# Patient Record
Sex: FEMALE | Race: WHITE | Employment: OTHER | URBAN - METROPOLITAN AREA
[De-identification: names, ages, dates, MRNs, and addresses within clinical notes are randomized per-mention and may not be internally consistent; named-entity substitution may affect disease eponyms.]

---

## 2017-01-31 PROBLEM — M43.10 ANTEROLISTHESIS: Status: ACTIVE | Noted: 2017-01-31

## 2017-01-31 PROBLEM — M54.50 CHRONIC BILATERAL LOW BACK PAIN WITHOUT SCIATICA: Status: ACTIVE | Noted: 2017-01-31

## 2017-01-31 PROBLEM — M54.16 LUMBAR RADICULOPATHY: Status: ACTIVE | Noted: 2017-01-31

## 2017-01-31 PROBLEM — M48.062 LUMBAR STENOSIS WITH NEUROGENIC CLAUDICATION: Status: ACTIVE | Noted: 2017-01-31

## 2017-01-31 PROBLEM — G89.29 CHRONIC BILATERAL LOW BACK PAIN WITHOUT SCIATICA: Status: ACTIVE | Noted: 2017-01-31

## 2017-11-26 PROBLEM — N81.4 UTEROVAGINAL PROLAPSE: Status: ACTIVE | Noted: 2017-11-26

## 2017-11-27 PROBLEM — Z90.710 STATUS POST HYSTERECTOMY: Status: ACTIVE | Noted: 2017-11-27

## 2017-11-29 PROBLEM — Z90.710 STATUS POST HYSTERECTOMY: Status: RESOLVED | Noted: 2017-11-27 | Resolved: 2017-11-29

## 2017-11-29 PROBLEM — N81.4 UTEROVAGINAL PROLAPSE: Status: RESOLVED | Noted: 2017-11-26 | Resolved: 2017-11-29

## 2018-03-26 ENCOUNTER — OFFICE VISIT (OUTPATIENT)
Dept: PHYSICAL MEDICINE AND REHAB | Age: 68
End: 2018-03-26
Payer: MEDICARE

## 2018-03-26 VITALS
WEIGHT: 252 LBS | DIASTOLIC BLOOD PRESSURE: 88 MMHG | SYSTOLIC BLOOD PRESSURE: 134 MMHG | BODY MASS INDEX: 36.08 KG/M2 | HEIGHT: 70 IN | OXYGEN SATURATION: 99 % | HEART RATE: 54 BPM

## 2018-03-26 DIAGNOSIS — M54.16 LUMBAR RADICULOPATHY: Primary | ICD-10-CM

## 2018-03-26 DIAGNOSIS — G89.29 CHRONIC BILATERAL LOW BACK PAIN WITHOUT SCIATICA: ICD-10-CM

## 2018-03-26 DIAGNOSIS — M70.61 TROCHANTERIC BURSITIS OF RIGHT HIP: ICD-10-CM

## 2018-03-26 DIAGNOSIS — M48.062 LUMBAR STENOSIS WITH NEUROGENIC CLAUDICATION: ICD-10-CM

## 2018-03-26 DIAGNOSIS — M47.896 OTHER SPONDYLOSIS, LUMBAR REGION: ICD-10-CM

## 2018-03-26 DIAGNOSIS — M54.50 CHRONIC BILATERAL LOW BACK PAIN WITHOUT SCIATICA: ICD-10-CM

## 2018-03-26 PROCEDURE — 99214 OFFICE O/P EST MOD 30 MIN: CPT | Performed by: PHYSICAL MEDICINE & REHABILITATION

## 2018-03-26 NOTE — PROGRESS NOTES
Silke Garcia D.O. [facility]  [address] [facility], [address] [place] [address], Jeronimo Maria  Phone: [phone]  Fax: [phone]      3/27/18    Chief Complaint   Patient presents with    Lower Back Pain     6 Week Follow Up        HPI:  Rosalind Stevens is a 69y.o. year old woman seen today in follow up regarding low back pain. Interval history: Since the last visit the patient states her right lateral hip pain has resolved with the exercises. She completed PT and is doing the home exercise daily. She has continued with Celebrex and Tylenol arthritis. Today, the pain is rated Pain Score:   5 where 0 is no pain and 10 is pain as bad as it can be. The pain is located in the axial lumbar spine, right lateral hip and  does not radiate, and is described as aching, tight. This pain occurs in the morning. The symptoms have been unchanged since onset. Symptoms are exacerbated by walking. Factors which relieve the pain include hot shower, rest and sitting. Other associated symptoms include weakness in the right leg, weakness in the left leg, tingling in the right leg and tingling in the left leg. Otherwise, the pain assessment has not changed since the last visit.      Past Medical History:   Diagnosis Date    Arthritis     Dry eyes, bilateral     Female bladder prolapse 05/2017    Hypertension     OAB (overactive bladder)      Past Surgical History:   Procedure Laterality Date    ABDOMEN SURGERY      BUNIONECTOMY      bilteral twice    CHOLECYSTECTOMY      COLONOSCOPY      x 2    HYSTERECTOMY  11/27/2017    robotic assisted bilateral alpingo-oophorectomy lap robotic assisted abdominal sacral colopexy with y mesh    JOINT REPLACEMENT      bilateral knees, right shoulder replaced    KNEE ARTHROPLASTY Bilateral     SHOULDER SURGERY  09/20/2016    right total shoulder reverse arthroplasty     Social History     Social History    Marital status:  and pin prick in all upper and lower extremity dermatomes. Vibration and proprioception are intact at the bilateral first MTP. Strength: 4/5 bilateral hip flexion and abduction, otherwise is 5/5 in all myotomes in the upper and lower extremities. Muscle Tendon Reflexes: 2+ symmetric in the bilateral upper and lower extremities. Babinski is downgoing bilaterally. Beau Jun is negative bilaterally. Gait is Tredellenberg, antalgic with cane. Romberg is negative. Heel and toe walk are normal.  Tandem walk is normal.  No clonus or spasticity. The patient was able to rise from a chair and squat without difficulty. There is no tremor. Impression:   1. Lumbar radiculopathy    2. Chronic bilateral low back pain without sciatica    3. Lumbar stenosis with neurogenic claudication    4. Trochanteric bursitis of right hip    5. Other spondylosis, lumbar region         Plan:  Orders Placed This Encounter   Procedures    AK INJ DX/THER AGNT PARAVERT FACET JOINT, LUMBAR/SAC, 1ST LEVEL     Bilateral intra-articular facet joint injection with fluoroscopic guidance at L3-4 and L4-5  by Dr. Sarah Muir     Weight loss is recommended. Patient was educated regarding reasons to seek urgent medical care including new and/ or progressive weakness in her legs, saddle anesthesia, loss of control of bowel and bladder, falls and difficulty ambulating. The patient was educated about the diagnosis, prognosis, indications, risks and benefits of treatment. An opportunity to ask questions was given to the patient and questions were answered. The patient agreed to proceed with the recommended treatment as described above. Follow up after Facet injection  Thank you for allowing me to participate in the care of your patient. Ricky Flowers D.O., P.T.   Board Certified Physical Medicine and Rehabilitation  Board Certified Electrodiagnostic Medicine

## 2018-04-24 ENCOUNTER — TELEPHONE (OUTPATIENT)
Dept: PHYSICAL MEDICINE AND REHAB | Age: 68
End: 2018-04-24

## 2018-06-28 ENCOUNTER — HOSPITAL ENCOUNTER (OUTPATIENT)
Dept: OPERATING ROOM | Age: 68
Setting detail: OUTPATIENT SURGERY
Discharge: HOME | End: 2018-06-28
Attending: PHYSICAL MEDICINE & REHABILITATION
Payer: MEDICARE

## 2018-06-28 ENCOUNTER — HOSPITAL ENCOUNTER (OUTPATIENT)
Age: 68
Setting detail: OUTPATIENT SURGERY
Discharge: HOME | End: 2018-06-28
Attending: PHYSICAL MEDICINE & REHABILITATION | Admitting: PHYSICAL MEDICINE & REHABILITATION
Payer: MEDICARE

## 2018-06-28 VITALS
HEART RATE: 54 BPM | RESPIRATION RATE: 16 BRPM | OXYGEN SATURATION: 99 % | SYSTOLIC BLOOD PRESSURE: 171 MMHG | DIASTOLIC BLOOD PRESSURE: 86 MMHG

## 2018-06-28 DIAGNOSIS — M54.16 LUMBAR RADICULOPATHY: ICD-10-CM

## 2018-06-28 PROBLEM — M47.819 FACET ARTHROPATHY: Status: ACTIVE | Noted: 2018-06-28

## 2018-06-28 PROCEDURE — 3209999900 FLUORO FOR SURGICAL PROCEDURES

## 2018-06-28 PROCEDURE — 64494 INJ PARAVERT F JNT L/S 2 LEV: CPT | Performed by: PHYSICAL MEDICINE & REHABILITATION

## 2018-06-28 PROCEDURE — 6360000002 HC RX W HCPCS: Performed by: PHYSICAL MEDICINE & REHABILITATION

## 2018-06-28 PROCEDURE — 3600000005 HC SURGERY LEVEL 5 BASE: Performed by: PHYSICAL MEDICINE & REHABILITATION

## 2018-06-28 PROCEDURE — 7100000010 HC PHASE II RECOVERY - FIRST 15 MIN: Performed by: PHYSICAL MEDICINE & REHABILITATION

## 2018-06-28 PROCEDURE — 64493 INJ PARAVERT F JNT L/S 1 LEV: CPT | Performed by: PHYSICAL MEDICINE & REHABILITATION

## 2018-06-28 PROCEDURE — 2500000003 HC RX 250 WO HCPCS: Performed by: PHYSICAL MEDICINE & REHABILITATION

## 2018-06-28 RX ORDER — LIDOCAINE HYDROCHLORIDE 10 MG/ML
INJECTION, SOLUTION EPIDURAL; INFILTRATION; INTRACAUDAL; PERINEURAL PRN
Status: DISCONTINUED | OUTPATIENT
Start: 2018-06-28 | End: 2018-06-28 | Stop reason: HOSPADM

## 2018-06-28 ASSESSMENT — PAIN SCALES - GENERAL
PAINLEVEL_OUTOF10: 0
PAINLEVEL_OUTOF10: 0

## 2018-06-28 ASSESSMENT — PAIN - FUNCTIONAL ASSESSMENT: PAIN_FUNCTIONAL_ASSESSMENT: 0-10

## 2018-06-28 ASSESSMENT — PAIN DESCRIPTION - DESCRIPTORS: DESCRIPTORS: ACHING

## 2018-07-27 ENCOUNTER — OFFICE VISIT (OUTPATIENT)
Dept: PHYSICAL MEDICINE AND REHAB | Age: 68
End: 2018-07-27
Payer: MEDICARE

## 2018-07-27 VITALS
HEIGHT: 70 IN | OXYGEN SATURATION: 97 % | WEIGHT: 253 LBS | HEART RATE: 48 BPM | BODY MASS INDEX: 36.22 KG/M2 | DIASTOLIC BLOOD PRESSURE: 80 MMHG | SYSTOLIC BLOOD PRESSURE: 130 MMHG

## 2018-07-27 DIAGNOSIS — M48.062 LUMBAR STENOSIS WITH NEUROGENIC CLAUDICATION: ICD-10-CM

## 2018-07-27 DIAGNOSIS — M47.896 OTHER SPONDYLOSIS, LUMBAR REGION: ICD-10-CM

## 2018-07-27 DIAGNOSIS — G89.29 CHRONIC BILATERAL LOW BACK PAIN WITHOUT SCIATICA: Primary | ICD-10-CM

## 2018-07-27 DIAGNOSIS — E66.01 MORBID OBESITY DUE TO EXCESS CALORIES (HCC): ICD-10-CM

## 2018-07-27 DIAGNOSIS — M54.50 CHRONIC BILATERAL LOW BACK PAIN WITHOUT SCIATICA: Primary | ICD-10-CM

## 2018-07-27 PROCEDURE — 99213 OFFICE O/P EST LOW 20 MIN: CPT | Performed by: PHYSICAL MEDICINE & REHABILITATION

## 2020-03-06 ENCOUNTER — HOSPITAL ENCOUNTER (OUTPATIENT)
Dept: GENERAL RADIOLOGY | Age: 70
Discharge: HOME | End: 2020-03-08
Payer: MEDICARE

## 2020-03-06 PROCEDURE — 77063 BREAST TOMOSYNTHESIS BI: CPT

## 2020-12-05 ENCOUNTER — HOSPITAL ENCOUNTER (EMERGENCY)
Age: 70
Discharge: HOME | End: 2020-12-05
Attending: EMERGENCY MEDICINE
Payer: MEDICARE

## 2020-12-05 ENCOUNTER — APPOINTMENT (OUTPATIENT)
Dept: CT IMAGING | Age: 70
End: 2020-12-05
Payer: MEDICARE

## 2020-12-05 VITALS
HEART RATE: 61 BPM | BODY MASS INDEX: 35.79 KG/M2 | TEMPERATURE: 97.7 F | OXYGEN SATURATION: 98 % | DIASTOLIC BLOOD PRESSURE: 123 MMHG | WEIGHT: 250 LBS | RESPIRATION RATE: 18 BRPM | HEIGHT: 70 IN | SYSTOLIC BLOOD PRESSURE: 231 MMHG

## 2020-12-05 PROCEDURE — 72125 CT NECK SPINE W/O DYE: CPT

## 2020-12-05 PROCEDURE — 99283 EMERGENCY DEPT VISIT LOW MDM: CPT

## 2020-12-05 PROCEDURE — 70450 CT HEAD/BRAIN W/O DYE: CPT

## 2020-12-05 ASSESSMENT — ENCOUNTER SYMPTOMS
NAUSEA: 0
ABDOMINAL PAIN: 0
VISUAL CHANGE: 0
COUGH: 0
EYE DISCHARGE: 0
SHORTNESS OF BREATH: 0
SORE THROAT: 0
WHEEZING: 0
BACK PAIN: 0
BOWEL INCONTINENCE: 0
EYE REDNESS: 0
DIARRHEA: 0
VOMITING: 0
ABDOMINAL DISTENTION: 0
EYE PAIN: 0
SINUS PRESSURE: 0

## 2020-12-05 NOTE — ED PROVIDER NOTES
Pupils: Pupils are equal, round, and reactive to light. Neck:      Musculoskeletal: Normal range of motion and neck supple. Cardiovascular:      Rate and Rhythm: Normal rate and regular rhythm. Heart sounds: Normal heart sounds. No murmur. Pulmonary:      Effort: Pulmonary effort is normal. No respiratory distress. Breath sounds: Normal breath sounds. No wheezing or rales. Abdominal:      General: Bowel sounds are normal.      Palpations: Abdomen is soft. Tenderness: There is no abdominal tenderness. There is no guarding or rebound. Musculoskeletal:         General: Tenderness present. Cervical back: She exhibits tenderness and bony tenderness. Comments: Midline tenderness around C4-C5. Skin:     General: Skin is warm and dry. Neurological:      Mental Status: She is alert and oriented to person, place, and time. Cranial Nerves: No cranial nerve deficit. Coordination: Coordination normal.          Procedures     MDM            --------------------------------------------- PAST HISTORY ---------------------------------------------  Past Medical History:  has a past medical history of Arthritis, Dry eyes, bilateral, Female bladder prolapse, Hypertension, and OAB (overactive bladder). Past Surgical History:  has a past surgical history that includes Knee Arthroplasty (Bilateral); Bunionectomy; Cholecystectomy; shoulder surgery (09/20/2016); Colonoscopy; joint replacement; Hysterectomy (11/27/2017); Abdomen surgery; Nerve Block (Bilateral, 06/28/2018); and pr kiarra nose/cleft lip/tip (Bilateral, 6/28/2018). Social History:  reports that she has quit smoking. She has never used smokeless tobacco. She reports current alcohol use. She reports that she does not use drugs. Family History: family history is not on file. The patients home medications have been reviewed.     Allergies: Morphine and Pcn [penicillins]    -------------------------------------------------- RESULTS -------------------------------------------------  Labs:  No results found for this visit on 12/05/20. Radiology:  CT HEAD WO CONTRAST   Final Result   1. There is no acute intracranial abnormality. Specifically, there is no   intracranial hemorrhage. 2. Atrophy and periventricular leukomalacia,   3. Right maxillary and right ethmoid sinusitis. There is complete   opacification of the right maxillary sinus. CT CERVICAL SPINE WO CONTRAST   Final Result   1. There is no acute compression fracture or subluxation of the cervical   spine. 2. Multilevel degenerative disc and degenerative joint disease.          ------------------------- NURSING NOTES AND VITALS REVIEWED ---------------------------  Date / Time Roomed:  12/5/2020  1:43 PM  ED Bed Assignment:  16/16    The nursing notes within the ED encounter and vital signs as below have been reviewed. BP (!) 231/123   Pulse 61   Temp 97.7 °F (36.5 °C)   Resp 18   Ht 5' 10\" (1.778 m)   Wt 250 lb (113.4 kg)   SpO2 98%   BMI 35.87 kg/m²   Oxygen Saturation Interpretation: Normal      ------------------------------------------ PROGRESS NOTES ------------------------------------------  2:46 PM EST  I have spoken with the patient and discussed todays results, in addition to providing specific details for the plan of care and counseling regarding the diagnosis and prognosis. Their questions are answered at this time and they are agreeable with the plan. I discussed at length with them reasons for immediate return here for re evaluation. They will followup with their primary care physician by calling their office on Monday.      --------------------------------- ADDITIONAL PROVIDER NOTES ---------------------------------  At this time the patient is without objective evidence of an acute process requiring hospitalization or inpatient management.   They have remained hemodynamically stable throughout their entire ED visit and are stable for discharge with outpatient follow-up. The plan has been discussed in detail and they are aware of the specific conditions for emergent return, as well as the importance of follow-up. New Prescriptions    No medications on file       Diagnosis:  1. Fall, initial encounter        Disposition:  Patient's disposition: Discharge to home  Patient's condition is stable.            Belén Bernal DO  12/05/20 1446

## 2021-10-08 ENCOUNTER — HOSPITAL ENCOUNTER (OUTPATIENT)
Dept: GENERAL RADIOLOGY | Age: 71
Discharge: HOME | End: 2021-10-10
Payer: MEDICARE

## 2021-10-08 DIAGNOSIS — Z12.31 VISIT FOR SCREENING MAMMOGRAM: ICD-10-CM

## 2021-10-08 PROCEDURE — 77063 BREAST TOMOSYNTHESIS BI: CPT

## 2021-12-06 NOTE — PROGRESS NOTES
Patient states she is fully vaccinated against Covid-19. Patient to bring vaccine card on day of procedure. No pre-op testing needed.

## 2021-12-08 ENCOUNTER — PREP FOR PROCEDURE (OUTPATIENT)
Dept: UROLOGY | Age: 71
End: 2021-12-08

## 2021-12-08 RX ORDER — CIPROFLOXACIN 2 MG/ML
400 INJECTION, SOLUTION INTRAVENOUS
Status: CANCELLED | OUTPATIENT
Start: 2021-12-08 | End: 2021-12-08

## 2021-12-08 RX ORDER — SODIUM CHLORIDE 0.9 % (FLUSH) 0.9 %
5-40 SYRINGE (ML) INJECTION PRN
Status: CANCELLED | OUTPATIENT
Start: 2021-12-08

## 2021-12-08 RX ORDER — SODIUM CHLORIDE 9 MG/ML
25 INJECTION, SOLUTION INTRAVENOUS PRN
Status: CANCELLED | OUTPATIENT
Start: 2021-12-08

## 2021-12-08 RX ORDER — SODIUM CHLORIDE 0.9 % (FLUSH) 0.9 %
5-40 SYRINGE (ML) INJECTION EVERY 12 HOURS SCHEDULED
Status: CANCELLED | OUTPATIENT
Start: 2021-12-08

## 2021-12-16 RX ORDER — METOPROLOL SUCCINATE 100 MG/1
100 TABLET, EXTENDED RELEASE ORAL DAILY
COMMUNITY
End: 2022-10-19 | Stop reason: SDUPTHER

## 2021-12-16 NOTE — PROGRESS NOTES
Have you been tested for COVID  Yes           Have you been told you were positive for COVID No  Have you had any known exposure to someone that is positive for COVID No  Do you have a cough                   No              Do you have shortness of breath No                 Do you have a sore throat            No                Are you having chills                    No                Are you having muscle aches. No                    Please come to the hospital wearing a mask and have your significant other wear a mask as well. Both of you should check your temperature before leaving to come here,  if it is 100 or higher please call [phone] for instruction.

## 2021-12-16 NOTE — PROGRESS NOTES
[facility] PRE-ADMISSION TESTING INSTRUCTIONS    The Preadmission Testing patient is instructed accordingly using the following criteria (check applicable):    ARRIVAL INSTRUCTIONS:  [x] Parking the day of Surgery is located in the Main Entrance lot. Upon entering the door, make an immediate right to the surgery reception desk    [x] Bring photo ID and insurance card    [x] Bring in a copy of Living will or Durable Power of  papers. [x] Please be sure to arrange for responsible adult to provide transportation to and from the hospital    [x] Please arrange for responsible adult to be with you for the 24 hour period post procedure due to having anesthesia      GENERAL INSTRUCTIONS:    [x] Nothing by mouth after midnight, including gum, candy, mints or water    [x] You may brush your teeth, but do not swallow any water    [x] Take medications as instructed with 1-2 oz of water    [x] Stop herbal supplements and vitamins 5 days prior to procedure    [x] Follow preop dosing of blood thinners per physician instructions    [] Take 1/2 dose of evening insulin, but no insulin after midnight    [] No oral diabetic medications after midnight    [] If diabetic and have low blood sugar or feel symptomatic, take 1-2oz apple juice only    [] Bring inhalers day of surgery    [] Bring C-PAP/ Bi-Pap day of surgery    [] Bring urine specimen day of surgery    [x] Shower or bath with soap, lather and rinse well, AM of Surgery, no lotion, powders or creams to surgical site    [] Follow bowel prep as instructed per surgeon    [x] No tobacco products within 24 hours of surgery     [x] No alcohol or illegal drug use within 24 hours of surgery.     [x] Jewelry, body piercing's, eyeglasses, contact lenses and dentures are not permitted into surgery (bring cases)      [x] Please do not wear any nail polish, make up or hair products on the day of surgery    [x] You can expect a call the business day prior to procedure to notify you if your arrival time changes    [x] If you receive a survey after surgery we would greatly appreciate your comments    [] Parent/guardian of a minor must accompany their child and remain on the premises  the entire time they are under our care     [] Pediatric patients may bring favorite toy, blanket or comfort item with them    [] A caregiver or family member must remain with the patient during their stay if they are mentally handicapped, have dementia, disoriented or unable to use a call light or would be a safety concern if left unattended    [x] Please notify surgeon if you develop any illness between now and time of surgery (cold, cough, sore throat, fever, nausea, vomiting) or any signs of infections  including skin, wounds, and dental.    [x]  The Outpatient Pharmacy is available to fill your prescription here on your day of surgery, ask your preop nurse for details    [] Other instructions    EDUCATIONAL MATERIALS PROVIDED:    [] PAT Preoperative Education Packet/Booklet     [] Medication List    [] Transfusion bracelet applied with instructions    [] Shower with soap, lather and rinse well, and use CHG wipes provided the evening before surgery as instructed    [] Incentive spirometer with instructions

## 2021-12-17 ENCOUNTER — HOSPITAL ENCOUNTER (OUTPATIENT)
Age: 71
Setting detail: OUTPATIENT SURGERY
Discharge: HOME | End: 2021-12-17
Attending: UROLOGY | Admitting: UROLOGY
Payer: MEDICARE

## 2021-12-17 ENCOUNTER — ANESTHESIA (OUTPATIENT)
Dept: OPERATING ROOM | Age: 71
End: 2021-12-17
Payer: MEDICARE

## 2021-12-17 ENCOUNTER — HOSPITAL ENCOUNTER (OUTPATIENT)
Dept: GENERAL RADIOLOGY | Age: 71
Discharge: HOME | End: 2021-12-19
Payer: MEDICARE

## 2021-12-17 ENCOUNTER — ANESTHESIA EVENT (OUTPATIENT)
Dept: OPERATING ROOM | Age: 71
End: 2021-12-17
Payer: MEDICARE

## 2021-12-17 VITALS
BODY MASS INDEX: 33.6 KG/M2 | TEMPERATURE: 97.3 F | DIASTOLIC BLOOD PRESSURE: 88 MMHG | SYSTOLIC BLOOD PRESSURE: 158 MMHG | HEART RATE: 64 BPM | RESPIRATION RATE: 18 BRPM | HEIGHT: 71 IN | OXYGEN SATURATION: 97 % | WEIGHT: 240 LBS

## 2021-12-17 VITALS
OXYGEN SATURATION: 98 % | RESPIRATION RATE: 13 BRPM | SYSTOLIC BLOOD PRESSURE: 112 MMHG | DIASTOLIC BLOOD PRESSURE: 71 MMHG

## 2021-12-17 DIAGNOSIS — R52 PAIN: ICD-10-CM

## 2021-12-17 PROCEDURE — 3209999900 FLUORO FOR SURGICAL PROCEDURES

## 2021-12-17 PROCEDURE — 6360000002 HC RX W HCPCS: Performed by: NURSE ANESTHETIST, CERTIFIED REGISTERED

## 2021-12-17 PROCEDURE — 2500000003 HC RX 250 WO HCPCS: Performed by: NURSE ANESTHETIST, CERTIFIED REGISTERED

## 2021-12-17 PROCEDURE — C1897 LEAD, NEUROSTIM TEST KIT: HCPCS | Performed by: UROLOGY

## 2021-12-17 PROCEDURE — 2500000003 HC RX 250 WO HCPCS: Performed by: UROLOGY

## 2021-12-17 PROCEDURE — 3600000002 HC SURGERY LEVEL 2 BASE: Performed by: UROLOGY

## 2021-12-17 PROCEDURE — 6360000002 HC RX W HCPCS: Performed by: ANESTHESIOLOGY

## 2021-12-17 PROCEDURE — 3700000001 HC ADD 15 MINUTES (ANESTHESIA): Performed by: UROLOGY

## 2021-12-17 PROCEDURE — 3600000012 HC SURGERY LEVEL 2 ADDTL 15MIN: Performed by: UROLOGY

## 2021-12-17 PROCEDURE — 6360000002 HC RX W HCPCS: Performed by: NURSE PRACTITIONER

## 2021-12-17 PROCEDURE — 2580000003 HC RX 258: Performed by: NURSE ANESTHETIST, CERTIFIED REGISTERED

## 2021-12-17 PROCEDURE — 2709999900 HC NON-CHARGEABLE SUPPLY: Performed by: UROLOGY

## 2021-12-17 PROCEDURE — 7100000011 HC PHASE II RECOVERY - ADDTL 15 MIN: Performed by: UROLOGY

## 2021-12-17 PROCEDURE — 3700000000 HC ANESTHESIA ATTENDED CARE: Performed by: UROLOGY

## 2021-12-17 PROCEDURE — 7100000010 HC PHASE II RECOVERY - FIRST 15 MIN: Performed by: UROLOGY

## 2021-12-17 DEVICE — KIT NEUROSTIMULATOR TST LD FOR SACR NEUROMODULATION BWL: Type: IMPLANTABLE DEVICE | Site: BACK | Status: FUNCTIONAL

## 2021-12-17 DEVICE — LEAD STIM TST INTERSTIM: Type: IMPLANTABLE DEVICE | Site: BACK | Status: FUNCTIONAL

## 2021-12-17 RX ORDER — LIDOCAINE HYDROCHLORIDE 10 MG/ML
INJECTION, SOLUTION EPIDURAL; INFILTRATION; INTRACAUDAL; PERINEURAL PRN
Status: DISCONTINUED | OUTPATIENT
Start: 2021-12-17 | End: 2021-12-17 | Stop reason: ALTCHOICE

## 2021-12-17 RX ORDER — SODIUM CHLORIDE 9 MG/ML
25 INJECTION, SOLUTION INTRAVENOUS PRN
Status: DISCONTINUED | OUTPATIENT
Start: 2021-12-17 | End: 2021-12-17 | Stop reason: HOSPADM

## 2021-12-17 RX ORDER — MIDAZOLAM HYDROCHLORIDE 1 MG/ML
INJECTION INTRAMUSCULAR; INTRAVENOUS PRN
Status: DISCONTINUED | OUTPATIENT
Start: 2021-12-17 | End: 2021-12-17 | Stop reason: SDUPTHER

## 2021-12-17 RX ORDER — FENTANYL CITRATE 50 UG/ML
INJECTION, SOLUTION INTRAMUSCULAR; INTRAVENOUS PRN
Status: DISCONTINUED | OUTPATIENT
Start: 2021-12-17 | End: 2021-12-17 | Stop reason: SDUPTHER

## 2021-12-17 RX ORDER — CIPROFLOXACIN 2 MG/ML
400 INJECTION, SOLUTION INTRAVENOUS
Status: COMPLETED | OUTPATIENT
Start: 2021-12-17 | End: 2021-12-17

## 2021-12-17 RX ORDER — SODIUM CHLORIDE 9 MG/ML
INJECTION, SOLUTION INTRAVENOUS CONTINUOUS PRN
Status: DISCONTINUED | OUTPATIENT
Start: 2021-12-17 | End: 2021-12-17 | Stop reason: SDUPTHER

## 2021-12-17 RX ORDER — HYDRALAZINE HYDROCHLORIDE 20 MG/ML
10 INJECTION INTRAMUSCULAR; INTRAVENOUS ONCE
Status: COMPLETED | OUTPATIENT
Start: 2021-12-17 | End: 2021-12-17

## 2021-12-17 RX ORDER — SODIUM CHLORIDE 0.9 % (FLUSH) 0.9 %
5-40 SYRINGE (ML) INJECTION EVERY 12 HOURS SCHEDULED
Status: DISCONTINUED | OUTPATIENT
Start: 2021-12-17 | End: 2021-12-17 | Stop reason: HOSPADM

## 2021-12-17 RX ORDER — SODIUM CHLORIDE 0.9 % (FLUSH) 0.9 %
5-40 SYRINGE (ML) INJECTION PRN
Status: DISCONTINUED | OUTPATIENT
Start: 2021-12-17 | End: 2021-12-17 | Stop reason: HOSPADM

## 2021-12-17 RX ORDER — KETAMINE HYDROCHLORIDE 10 MG/ML
INJECTION, SOLUTION INTRAMUSCULAR; INTRAVENOUS PRN
Status: DISCONTINUED | OUTPATIENT
Start: 2021-12-17 | End: 2021-12-17 | Stop reason: SDUPTHER

## 2021-12-17 RX ORDER — PROPOFOL 10 MG/ML
INJECTION, EMULSION INTRAVENOUS PRN
Status: DISCONTINUED | OUTPATIENT
Start: 2021-12-17 | End: 2021-12-17 | Stop reason: SDUPTHER

## 2021-12-17 RX ORDER — HYDRALAZINE HYDROCHLORIDE 20 MG/ML
5 INJECTION INTRAMUSCULAR; INTRAVENOUS ONCE
Status: COMPLETED | OUTPATIENT
Start: 2021-12-17 | End: 2021-12-17

## 2021-12-17 RX ORDER — PROPOFOL 10 MG/ML
INJECTION, EMULSION INTRAVENOUS CONTINUOUS PRN
Status: DISCONTINUED | OUTPATIENT
Start: 2021-12-17 | End: 2021-12-17 | Stop reason: SDUPTHER

## 2021-12-17 RX ADMIN — PROPOFOL 100 MCG/KG/MIN: 10 INJECTION, EMULSION INTRAVENOUS at 09:19

## 2021-12-17 RX ADMIN — HYDRALAZINE HYDROCHLORIDE 5 MG: 20 INJECTION INTRAMUSCULAR; INTRAVENOUS at 10:47

## 2021-12-17 RX ADMIN — CIPROFLOXACIN 600 MG: 2 INJECTION, SOLUTION INTRAVENOUS at 09:46

## 2021-12-17 RX ADMIN — CIPROFLOXACIN 400 MG: 2 INJECTION, SOLUTION INTRAVENOUS at 08:47

## 2021-12-17 RX ADMIN — MIDAZOLAM 1 MG: 1 INJECTION INTRAMUSCULAR; INTRAVENOUS at 09:19

## 2021-12-17 RX ADMIN — HYDRALAZINE HYDROCHLORIDE 10 MG: 20 INJECTION INTRAMUSCULAR; INTRAVENOUS at 11:38

## 2021-12-17 RX ADMIN — FENTANYL CITRATE 50 MCG: 50 INJECTION, SOLUTION INTRAMUSCULAR; INTRAVENOUS at 09:08

## 2021-12-17 RX ADMIN — SODIUM CHLORIDE: 9 INJECTION, SOLUTION INTRAVENOUS at 09:07

## 2021-12-17 RX ADMIN — KETAMINE HYDROCHLORIDE 30 MG: 10 INJECTION INTRAMUSCULAR; INTRAVENOUS at 09:19

## 2021-12-17 RX ADMIN — PROPOFOL 40 MG: 10 INJECTION, EMULSION INTRAVENOUS at 09:19

## 2021-12-17 ASSESSMENT — PAIN SCALES - GENERAL
PAINLEVEL_OUTOF10: 0

## 2021-12-17 ASSESSMENT — PULMONARY FUNCTION TESTS
PIF_VALUE: 1
PIF_VALUE: 0
PIF_VALUE: 1
PIF_VALUE: 0

## 2021-12-17 ASSESSMENT — ENCOUNTER SYMPTOMS: SHORTNESS OF BREATH: 0

## 2021-12-17 ASSESSMENT — PAIN DESCRIPTION - PAIN TYPE: TYPE: SURGICAL PAIN

## 2021-12-17 NOTE — H&P
12/17/2021 8:22 AM  Service: Urology  Group: [facility] (Josh/Yuliana/Mary)    Santino Lindsey  [id number]     Chief Complaint: NGB, cystocele     History of Present Illness:   The patient is a 65 y.o. female patient who presents with the above    Past Medical History:   Diagnosis Date    Arthritis     Dry eyes, bilateral     Hypertension     OAB (overactive bladder)        Past Surgical History:   Procedure Laterality Date    ABDOMEN SURGERY      BUNIONECTOMY      bilteral twice    CHOLECYSTECTOMY      COLONOSCOPY      x 2    HYSTERECTOMY  11/27/2017    robotic assisted bilateral alpingo-oophorectomy lap robotic assisted abdominal sacral colopexy with y mesh    JOINT REPLACEMENT      bilateral knees, right shoulder replaced    KNEE ARTHROPLASTY Bilateral     NERVE BLOCK Bilateral 06/28/2018    facet L3-5 #1    IA ENOC NOSE/CLEFT LIP/TIP Bilateral 6/28/2018    BILATERAL INTRA-ARTICULAR FACET JOINT INJECTION WITH FLUOROSCOPIC GUIDANCE AT L3-4 AND L4-5 #1 performed by Eduard Maciel DO at [address] [facility]  09/20/2016    right total shoulder reverse arthroplasty       Medications Prior to Admission:    Medications Prior to Admission: metoprolol succinate (TOPROL XL) 100 MG extended release tablet, Take 100 mg by mouth daily  acetaminophen (TYLENOL ARTHRITIS PAIN) 650 MG extended release tablet, Take 650 mg by mouth every 8 hours as needed for Pain  celecoxib (CELEBREX) 200 MG capsule, Take 200 mg by mouth daily  docusate sodium (COLACE) 100 MG capsule, Take 100 mg by mouth daily  aspirin 81 MG tablet, Take 81 mg by mouth daily  Cyanocobalamin (VITAMIN B 12 PO), Take 250 mg by mouth daily  Multiple Vitamins-Minerals (MULTIVITAL PO), Take by mouth daily  irbesartan-hydrochlorothiazide (AVALIDE) 150-12.5 MG per tablet, Take 2 tablets by mouth daily   erythromycin (ROMYCIN) 5 MG/GM ophthalmic ointment, APPLY TO BOTH EYES AT BEDTIME AS DIRECTED  polyvinyl alcohol (LIQUIFILM ASCP    Plan:    See the outpatient H&P  All options were discussed  The patient family is present  Progress to the OR for PNE  The risks, benefits, and alternatives were discussed  NPO  DVT prophylaxis  Pre-op antibiotics      Ginger Carvajal Josh, DO   [facility]

## 2021-12-17 NOTE — BRIEF OP NOTE
Brief Postoperative Note      Patient: Ilya Camp  YOB: 1950  MRN: [id number]    Date of Procedure: 12/17/2021    Pre-Op Diagnosis: FEELING OF INCOMPLETE BLADDER EMPTYING    Post-Op Diagnosis: Same       Procedure(s):  PERIPHERAL NERVE EVALUATION    Surgeon(s):  Bienvenido Moreno DO    Assistant:  * No surgical staff found *    Anesthesia: Monitor Anesthesia Care    Estimated Blood Loss (mL): Minimal    Complications: None    Specimens:   * No specimens in log *    Implants:  * No implants in log *      Drains: * No LDAs found *    Findings: see operative report     Electronically signed by Cody Moreno DO on 12/17/2021 at 8:24 AM

## 2021-12-17 NOTE — ANESTHESIA PRE PROCEDURE
Department of Anesthesiology  Preprocedure Note       Name:  Yenni Montague   Age:  65 y.o.  :  1950                                          MRN:  [id number]         Date:  2021      Surgeon: Ede Medina):  Acacia Wells A Josh, DO    Procedure: Procedure(s):  PERIPHERAL NERVE EVALUATION    Medications prior to admission:   Prior to Admission medications    Medication Sig Start Date End Date Taking?  Authorizing Provider   metoprolol succinate (TOPROL XL) 100 MG extended release tablet Take 100 mg by mouth daily   Yes Historical Provider, MD   acetaminophen (TYLENOL ARTHRITIS PAIN) 650 MG extended release tablet Take 650 mg by mouth every 8 hours as needed for Pain   Yes Historical Provider, MD   celecoxib (CELEBREX) 200 MG capsule Take 200 mg by mouth daily   Yes Historical Provider, MD   docusate sodium (COLACE) 100 MG capsule Take 100 mg by mouth daily   Yes Historical Provider, MD   aspirin 81 MG tablet Take 81 mg by mouth daily   Yes Historical Provider, MD   Cyanocobalamin (VITAMIN B 12 PO) Take 250 mg by mouth daily   Yes Historical Provider, MD   Multiple Vitamins-Minerals (MULTIVITAL PO) Take by mouth daily   Yes Historical Provider, MD   irbesartan-hydrochlorothiazide (AVALIDE) 150-12.5 MG per tablet Take 2 tablets by mouth daily    Yes Historical Provider, MD   erythromycin (ROMYCIN) 5 MG/GM ophthalmic ointment APPLY TO BOTH EYES AT BEDTIME AS DIRECTED 8/10/17   Historical Provider, MD   polyvinyl alcohol (LIQUIFILM TEARS) 1.4 % ophthalmic solution Place 1 drop into both eyes as needed    Historical Provider, MD   Omega-3 Fatty Acids (OMEGA 3 PO) Take 2,000 mg by mouth daily     Historical Provider, MD       Current medications:    Current Facility-Administered Medications   Medication Dose Route Frequency Provider Last Rate Last Admin    0.9 % sodium chloride infusion  25 mL IntraVENous PRN Lilian Gu, APRN - CNP        ciprofloxacin (CIPRO) IVPB 400 mg  400 mg IntraVENous On Call to OR CHANDRA Rice CNP        sodium chloride flush 0.9 % injection 5-40 mL  5-40 mL IntraVENous 2 times per day CHANDRA Rice CNP        sodium chloride flush 0.9 % injection 5-40 mL  5-40 mL IntraVENous PRN CHANDRA Rice CNP         Facility-Administered Medications Ordered in Other Encounters   Medication Dose Route Frequency Provider Last Rate Last Admin    sodium chloride flush 0.9 % injection 10 mL  10 mL IntraVENous 2 times per day ADALI Madrigal        sodium chloride flush 0.9 % injection 10 mL  10 mL IntraVENous PRN ADALI Greenfield        0.9 % sodium chloride infusion   IntraVENous Continuous ADALI Madrigal        midazolam (VERSED) injection 1 mg  1 mg IntraVENous Q5 Min PRN Azalea Lopez DO   1 mg at 09/20/16 4539       Allergies:     Allergies   Allergen Reactions    Pcn [Penicillins] Hives, Itching and Swelling    Morphine Nausea Only       Problem List:    Patient Active Problem List   Diagnosis Code    Morbid obesity ([facility] [address] E66.01    Overactive bladder N32.81    Essential hypertension I10    Lumbar stenosis with neurogenic claudication M48.062    Lumbar radiculopathy M54.16    Anterolisthesis M43.10    Chronic bilateral low back pain without sciatica M54.50, G89.29    Facet arthropathy M47.819       Past Medical History:        Diagnosis Date    Arthritis     Dry eyes, bilateral     Hypertension     OAB (overactive bladder)        Past Surgical History:        Procedure Laterality Date    ABDOMEN SURGERY      BUNIONECTOMY      bilteral twice    CHOLECYSTECTOMY      COLONOSCOPY      x 2    HYSTERECTOMY  11/27/2017    robotic assisted bilateral alpingo-oophorectomy lap robotic assisted abdominal sacral colopexy with y mesh    JOINT REPLACEMENT      bilateral knees, right shoulder replaced    KNEE ARTHROPLASTY Bilateral     NERVE BLOCK Bilateral 06/28/2018    facet L3-5 #1    IL ENOC NOSE/CLEFT LIP/TIP Bilateral 6/28/2018 BILATERAL INTRA-ARTICULAR FACET JOINT INJECTION WITH FLUOROSCOPIC GUIDANCE AT L3-4 AND L4-5 #1 performed by Delia Alamo DO at [address]  09/20/2016    right total shoulder reverse arthroplasty       Social History:    Social History     Tobacco Use    Smoking status: Former Smoker    Smokeless tobacco: Never Used    Tobacco comment: quit smoking 40 yrs ago   Substance Use Topics    Alcohol use: Yes     Comment: social                                Counseling given: Not Answered  Comment: quit smoking 40 yrs ago      Vital Signs (Current):   Vitals:    12/16/21 0932 12/17/21 0816   Weight: 240 lb (108.9 kg) 240 lb (108.9 kg)   Height: 5' 11\" (1.803 m) 5' 11\" (1.803 m)                                              BP Readings from Last 3 Encounters:   12/05/20 (!) 231/123   07/27/18 130/80   06/28/18 (!) 171/86       NPO Status:                                                                                 BMI:   Wt Readings from Last 3 Encounters:   12/17/21 240 lb (108.9 kg)   12/05/20 250 lb (113.4 kg)   07/27/18 253 lb (114.8 kg)     Body mass index is 33.47 kg/m². CBC:   Lab Results   Component Value Date    WBC 13.1 11/28/2017    RBC 3.09 11/28/2017    HGB 9.7 11/28/2017    HCT 29.8 11/28/2017    MCV 96.4 11/28/2017    RDW 13.4 11/28/2017     11/28/2017       CMP:   Lab Results   Component Value Date     11/28/2017    K 4.3 11/28/2017     11/28/2017    CO2 25 11/28/2017    BUN 23 11/28/2017    CREATININE 0.6 11/28/2017    GFRAA >60 11/28/2017    LABGLOM >60 11/28/2017    GLUCOSE 154 11/28/2017    PROT 5.4 11/28/2017    CALCIUM 8.4 11/28/2017    BILITOT 0.7 11/28/2017    ALKPHOS 48 11/28/2017    AST 19 11/28/2017    ALT 19 11/28/2017       POC Tests: No results for input(s): POCGLU, POCNA, POCK, POCCL, POCBUN, POCHEMO, POCHCT in the last 72 hours.     Coags: No results found for: PROTIME, INR, APTT    HCG (If Applicable): No results found for: PREGTESTUR, PREGSERUM, HCG, HCGQUANT     ABGs: No results found for: PHART, PO2ART, SUG5NGO, YXM5FAP, BEART, Q5BKORZT     Type & Screen (If Applicable):  No results found for: LABABO, LABRH    Drug/Infectious Status (If Applicable):  No results found for: HIV, HEPCAB    COVID-19 Screening (If Applicable): No results found for: COVID19        Anesthesia Evaluation  Patient summary reviewed no history of anesthetic complications:   Airway: Mallampati: II  TM distance: >3 FB   Neck ROM: full  Mouth opening: > = 3 FB Dental: normal exam         Pulmonary: breath sounds clear to auscultation      (-) asthma and shortness of breath                          ROS comment: Former smoker   Cardiovascular:    (+) hypertension: moderate,     (-) past MI and CAD    ECG reviewed  Rhythm: regular  Rate: normal                    Neuro/Psych:   (+) neuromuscular disease (LUMBAR STENOSIS):,             GI/Hepatic/Renal:   (+) morbid obesity     (-) liver disease and no renal disease       Endo/Other:        (-) diabetes mellitus, hypothyroidism               Abdominal:   (+) obese,           Vascular: negative vascular ROS. Other Findings:             Anesthesia Plan      MAC     ASA 3       Induction: intravenous. MIPS: Postoperative opioids intended and Prophylactic antiemetics administered. Anesthetic plan and risks discussed with patient and spouse. Plan discussed with CRNA. Peyman Beck MD   12/17/2021    Chart reviewed, patient seen and interviewed. Agree with note above.   CHANDRA Steele, CRNA

## 2021-12-17 NOTE — ANESTHESIA POSTPROCEDURE EVALUATION
Department of Anesthesiology  Postprocedure Note    Patient: Sabino Douglas  MRN: [id number]  YOB: 1950  Date of evaluation: 12/17/2021  Time:  9:48 AM     Procedure Summary     Date: 12/17/21 Room / Location: [facility] 01 / [facility]    Anesthesia Start: 2585 Anesthesia Stop:     Procedure: PERIPHERAL NERVE EVALUATION (N/A Back) Diagnosis: (FEELING OF INCOMPLETE BLADDER EMPTYING)    Surgeons: Remy Moreno DO Responsible Provider: Autumn Drake MD    Anesthesia Type: MAC ASA Status: 3          Anesthesia Type: No value filed. Temo Phase I: Temo Score: 10    Temo Phase II:      Last vitals: Reviewed and per EMR flowsheets.        Anesthesia Post Evaluation    Patient location during evaluation: PACU  Patient participation: complete - patient participated  Level of consciousness: awake and alert  Pain score: 0  Airway patency: patent  Nausea & Vomiting: no nausea and no vomiting  Complications: no  Cardiovascular status: hemodynamically stable  Respiratory status: acceptable  Hydration status: euvolemic  Multimodal analgesia pain management approach    Eveleen Goodpasture, APRN, CRNA

## 2021-12-18 NOTE — OP NOTE
[address] [address]                                OPERATIVE REPORT    PATIENT NAME: Miguel Ramirez                   :        1950  MED REC NO:   [id number]                            ROOM:  ACCOUNT NO:   [de-identified]                           ADMIT DATE: 2021  PROVIDER:     Nnamdi Moreno DO    DATE OF PROCEDURE:  2021    PREOPERATIVE DIAGNOSES:  1. History of cystocele, status post robotic-assisted abdominal  sacrocolpopexy. 2.  She has a neurogenic bladder. 3.  Urinary retention. POSTOPERATIVE DIAGNOSES:  1. History of cystocele, status post robotic-assisted abdominal  sacrocolpopexy. 2.  She has a neurogenic bladder. 3.  Urinary retention. PROCEDURE PERFORMED:  The patient had peripheral nerve evaluation and  lead placement. ANESTHESIA:  Monitored anesthesia as well as local infiltration. ESTIMATED BLOOD LOSS:  None. CONDITION:  To PACU, stable. Preoperative antibiotics were administered. PATHOLOGIC SPECIMEN:  None. STORY:  A 68-year-old  female, known to me, presents for  potential neuromodulation implant in the future. We are going to do a  PNE testing today. The risks, the benefits, and the alternatives of the  proposed surgical procedure were extensively explained to the patient as  well as her family. She understood the risks, the benefits, and the  alternatives and elected to proceed. DESCRIPTION OF PROCEDURE:  This pleasant 68-year-old  female  was brought to the operating room #1 at [address], placed in the prone position, and induced with  monitored anesthesia. Anesthesia monitored the head and neck area, IV  access, and vital signs throughout the course of the case.   Status post  induction, triangulation of the S3 nerve foramen occurred bilaterally  with the C-arm and fluoroscopy initially from the anterior and posterior  view and then from the lateral view. I was able to take the finder  needles and placed them into the left and right S3 nerve foramen without  complications. I did testing and got appropriate nael and toe  flexion bilaterally. The leads were then implanted to the appropriate  level. It was at this point _____ was connected, dressed, and awoke  from anesthesia without complications, brought to PACU in stable  condition. PLAN:  1. She will be discharged home today. 2.  No pain medications and antibiotics were required. 3.  She will have these leads removed on Tuesday of next week. 4.  I will see her in the office in one to two weeks. 5.  She will need potential neuromodulation implant if she has  improvement objectively.         [address], DO    D: 12/17/2021 9:38:46       T: 12/17/2021 11:44:46     [id number]  Job#: [id number]     Doc#: [id number]    CC:

## 2022-04-14 ENCOUNTER — OFFICE VISIT (OUTPATIENT)
Dept: BARIATRICS/WEIGHT MGMT | Age: 72
End: 2022-04-14
Payer: MEDICARE

## 2022-04-14 VITALS
WEIGHT: 243.2 LBS | TEMPERATURE: 97.3 F | HEIGHT: 67 IN | HEART RATE: 65 BPM | BODY MASS INDEX: 38.17 KG/M2 | SYSTOLIC BLOOD PRESSURE: 161 MMHG | DIASTOLIC BLOOD PRESSURE: 86 MMHG

## 2022-04-14 DIAGNOSIS — E66.01 CLASS 2 SEVERE OBESITY DUE TO EXCESS CALORIES WITH SERIOUS COMORBIDITY AND BODY MASS INDEX (BMI) OF 37.0 TO 37.9 IN ADULT (HCC): ICD-10-CM

## 2022-04-14 DIAGNOSIS — M54.50 CHRONIC BILATERAL LOW BACK PAIN WITHOUT SCIATICA: ICD-10-CM

## 2022-04-14 DIAGNOSIS — I10 ESSENTIAL HYPERTENSION: Primary | ICD-10-CM

## 2022-04-14 DIAGNOSIS — G89.29 CHRONIC BILATERAL LOW BACK PAIN WITHOUT SCIATICA: ICD-10-CM

## 2022-04-14 PROCEDURE — 99202 OFFICE O/P NEW SF 15 MIN: CPT

## 2022-04-14 PROCEDURE — 99205 OFFICE O/P NEW HI 60 MIN: CPT | Performed by: INTERNAL MEDICINE

## 2022-04-14 NOTE — PROGRESS NOTES
CC -   HTN, Weight gain    Would like to be <200 lbs.     BACKGROUND -     First visit: 4/14/22     Obesity (all weight in lbs)  Began in childhood  Initial BMI 37.81, Wt 243.2, Ht 67.25\"  HS Grad wt ~200 lbs (down from 300)   Lowest   wt 175 lbs (on weight watchers)  Highest  wt 300 (at 10years of age)  Pattern of wt gain: gradual  Wt change past yr: similar (235-245)  Most wt lost: 100 lbs (in high school)  Other diets attempted: [employer], diet pills (multiple)     Desire to lose weight: 10/10 (does not want surgery)    Initial Diet:    Number of meals per day - 3    Number of snacks per day - 1    Meal volume - 12\" plate,  sometimes seconds    Fast food/convenience store - 3-4x/week (eg a breakfast sandwich)    Restaurants (not fast food) - 1-2x/week   Sweets - 0d/week   Chips - 1d/week   Crackers/pretzels - 1-2d/week   Nuts - 1d/week   Peanut Butter - 0d/week   Popcorn - 1d/week   Dried fruit - 0d/week   Whole fruit - 7d/week (berries, oranges, grapes, apples in season)   Breakfast cereal - 2d/week   Granola/Protein/Energy bar - 0d/week   Sugar sweetened beverages - none   Protein - No supplements   Fiber - No supplements     Initial Exercise:    Gym membership - silver sneakers    Walking - marching around the house with leg lifts etc    Running - no    Resistance - no    Aerobic class - no        Initial Sleep: Bedtime: 11pm-midnight, wake up time: 6:30-7:00 - usu rested, daytime naps: no    Weight scale at home: yes, takes weight: 1x/wk  Food scale: yes  ______________________    [facility] -  Past Medical History:   Diagnosis Date    Arthritis     Dry eyes, bilateral     Hypertension     OAB (overactive bladder)      Past Surgical History:   Procedure Laterality Date    ABDOMEN SURGERY      BUNIONECTOMY      bilteral twice    CHOLECYSTECTOMY      COLONOSCOPY      x 2    HYSTERECTOMY  11/27/2017    robotic assisted bilateral alpingo-oophorectomy lap robotic assisted abdominal sacral colopexy with y mesh    JOINT REPLACEMENT      bilateral knees, right shoulder replaced    KNEE ARTHROPLASTY Bilateral     NERVE BLOCK Bilateral 06/28/2018    facet L3-5 #1    PAIN MANAGEMENT PROCEDURE N/A 12/17/2021    PERIPHERAL NERVE EVALUATION performed by Js Moreno DO at [address] NOSE/CLEFT LIP/TIP Bilateral 6/28/2018    BILATERAL INTRA-ARTICULAR FACET JOINT INJECTION WITH FLUOROSCOPIC GUIDANCE AT L3-4 AND L4-5 #1 performed by Stephen Middleton DO at [address] [facility]  09/20/2016    right total shoulder reverse arthroplasty     Prior to Admission medications    Medication Sig Start Date End Date Taking? Authorizing Provider   metoprolol succinate (TOPROL XL) 100 MG extended release tablet Take 100 mg by mouth daily    Historical Provider, MD   acetaminophen (TYLENOL ARTHRITIS PAIN) 650 MG extended release tablet Take 650 mg by mouth every 8 hours as needed for Pain    Historical Provider, MD   erythromycin (ROMYCIN) 5 MG/GM ophthalmic ointment APPLY TO BOTH EYES AT BEDTIME AS DIRECTED 8/10/17   Historical Provider, MD   celecoxib (CELEBREX) 200 MG capsule Take 200 mg by mouth daily    Historical Provider, MD   docusate sodium (COLACE) 100 MG capsule Take 100 mg by mouth daily    Historical Provider, MD   aspirin 81 MG tablet Take 81 mg by mouth daily    Historical Provider, MD   polyvinyl alcohol (LIQUIFILM TEARS) 1.4 % ophthalmic solution Place 1 drop into both eyes as needed    Historical Provider, MD   Cyanocobalamin (VITAMIN B 12 PO) Take 250 mg by mouth daily    Historical Provider, MD   Omega-3 Fatty Acids (OMEGA 3 PO) Take 2,000 mg by mouth daily     Historical Provider, MD   Multiple Vitamins-Minerals (MULTIVITAL PO) Take by mouth daily    Historical Provider, MD   irbesartan-hydrochlorothiazide (AVALIDE) 150-12.5 MG per tablet Take 2 tablets by mouth daily     Historical Provider, MD   xiidra eye drops bid.     Family history: DM: brother, Heart disease: half brothers (3) all had heart ds. Allergies: Allergies   Allergen Reactions    Pcn [Penicillins] Hives, Itching and Swelling    Morphine Nausea Only       Social history: smoking: quit 44 years ago ; Alcohol: couple of times week (a glass of wine or other drink). ROS -  Card - no CP, but difficulty with mobility which she feels is due to weight. GI - no N/V, has Constipation colace helps. PE -  Gen : BP (!) 161/86 (Site: Left Upper Arm, Position: Sitting, Cuff Size: Medium Adult)   Pulse 65   Temp 97.3 °F (36.3 °C) (Temporal)   Ht 5' 7.25\" (1.708 m)   Wt 243 lb 3.2 oz (110.3 kg)   BMI 37.81 kg/m²    WN, WD, NAD  Lung: Nml resp effort, CTA B/L  Heart:  RRR w/o MGR, + LE pitting edema b/l  Psych: Normal mood   Full affect  Neuro: Moves all ext well  ______________________    HISTORY & ASSESSMENT/PLAN -     Problem 1  - Hypertension   HPI   - Ongoing, compliant with medications, and BP is usually controlled at home per patient, but has h/o white coat HTN. BP here was high, repeat BP still high but better, pt asymptomatic  Assessment  - Uncontrolled   Plan   - Continue medications, need to watch. Weight reduction can help. Weight reduction per plan below    Problem 2  - Obesity   HPI   - See above Background for description    Weight  Date    243.2  4/14/22  DEN (est.)= 1926 Dioni/d = 70956 Dioni/wk  Wt effect of HR foods = 1050 Dioni/wk = 150 Dioni/d= 8% DEN = 16 lb/year. Assessment  - Uncontrolled  Plan   - Talked about various options. Does not want surgery at this time. Does not want VLCD. Would like calorie counting with low calorie diet as detailed below. Would like an appetite suppressant, and after talking about options and side effects, would like to try Ozempic first (Has samples), talked about side effects including n/v, diarrhea and need to watch BGL, symptoms discussed.  Planned as follows:  Patient Instructions   Rules:  · Count every calorie every day  · Limit sweets to one day per month  · Limit chips/crackers/pretzels/nuts/popcorn to 100 dioni/day  · Eliminate all sugar sweetened beverages (including fruit juice)  · Limit restaurants (including fast food and food from a convenience store) to one time every two weeks while in town    Requirements:  · Make sure protein intake is at least 75 grams per day (do not count protein every day; instead spot check your intake every 2-3 weeks and make sure what you think you are getting is close to accurate; consider using a protein shake if needed; these are in the pharmacy section of the stores, not the grocery section; Premier, Pure Protein and Fairlife are relatively inexpensive and taste good to most patients; other options are Nectar, Boost Max, Ensure Max, BeneProtein and [employer]NC lean (which is lactose-free); Nectar fruit, Premier Protein Clear, IsoPure Protein Drink, and Protein 2 O are water-based options; Quest (or Cosco, which is cheaper and is ordered on [employer]) and the [employer] 1 protein bars can also be used, but have less protein in them )  (Disclaimer: Dietary supplements rarely have their listed ingredients and the amount of each verified by a third party other. Sometimes they give verification for their claims to be GMO and gluten free and to be organic. However, even such verifications as these may still be untrustworthy.) (<200 Dioni, >25 g protein)    · Make sure that fiber intake is at least 22 grams per day. Do this by either eating 12 tablespoons of the original, plain Fiber One cereal every day or 4 tablespoons of wheat dextrin powder (Benefiber or a generic brand) every day. Work up to this amount slowly by starting with only one-eighth to one-fourth of the target amount and then adding another one-eighth to one-fourth every one or two weeks until reaching the target.     · Take one multivitamin every day    Targets:  · Limit calorie intake to 1400 calories/day  · Walk 30 minutes daily or equivalent (210 min week)  · Avoid eating 2 hours within bedtime. Tips:  · Do not eat outside of the dining room or the kitchen  · Do not eat while watching TV, videos, working on the computer or using a smart phone  · Do not eat food out of a multi-serving bag or container. · Establish 6 hours of food-free \"time-out\" periods (times you don't eat) each day. No period can be less than 1 hour long. The periods need to be the same every day for days that are the same (for example, workdays would have one set of food free periods and weekends would have another set of days). These six hours are in addition to the two hours before bedtime and the time spent sleeping. Protein: >=75 gm/day. Fiber: >=25 gm/day. Water: ~64-96 oz/day. You can try Ozempic, beware of side effects as discussed. Follow up in about 2 months. Problem 3  - Back, hip and knee pain   HPI   - ongoing, feels worse with weight gain limiting ambulation/exercise  Assessment  - uncontrolled. Plan   - Continue tylenol prn. Weight reduction can help with joint pain as well. Planned as above. Total time spent on encounter: 68 min. Luzmaria Martin MD  Internal Medicine/Obesity Medicine  4/14/2022.

## 2022-06-17 ENCOUNTER — OFFICE VISIT (OUTPATIENT)
Dept: BARIATRICS/WEIGHT MGMT | Age: 72
End: 2022-06-17
Payer: MEDICARE

## 2022-06-17 VITALS
BODY MASS INDEX: 35.75 KG/M2 | TEMPERATURE: 97.6 F | SYSTOLIC BLOOD PRESSURE: 165 MMHG | WEIGHT: 227.8 LBS | DIASTOLIC BLOOD PRESSURE: 105 MMHG | HEART RATE: 65 BPM | HEIGHT: 67 IN

## 2022-06-17 DIAGNOSIS — G89.29 CHRONIC BILATERAL LOW BACK PAIN WITHOUT SCIATICA: ICD-10-CM

## 2022-06-17 DIAGNOSIS — I10 ESSENTIAL HYPERTENSION: Primary | ICD-10-CM

## 2022-06-17 DIAGNOSIS — E66.01 CLASS 2 SEVERE OBESITY DUE TO EXCESS CALORIES WITH SERIOUS COMORBIDITY AND BODY MASS INDEX (BMI) OF 35.0 TO 35.9 IN ADULT (HCC): ICD-10-CM

## 2022-06-17 DIAGNOSIS — M54.50 CHRONIC BILATERAL LOW BACK PAIN WITHOUT SCIATICA: ICD-10-CM

## 2022-06-17 DIAGNOSIS — R73.9 HYPERGLYCEMIA: ICD-10-CM

## 2022-06-17 PROCEDURE — 99211 OFF/OP EST MAY X REQ PHY/QHP: CPT

## 2022-06-17 PROCEDURE — 1123F ACP DISCUSS/DSCN MKR DOCD: CPT | Performed by: INTERNAL MEDICINE

## 2022-06-17 PROCEDURE — 99214 OFFICE O/P EST MOD 30 MIN: CPT | Performed by: INTERNAL MEDICINE

## 2022-06-17 RX ORDER — SEMAGLUTIDE 1.34 MG/ML
INJECTION, SOLUTION SUBCUTANEOUS
Qty: 3 ML | Refills: 2 | Status: SHIPPED
Start: 2022-06-17 | End: 2022-06-17 | Stop reason: ALTCHOICE

## 2022-06-17 RX ORDER — APIXABAN 5 MG/1
5 TABLET, FILM COATED ORAL 2 TIMES DAILY
COMMUNITY
Start: 2022-06-15 | End: 2022-08-23 | Stop reason: SDUPTHER

## 2022-06-17 RX ORDER — SEMAGLUTIDE 1.34 MG/ML
0.5 INJECTION, SOLUTION SUBCUTANEOUS WEEKLY
Qty: 1 PEN | Refills: 2 | Status: SHIPPED
Start: 2022-06-17 | End: 2022-07-20 | Stop reason: ALTCHOICE

## 2022-06-17 RX ORDER — LIFITEGRAST 50 MG/ML
SOLUTION/ DROPS OPHTHALMIC 2 TIMES DAILY
COMMUNITY
Start: 2022-05-16

## 2022-06-17 NOTE — PATIENT INSTRUCTIONS
Low calorie non-starchy vegetables:  Food (per 100 g) Calories Fibers T. Carbohydrates Protein Fat Sodium (mg) Potassium (mg)   Green Bell Peppers 10 1.7 4.6 0.9 0.2 3 175   Cucumbers 15 0.5 3.6 0.7 0.1 2 147   Celery 16 1.6 3 0.7 0.2 80 260   Tomatoes 18 1.2 3.9 0.9 0.2 5 237   Carrots 41 2.8 10 0.9 0.2 69 320   Cabbage 25 2.5 6 1.3 0.1 18 170   Broccoli 35 3.3 7.2 2.4 0.4 41 293   Cauliflower 23 2.3 4.1 1.8 0.5 15 142   Iceberg Lettuce 14 1.2 3 0.9 0.1 10 141   Kale 28 2 5.6 1.9 0.4 23 228   Brussel Sprouts 43 3.8 9 3.4 0.3 25 389   Spinach 23 2.4 3.8 3 0.3 70 466   Zucchini 15 1 2.7 1.1 0.4 3 264   Mushroom 28 2.2 5.3 2.2 0.5 2 356   Asparagus 22 2 4.1 2.4 0.2 14 224   Green Beans 35 3.2 7.9 1.9 0.3 1 146   Eggplant 35 2.5 8.7 0.8 0.2 1 123     Continue Ozempic 0.5 mg once a week. Follow up in 2 months.

## 2022-06-17 NOTE — PROGRESS NOTES
CC -   Follow up of: HTN, Weight gain    Would like to be <200 lbs. BACKGROUND -   Last visit: 4/14/22  First visit: 4/14/22     Obesity (all weight in lbs)  Began in childhood  Initial BMI 37.81, Wt 243.2, Ht 67.25\"  HS Grad wt ~200 lbs (down from 300)   Lowest   wt 175 lbs (on weight watchers)  Highest  wt 300 (at 10years of age)  Pattern of wt gain: gradual  Wt change past yr: similar (235-245)  Most wt lost: 100 lbs (in high school)  Other diets attempted: [employer], diet pills (multiple)     Desire to lose weight: 10/10 (does not want surgery)    Initial Diet:    Number of meals per day - 3    Number of snacks per day - 1    Meal volume - 12\" plate,  sometimes seconds    Fast food/convenience store - 3-4x/week (eg a breakfast sandwich)    Restaurants (not fast food) - 1-2x/week   Sweets - 0d/week   Chips - 1d/week   Crackers/pretzels - 1-2d/week   Nuts - 1d/week   Peanut Butter - 0d/week   Popcorn - 1d/week   Dried fruit - 0d/week   Whole fruit - 7d/week (berries, oranges, grapes, apples in season)   Breakfast cereal - 2d/week   Granola/Protein/Energy bar - 0d/week   Sugar sweetened beverages - none   Protein - No supplements   Fiber - No supplements     Initial Exercise:    Gym membership - silver sneakers    Walking - marching around the house with leg lifts etc    Running - no    Resistance - no    Aerobic class - no        Initial Sleep: Bedtime: 11pm-midnight, wake up time: 6:30-7:00 - usu rested, daytime naps: no    Weight scale at home: yes, takes weight: 1x/wk  Food scale: yes    Follow up 6/17/22:  1. Fatigue: Denies. 2. Diet: Fiber: Benefiber - 1/2 of recommended currently. Eats a lot of protein - eggs, chicken breast, burgers on the grill. Follows [employer]. Water at least 6-8 glasses/day and coffee on top. 3. Exercise: walking and balance as shown by prior PT. 4. Sleep: like prior, wakes up 2-3x/night, goes back to sleep without a problem.     5. Medications: Ozempic helping with craving and appetite suppression 8/10. Does have constipation. Was started on Eliquis and Metoprolol, and was referred to cardiologist.    ______________________    [facility] -  Past Medical History:   Diagnosis Date    Arthritis     Class 2 severe obesity due to excess calories with serious comorbidity and body mass index (BMI) of 37.0 to 37.9 in adult ([address]     Dry eyes, bilateral     Hypertension     OAB (overactive bladder)    Atrial fibrillation - recently diagnosed, on Eliquis and Metoprolol now. Past Surgical History:   Procedure Laterality Date    ABDOMEN SURGERY      BUNIONECTOMY      bilteral twice    CHOLECYSTECTOMY      COLONOSCOPY      x 2    HYSTERECTOMY (CERVIX STATUS UNKNOWN)  11/27/2017    robotic assisted bilateral alpingo-oophorectomy lap robotic assisted abdominal sacral colopexy with y mesh    JOINT REPLACEMENT      bilateral knees, right shoulder replaced    KNEE ARTHROPLASTY Bilateral     NERVE BLOCK Bilateral 06/28/2018    facet L3-5 #1    PAIN MANAGEMENT PROCEDURE N/A 12/17/2021    PERIPHERAL NERVE EVALUATION performed by Ruba Morneo DO at [address] NOSE/CLEFT LIP/TIP Bilateral 6/28/2018    BILATERAL INTRA-ARTICULAR FACET JOINT INJECTION WITH FLUOROSCOPIC GUIDANCE AT L3-4 AND L4-5 #1 performed by Sita Mccullough DO at [address] [facility]  09/20/2016    right total shoulder reverse arthroplasty     Prior to Admission medications    Medication Sig Start Date End Date Taking?  Authorizing Provider   XIIDRA 5 % SOLN  5/16/22   Historical Provider, MD   ELIQUIS 5 MG TABS tablet Take 5 mg by mouth in the morning and at bedtime 6/15/22   Historical Provider, MD   metoprolol succinate (TOPROL XL) 100 MG extended release tablet Take 100 mg by mouth daily    Historical Provider, MD   acetaminophen (TYLENOL ARTHRITIS PAIN) 650 MG extended release tablet Take 650 mg by mouth every 8 hours as needed for Pain    Historical Provider, MD   erythromycin [facility]) 5 MG/GM ophthalmic ointment APPLY TO BOTH EYES AT BEDTIME AS DIRECTED 8/10/17   Historical Provider, MD   celecoxib (CELEBREX) 200 MG capsule Take 200 mg by mouth daily    Historical Provider, MD   docusate sodium (COLACE) 100 MG capsule Take 100 mg by mouth daily    Historical Provider, MD   aspirin 81 MG tablet Take 81 mg by mouth daily    Historical Provider, MD   polyvinyl alcohol (LIQUIFILM TEARS) 1.4 % ophthalmic solution Place 1 drop into both eyes as needed    Historical Provider, MD   Cyanocobalamin (VITAMIN B 12 PO) Take 250 mg by mouth daily    Historical Provider, MD   Omega-3 Fatty Acids (OMEGA 3 PO) Take 2,000 mg by mouth daily     Historical Provider, MD   Multiple Vitamins-Minerals (MULTIVITAL PO) Take by mouth daily    Historical Provider, MD   irbesartan-hydrochlorothiazide (AVALIDE) 150-12.5 MG per tablet Take 2 tablets by mouth daily     Historical Provider, MD   xiidra eye drops bid. Family history: DM: brother, Heart disease: half brothers (3) all had heart ds. Allergies: Allergies   Allergen Reactions    Pcn [Penicillins] Hives, Itching and Swelling    Morphine Nausea Only       Social history: smoking: quit 44 years ago ; Alcohol: couple of times week (a glass of wine or other drink). ROS -  Card - no CP, but difficulty with mobility which she feels is due to weight. GI - no N/V, has Constipation colace helps. PE -  Gen : BP (!) 165/105 (Site: Left Upper Arm, Position: Sitting, Cuff Size: Large Adult)   Pulse 65   Temp 97.6 °F (36.4 °C) (Temporal)   Ht 5' 7.25\" (1.708 m)   Wt 227 lb 12.8 oz (103.3 kg)   BMI 35.41 kg/m²    WN, WD, NAD  Lung: Nml resp effort, CTA B/L  Heart:  RRR w/o MGR, + LE pitting edema ramona on left  Psych: Normal mood   Full affect  Neuro:  Moves all ext well  ______________________    HISTORY & ASSESSMENT/PLAN -     Problem 1  - Hypertension   HPI   - Ongoing, compliant with medications, and BP is usually controlled at home per patient, but has h/o white coat HTN. BP here was high, repeat BP still high, pt asymptomatic  Assessment  - Uncontrolled   Plan   - Continue medications, need to watch, will need to see cardiologist for afib as well. Weight reduction can help with BP. Weight reduction per plan below    Problem 2  - Obesity   HPI   - See above Background for description    Weight  Date    243.2  4/14/22    227.8  6/17/22    DEN (est.)= 1926 Dioni/d = 48982 Dioni/wk    Assessment  - Uncontrolled  Plan   - He is doing very well with current plan. Would like to continue. Talked about protein, fiber and water intake. List of low calorie non-starchy vegetables provided. Planned to continue Ozempic script written. Planned for follow up in 2 months. Problem 3  - Back, hip and knee pain   HPI   - ongoing, feels worse with weight gain limiting ambulation/exercise  Assessment  - uncontrolled. Plan   - Continue tylenol prn. Weight reduction can help with joint pain as well. Planned as above. Orders Placed This Encounter   Medications    Semaglutide, 1 MG/DOSE, (OZEMPIC, 1 MG/DOSE,) 4 MG/3ML SOPN     Sig: Continue Ozempic 0.5 mg once a week until next follow up. Dispense:  3 mL     Refill:  2       Erinn Kiser MD  Internal Medicine/Obesity Medicine  6/17/2022.

## 2022-06-20 ENCOUNTER — TELEPHONE (OUTPATIENT)
Dept: BARIATRICS/WEIGHT MGMT | Age: 72
End: 2022-06-20

## 2022-06-20 NOTE — TELEPHONE ENCOUNTER
Received denial on PA for Ozempic spoke with patient filed and appeal per patient request explained to patient does not have DM2 and plan will not pay for medication patient stated her PCP wants her to see cardiology and she was reading [address] has cardiac benefits to it explained to patient insurance will not take that into consideration when assessing for the med however included the info in her appeal. Patient is requesting provider to call her with other options . provider aware and will contact patient.

## 2022-06-23 PROBLEM — R00.2 PALPITATIONS: Status: ACTIVE | Noted: 2022-06-23

## 2022-06-29 ENCOUNTER — TELEPHONE (OUTPATIENT)
Dept: BARIATRICS/WEIGHT MGMT | Age: 72
End: 2022-06-29

## 2022-06-29 DIAGNOSIS — E66.01 CLASS 2 SEVERE OBESITY DUE TO EXCESS CALORIES WITH SERIOUS COMORBIDITY AND BODY MASS INDEX (BMI) OF 35.0 TO 35.9 IN ADULT (HCC): Primary | ICD-10-CM

## 2022-06-29 RX ORDER — TOPIRAMATE 25 MG/1
TABLET ORAL
Qty: 60 TABLET | Refills: 1 | Status: SHIPPED
Start: 2022-06-29 | End: 2022-08-17 | Stop reason: SDUPTHER

## 2022-06-29 NOTE — TELEPHONE ENCOUNTER
Prior auth denied for Ozempic, pt would like to know if there is any other medications she can try or she may be willing to buy  otc at [facility] without ins. Please advise. Pt is asking if she needs an appt to discuss or not or over the phone. I talked to Ms. Alexus Valle over the phone, talked about Topiramate, side effects. Would like to start it after she is done with her current Ozempic which will last till 7/14/2022.  Dung Romero MD.

## 2022-07-11 ENCOUNTER — TELEPHONE (OUTPATIENT)
Dept: BARIATRICS/WEIGHT MGMT | Age: 72
End: 2022-07-11

## 2022-07-11 NOTE — TELEPHONE ENCOUNTER
Patient called left facility voicemail is wondering if the Topamax can be taken with her other meds of it it needs to be taken alone please advise patient on any drug interactions .

## 2022-07-20 ENCOUNTER — OFFICE VISIT (OUTPATIENT)
Dept: CARDIOLOGY CLINIC | Age: 72
End: 2022-07-20
Payer: MEDICARE

## 2022-07-20 ENCOUNTER — TELEPHONE (OUTPATIENT)
Dept: CARDIOLOGY CLINIC | Age: 72
End: 2022-07-20

## 2022-07-20 VITALS
BODY MASS INDEX: 33.34 KG/M2 | WEIGHT: 220 LBS | RESPIRATION RATE: 16 BRPM | HEART RATE: 99 BPM | SYSTOLIC BLOOD PRESSURE: 122 MMHG | DIASTOLIC BLOOD PRESSURE: 66 MMHG | HEIGHT: 68 IN

## 2022-07-20 DIAGNOSIS — I10 ESSENTIAL HYPERTENSION: ICD-10-CM

## 2022-07-20 DIAGNOSIS — I48.19 PERSISTENT ATRIAL FIBRILLATION (HCC): Primary | ICD-10-CM

## 2022-07-20 DIAGNOSIS — E66.8 MODERATE OBESITY: ICD-10-CM

## 2022-07-20 DIAGNOSIS — R00.2 PALPITATIONS: ICD-10-CM

## 2022-07-20 PROBLEM — I48.0 PAROXYSMAL ATRIAL FIBRILLATION (HCC): Status: ACTIVE | Noted: 2022-07-20

## 2022-07-20 PROCEDURE — 93000 ELECTROCARDIOGRAM COMPLETE: CPT | Performed by: INTERNAL MEDICINE

## 2022-07-20 PROCEDURE — 99204 OFFICE O/P NEW MOD 45 MIN: CPT | Performed by: INTERNAL MEDICINE

## 2022-07-20 PROCEDURE — 1123F ACP DISCUSS/DSCN MKR DOCD: CPT | Performed by: INTERNAL MEDICINE

## 2022-07-20 RX ORDER — SODIUM CHLORIDE 0.9 % (FLUSH) 0.9 %
5-40 SYRINGE (ML) INJECTION EVERY 12 HOURS SCHEDULED
Status: CANCELLED | OUTPATIENT
Start: 2022-07-20

## 2022-07-20 RX ORDER — SODIUM CHLORIDE 0.9 % (FLUSH) 0.9 %
5-40 SYRINGE (ML) INJECTION PRN
Status: CANCELLED | OUTPATIENT
Start: 2022-07-20

## 2022-07-20 RX ORDER — SODIUM CHLORIDE 9 MG/ML
INJECTION, SOLUTION INTRAVENOUS PRN
Status: CANCELLED | OUTPATIENT
Start: 2022-07-20

## 2022-07-20 NOTE — PROGRESS NOTES
multisystem review including consitutional, central nervous, respiratory, circulatory, gastrointestinal, genitourinary, endocrinologic, hematologic, musculoskeletal and psychiatric are negative. Past Medical History:  Past Medical History:   Diagnosis Date    Arthritis     Class 2 severe obesity due to excess calories with serious comorbidity and body mass index (BMI) of 37.0 to 37.9 in adult ([facility] [address]     Dry eyes, bilateral     Hypertension     OAB (overactive bladder)        Past Surgical History:  Past Surgical History:   Procedure Laterality Date    ABDOMEN SURGERY      BLADDER SURGERY  01/01/2022    BUNIONECTOMY      bilteral twice    CHOLECYSTECTOMY      COLONOSCOPY      x 2    HYSTERECTOMY (CERVIX STATUS UNKNOWN)  11/27/2017    robotic assisted bilateral alpingo-oophorectomy lap robotic assisted abdominal sacral colopexy with y mesh    JOINT REPLACEMENT      bilateral knees, right shoulder replaced    KNEE ARTHROPLASTY Bilateral     NERVE BLOCK Bilateral 06/28/2018    facet L3-5 #1    PAIN MANAGEMENT PROCEDURE N/A 12/17/2021    PERIPHERAL NERVE EVALUATION performed by Tia Moreno DO at [address] [place] [address] NOSE/CLEFT LIP/TIP Bilateral 06/28/2018    BILATERAL INTRA-ARTICULAR FACET JOINT INJECTION WITH FLUOROSCOPIC GUIDANCE AT L3-4 AND L4-5 #1 performed by Morena Monae DO at [place] [address]  09/20/2016    right total shoulder reverse arthroplasty       Family History:  History reviewed. No pertinent family history. Social History:  Social History     Socioeconomic History    Marital status:      Spouse name: Not on file    Number of children: Not on file    Years of education: Not on file    Highest education level: Not on file   Occupational History    Not on file   Tobacco Use    Smoking status: Former    Smokeless tobacco: Never    Tobacco comments:     quit smoking 40 yrs ago   Vaping Use    Vaping Use: Never used   Substance and Sexual Activity    Alcohol use:  Yes week (Patient not taking: Reported on 7/20/2022) 1 pen 2    aspirin 81 MG tablet Take 81 mg by mouth daily (Patient not taking: Reported on 7/20/2022)       No current facility-administered medications for this visit. Facility-Administered Medications Ordered in Other Visits   Medication Dose Route Frequency Provider Last Rate Last Admin    sodium chloride flush 0.9 % injection 10 mL  10 mL IntraVENous 2 times per day ADALI Greenfield        sodium chloride flush 0.9 % injection 10 mL  10 mL IntraVENous PRN ADALI Greenfield        0.9 % sodium chloride infusion   IntraVENous Continuous ADALI Greenfield        midazolam (VERSED) injection 1 mg  1 mg IntraVENous Q5 Min PRN Bibiana Pipestem, DO   1 mg at 09/20/16 1233         Physical Exam:  /66   Pulse 99   Resp 16   Ht 5' 7.5\" (1.715 m)   Wt 220 lb (99.8 kg)   BMI 33.95 kg/m²   Wt Readings from Last 3 Encounters:   07/20/22 220 lb (99.8 kg)   06/17/22 227 lb 12.8 oz (103.3 kg)   04/14/22 243 lb 3.2 oz (110.3 kg)     The patient is awake, alert and in no discomfort or distress. No gross musculoskeletal deformity or lymphadenopathy are present. No significant skin or nail changes are present. Gross examination of head, eyes, nose and throat are negative. Jugular venous pressure is normal and no carotid bruits are present. No thyromegaly is noted. Normal respiratory effort is noted with no accessory muscle usage present. Lung fields are clear to ascultation. Cardiac examination is notable for an irregular rhythm with no palpable thrill. No gallop rhythm or cardiac murmur are identified. A benign abdominal examination is present with the exception of obesity and no masses or organomegaly. A normal abdominal aortic pulsation is present. Intact pulses are present throughout all extremities and no peripheral edema is present. No focal neurologic deficits are present.     Laboratory Tests:  Lab Results   Component Value Date    CREATININE 0.6 11/28/2017 BUN 23 11/28/2017     11/28/2017    K 4.3 11/28/2017     11/28/2017    CO2 25 11/28/2017     No results found for: BNP  Lab Results   Component Value Date/Time    WBC 13.1 11/28/2017 04:44 AM    RBC 3.09 11/28/2017 04:44 AM    HGB 9.7 11/28/2017 04:44 AM    HCT 29.8 11/28/2017 04:44 AM    MCV 96.4 11/28/2017 04:44 AM    MCH 31.4 11/28/2017 04:44 AM    MCHC 32.6 11/28/2017 04:44 AM    RDW 13.4 11/28/2017 04:44 AM     11/28/2017 04:44 AM    MPV 12.1 11/28/2017 04:44 AM     No results for input(s): ALKPHOS, ALT, AST, PROT, BILITOT, BILIDIR, LABALBU in the last 72 hours. No results found for: MG  No results found for: PROTIME, INR  No results found for: TSH  No components found for: CHLPL  No results found for: TRIG  No results found for: HDL  No results found for: [address]    Cardiac Tests:  ECG: A resting electrocardiogram demonstrates evidence of atrial fibrillation with a mean ventricular response of approximately 100 bpm with nonspecific ST changes      ASSESSMENT / PLAN: On a clinical basis, the patient presents with persistent atrial fibrillation of several months duration in the absence of additional significant symptomatology beyond that of her palpitations. She has presently been maintained on a rate control and anticoagulation strategy with marginal rate control on present therapy. On this basis, and in view of the continuous administration of anticoagulation over the past month, she would be a candidate for electrical cardioversion in attempt to restore sinus rhythm potentially minimizing some of her symptoms including that of her palpitations as well as reducing her risk of embolic events. At the time of evaluation this is been extensively discussed including the proposed benefits, risks and alternatives to cardioversion and following consideration she is agreeable in proceeding.   I additionally recommended a repeat echocardiogram for further assessment of the presence or absence of additional structural heart disease we will provide additional recommendations as appropriate following completion. She will benefit from appropriate lifestyle modification to achieve weight reduction to benefit diastolic cardiac performance as well as reducing her risk of the development of obstructive sleep apnea with associated adverse cardiovascular effects including an increased risk of recurrent atrial arrhythmias. Ongoing appropriate risk factor modification of blood pressure and serum lipids will remain essential to reducing risk of future atherosclerotic development. Based on her existing embolic risk, long-term anticoagulation will be advisable and if possible the discontinuation of her Pinto 2 inhibitor would be advisable both to reduce risk of adverse cardiovascular effects as well as that of adverse effects on blood pressure regulation and fluid retention in addition to that of an increased risk of cardiovascular events. I presently plan her clinical reassessment approximately 1 month and would happily reevaluate her in the interim should additional cardiovascular difficulties or concerns arise. Follow-up office visit in 1 month. Thank you for allowing me to participate in your patient's care. Please feel free to contact me if you have any questions or concerns. Note: This report was completed utilizing a computerized voice recognition software. Every effort has been made to insure accuracy, however; inadvertent computerized transcription errors may be present. Court Nathan.  Dorinda Blackman, [facility] Cardiology    An electronic copy of this consult note was forwarded to Dr. Nessa Blanchard

## 2022-07-24 ENCOUNTER — ANESTHESIA EVENT (OUTPATIENT)
Dept: POSTOP/PACU | Age: 72
End: 2022-07-24
Payer: MEDICARE

## 2022-07-24 ASSESSMENT — ENCOUNTER SYMPTOMS: SHORTNESS OF BREATH: 0

## 2022-07-24 NOTE — ANESTHESIA PRE PROCEDURE
Department of Anesthesiology  Preprocedure Note       Name:  Zachariah Ramirez   Age:  66 y.o.  :  1950                                          MRN:  [id number]         Date:  2022      Surgeon: * Surgery not found *    Procedure:     Medications prior to admission:   Prior to Admission medications    Medication Sig Start Date End Date Taking? Authorizing Provider   topiramate (TOPAMAX) 25 MG tablet Start Topiramate 25 mg. Take one tablet twice each day for one week. If the appetite suppression is insufficient, then increase to two tablets twice daily.  22   Yani Wilson MD   XIIDRA 5 % SOLN  22   Historical Provider, MD   ELIQUIS 5 MG TABS tablet Take 5 mg by mouth in the morning and at bedtime 6/15/22   Historical Provider, MD   metoprolol succinate (TOPROL XL) 100 MG extended release tablet Take 100 mg by mouth daily    Historical Provider, MD   acetaminophen (TYLENOL) 650 MG extended release tablet Take 650 mg by mouth every 8 hours as needed for Pain    Historical Provider, MD   erythromycin (ROMYCIN) 5 MG/GM ophthalmic ointment APPLY TO BOTH EYES AT BEDTIME AS DIRECTED 8/10/17   Historical Provider, MD   celecoxib (CELEBREX) 200 MG capsule Take 200 mg by mouth daily    Historical Provider, MD   docusate sodium (COLACE) 100 MG capsule Take 100 mg by mouth daily    Historical Provider, MD   polyvinyl alcohol (LIQUIFILM TEARS) 1.4 % ophthalmic solution Place 1 drop into both eyes as needed    Historical Provider, MD   Cyanocobalamin (VITAMIN B 12 PO) Take 250 mg by mouth daily    Historical Provider, MD   Omega-3 Fatty Acids (OMEGA 3 PO) Take 2,000 mg by mouth daily     Historical Provider, MD   Multiple Vitamins-Minerals (MULTIVITAL PO) Take by mouth daily    Historical Provider, MD   irbesartan-hydrochlorothiazide (AVALIDE) 150-12.5 MG per tablet Take 2 tablets by mouth daily     Historical Provider, MD       Current medications:    Current Outpatient Medications   Medication Sig Dispense Refill    topiramate (TOPAMAX) 25 MG tablet Start Topiramate 25 mg. Take one tablet twice each day for one week. If the appetite suppression is insufficient, then increase to two tablets twice daily. 60 tablet 1    XIIDRA 5 % SOLN       ELIQUIS 5 MG TABS tablet Take 5 mg by mouth in the morning and at bedtime      metoprolol succinate (TOPROL XL) 100 MG extended release tablet Take 100 mg by mouth daily      acetaminophen (TYLENOL) 650 MG extended release tablet Take 650 mg by mouth every 8 hours as needed for Pain      erythromycin (ROMYCIN) 5 MG/GM ophthalmic ointment APPLY TO BOTH EYES AT BEDTIME AS DIRECTED  6    celecoxib (CELEBREX) 200 MG capsule Take 200 mg by mouth daily      docusate sodium (COLACE) 100 MG capsule Take 100 mg by mouth daily      polyvinyl alcohol (LIQUIFILM TEARS) 1.4 % ophthalmic solution Place 1 drop into both eyes as needed      Cyanocobalamin (VITAMIN B 12 PO) Take 250 mg by mouth daily      Omega-3 Fatty Acids (OMEGA 3 PO) Take 2,000 mg by mouth daily       Multiple Vitamins-Minerals (MULTIVITAL PO) Take by mouth daily      irbesartan-hydrochlorothiazide (AVALIDE) 150-12.5 MG per tablet Take 2 tablets by mouth daily        No current facility-administered medications for this visit. Facility-Administered Medications Ordered in Other Visits   Medication Dose Route Frequency Provider Last Rate Last Admin    sodium chloride flush 0.9 % injection 10 mL  10 mL IntraVENous 2 times per day ADALI Busch        sodium chloride flush 0.9 % injection 10 mL  10 mL IntraVENous PRN ADALI Greenfield        0.9 % sodium chloride infusion   IntraVENous Continuous ADALI Busch        midazolam (VERSED) injection 1 mg  1 mg IntraVENous Q5 Min PRN Adonis Porvirgilio, DO   1 mg at 09/20/16 2385       Allergies:     Allergies   Allergen Reactions    Pcn [Penicillins] Hives, Itching and Swelling    Morphine Nausea Only       Problem List:    Patient Active Problem List Diagnosis Code    Moderate obesity E66.8    Overactive bladder N32.81    Essential hypertension I10    Lumbar stenosis with neurogenic claudication M48.062    Lumbar radiculopathy M54.16    Anterolisthesis M43.10    Chronic bilateral low back pain without sciatica M54.50, G89.29    Facet arthropathy M47.819    Palpitations R00.2    Paroxysmal atrial fibrillation (HCC) I48.0       Past Medical History:        Diagnosis Date    Arthritis     Class 2 severe obesity due to excess calories with serious comorbidity and body mass index (BMI) of 37.0 to 37.9 in adult (HCC)     Dry eyes, bilateral     Hypertension     OAB (overactive bladder)        Past Surgical History:        Procedure Laterality Date    ABDOMEN SURGERY      BLADDER SURGERY  01/01/2022    BUNIONECTOMY      bilteral twice    CHOLECYSTECTOMY      COLONOSCOPY      x 2    HYSTERECTOMY (CERVIX STATUS UNKNOWN)  11/27/2017    robotic assisted bilateral alpingo-oophorectomy lap robotic assisted abdominal sacral colopexy with y mesh    JOINT REPLACEMENT      bilateral knees, right shoulder replaced    KNEE ARTHROPLASTY Bilateral     NERVE BLOCK Bilateral 06/28/2018    facet L3-5 #1    PAIN MANAGEMENT PROCEDURE N/A 12/17/2021    PERIPHERAL NERVE EVALUATION performed by Dawn Moreno DO at [facility] NOSE/CLEFT LIP/TIP Bilateral 06/28/2018    BILATERAL INTRA-ARTICULAR FACET JOINT INJECTION WITH FLUOROSCOPIC GUIDANCE AT L3-4 AND L4-5 #1 performed by Ken Che DO at [address]  09/20/2016    right total shoulder reverse arthroplasty       Social History:    Social History     Tobacco Use    Smoking status: Former    Smokeless tobacco: Never    Tobacco comments:     quit smoking 40 yrs ago   Substance Use Topics    Alcohol use: Yes     Comment: social                                Counseling given: Not Answered  Tobacco comments: quit smoking 40 yrs ago      Vital Signs (Current):    There were no vitals filed for this visit. BP Readings from Last 3 Encounters:   07/20/22 122/66   06/17/22 (!) 165/105   04/14/22 (!) 161/86       NPO Status:                                                                                 BMI:   Wt Readings from Last 3 Encounters:   07/20/22 220 lb (99.8 kg)   06/17/22 227 lb 12.8 oz (103.3 kg)   04/14/22 243 lb 3.2 oz (110.3 kg)     There is no height or weight on file to calculate BMI.    CBC:   Lab Results   Component Value Date/Time    WBC 13.1 11/28/2017 04:44 AM    RBC 3.09 11/28/2017 04:44 AM    HGB 9.7 11/28/2017 04:44 AM    HCT 29.8 11/28/2017 04:44 AM    MCV 96.4 11/28/2017 04:44 AM    RDW 13.4 11/28/2017 04:44 AM     11/28/2017 04:44 AM       CMP:   Lab Results   Component Value Date/Time     11/28/2017 04:44 AM    K 4.3 11/28/2017 04:44 AM     11/28/2017 04:44 AM    CO2 25 11/28/2017 04:44 AM    BUN 23 11/28/2017 04:44 AM    CREATININE 0.6 11/28/2017 04:44 AM    GFRAA >60 11/28/2017 04:44 AM    LABGLOM >60 11/28/2017 04:44 AM    GLUCOSE 154 11/28/2017 04:44 AM    PROT 5.4 11/28/2017 01:07 AM    CALCIUM 8.4 11/28/2017 04:44 AM    BILITOT 0.7 11/28/2017 01:07 AM    ALKPHOS 48 11/28/2017 01:07 AM    AST 19 11/28/2017 01:07 AM    ALT 19 11/28/2017 01:07 AM       POC Tests: No results for input(s): POCGLU, POCNA, POCK, POCCL, POCBUN, POCHEMO, POCHCT in the last 72 hours.     Coags: No results found for: PROTIME, INR, APTT    HCG (If Applicable): No results found for: PREGTESTUR, PREGSERUM, HCG, HCGQUANT     ABGs: No results found for: PHART, PO2ART, OML9GAW, GRO2XKO, BEART, L0ZHPDWR     Type & Screen (If Applicable):  No results found for: LABABO, LABRH    Drug/Infectious Status (If Applicable):  No results found for: HIV, HEPCAB    COVID-19 Screening (If Applicable): No results found for: COVID19        Anesthesia Evaluation  Patient summary reviewed no history of anesthetic complications:   Airway: Mallampati: III  TM distance: >3 FB   Neck ROM: full  Mouth opening: > = 3 FB   Dental: normal exam     Comment: Dentition intact, 2 permanent upper partials, denies any loose teeth. Pulmonary: breath sounds clear to auscultation  (+) sleep apnea ( Probable GRETTA as patient snores at night and reports daytime sleepiness. Advised to obtain a sleep study.):      (-) asthma and shortness of breath                          ROS comment: Former smoker: 0.5 - 1.0 PPD x 40 years   Cardiovascular:    (+) hypertension: moderate, dysrhythmias ( Paroxysmal atrial fibrillation ): atrial fibrillation, murmur: Grade 1, hyperlipidemia    (-) past MI and CAD    ECG reviewed  Rhythm: irregular  Rate: normal                    Neuro/Psych:   (+) neuromuscular disease (Chronic bilateral low back pain without sciatica, Lumbar spinal stenosis):,             GI/Hepatic/Renal:   (+) renal disease ( Overactive bladder):,      (-) liver disease       Endo/Other:    (+) blood dyscrasia ( ELIQUIS 5 MG TABS ): anticoagulation therapy, arthritis: OA., .    (-) diabetes mellitus, hypothyroidism               Abdominal:   (+) obese,           Vascular: negative vascular ROS. Other Findings:             Anesthesia Plan      MAC     ASA 3       Induction: intravenous. Anesthetic plan and risks discussed with patient. Plan discussed with CRNA. Cookie Rodriguez MD   7-26-22      Chart reviewed, patient seen and interviewed. Agree with note above.   Eveleen Goodpasture, APRN, CRNA

## 2022-07-25 NOTE — PROGRESS NOTES
[address]        Date: 7/25/2022    Date of surgery: 7/26/22   Arrival Time: Hospital will call you between 5pm and 7pm with your final arrival time for surgery    Do not eat or drink anything after midnight prior to surgery. This includes no water, chewing gum, mints or ice chips. Take the following medications with a small sip of water on the morning of Surgery: Eliquis and Metoprolol     Diabetics may take evening dose of insulin but none after midnight. If you feel symptomatic or low blood sugar morning of surgery drink 1-2 ounces of apple juice only. Aspirin, Ibuprofen, Advil, Naproxen, Vitamin E and other Anti-inflammatory products should be stopped  before surgery  as directed by your physician. Take Tylenol only unless instructed otherwise by your surgeon. Check with your Doctor regarding stopping Plavix, Coumadin, Lovenox, Eliquis, Effient, or other blood thinners. Do not smoke,use illicit drugs and do not drink any alcoholic beverages 24 hours prior to surgery. You may brush your teeth the morning of surgery. DO NOT SWALLOW WATER    You MUST make arrangements for a responsible adult to take you home after your surgery. You will not be allowed to leave alone or drive yourself home. It is strongly suggested someone stay with you the first 24 hrs. Your surgery will be cancelled if you do not have a ride home. PEDIATRIC PATIENTS ONLY:  A parent/legal guardian must accompany a child scheduled for surgery and plan to stay at the hospital until the child is discharged. Please do not bring other children with you.     Please wear simple, loose fitting clothing to the hospital.  Luz Dozier not bring valuables (money, credit cards, checkbooks, etc.) Do not wear any makeup (including no eye makeup) or nail polish on your fingers or toes. DO NOT wear any jewelry or piercings on day of surgery. All body piercing jewelry must be removed. Shower the night before surgery with __x_Antibacterial soap /RYAN WIPES________    TOTAL JOINT REPLACEMENT/HYSTERECTOMY PATIENTS ONLY---Remember to bring Blood Bank bracelet to the hospital on the day of surgery. If you have a Living Will and Durable Power of  for Healthcare, please bring in a copy. If appropriate bring crutches, inspirex, WALKER, CANE etc... Notify your Surgeon if you develop any illness between now and surgery time, cough, cold, fever, sore throat, nausea, vomiting, etc.  Please notify your surgeon if you experience dizziness, shortness of breath or blurred vision between now & the time of your surgery. If you have ___dentures, they will be removed before going to the OR; we will provide you a container. If you wear ___contact lenses or _x__glasses, they will be removed; please bring a case for them. To provide excellent care visitors will be limited to 2 in the room at any given time. Please bring picture ID and insurance card. Sleep apnea patients need to bring CPAP AND SETTINGS to hospital on day of surgery. During flu season no children under the age of 15 are permitted in the hospital for the safety of all patients. Other                  Please call AMBULATORY CARE if you have any further questions.    [address]     [address]

## 2022-07-26 ENCOUNTER — HOSPITAL ENCOUNTER (OUTPATIENT)
Dept: POSTOP/PACU | Age: 72
Setting detail: OUTPATIENT SURGERY
Discharge: HOME | End: 2022-07-26
Attending: INTERNAL MEDICINE | Admitting: INTERNAL MEDICINE
Payer: MEDICARE

## 2022-07-26 ENCOUNTER — ANESTHESIA (OUTPATIENT)
Dept: POSTOP/PACU | Age: 72
End: 2022-07-26
Payer: MEDICARE

## 2022-07-26 VITALS
DIASTOLIC BLOOD PRESSURE: 88 MMHG | HEIGHT: 68 IN | SYSTOLIC BLOOD PRESSURE: 151 MMHG | RESPIRATION RATE: 16 BRPM | HEART RATE: 73 BPM | BODY MASS INDEX: 32.89 KG/M2 | TEMPERATURE: 97.1 F | WEIGHT: 217 LBS | OXYGEN SATURATION: 96 %

## 2022-07-26 LAB
EKG ATRIAL RATE: 113 BPM
EKG ATRIAL RATE: 60 BPM
EKG P AXIS: 84 DEGREES
EKG P-R INTERVAL: 242 MS
EKG Q-T INTERVAL: 344 MS
EKG Q-T INTERVAL: 418 MS
EKG QRS DURATION: 76 MS
EKG QRS DURATION: 86 MS
EKG QTC CALCULATION (BAZETT): 418 MS
EKG QTC CALCULATION (BAZETT): 456 MS
EKG R AXIS: 10 DEGREES
EKG R AXIS: 22 DEGREES
EKG T AXIS: -9 DEGREES
EKG T AXIS: 21 DEGREES
EKG VENTRICULAR RATE: 106 BPM
EKG VENTRICULAR RATE: 60 BPM

## 2022-07-26 PROCEDURE — 96374 THER/PROPH/DIAG INJ IV PUSH: CPT | Performed by: ANESTHESIOLOGY

## 2022-07-26 PROCEDURE — 2500000003 HC RX 250 WO HCPCS: Performed by: ANESTHESIOLOGY

## 2022-07-26 PROCEDURE — 2580000003 HC RX 258: Performed by: NURSE ANESTHETIST, CERTIFIED REGISTERED

## 2022-07-26 PROCEDURE — 36415 COLL VENOUS BLD VENIPUNCTURE: CPT

## 2022-07-26 PROCEDURE — 93005 ELECTROCARDIOGRAM TRACING: CPT | Performed by: INTERNAL MEDICINE

## 2022-07-26 PROCEDURE — 6360000002 HC RX W HCPCS: Performed by: NURSE ANESTHETIST, CERTIFIED REGISTERED

## 2022-07-26 PROCEDURE — 92960 CARDIOVERSION ELECTRIC EXT: CPT | Performed by: INTERNAL MEDICINE

## 2022-07-26 PROCEDURE — 92960 CARDIOVERSION ELECTRIC EXT: CPT

## 2022-07-26 RX ORDER — SODIUM CHLORIDE, SODIUM LACTATE, POTASSIUM CHLORIDE, CALCIUM CHLORIDE 600; 310; 30; 20 MG/100ML; MG/100ML; MG/100ML; MG/100ML
INJECTION, SOLUTION INTRAVENOUS CONTINUOUS
Status: DISCONTINUED | OUTPATIENT
Start: 2022-07-26 | End: 2022-07-26 | Stop reason: HOSPADM

## 2022-07-26 RX ORDER — SODIUM CHLORIDE 0.9 % (FLUSH) 0.9 %
5-40 SYRINGE (ML) INJECTION PRN
Status: DISCONTINUED | OUTPATIENT
Start: 2022-07-26 | End: 2022-07-26 | Stop reason: HOSPADM

## 2022-07-26 RX ORDER — SODIUM CHLORIDE 0.9 % (FLUSH) 0.9 %
5-40 SYRINGE (ML) INJECTION EVERY 12 HOURS SCHEDULED
Status: DISCONTINUED | OUTPATIENT
Start: 2022-07-26 | End: 2022-07-26 | Stop reason: HOSPADM

## 2022-07-26 RX ORDER — SODIUM CHLORIDE 9 MG/ML
INJECTION, SOLUTION INTRAVENOUS CONTINUOUS PRN
Status: DISCONTINUED | OUTPATIENT
Start: 2022-07-26 | End: 2022-07-26 | Stop reason: SDUPTHER

## 2022-07-26 RX ORDER — LIDOCAINE HYDROCHLORIDE 20 MG/ML
INJECTION, SOLUTION INTRAVENOUS PRN
Status: DISCONTINUED | OUTPATIENT
Start: 2022-07-26 | End: 2022-07-26 | Stop reason: SDUPTHER

## 2022-07-26 RX ORDER — SODIUM CHLORIDE 9 MG/ML
INJECTION, SOLUTION INTRAVENOUS PRN
Status: DISCONTINUED | OUTPATIENT
Start: 2022-07-26 | End: 2022-07-26 | Stop reason: HOSPADM

## 2022-07-26 RX ORDER — PROPOFOL 10 MG/ML
INJECTION, EMULSION INTRAVENOUS PRN
Status: DISCONTINUED | OUTPATIENT
Start: 2022-07-26 | End: 2022-07-26 | Stop reason: SDUPTHER

## 2022-07-26 RX ADMIN — LIDOCAINE HYDROCHLORIDE 100 MG: 20 INJECTION, SOLUTION INTRAVENOUS at 13:38

## 2022-07-26 RX ADMIN — PROPOFOL 80 MG: 10 INJECTION, EMULSION INTRAVENOUS at 13:38

## 2022-07-26 RX ADMIN — SILVER SULFADIAZINE: 10 CREAM TOPICAL at 14:17

## 2022-07-26 RX ADMIN — SODIUM CHLORIDE: 9 INJECTION, SOLUTION INTRAVENOUS at 13:30

## 2022-07-26 ASSESSMENT — PAIN - FUNCTIONAL ASSESSMENT: PAIN_FUNCTIONAL_ASSESSMENT: 0-10

## 2022-07-26 ASSESSMENT — PAIN DESCRIPTION - DESCRIPTORS: DESCRIPTORS: ACHING;TENDER;DISCOMFORT

## 2022-07-26 NOTE — PROGRESS NOTES
Patient brought to PACU for scheduled Cardioversion. Patient placed on monitors and consents verified. Time out performed at 1340. Patient shocked at 200Js at 1341 and converted to normal sinus rhythm with 1st degree AV block. EKG performed. Family updated.

## 2022-07-26 NOTE — ANESTHESIA POSTPROCEDURE EVALUATION
Department of Anesthesiology  Postprocedure Note    Patient: Jenna Mueller  MRN: [id number]  YOB: 1950  Date of evaluation: 7/26/2022      Procedure Summary     Date: 07/26/22 Room / Location: [facility] PACU    Anesthesia Start: 1330 Anesthesia Stop: 1347    Procedure: CARDIOVERSION Diagnosis: Encounter for cardioversion procedure    Scheduled Providers:  Responsible Provider: González Gaspar MD    Anesthesia Type: MAC ASA Status: 3          Anesthesia Type: No value filed.     Temo Phase I: Temo Score: 10    Temo Phase II: Temo Score: 10      Anesthesia Post Evaluation    Patient location during evaluation: PACU  Patient participation: complete - patient participated  Level of consciousness: awake  Pain score: 0  Airway patency: patent  Nausea & Vomiting: no nausea and no vomiting  Complications: no  Cardiovascular status: hemodynamically stable  Respiratory status: acceptable  Hydration status: euvolemic

## 2022-07-26 NOTE — PROCEDURES
PROCEDURE NOTE    Attending Cardiologist:  Cassi Mcguire MD  Primary Cardiologist: Wu Suggs MD    Date of Service: 7/26/2022    Procedure: Direct Current Cardioversion    Indication: Persistent atrial fibrillation    Anticoagulant: Apixaban    Antiarrhythmic drug(s): Metoprolol    Description of procedure:  Patient presented in a fasting and well hydrated state. Informed consent obtained from patient. Risks, benefits and alternatives to DC cardioversion were explained to the patient in detail, and the patient acknowledged understanding. Cardiac rhythm, arterial oxygen saturation, and blood pressure were continuously monitored. Deep sedation with propofol administered by the Department of Anesthesiology. Patch placement: Anterior/posterior  Energy: 200 Joules, synchronized  Number of shocks: 1  Outcome: Sinus rhythm  Complications: None    Impression:  Successful direct current cardioversion of atrial fibrillation to normal sinus rhythm.     Cassi Mcguire MD, [facility] Cardiology

## 2022-07-26 NOTE — H&P
H&P  Octavio Gómez patient. Office note from 7/20/22 reviewed, no changes. Denies missed doses of apixaban. Took dose the AM.    Impression: Persistent atrial fibrillation  Plan: DC cardioversion    Risks and benefits of cardioversion explained, including risk of CVA, failure to restore sinus rhythm, recurrence of atrial arrhythmias, skin burns. She understands and agrees to proceed.     Jose Roberto Hilliard MD

## 2022-07-28 ENCOUNTER — HOSPITAL ENCOUNTER (OUTPATIENT)
Dept: CARDIOLOGY | Age: 72
Discharge: HOME | End: 2022-07-28
Payer: MEDICARE

## 2022-07-28 ENCOUNTER — TELEPHONE (OUTPATIENT)
Dept: CARDIOLOGY CLINIC | Age: 72
End: 2022-07-28

## 2022-07-28 DIAGNOSIS — I48.19 PERSISTENT ATRIAL FIBRILLATION (HCC): ICD-10-CM

## 2022-07-28 LAB
LV EF: 65 %
LVEF MODALITY: NORMAL

## 2022-07-28 PROCEDURE — 93306 TTE W/DOPPLER COMPLETE: CPT

## 2022-07-28 NOTE — TELEPHONE ENCOUNTER
----- Message from Jorgito Aviles MD sent at 7/28/2022  9:50 AM EDT -----  Please notify patient that echocardiogram continues to demonstrate normal heart muscle function. From monitoring at the time of her echocardiogram she remains in a normal rhythm.   Continue present regimen until schedule follow-up appointment

## 2022-08-10 ENCOUNTER — EVALUATION (OUTPATIENT)
Dept: PHYSICAL THERAPY | Age: 72
End: 2022-08-10
Payer: MEDICARE

## 2022-08-10 DIAGNOSIS — M19.90 OSTEOARTHRITIS, UNSPECIFIED OSTEOARTHRITIS TYPE, UNSPECIFIED SITE: Primary | ICD-10-CM

## 2022-08-10 PROCEDURE — 97163 PT EVAL HIGH COMPLEX 45 MIN: CPT | Performed by: PHYSICAL THERAPIST

## 2022-08-10 NOTE — PROGRESS NOTES
[facility] OUTPATIENT REHABILITATION  PHYSICAL THERAPY INITIAL EVALUATION         Date:  8/10/2022   Patient: Rea Montenegro  : 1950  MRN: [id number]  Referring Provider: Annette Nath MD  [address] [facility],  [address] [place] [address]     Medical Diagnosis:     M19.90 (ICD-10-CM) - Unspecified osteoarthritis, unspecified site    Physician Order: Eval and Treat      SUBJECTIVE:     History: Patient reports decreased functional mobility over the past 8  year(s) due to spinal stenosis w/ neurogenic claudication    Chief complaint:  feels she needs a walker but is very nervous about using, wants someone to help her make decision    Behavior: condition is getting worse    Pain: intermittent, worse in morning  Current: 6/10         Symptom Type / Quality: aching  Location[de-identified] all over    Imaging results: ECHO COMPLETE    Result Date: 2022  Transthoracic Echocardiography Report (TTE)  Demographics   Patient Name       Lou Martin  Gender               Female                     K   Medical Record     [id number]        Room Number  Number   Account #          [de-identified]       Procedure Date       2022   Corporate ID                       Ordering Physician   Clarita Mann MD   Accession Number   [id number]      Referring Physician  Clarita Mann MD   Date of Birth      1950      Sonographer          Vladimir Hernandez [facility]   Age                66 year(s)      Interpreting         Clarita Mann MD                                     Physician                                      Any Other  Procedure Type of Study   TTE procedure:Echo Complete W/Doppler & Color Flow. Procedure Date Date: 2022 Start: 08:25 AM Study Location: Echo Lab Technical Quality: Good visualization Indications:Atrial fibrillation.  Patient Status: Routine Height: 67 inches Weight: 220 pounds BSA: 2.11 m^2 BMI: 34.46 kg/m^2  Findings   Left Ventricle  Left ventricular internal dimensions were normal in diastole and systole. No regional wall motion abnormalities seen. Normal left ventricular ejection fraction. Ejection fraction is visually estimated at 65%. Indeterminate diastolic function. Right Ventricle  Normal right ventricular size and function. Left Atrium  The left atrium is mildly dilated. Interatrial septum appears intact. Right Atrium  Normal right atrium size. Mitral Valve  Structurally normal mitral valve. Mild mitral annular calcification. Moderate centrally directed mitral regurgitation. ERO 0.2cm2   Tricuspid Valve  The tricuspid valve appears structurally normal.  Mild tricuspid regurgitation. Aortic Valve  The aortic valve appears mildly sclerotic. Pulmonic Valve  The pulmonic valve was not well visualized. Physiologic and/or trace pulmonic regurgitation present. Pericardial Effusion  No evidence of pericardial effusion. Aorta  Aortic root dimension within normal limits. Conclusions   Summary  Left ventricular internal dimensions were normal in diastole and systole. No regional wall motion abnormalities seen. Normal left ventricular ejection fraction. The left atrium is mildly dilated. Mild mitral annular calcification. Moderate centrally directed mitral regurgitation. The aortic valve appears mildly sclerotic. Mild tricuspid regurgitation.    Signature   ----------------------------------------------------------------  Electronically signed by Kyrie Villalobos MD(Interpreting  physician) on 07/28/2022 09:21 AM  ----------------------------------------------------------------  M-Mode/2D Measurements & Calculations   LV Diastolic    LV Systolic Dimension: 3.2   AV Cusp Separation: 1.8 cmLA  Dimension: 4.9  cm                           Dimension: 4.9 cmAO Root  cm              LV Volume Diastolic: 898.3   Dimension: 2.8 cm  LV FS:34.7 %    ml  LV PW           LV Volume Systolic: 63.0 ml  Diastolic: 1 cm LV EDV/LV EDV Index: 111.6  Septum          ml/53 ml/m^2LV ESV/LV ESV    RV Diastolic Dimension: 2.8  Diastolic: 1 cm Index: 00.0 ml/20ml/ m^2     cm                  EF Calculated: 63 %  LV Mass: 176.04 LV Mass Index: 83 l/min*m^2  g                                            LA volume/Index: 83.5 ml                                               RA Area: 16.7 cm^2  Doppler Measurements & Calculations   MV Peak E-Wave: 1 m/s               AV Peak Velocity:  MV Peak A-Wave: 0.34 m/s            1.56 m/s          Estimated RVSP: 30.3  MV E/A Ratio: 3                     AV Peak Gradient: mmHg  MV Peak Gradient: 5.4 mmHg          9.67 mmHg         Estimated RAP:3 mmHg  MV Mean Gradient: 1.4 mmHg          AV Mean Velocity:  MV Mean Velocity: 0.51 m/s          1.06 m/s  MV Deceleration Time: 157 msec      AV Mean Gradient: TR Velocity:2.61 m/s                                      4.7 mmHg          TR Gradient:27.29                                      AV VTI: 33.6 cm   mmHg   MR Velocity: 5.06 m/s  MV RUFINA PISA: 0.2 cm^2               Estimated PASP:  MR VTI: 192 cm                      30.29 mmHg  Alias Velocity: 0.25 m/sPISA  Radius: 0.8 cm   PISA area: 4.02 cm^2MR flow rate:  102.11 ml/sMR volume:38.4 ml  [email or web address] [id number]%3d      Past Medical History:  Past Medical History:   Diagnosis Date    [facility]     Arthritis     Class 2 severe obesity due to excess calories with serious comorbidity and body mass index (BMI) of 37.0 to 37.9 in adult ([facility]     Dry eyes, bilateral     Hypertension     OAB (overactive bladder)     PONV (postoperative nausea and vomiting)      Past Surgical History:   Procedure Laterality Date    ABDOMEN SURGERY      BLADDER SURGERY  01/01/2022    bladder stimulator    BUNIONECTOMY      bilteral twice    CARDIAC SURGERY  2006    heart cath in [facility]      x 2    HYSTERECTOMY (CERVIX STATUS UNKNOWN)  11/27/2017 robotic assisted bilateral alpingo-oophorectomy lap robotic assisted abdominal sacral colopexy with y mesh    JOINT REPLACEMENT      bilateral knees, right shoulder replaced    KNEE ARTHROPLASTY Bilateral     NERVE BLOCK Bilateral 06/28/2018    facet L3-5 #1    PAIN MANAGEMENT PROCEDURE N/A 12/17/2021    PERIPHERAL NERVE EVALUATION performed by Mely Moreno DO at [address] NOSE/CLEFT LIP/TIP Bilateral 06/28/2018    BILATERAL INTRA-ARTICULAR FACET JOINT INJECTION WITH FLUOROSCOPIC GUIDANCE AT L3-4 AND L4-5 #1 performed by Delia Alamo DO at [place] [address]  09/20/2016    right total shoulder reverse arthroplasty       Medications:   Current Outpatient Medications   Medication Sig Dispense Refill    topiramate (TOPAMAX) 25 MG tablet Start Topiramate 25 mg. Take one tablet twice each day for one week. If the appetite suppression is insufficient, then increase to two tablets twice daily. (Patient taking differently: Take 25 mg by mouth daily as needed Start Topiramate 25 mg. Take one tablet twice each day for one week.  If the appetite suppression is insufficient, then increase to two tablets twice daily.) 60 tablet 1    XIIDRA 5 % SOLN 2 times daily      ELIQUIS 5 MG TABS tablet Take 5 mg by mouth in the morning and at bedtime      metoprolol succinate (TOPROL XL) 100 MG extended release tablet Take 100 mg by mouth daily      acetaminophen (TYLENOL) 650 MG extended release tablet Take 650 mg by mouth every 8 hours as needed for Pain      erythromycin (ROMYCIN) 5 MG/GM ophthalmic ointment APPLY TO BOTH EYES AT BEDTIME AS DIRECTED  6    celecoxib (CELEBREX) 200 MG capsule Take 200 mg by mouth daily      docusate sodium (COLACE) 100 MG capsule Take 100 mg by mouth daily      polyvinyl alcohol (LIQUIFILM TEARS) 1.4 % ophthalmic solution Place 1 drop into both eyes as needed      Cyanocobalamin (VITAMIN B 12 PO) Take 250 mg by mouth daily      Omega-3 Fatty Acids (OMEGA 3 PO) Take 2,000 mg by mouth daily       Multiple Vitamins-Minerals (MULTIVITAL PO) Take by mouth daily      irbesartan-hydrochlorothiazide (AVALIDE) 150-12.5 MG per tablet Take 2 tablets by mouth daily        No current facility-administered medications for this visit. Facility-Administered Medications Ordered in Other Visits   Medication Dose Route Frequency Provider Last Rate Last Admin    sodium chloride flush 0.9 % injection 10 mL  10 mL IntraVENous 2 times per day ADALI Greenfield        sodium chloride flush 0.9 % injection 10 mL  10 mL IntraVENous PRN ADALI Greenfield        0.9 % sodium chloride infusion   IntraVENous Continuous ADALI Greenfield        midazolam (VERSED) injection 1 mg  1 mg IntraVENous Q5 Min PRN Misha Forth, DO   1 mg at 09/20/16 7963       Social history: Patient lives with spouse in a 1 story house with 3 steps to enter with railing. Equipment owned: cane, wheeled walker    Occupation: retired. Teacher special ed supervisor    Exercise regimen: none    Hobbies: quilting active in J[employer]ism    Patient Goals:  able to walk with walker    Contraindications/Precautions: falls risk    OBJECTIVE:     Estimated body mass index is 33.49 kg/m² as calculated from the following:    Height as of 7/26/22: 5' 7.5\" (1.715 m). Weight as of 7/26/22: 217 lb (98.4 kg). Observations: well nourished female, anxious affect because she is always afraid of falling, this is very different from the last time I saw her    Inspection: normal orthopedic exam       Gait: wide-based, Trendelenburg right, Trendelenburg left    Functional Strength:   unable to toe walk, heel walk, and squat.     Range of Motion:      Upper Extremity   Right:   [x] Normal   [] Limited    Left:   [x] Normal   [] Limited     Trunk:   Flexion:  [x] Normal   [] Limited    Extension:  [] Normal   [x] Limited     Right Side Bending: [] Normal   [x] Limited    Left Side Bending: [] Normal   [x] Limited     Lower Extremity:   Right:   [x] Normal   [] Limited   Left:   [x] Normal   [] Limited w/ exception of ankle limited      Strength:     Trunk: 4/5   R UE: 5/5   L UE: 5/5   R LE: 5/5, hip flex 5-/5   L LE: 5/5, hip flex 5-/5    Functional Mobility/Tests:  Test    Basic Transfer Independent   Sit/Stand Independent   Squat unable   Double stance, feet together, eyes open Poor+   Double stance, feet together, eyes closed poor   Step stool touches    360 degree turns    Dysdiadochokinesia Not present    Dysmetria  Not present      30-Sec. Chair Stand Test:  Number:  _7_  Test date: 8/10/2022    Notes:      Scoring criteria. Chair Stand--Below Average Scores Age  Men  Women    60-64  < 14  < 12    65-69  < 12  < 11    70-74  < 12  < 10    75-79  < 11  < 10    80-84  < 10  < 9    85-89  < 8  < 8    90-94  < 7  < 4         ASSESSMENT     Hilaria's gait is significantly worse than when I saw her last 4 years ago. She has difficulty starting which she says is due to fear of falling though it does appear somewhat like \"glue foot\" in Parkinson's. Her gait is waddling. We will work on gait training with walker for safety.     Outcome Measure:   LEFS 71%    Problems:   Strength decreased   Balance decreased in both standing and walking   Limitations with walking      [x] There are no barriers affecting plan of care or recovery    [] Barriers to this patient's plan of care or recovery include:      Domestic Concerns:  [x] No  [] Yes:    Long Term goals (2 weeks)  Demonstrate improved balance in standing and walking  Able to perform / complete the following functions / tasks:  ambulate with walker safely, build confidence  Independent with Home Exercise Programs for balance and strength     Rehab Potential: [x] Good  [] Fair  [] Poor    PLAN     Time: 3883-7824   40 minutes  Treatment Plan:  instruction in home exercise program   therapeutic exercise   therapeutic activity   neuromuscular re-education   gait training     The following CPT codes are likely to be used in the care of this patient:   04056 PT Evaluation: High Complexity   54062 Therapeutic Exercise   14209 Neuromuscular Re-Education   47437 Therapeutic Activities   20214 Gait Training     Suggested Professional Referral: [x] No  [] Yes:     Patient Education:  [x] Plans / Goals, Risks / Benefits discussed  [x] Home exercise program  Method of Education: [x] Verbal  [x] Demo  [x] Written  Comprehension of Education:  [x] Verbalizes understanding. [x] Demonstrates understanding. [] Needs Review. [] Demonstrates / verbalizes understanding of HEP / Jonatan Ok previously given. Frequency:  1-2 days per week for 2 weeks     Patient understands diagnosis/prognosis and consents to treatment, plan and goals: [x] Yes    [] No     Thank you for the opportunity to work with your patient. If you have questions or comments, please contact me at [phone]; fax: [phone]. Electronically signed by: Nita Gonsales PT         Please sign Physician's Certification and return to: [address] [place] [address] PHYSICAL THERAPY  [address] [address]  Dept: [phone]  Dept Fax: [phone]: [phone] Certification / Comments     Frequency/Duration 1-2 days per week for 2 weeks . Certification period from 8/10/2022  to 11/5/2022. I have reviewed the Plan of Care established for skilled therapy services and certify that the services are required and that they will be provided while the patient is under my care.     Physician's Comments/Revisions:               Physician's Printed Name:                                           [de-identified] Signature:                                                               Date:

## 2022-08-12 ENCOUNTER — TREATMENT (OUTPATIENT)
Dept: PHYSICAL THERAPY | Age: 72
End: 2022-08-12
Payer: MEDICARE

## 2022-08-12 DIAGNOSIS — M19.90 OSTEOARTHRITIS, UNSPECIFIED OSTEOARTHRITIS TYPE, UNSPECIFIED SITE: Primary | ICD-10-CM

## 2022-08-12 PROCEDURE — 97116 GAIT TRAINING THERAPY: CPT | Performed by: PHYSICAL THERAPIST

## 2022-08-12 NOTE — PROGRESS NOTES
Physical Therapy Daily Treatment Note    Date: 8/15/2022  Patient Name: Paige Wilkerson  : 1950   MRN: [id number]  DOInjury: -   DOSx: -  Referring Provider: Ro Rowan MD  [address] [facility],  [address] [place] [address]     Medical Diagnosis:      Diagnosis Orders   1. Osteoarthritis, unspecified osteoarthritis type, unspecified site            Outcome Measure:  LEFS 71% disability        X = TO BE PERFORMED NEXT VISIT  > = PROGRESS TO THIS    S: very emotional about having to use a walker, she does realize that it's necessary at this point for safety and improved ability to ambulate  O: gait training with wheeled walker 25 minutes 3 x 145, emphasized sequencing, upright posture, reviewed walker that would be most appropriate for her at home  Time 5892-7966     Visit 2 Repeat outcome measure at mid point and end. Pain Pain at rest 0/10     ROM      Modalities         MO   Manual            Stretch      Towel / strap DF, INV, EV   TE      TE   Exercise      Ball / can rolling   TE   Toe curls      Heel slides   TE   QS   TE   SLR   TE   SAQ   TE   [] TG  [] Leg Press 2-leg   TE   [] TG  [] Leg Press 1-leg   TE   Hamstring Curl Machine   TE   Knee Extension Machine   TE   Step-ups - FWD   TA   Step-ups - LAT   TA   Step-ups - BWD   TA   Calf Raises   TA      TA      TA               A:  Tolerated well.     P: Continue with rehab plan  Victorino Prasad PT    Treatment Charges: Mins Units   Initial Evaluation     Re-Evaluation     Ther Exercise         TE     Manual Therapy     MT     Ther Activities        TA     Gait Training          GT 25 2   Neuro Re-education NR     Modalities     Non-Billable Service Time     Other     Total Time/Units 25 2

## 2022-08-15 PROBLEM — M19.90 OSTEOARTHRITIS: Status: ACTIVE | Noted: 2022-08-15

## 2022-08-16 ENCOUNTER — TREATMENT (OUTPATIENT)
Dept: PHYSICAL THERAPY | Age: 72
End: 2022-08-16
Payer: MEDICARE

## 2022-08-16 DIAGNOSIS — M19.90 OSTEOARTHRITIS, UNSPECIFIED OSTEOARTHRITIS TYPE, UNSPECIFIED SITE: Primary | ICD-10-CM

## 2022-08-16 PROCEDURE — 97116 GAIT TRAINING THERAPY: CPT | Performed by: PHYSICAL THERAPIST

## 2022-08-16 NOTE — PROGRESS NOTES
Physical Therapy Daily Treatment Note    Date: 2022  Patient Name: Colton Purcell  : 1950   MRN: [id number]  DOInjury: -   DOSx: -  Referring Provider: Chantal Bull MD  [address] [employer]  [place],  [address] [place] [address] Road     Medical Diagnosis:      Diagnosis Orders   1. Osteoarthritis, unspecified osteoarthritis type, unspecified site              Outcome Measure:  LEFS 71% disability        X = TO BE PERFORMED NEXT VISIT  > = PROGRESS TO THIS    S: very emotional about having to use a walker, she does realize that it's necessary at this point for safety and improved ability to ambulate  O: gait training with wheeled walker 25 minutes 3 x 145, emphasized sequencing, upright posture, reviewed walker that would be most appropriate for her at home  Time 5713-5807     Visit 2 Repeat outcome measure at mid point and end. Pain Pain at rest 0/10     ROM      Modalities         MO   Manual            Stretch      Towel / strap DF, INV, EV   TE      TE   Exercise      Ball / can rolling   TE   Toe curls      Heel slides   TE   QS   TE   SLR   TE   SAQ   TE   [] TG  [] Leg Press 2-leg   TE   [] TG  [] Leg Press 1-leg   TE   Hamstring Curl Machine   TE   Knee Extension Machine   TE   Step-ups - FWD   TA   Step-ups - LAT   TA   Step-ups - BWD   TA   Calf Raises   TA   Gait training 25 min  GT      TA               A:  Tolerated well.     P: Continue with rehab plan  Liam Merino PT    Treatment Charges: Mins Units   Initial Evaluation     Re-Evaluation     Ther Exercise         TE     Manual Therapy     MT     Ther Activities        TA     Gait Training          GT 25 2   Neuro Re-education NR     Modalities     Non-Billable Service Time     Other     Total Time/Units 25 2

## 2022-08-17 ENCOUNTER — OFFICE VISIT (OUTPATIENT)
Dept: BARIATRICS/WEIGHT MGMT | Age: 72
End: 2022-08-17
Payer: MEDICARE

## 2022-08-17 VITALS
HEART RATE: 54 BPM | WEIGHT: 215.8 LBS | TEMPERATURE: 97.1 F | DIASTOLIC BLOOD PRESSURE: 85 MMHG | HEIGHT: 67 IN | SYSTOLIC BLOOD PRESSURE: 142 MMHG | BODY MASS INDEX: 33.87 KG/M2

## 2022-08-17 DIAGNOSIS — I10 ESSENTIAL HYPERTENSION: Primary | ICD-10-CM

## 2022-08-17 DIAGNOSIS — E66.09 CLASS 1 OBESITY DUE TO EXCESS CALORIES WITH SERIOUS COMORBIDITY AND BODY MASS INDEX (BMI) OF 33.0 TO 33.9 IN ADULT: ICD-10-CM

## 2022-08-17 PROCEDURE — 99211 OFF/OP EST MAY X REQ PHY/QHP: CPT

## 2022-08-17 PROCEDURE — 99214 OFFICE O/P EST MOD 30 MIN: CPT | Performed by: INTERNAL MEDICINE

## 2022-08-17 PROCEDURE — 1123F ACP DISCUSS/DSCN MKR DOCD: CPT | Performed by: INTERNAL MEDICINE

## 2022-08-17 RX ORDER — TOPIRAMATE 25 MG/1
TABLET ORAL
Qty: 120 TABLET | Refills: 3 | Status: SHIPPED | OUTPATIENT
Start: 2022-08-17

## 2022-08-17 NOTE — PATIENT INSTRUCTIONS
Continue current plan. Topiramate:  Increase Topiramate to 50 mg twice a day. Follow up in 2 months.

## 2022-08-17 NOTE — PROGRESS NOTES
CC -   Follow up of: HTN, Weight gain    Would like to be <200 lbs. BACKGROUND -   Last visit: 6/17/22  First visit: 4/14/22    Obesity (all weight in lbs)  Began in childhood  Initial BMI 37.81, Wt 243.2, Ht 67.25\"  HS Grad wt ~200 lbs (down from 300)   Lowest   wt 175 lbs (on weight watchers)  Highest  wt 300 (at 10years of age)  Pattern of wt gain: gradual  Wt change past yr: similar (235-245)  Most wt lost: 100 lbs (in high school)  Other diets attempted: [employer], diet pills (multiple)     Desire to lose weight: 10/10 (does not want surgery)    Initial Diet:    Number of meals per day - 3    Number of snacks per day - 1    Meal volume - 12\" plate,  sometimes seconds    Fast food/convenience store - 3-4x/week (eg a breakfast sandwich)    Restaurants (not fast food) - 1-2x/week   Sweets - 0d/week   Chips - 1d/week   Crackers/pretzels - 1-2d/week   Nuts - 1d/week   Peanut Butter - 0d/week   Popcorn - 1d/week   Dried fruit - 0d/week   Whole fruit - 7d/week (berries, oranges, grapes, apples in season)   Breakfast cereal - 2d/week   Granola/Protein/Energy bar - 0d/week   Sugar sweetened beverages - none   Protein - No supplements   Fiber - No supplements     Initial Exercise:    Gym membership - silver sneakers    Walking - marching around the house with leg lifts etc    Running - no    Resistance - no    Aerobic class - no        Initial Sleep: Bedtime: 11pm-midnight, wake up time: 6:30-7:00 - usu rested, daytime naps: no    Weight scale at home: yes, takes weight: 1x/wk  Food scale: yes    Follow up 6/17/22:  1. Fatigue: Denies. 2. Diet: Fiber: Benefiber - 1/2 of recommended currently. Eats a lot of protein - eggs, chicken breast, burgers on the grill. Follows [employer]. Water at least 6-8 glasses/day and coffee on top. 3. Exercise: walking and balance as shown by prior PT. 4. Sleep: like prior, wakes up 2-3x/night, goes back to sleep without a problem.     5. Medications: Ozempic helping with craving and appetite suppression 8/10. Does have constipation. Was started on Eliquis and Metoprolol, and was referred to cardiologist.    ______________________    [address] [place] [address] -  Past Medical History:   Diagnosis Date    A-fib ([facility]     Arthritis     Class 2 severe obesity due to excess calories with serious comorbidity and body mass index (BMI) of 37.0 to 37.9 in adult ([facility]     Dry eyes, bilateral     Hypertension     OAB (overactive bladder)     PONV (postoperative nausea and vomiting)    Atrial fibrillation - recently diagnosed, on Eliquis and Metoprolol now. Past Surgical History:   Procedure Laterality Date    ABDOMEN SURGERY      BLADDER SURGERY  01/01/2022    bladder stimulator    BUNIONECTOMY      bilteral twice    CARDIAC SURGERY  2006    heart cath in [facility]      x 2    HYSTERECTOMY (CERVIX STATUS UNKNOWN)  11/27/2017    robotic assisted bilateral alpingo-oophorectomy lap robotic assisted abdominal sacral colopexy with y mesh    JOINT REPLACEMENT      bilateral knees, right shoulder replaced    KNEE ARTHROPLASTY Bilateral     NERVE BLOCK Bilateral 06/28/2018    facet L3-5 #1    PAIN MANAGEMENT PROCEDURE N/A 12/17/2021    PERIPHERAL NERVE EVALUATION performed by Socorro Moreno DO at [address] NOSE/CLEFT LIP/TIP Bilateral 06/28/2018    BILATERAL INTRA-ARTICULAR FACET JOINT INJECTION WITH FLUOROSCOPIC GUIDANCE AT L3-4 AND L4-5 #1 performed by Maxwell Mccrary DO at [place] [address]  09/20/2016    right total shoulder reverse arthroplasty     Prior to Admission medications    Medication Sig Start Date End Date Taking? Authorizing Provider   topiramate (TOPAMAX) 25 MG tablet Start Topiramate 25 mg. Take one tablet twice each day for one week. If the appetite suppression is insufficient, then increase to two tablets twice daily. Patient taking differently: Take 25 mg by mouth daily as needed Start Topiramate 25 mg.  Take one tablet twice each day for one week. If the appetite suppression is insufficient, then increase to two tablets twice daily. 6/29/22   MD VAMSHI Feliz 5 % SOLN 2 times daily 5/16/22   Historical Provider, MD   ELIQUIS 5 MG TABS tablet Take 5 mg by mouth in the morning and at bedtime 6/15/22   Historical Provider, MD   metoprolol succinate (TOPROL XL) 100 MG extended release tablet Take 100 mg by mouth daily    Historical Provider, MD   acetaminophen (TYLENOL) 650 MG extended release tablet Take 650 mg by mouth every 8 hours as needed for Pain    Historical Provider, MD   erythromycin (ROMYCIN) 5 MG/GM ophthalmic ointment APPLY TO BOTH EYES AT BEDTIME AS DIRECTED 8/10/17   Historical Provider, MD   celecoxib (CELEBREX) 200 MG capsule Take 200 mg by mouth daily    Historical Provider, MD   docusate sodium (COLACE) 100 MG capsule Take 100 mg by mouth daily    Historical Provider, MD   polyvinyl alcohol (LIQUIFILM TEARS) 1.4 % ophthalmic solution Place 1 drop into both eyes as needed    Historical Provider, MD   Cyanocobalamin (VITAMIN B 12 PO) Take 250 mg by mouth daily    Historical Provider, MD   Omega-3 Fatty Acids (OMEGA 3 PO) Take 2,000 mg by mouth daily     Historical Provider, MD   Multiple Vitamins-Minerals (MULTIVITAL PO) Take by mouth daily    Historical Provider, MD   irbesartan-hydrochlorothiazide (AVALIDE) 150-12.5 MG per tablet Take 2 tablets by mouth daily     Historical Provider, MD   xiidra eye drops bid. Family history: DM: brother, Heart disease: half brothers (3) all had heart ds. Allergies: Allergies   Allergen Reactions    Pcn [Penicillins] Hives, Itching and Swelling    Morphine Nausea Only       Social history: smoking: quit 44 years ago ; Alcohol: couple of times week (a glass of wine or other drink). ROS -  Card - no CP, but difficulty with mobility which she feels is due to weight. GI - no N/V, has Constipation colace helps.     PE -  Gen : BP (!) 142/85 (Site: Left Upper Arm, Position: Sitting, Cuff Size: Large Adult)   Pulse 54   Temp 97.1 °F (36.2 °C) (Temporal)   Ht 5' 7.25\" (1.708 m)   Wt 215 lb 12.8 oz (97.9 kg)   BMI 33.55 kg/m²    WN, WD, NAD  Lung: Nml resp effort, CTA B/L  Heart:  RRR w/o MGR, trace LE pitting edema ramona on left  Psych: Normal mood   Full affect  Neuro: Moves all ext well  ______________________    HISTORY & ASSESSMENT/PLAN -     Problem 1  - Hypertension   HPI   - Ongoing, compliant with medications, patient was recently started on Eliquis for afib, had cardioversion, also on Metoprolol and Avalide 150-12.5, tolerating, pt asymptomatic. Assessment  - fairly controlled   Plan   - Has follow up with cardiologist, current medication seems to be working well. Weight reduction can help with BP - may help decrease dose. Weight reduction per plan below    Problem 2  - Obesity   HPI   - See above Background for description    Weight  Date    243.2  4/14/22    227.8  6/17/22    215.8  8/17/22    Total weight change to date: -27.4 lbs. Average daily energy variance:  4/14/2022 - 6/17/2022: -13.8 lbs (89756 Dioni)/63 days = -767 Dioni/day deficit (-1.6 lbs subtracted for waterweight correction). 6/17/2022 - 8/17/2022: -12 lbs (59480 Dioni)/61 days = -689 Dioni/day deficit. DEN (est.)= 1926 Dioni/d = 68958 Dioni/wk    Update:  Calorie monitoring: calorie conscious, watching portions. Close to recommended but may be little low on fibers. Sweets: 0 d/month  SSB: none  Restaurant food: 1x/wk  Appetite suppressant: Topiramate 25 mg bid, 5/10 with 25 bid, no s/e. Home BP monitoring: NA    Assessment  - improving    Plan   - He is doing very well with current plan. Would like to continue. Talked about protein, fiber and water intake. Ozempic was not approved, patient would like Topiramate increased to 50 bid and see if it helps more - [facility] and [facility]. Will review bloodworks or request in follow up. Planned for follow up in 2 months.     Medication management: Topiramate. Cristine Dos Santos MD  Internal Medicine/Obesity Medicine  8/17/2022.

## 2022-08-18 ENCOUNTER — TELEPHONE (OUTPATIENT)
Dept: BARIATRICS/WEIGHT MGMT | Age: 72
End: 2022-08-18

## 2022-08-19 ENCOUNTER — TREATMENT (OUTPATIENT)
Dept: PHYSICAL THERAPY | Age: 72
End: 2022-08-19
Payer: MEDICARE

## 2022-08-19 DIAGNOSIS — M19.90 OSTEOARTHRITIS, UNSPECIFIED OSTEOARTHRITIS TYPE, UNSPECIFIED SITE: Primary | ICD-10-CM

## 2022-08-19 PROCEDURE — 97116 GAIT TRAINING THERAPY: CPT | Performed by: PHYSICAL THERAPIST

## 2022-08-19 NOTE — PROGRESS NOTES
Physical Therapy Daily Treatment Note    Date: 2022  Patient Name: Rea Montenegro  : 1950   MRN: [id number]  DOInjury: -   DOSx: -  Referring Provider: Annette Nath MD  [address] [facility],  [address] [place] [address]     Medical Diagnosis:      Diagnosis Orders   1. Osteoarthritis, unspecified osteoarthritis type, unspecified site              Outcome Measure:  LEFS 71% disability        X = TO BE PERFORMED NEXT VISIT  > = PROGRESS TO THIS    S: very emotional about having to use a walker, she does realize that it's necessary at this point for safety and improved ability to ambulate  O: gait training with wheeled walker 25 minutes 3 x 145, emphasized sequencing, upright posture, reviewed walker that would be most appropriate for her at home  Time 3110-9625     Visit 3 Repeat outcome measure at mid point and end. Pain Pain at rest 0/10     ROM      Modalities         MO   Manual            Stretch      Towel / strap DF, INV, EV   TE      TE   Exercise      Ball / can rolling   TE   Toe curls      Heel slides   TE   QS   TE   SLR   TE   SAQ   TE   [] TG  [] Leg Press 2-leg   TE   [] TG  [] Leg Press 1-leg   TE   Hamstring Curl Machine   TE   Knee Extension Machine   TE   Step-ups - FWD   TA   Step-ups - LAT   TA   Step-ups - BWD   TA   Calf Raises   TA      TA      TA               A:  Tolerated well.     P: Continue with rehab plan  Paulo Bosworth, PT    Treatment Charges: Mins Units   Initial Evaluation     Re-Evaluation     Ther Exercise         TE     Manual Therapy     MT     Ther Activities        TA     Gait Training          GT 25 2   Neuro Re-education NR     Modalities     Non-Billable Service Time     Other     Total Time/Units 25 2

## 2022-08-23 ENCOUNTER — OFFICE VISIT (OUTPATIENT)
Dept: CARDIOLOGY CLINIC | Age: 72
End: 2022-08-23
Payer: MEDICARE

## 2022-08-23 VITALS
BODY MASS INDEX: 33.9 KG/M2 | RESPIRATION RATE: 16 BRPM | SYSTOLIC BLOOD PRESSURE: 112 MMHG | DIASTOLIC BLOOD PRESSURE: 72 MMHG | HEART RATE: 89 BPM | WEIGHT: 216 LBS | HEIGHT: 67 IN

## 2022-08-23 DIAGNOSIS — R06.09 EXERTIONAL DYSPNEA: ICD-10-CM

## 2022-08-23 DIAGNOSIS — I10 ESSENTIAL HYPERTENSION: ICD-10-CM

## 2022-08-23 DIAGNOSIS — E66.8 MODERATE OBESITY: ICD-10-CM

## 2022-08-23 DIAGNOSIS — I48.0 PAROXYSMAL ATRIAL FIBRILLATION (HCC): Primary | ICD-10-CM

## 2022-08-23 PROCEDURE — 99215 OFFICE O/P EST HI 40 MIN: CPT | Performed by: INTERNAL MEDICINE

## 2022-08-23 PROCEDURE — 93000 ELECTROCARDIOGRAM COMPLETE: CPT | Performed by: INTERNAL MEDICINE

## 2022-08-23 PROCEDURE — 1123F ACP DISCUSS/DSCN MKR DOCD: CPT | Performed by: INTERNAL MEDICINE

## 2022-08-23 RX ORDER — APIXABAN 5 MG/1
5 TABLET, FILM COATED ORAL 2 TIMES DAILY
Qty: 180 TABLET | Refills: 3 | Status: SHIPPED | OUTPATIENT
Start: 2022-08-23

## 2022-08-23 NOTE — PROGRESS NOTES
Patient was in today and had 7 day ZIO XT placed per Dr. Raphael Ordonez. Patient given instructions, questions answered. Patient verbalized understanding. Monitor [id number].      Gala Pickens CMA

## 2022-08-23 NOTE — PROGRESS NOTES
Physical Therapy Daily Treatment Note     Date: 2022  Patient Name: Giovanna Barton  : 1950          MRN: [id number]  DOInjury: -   DOSx: -  Referring Provider: Rosaura Lucas MD  [address] [facility],  [address] [place] [address]       Medical Diagnosis:        Diagnosis Orders   1. Osteoarthritis, unspecified osteoarthritis type, unspecified site            Dear Dr Gale Serrano was seen for 3 visits by me for gait training and transitioning to a walker from a single point cane. She did well and now ambulates faster, with better balance, in a much safer manner. She does have complaints of pain in the left wrist from years of using her cane on the left. On exam of the left she presents with:  Pain on weightbearing  Mild/moderate edema in the wrist  Tenderness with palpation  Limited active extension, L 30deg, R 60deg. Weakness of     I feel she would benefit from a consultation with a hand specialist as she has had this issue for some years. Thank you for the opportunity to work with your patient.     Sincerely,    Victoria Mosley PT, OCS  Board Certified in [address] [place] [address]

## 2022-08-23 NOTE — PROGRESS NOTES
OUTPATIENT CARDIOLOGY FOLLOW-UP    Name: Koby Morocho    Age: 66 y.o. Primary Care Physician: Wayne Ramirez MD    Date of Service: 8/23/2022    Chief Complaint: Paroxysmal atrial fibrillation, exertional dyspnea, hypertension, moderate obesity    Interim History: Since her initial assessment, the patient underwent successful cardioversion restoring sinus rhythm but in the interim has developed recurrence of palpitations as well as that of minimal symptoms of exertional dyspnea in the absence of anginal-like chest discomfort or other ischemic equivalents. She presents with acceptable rate control of her recurrent atrial arrhythmias and is noted no additional symptoms of an arrhythmia related nature with no symptoms of a focal neurologic origin or bleeding in the face of ongoing anticoagulation. She is normotensive at the time of evaluation. Review of Systems: The remainder of a complete multisystem review including consitutional, central nervous, respiratory, circulatory, gastrointestinal, genitourinary, endocrinologic, hematologic, musculoskeletal and psychiatric are negative.     Past Medical History:  Past Medical History:   Diagnosis Date    A-fib ([facility]     Arthritis     Class 2 severe obesity due to excess calories with serious comorbidity and body mass index (BMI) of 37.0 to 37.9 in adult ([facility]     Dry eyes, bilateral     Hypertension     OAB (overactive bladder)     PONV (postoperative nausea and vomiting)        Past Surgical History:  Past Surgical History:   Procedure Laterality Date    ABDOMEN SURGERY      BLADDER SURGERY  01/01/2022    bladder stimulator    BUNIONECTOMY      bilteral twice    CARDIAC SURGERY  2006    heart cath in [address] [place] [address]      x 2    HYSTERECTOMY (CERVIX STATUS UNKNOWN)  11/27/2017    robotic assisted bilateral alpingo-oophorectomy lap robotic assisted abdominal sacral colopexy with y mesh    JOINT REPLACEMENT      bilateral knees, right shoulder replaced    KNEE ARTHROPLASTY Bilateral     NERVE BLOCK Bilateral 06/28/2018    facet L3-5 #1    PAIN MANAGEMENT PROCEDURE N/A 12/17/2021    PERIPHERAL NERVE EVALUATION performed by Lee Moreno DO at [address] NOSE/CLEFT LIP/TIP Bilateral 06/28/2018    BILATERAL INTRA-ARTICULAR FACET JOINT INJECTION WITH FLUOROSCOPIC GUIDANCE AT L3-4 AND L4-5 #1 performed by Devin Linton DO at [place] [address]  09/20/2016    right total shoulder reverse arthroplasty       Family History:  History reviewed. No pertinent family history. Social History:  Social History     Socioeconomic History    Marital status:      Spouse name: Not on file    Number of children: Not on file    Years of education: Not on file    Highest education level: Not on file   Occupational History    Not on file   Tobacco Use    Smoking status: Former    Smokeless tobacco: Never    Tobacco comments:     quit smoking 40 yrs ago   Vaping Use    Vaping Use: Never used   Substance and Sexual Activity    Alcohol use: Yes     Comment: social    Drug use: No    Sexual activity: Not on file   Other Topics Concern    Not on file   Social History Narrative    Not on file     Social Determinants of Health     Financial Resource Strain: Not on file   Food Insecurity: Not on file   Transportation Needs: Not on file   Physical Activity: Not on file   Stress: Not on file   Social Connections: Not on file   Intimate Partner Violence: Not on file   Housing Stability: Not on file       Allergies: Allergies   Allergen Reactions    Pcn [Penicillins] Hives, Itching and Swelling    Morphine Nausea Only       Current Medications:  Current Outpatient Medications   Medication Sig Dispense Refill    topiramate (TOPAMAX) 25 MG tablet Take Topiramate two tablets twice daily.  120 tablet 3    XIIDRA 5 % SOLN 2 times daily      ELIQUIS 5 MG TABS tablet Take 5 mg by mouth in the morning and at bedtime      metoprolol succinate (TOPROL XL) 100 MG extended release tablet Take 100 mg by mouth daily      acetaminophen (TYLENOL) 650 MG extended release tablet Take 650 mg by mouth every 8 hours as needed for Pain      erythromycin (ROMYCIN) 5 MG/GM ophthalmic ointment APPLY TO BOTH EYES AT BEDTIME AS DIRECTED  6    celecoxib (CELEBREX) 200 MG capsule Take 200 mg by mouth daily      docusate sodium (COLACE) 100 MG capsule Take 100 mg by mouth daily      polyvinyl alcohol (LIQUIFILM TEARS) 1.4 % ophthalmic solution Place 1 drop into both eyes as needed      Cyanocobalamin (VITAMIN B 12 PO) Take 250 mg by mouth daily      Omega-3 Fatty Acids (OMEGA 3 PO) Take 2,000 mg by mouth daily       Multiple Vitamins-Minerals (MULTIVITAL PO) Take by mouth daily      irbesartan-hydrochlorothiazide (AVALIDE) 150-12.5 MG per tablet Take 2 tablets by mouth daily        No current facility-administered medications for this visit. Facility-Administered Medications Ordered in Other Visits   Medication Dose Route Frequency Provider Last Rate Last Admin    sodium chloride flush 0.9 % injection 10 mL  10 mL IntraVENous 2 times per day ADALI Greenfield        sodium chloride flush 0.9 % injection 10 mL  10 mL IntraVENous PRN ADALI Greenfield        0.9 % sodium chloride infusion   IntraVENous Continuous ADALI Greenfield        midazolam (VERSED) injection 1 mg  1 mg IntraVENous Q5 Min PRN Manju Coffey DO   1 mg at 09/20/16 5912         Physical Exam:  /72   Pulse 89   Resp 16   Ht 5' 7\" (1.702 m)   Wt 216 lb (98 kg)   BMI 33.83 kg/m²   Wt Readings from Last 3 Encounters:   08/23/22 216 lb (98 kg)   08/17/22 215 lb 12.8 oz (97.9 kg)   07/26/22 217 lb (98.4 kg)     The patient is awake, alert and in no discomfort or distress. No gross musculoskeletal deformity is present. No significant skin or nail changes are present. Gross examination of head, eyes, nose and throat are negative.  Jugular venous pressure is normal and no carotid bruits are present. Normal respiratory effort is noted with no accessory muscle usage present. Lung fields are clear to ascultation. Cardiac examination is notable for an irregular rhythm with no palpable thrill. No gallop rhythm or cardiac murmur are identified. A benign abdominal examination is present with no masses or organomegaly. Intact pulses are present throughout all extremities and no peripheral edema is present. No focal neurologic deficits are present. Laboratory Tests:  Lab Results   Component Value Date    CREATININE 0.6 11/28/2017    BUN 23 11/28/2017     11/28/2017    K 4.3 11/28/2017     11/28/2017    CO2 25 11/28/2017     No results found for: BNP  Lab Results   Component Value Date/Time    WBC 13.1 11/28/2017 04:44 AM    RBC 3.09 11/28/2017 04:44 AM    HGB 9.7 11/28/2017 04:44 AM    HCT 29.8 11/28/2017 04:44 AM    MCV 96.4 11/28/2017 04:44 AM    MCH 31.4 11/28/2017 04:44 AM    MCHC 32.6 11/28/2017 04:44 AM    RDW 13.4 11/28/2017 04:44 AM     11/28/2017 04:44 AM    MPV 12.1 11/28/2017 04:44 AM     No results for input(s): ALKPHOS, ALT, AST, PROT, BILITOT, BILIDIR, LABALBU in the last 72 hours. No results found for: MG  No results found for: PROTIME, INR  No results found for: TSH  No components found for: CHLPL  No results found for: TRIG  No results found for: HDL  No results found for: [address]    Cardiac Tests:  ECG: A resting electrocardiogram demonstrates evidence of atrial fibrillation with a mean ventricular response of approximately 90 bpm with nonspecific ST changes  Last Echocardiogram: An echocardiogram demonstrated evidence of a normal-sized left ventricular chamber with normal left ventricular systolic function and mild left atrial enlargement.   Moderate centrally directed mitral regurgitation was present      ASSESSMENT / PLAN: On a clinical basis, the patient presents with recurrent and minimally symptomatic atrial fibrillation presently with acceptable rate control and with evidence of moderate mitral regurgitation echocardiographically. On this basis, I feel that additional assessment would be advisable both with that of a vasodilator myocardial perfusion imaging study to assess the presence or absence of coronary atherosclerosis in addition to that of arrhythmia monitoring to assess the adequacy of rate control of her atrial arrhythmias in addition to that of additional symptom monitoring prior to a determination of long-term management strategies either with that of rate control and anticoagulation or rhythm maintenance with potential needs of antiarrhythmic therapy and/or consideration of electrophysiology evaluation and potential pulmonary vein isolation. She will continue to benefit from appropriate lifestyle modification to achieve weight reduction to benefit diastolic cardiac performance as well as reducing her risk of the development of obstructive sleep apnea and I would continue to recommend your consideration of the scheduling of a formal sleep assessment and initiation of nocturnal CPAP as appropriate to reduce risk of adverse cardiovascular effects including a continued increased risk of arrhythmia recurrences. In addition, as outlined, in conjunction with needs of chronic anticoagulation the discontinuation of her Pinto 2 inhibitor would be advisable both to reduce risk of adverse cardiovascular effects and bleeding. Ongoing aggressive risk factor modification of blood pressure and serum lipids will remain essential to reducing risk of future atherosclerotic development. I will provide additional recommendations as appropriate following the completion review of her objective assessment with plans of clinical reevaluation in approximately 1 month. The patient's current medication list, allergies, problem list and results of all previously ordered testing were reviewed at today's visit.     Follow-up office visit in 1 month      Note: This report was completed using computerized voice recognition software. Every effort has been made to ensure accuracy, however; inadvertent computerized transcription errors may be present. Elise Busby.  Abraham Rene, [facility]    An electronic copy of this follow-up progress note was forwarded to Dr. Sherrie Brown

## 2022-08-31 ENCOUNTER — TELEPHONE (OUTPATIENT)
Dept: CARDIOLOGY CLINIC | Age: 72
End: 2022-08-31

## 2022-08-31 RX ORDER — TOPIRAMATE 25 MG/1
TABLET ORAL
Qty: 60 TABLET | Refills: 3 | Status: SHIPPED
Start: 2022-08-31 | End: 2022-10-05

## 2022-08-31 NOTE — TELEPHONE ENCOUNTER
Patient called in and left VM on facility VM needs refill on  Topamax. Stated seh orderedit from mail pharmacy and it has not been delivered yet.  Patient states needs about a week supply until mail order pharmacy can deliver medication

## 2022-08-31 NOTE — TELEPHONE ENCOUNTER
Floridalma Tinoco from [facility] team states that a dx of PAF wont cover her upcoming stress test.

## 2022-09-06 ENCOUNTER — TELEPHONE (OUTPATIENT)
Dept: CARDIOLOGY | Age: 72
End: 2022-09-06

## 2022-09-06 NOTE — TELEPHONE ENCOUNTER
Left message on voice mail to remind patient of pharmacological stress test appointment on 9/8/22 at 0930. Instructions for test,Npo after midnight, no caffeine products 12 hours prior to the test , and COVID-19 preprocedure information left on voice mail. Asked patient to call with any questions or if unable to keep appointment.

## 2022-09-08 ENCOUNTER — HOSPITAL ENCOUNTER (OUTPATIENT)
Dept: CARDIOLOGY | Age: 72
Discharge: HOME | End: 2022-09-08
Payer: MEDICARE

## 2022-09-08 VITALS — BODY MASS INDEX: 33.9 KG/M2 | WEIGHT: 216 LBS | HEIGHT: 67 IN

## 2022-09-08 DIAGNOSIS — I48.0 PAROXYSMAL ATRIAL FIBRILLATION (HCC): ICD-10-CM

## 2022-09-08 DIAGNOSIS — R06.09 EXERTIONAL DYSPNEA: ICD-10-CM

## 2022-09-08 PROCEDURE — 6360000002 HC RX W HCPCS: Performed by: INTERNAL MEDICINE

## 2022-09-08 PROCEDURE — 93017 CV STRESS TEST TRACING ONLY: CPT

## 2022-09-08 PROCEDURE — A9502 TC99M TETROFOSMIN: HCPCS | Performed by: INTERNAL MEDICINE

## 2022-09-08 PROCEDURE — 3430000000 HC RX DIAGNOSTIC RADIOPHARMACEUTICAL: Performed by: INTERNAL MEDICINE

## 2022-09-08 PROCEDURE — 2580000003 HC RX 258: Performed by: INTERNAL MEDICINE

## 2022-09-08 PROCEDURE — 78452 HT MUSCLE IMAGE SPECT MULT: CPT

## 2022-09-08 RX ORDER — SODIUM CHLORIDE 0.9 % (FLUSH) 0.9 %
10 SYRINGE (ML) INJECTION PRN
Status: DISCONTINUED | OUTPATIENT
Start: 2022-09-08 | End: 2022-09-09 | Stop reason: HOSPADM

## 2022-09-08 RX ADMIN — Medication 10 ML: at 11:06

## 2022-09-08 RX ADMIN — REGADENOSON 0.4 MG: 0.08 INJECTION, SOLUTION INTRAVENOUS at 11:06

## 2022-09-08 RX ADMIN — Medication 10 ML: at 09:33

## 2022-09-08 RX ADMIN — TETROFOSMIN 11 MILLICURIE: 0.23 INJECTION, POWDER, LYOPHILIZED, FOR SOLUTION INTRAVENOUS at 09:34

## 2022-09-08 RX ADMIN — TETROFOSMIN 35.8 MILLICURIE: 0.23 INJECTION, POWDER, LYOPHILIZED, FOR SOLUTION INTRAVENOUS at 11:06

## 2022-09-08 NOTE — PROCEDURES
[address] [place] [facility]., [facility]. Precious Murphy , [facility]  [phone]                 Pharmacologic Stress Nuclear Gated SPECT Study    Name: Caleb Macias Account Number: [de-identified]    :  1950          Sex: female         Date of Study:  2022    Height: 5' 7\" (170.2 cm)         Weight: 216 lb (98 kg)     Ordering Provider: Sydni Moreno          PCP: Hood Orozco MD      Cardiologist: Sydni Moreno             Interpreting Physician: Jared Alicia  _________________________________________________________________________________    Indication:   Detecting the presence and location of coronary artery disease    Clinical History:   Patient has no known history of coronary artery disease. Resting ECG:    Atrial fibrillation with controlled ventricular response    Procedure:   Pharmacologic stress testing was performed with regadenoson 0.4 mg for 15 seconds. The heart rate was 89 at baseline and alfred to 123 beats during the infusion. The blood pressure at baseline was 134/101 and blood pressure at the end of infusion was 148/106. Blood pressure response was normal during the stress procedure. The patient experienced shortness of breath post lexiscan resolving in recovery during the infusion. ECG during the infusion did not change. IMAGING: Myocardial perfusion imaging was performed at rest 30-35 minutes following the intravenous injection of 11 mCi of (Tc-tetrofosmin) followed by 10 ml of Normal Saline. As per infusion protocol, the patient was injected intravenously with 35.8 mCi of (Tc-tetrofosmin) followed by 10 ml of Normal Saline. Gated post-stress tomographic imaging was performed 45 minutes after stress. FINDINGS: The overall quality of the study was good.      Left ventricular cavity size was noted to be normal.    Rotational analog analysis demonstrated no patient motion or abnormal extracardiac radioactivity and soft tissue breast attenuation. The gated SPECT rest and stress imaging in the short, vertical long, and horizontal long axis demonstrated mild decrease uptake of the radioactive tracer on rest and stress images with normal wall motion, most likely represents breast attenuation artifact. Gated SPECT left ventricular ejection fraction was calculated to be 52%, with normal myocardial thickening and wall motion. Impression:    Electrocardiographically normal regadenoson infusion with a clinically  non-ischemic response  Myocardial perfusion imaging was normal with attenuation artifact. Overall left ventricular systolic function was normal without regional wall motion abnormalities. 4. Low risk general pharmacologic stress test.    Thank you for sending your patient to this [employer].      Electronically signed by Martine Briceno MD on 9/8/22 at 7:01 PM EDT

## 2022-09-08 NOTE — DISCHARGE INSTRUCTIONS
[address] and Vascular Lab      Instructions to Patients    The following are the instructions for patients who have had a procedure in our office today. Patient name: Ilya Camp    Radionuclide Activity: 40mCi of 99mTc-Tetrofosmin    Date Administered: 9/8/2022    Expires: 48 hours after scheduled appointment time      Patient may resume normal activity unless otherwise instructed. Patient may resume medications as normal.  If the need should arise, patient may call [phone] between the hours of 7:00am-3:00pm.  After hours there is at least one physician on-call at all times for those patients needing assistance. Patients may call [phone] and the answering service will direct the patient to one of our physicians for assistance. After the patient's test if they are going to be leaving from an airport in the near future they should take this letter with them to verify the test and radionuclide used for their test.      This letter verifies that the above named bearer received an injection of a radionuclide for medical purpose/usage only.         Electronically signed by Main Carvajal on 9/8/2022 at 10:50 AM

## 2022-09-12 ENCOUNTER — TELEPHONE (OUTPATIENT)
Dept: CARDIOLOGY CLINIC | Age: 72
End: 2022-09-12

## 2022-09-13 DIAGNOSIS — I48.0 PAROXYSMAL ATRIAL FIBRILLATION (HCC): ICD-10-CM

## 2022-09-27 ENCOUNTER — TELEPHONE (OUTPATIENT)
Dept: CARDIOLOGY CLINIC | Age: 72
End: 2022-09-27

## 2022-09-27 ENCOUNTER — OFFICE VISIT (OUTPATIENT)
Dept: CARDIOLOGY CLINIC | Age: 72
End: 2022-09-27
Payer: MEDICARE

## 2022-09-27 VITALS
RESPIRATION RATE: 16 BRPM | HEIGHT: 67 IN | HEART RATE: 94 BPM | WEIGHT: 215 LBS | SYSTOLIC BLOOD PRESSURE: 118 MMHG | BODY MASS INDEX: 33.74 KG/M2 | DIASTOLIC BLOOD PRESSURE: 82 MMHG

## 2022-09-27 DIAGNOSIS — E66.8 MODERATE OBESITY: ICD-10-CM

## 2022-09-27 DIAGNOSIS — I10 ESSENTIAL HYPERTENSION: ICD-10-CM

## 2022-09-27 DIAGNOSIS — I48.0 PAROXYSMAL ATRIAL FIBRILLATION (HCC): Primary | ICD-10-CM

## 2022-09-27 PROCEDURE — 93000 ELECTROCARDIOGRAM COMPLETE: CPT | Performed by: INTERNAL MEDICINE

## 2022-09-27 PROCEDURE — 1123F ACP DISCUSS/DSCN MKR DOCD: CPT | Performed by: INTERNAL MEDICINE

## 2022-09-27 PROCEDURE — 99215 OFFICE O/P EST HI 40 MIN: CPT | Performed by: INTERNAL MEDICINE

## 2022-09-27 RX ORDER — SODIUM CHLORIDE 9 MG/ML
INJECTION, SOLUTION INTRAVENOUS PRN
Status: CANCELLED | OUTPATIENT
Start: 2022-09-27

## 2022-09-27 RX ORDER — SODIUM CHLORIDE 0.9 % (FLUSH) 0.9 %
5-40 SYRINGE (ML) INJECTION EVERY 12 HOURS SCHEDULED
Status: CANCELLED | OUTPATIENT
Start: 2022-09-27

## 2022-09-27 RX ORDER — SODIUM CHLORIDE 0.9 % (FLUSH) 0.9 %
5-40 SYRINGE (ML) INJECTION PRN
Status: CANCELLED | OUTPATIENT
Start: 2022-09-27

## 2022-09-27 NOTE — PROGRESS NOTES
OUTPATIENT CARDIOLOGY FOLLOW-UP    Name: Sherrill Crook    Age: 66 y.o. Primary Care Physician: Nelia Kaplan MD    Date of Service: 9/27/2022    Chief Complaint: Paroxysmal atrial fibrillation, hypertension, moderate obesity    Interim History: Since her most recent evaluation, the patient remains compensated from a cardiovascular standpoint with symptoms limited to that of palpitations in the face of her presently persistent atrial fibrillation with acceptable rate control. She continues to note significant exertional limitations likely predominantly on the basis of her orthopedic status, albeit with the inability to entirely exclude her atrial arrhythmias as a contributing factor. She denies any additional arrhythmia related symptoms and denies symptoms of a focal neurologic origin nor bleeding in the face of her chronic anticoagulation. Addition, in the interim she is undergone myocardial perfusion imaging demonstrating no evidence of significant perfusion abnormalities with normal left ventricular systolic function suggested in the face of her atrial fibrillation. She presently remains normotensive with no change of her weight. She reports no omission of anticoagulation dosing. Review of Systems: The remainder of a complete multisystem review including consitutional, central nervous, respiratory, circulatory, gastrointestinal, genitourinary, endocrinologic, hematologic, musculoskeletal and psychiatric are negative.     Past Medical History:  Past Medical History:   Diagnosis Date    A-fib ([facility] [address]     Arthritis     Class 2 severe obesity due to excess calories with serious comorbidity and body mass index (BMI) of 37.0 to 37.9 in adult ([address]     Dry eyes, bilateral     Hypertension     OAB (overactive bladder)     PONV (postoperative nausea and vomiting)        Past Surgical History:  Past Surgical History:   Procedure Laterality Date    ABDOMEN SURGERY      BLADDER SURGERY  01/01/2022 bladder stimulator    BUNIONECTOMY      bilteral twice    CARDIAC SURGERY  2006    heart cath in [address]      x 2    HYSTERECTOMY (CERVIX STATUS UNKNOWN)  11/27/2017    robotic assisted bilateral alpingo-oophorectomy lap robotic assisted abdominal sacral colopexy with y mesh    JOINT REPLACEMENT      bilateral knees, right shoulder replaced    KNEE ARTHROPLASTY Bilateral     NERVE BLOCK Bilateral 06/28/2018    facet L3-5 #1    PAIN MANAGEMENT PROCEDURE N/A 12/17/2021    PERIPHERAL NERVE EVALUATION performed by Camilo Moreno DO at [address] [place] [address] NOSE/CLEFT LIP/TIP Bilateral 06/28/2018    BILATERAL INTRA-ARTICULAR FACET JOINT INJECTION WITH FLUOROSCOPIC GUIDANCE AT L3-4 AND L4-5 #1 performed by Polina Morel DO at [place] [address]  09/20/2016    right total shoulder reverse arthroplasty       Family History:  Family History   Problem Relation Age of Onset    Stroke Mother     Stroke Father        Social History:  Social History     Socioeconomic History    Marital status:      Spouse name: Not on file    Number of children: Not on file    Years of education: Not on file    Highest education level: Not on file   Occupational History    Not on file   Tobacco Use    Smoking status: Former    Smokeless tobacco: Never    Tobacco comments:     quit smoking 40 yrs ago   Vaping Use    Vaping Use: Never used   Substance and Sexual Activity    Alcohol use: Yes     Comment: social    Drug use: No    Sexual activity: Not on file   Other Topics Concern    Not on file   Social History Narrative    Not on file     Social Determinants of Health     Financial Resource Strain: Not on file   Food Insecurity: Not on file   Transportation Needs: Not on file   Physical Activity: Not on file   Stress: Not on file   Social Connections: Not on file   Intimate Partner Violence: Not on file   Housing Stability: Not on file       Allergies:   Allergies   Allergen Reactions    Pcn [Penicillins] Hives, Itching and Swelling    Morphine Nausea Only       Current Medications:  Current Outpatient Medications   Medication Sig Dispense Refill    topiramate (TOPAMAX) 25 MG tablet Take 2 tabs BID 60 tablet 3    ELIQUIS 5 MG TABS tablet Take 1 tablet by mouth in the morning and at bedtime 180 tablet 3    topiramate (TOPAMAX) 25 MG tablet Take Topiramate two tablets twice daily. 120 tablet 3    XIIDRA 5 % SOLN 2 times daily      metoprolol succinate (TOPROL XL) 100 MG extended release tablet Take 100 mg by mouth daily      acetaminophen (TYLENOL) 650 MG extended release tablet Take 650 mg by mouth every 8 hours as needed for Pain      erythromycin (ROMYCIN) 5 MG/GM ophthalmic ointment APPLY TO BOTH EYES AT BEDTIME AS DIRECTED  6    celecoxib (CELEBREX) 200 MG capsule Take 200 mg by mouth daily      docusate sodium (COLACE) 100 MG capsule Take 100 mg by mouth daily      polyvinyl alcohol (LIQUIFILM TEARS) 1.4 % ophthalmic solution Place 1 drop into both eyes as needed      Cyanocobalamin (VITAMIN B 12 PO) Take 250 mg by mouth daily      Omega-3 Fatty Acids (OMEGA 3 PO) Take 2,000 mg by mouth daily       Multiple Vitamins-Minerals (MULTIVITAL PO) Take by mouth daily      irbesartan-hydrochlorothiazide (AVALIDE) 150-12.5 MG per tablet Take 2 tablets by mouth daily        No current facility-administered medications for this visit.      Facility-Administered Medications Ordered in Other Visits   Medication Dose Route Frequency Provider Last Rate Last Admin    sodium chloride flush 0.9 % injection 10 mL  10 mL IntraVENous 2 times per day ADALI Greenfield        sodium chloride flush 0.9 % injection 10 mL  10 mL IntraVENous PRN ADALI Greenfield        0.9 % sodium chloride infusion   IntraVENous Continuous ADALI Greenfield        midazolam (VERSED) injection 1 mg  1 mg IntraVENous Q5 Min PRN Nakita Peed, DO   1 mg at 09/20/16 7961         Physical Exam:  /82   Pulse 94   Resp 16 Ht 5' 7\" (1.702 m)   Wt 215 lb (97.5 kg)   BMI 33.67 kg/m²   Wt Readings from Last 3 Encounters:   09/27/22 215 lb (97.5 kg)   09/08/22 216 lb (98 kg)   08/23/22 216 lb (98 kg)     The patient is awake, alert and in no discomfort or distress. No gross musculoskeletal deformity is present. No significant skin or nail changes are present. Gross examination of head, eyes, nose and throat are negative. Jugular venous pressure is normal and no carotid bruits are present. Normal respiratory effort is noted with no accessory muscle usage present. Lung fields are clear to ascultation. Cardiac examination is notable for an irregular rhythm with no palpable thrill. No gallop rhythm or cardiac murmur are identified. A benign abdominal examination is present with the exception of obesity and the exception of obesity and no masses or organomegaly. Intact pulses are present throughout all extremities and no peripheral edema is present. No focal neurologic deficits are present. Laboratory Tests:  Lab Results   Component Value Date    CREATININE 0.6 11/28/2017    BUN 23 11/28/2017     11/28/2017    K 4.3 11/28/2017     11/28/2017    CO2 25 11/28/2017     No results found for: BNP  Lab Results   Component Value Date/Time    WBC 13.1 11/28/2017 04:44 AM    RBC 3.09 11/28/2017 04:44 AM    HGB 9.7 11/28/2017 04:44 AM    HCT 29.8 11/28/2017 04:44 AM    MCV 96.4 11/28/2017 04:44 AM    MCH 31.4 11/28/2017 04:44 AM    MCHC 32.6 11/28/2017 04:44 AM    RDW 13.4 11/28/2017 04:44 AM     11/28/2017 04:44 AM    MPV 12.1 11/28/2017 04:44 AM     No results for input(s): ALKPHOS, ALT, AST, PROT, BILITOT, BILIDIR, LABALBU in the last 72 hours.   No results found for: MG  No results found for: PROTIME, INR  No results found for: TSH  No components found for: CHLPL  No results found for: TRIG  No results found for: HDL  No results found for: [facility]    Cardiac Tests:  ECG: A resting electrocardiogram demonstrates evidence of atrial fibrillation with a mean ventricular response of approximately 95 bpm with nonspecific ST changes  Last stress test: A vasodilator myocardial perfusion imaging study of September, 2022 demonstrated no evidence of perfusion abnormalities beyond that of breast attenuation with no regional wall motion abnormalities and grossly normal left ventricular systolic function in the face of her atrial fibrillation      ASSESSMENT / PLAN: On a clinical basis, the patient while relatively compensated from a cardiovascular standpoint in the face of her recurrent and presently persistent atrial fibrillation would likely be improved over the long-term with maintenance of sinus rhythm and have discussed this with her at the time of her evaluation. On this basis, I have recommended one attempt of antiarrhythmic therapy with dronedarone in the absence of structural abnormalities and following a discussion of the proposed benefits, risks and alternatives to this approach she is agreeable with repeat cardioversion scheduled for next week following the initiation of antiarrhythmic therapy. I additionally have reinforced her needs of appropriate lifestyle modification to achieve weight reduction to benefit diastolic cardiac performance as well as reducing her risk of the development of obstructive sleep apnea with associated adverse cardiovascular effects. Ongoing aggressive risk factor modification of blood pressure and serum lipids will remain essential to reducing risk of future atherosclerotic development. I presently plan her clinical reassessment approximately 1 month and would happily reassess her in the interim should additional cardiovascular difficulties or concerns arise. The patient's current medication list, allergies, problem list and results of all previously ordered testing were reviewed at today's visit.     Follow-up office visit in 1 month      Note: This report was completed using computerized voice recognition software. Every effort has been made to ensure accuracy, however; inadvertent computerized transcription errors may be present. Dylan Clark.  Madison Nixon, [facility]    An electronic copy of this follow-up progress note was forwarded to Dr. Nelia Kaplan

## 2022-09-28 RX ORDER — PROPAFENONE HYDROCHLORIDE 225 MG/1
225 CAPSULE, EXTENDED RELEASE ORAL 2 TIMES DAILY
Qty: 60 CAPSULE | Refills: 3 | Status: SHIPPED
Start: 2022-09-28 | End: 2022-10-19 | Stop reason: SDUPTHER

## 2022-10-05 ENCOUNTER — ANESTHESIA EVENT (OUTPATIENT)
Dept: POSTOP/PACU | Age: 72
End: 2022-10-05

## 2022-10-05 NOTE — PROGRESS NOTES
[address]                                                                                                                     PRE OP INSTRUCTIONS FOR  Martínez Knight        Date: 10/5/2022    Date and time of surgery: 10/6/22   Arrival Time: 10:30 am     Do not eat or drink anything after 12 midnight prior to surgery. This includes no water, chewing gum, mints or ice chips. Take the following pills with a small sip of water on the morning of Surgery: Eliquis, Metoprolol, Propafenone and tylenol      Diabetics may take evening dose of insulin but none after midnight. If you feel symptomatic or low blood sugar take 1-2 ounces of apple juice only. Aspirin, Ibuprofen, Advil, Naproxen, Vitamin E and other Anti-inflammatory products should be stopped  before surgery  as directed by your physician. Check with your Doctor regarding stopping Plavix, Coumadin, Lovenox, Fragmin or other blood thinners. Do not smoke,use illicit drugs and do not drink any alcoholic beverages 24 hours prior to surgery. You may brush your teeth and gargle the morning of surgery. DO NOT SWALLOW WATER    You MUST make arrangements for a responsible adult to take you home after your surgery. You will not be allowed to leave alone or drive yourself home. It is strongly suggested someone stay with you the first 24 hrs. Your surgery will be cancelled if you do not have a ride home. A parent/legal guardian must accompany a child scheduled for surgery and plan to stay at the hospital until the child is discharged. Please do not bring other children with you. Please wear simple, loose fitting clothing to the hospital.  Benjamine Si not bring valuables (money, credit cards, checkbooks, etc.) Do not wear any makeup (including no eye makeup) or nail polish on your fingers or toes. DO NOT wear any jewelry or piercings on day of surgery. All body piercing jewelry must be removed.     Shower the night before surgery with _X__Antibacterial soap ___Hibiclens. Remember to bring Blood Bank bracelet to the hospital on the day of surgery. If you have a Living Will and Durable Power of  for Healthcare, please bring in a copy. If appropriate bring crutches, inspirex, etc... Notify your Surgeon if you develop any illness between now and surgery time, cough, cold, fever, sore throat, nausea, vomiting, etc.  Please notify your surgeon if you experience dizziness, shortness of breath or blurred vision between now & the time of your surgery. If you have ___dentures, they will be removed before going to the OR; we will provide you a container. If you wear ___contact lenses or ___glasses, they will be removed; please bring a case for them. To provide excellent care visitors will be limited to one in the room at any given time. Please bring picture ID and insurance card. Sleep apnea patients need to bring CPAP to hospital on day of surgery. Visit our web site for additional information: [email or web address]    During flu season no children under the age of 15 are permitted in the hospital for the safety of all patients. Other Come in through main lobby, go to information desk. Please call pre admission testing if you have any further questions.    [address] [facility]     [address]

## 2022-10-06 ENCOUNTER — HOSPITAL ENCOUNTER (OUTPATIENT)
Dept: POSTOP/PACU | Age: 72
Setting detail: OUTPATIENT SURGERY
Discharge: HOME | End: 2022-10-06
Payer: MEDICARE

## 2022-10-06 ENCOUNTER — ANESTHESIA (OUTPATIENT)
Dept: POSTOP/PACU | Age: 72
End: 2022-10-06

## 2022-10-06 VITALS
OXYGEN SATURATION: 99 % | TEMPERATURE: 97.2 F | DIASTOLIC BLOOD PRESSURE: 61 MMHG | RESPIRATION RATE: 18 BRPM | HEART RATE: 50 BPM | SYSTOLIC BLOOD PRESSURE: 105 MMHG

## 2022-10-06 PROCEDURE — 92960 CARDIOVERSION ELECTRIC EXT: CPT | Performed by: INTERNAL MEDICINE

## 2022-10-06 PROCEDURE — 7100000001 HC PACU RECOVERY - ADDTL 15 MIN

## 2022-10-06 PROCEDURE — 7100000011 HC PHASE II RECOVERY - ADDTL 15 MIN

## 2022-10-06 PROCEDURE — 3700000001 HC ADD 15 MINUTES (ANESTHESIA)

## 2022-10-06 PROCEDURE — 3700000000 HC ANESTHESIA ATTENDED CARE

## 2022-10-06 PROCEDURE — 99024 POSTOP FOLLOW-UP VISIT: CPT | Performed by: INTERNAL MEDICINE

## 2022-10-06 PROCEDURE — 7100000010 HC PHASE II RECOVERY - FIRST 15 MIN

## 2022-10-06 PROCEDURE — 6360000002 HC RX W HCPCS: Performed by: NURSE ANESTHETIST, CERTIFIED REGISTERED

## 2022-10-06 PROCEDURE — 7100000000 HC PACU RECOVERY - FIRST 15 MIN

## 2022-10-06 PROCEDURE — 92960 CARDIOVERSION ELECTRIC EXT: CPT

## 2022-10-06 PROCEDURE — 2580000003 HC RX 258: Performed by: ANESTHESIOLOGY

## 2022-10-06 RX ORDER — SODIUM CHLORIDE, SODIUM LACTATE, POTASSIUM CHLORIDE, CALCIUM CHLORIDE 600; 310; 30; 20 MG/100ML; MG/100ML; MG/100ML; MG/100ML
INJECTION, SOLUTION INTRAVENOUS CONTINUOUS
Status: DISCONTINUED | OUTPATIENT
Start: 2022-10-06 | End: 2022-10-07 | Stop reason: HOSPADM

## 2022-10-06 RX ORDER — SODIUM CHLORIDE 9 MG/ML
INJECTION, SOLUTION INTRAVENOUS PRN
Status: DISCONTINUED | OUTPATIENT
Start: 2022-10-06 | End: 2022-10-07 | Stop reason: HOSPADM

## 2022-10-06 RX ORDER — PROPOFOL 10 MG/ML
INJECTION, EMULSION INTRAVENOUS PRN
Status: DISCONTINUED | OUTPATIENT
Start: 2022-10-06 | End: 2022-10-06 | Stop reason: SDUPTHER

## 2022-10-06 RX ORDER — SODIUM CHLORIDE 0.9 % (FLUSH) 0.9 %
5-40 SYRINGE (ML) INJECTION EVERY 12 HOURS SCHEDULED
Status: DISCONTINUED | OUTPATIENT
Start: 2022-10-06 | End: 2022-10-07 | Stop reason: HOSPADM

## 2022-10-06 RX ORDER — SODIUM CHLORIDE 0.9 % (FLUSH) 0.9 %
5-40 SYRINGE (ML) INJECTION PRN
Status: DISCONTINUED | OUTPATIENT
Start: 2022-10-06 | End: 2022-10-07 | Stop reason: HOSPADM

## 2022-10-06 RX ADMIN — SODIUM CHLORIDE, POTASSIUM CHLORIDE, SODIUM LACTATE AND CALCIUM CHLORIDE: 600; 310; 30; 20 INJECTION, SOLUTION INTRAVENOUS at 11:59

## 2022-10-06 RX ADMIN — PROPOFOL 60 MG: 10 INJECTION, EMULSION INTRAVENOUS at 12:21

## 2022-10-06 ASSESSMENT — LIFESTYLE VARIABLES: SMOKING_STATUS: 0

## 2022-10-06 ASSESSMENT — PAIN - FUNCTIONAL ASSESSMENT: PAIN_FUNCTIONAL_ASSESSMENT: NONE - DENIES PAIN

## 2022-10-06 ASSESSMENT — ENCOUNTER SYMPTOMS: SHORTNESS OF BREATH: 0

## 2022-10-06 NOTE — PROGRESS NOTES
1148- Patient arrived to PACU and placed on monitors. See flowsheet for vital signs  1218- Dr. Norris Stevens arrived  [facility] done  [phone]- Patient medicated by anesthesia  [id number] 31 29 02- Shocked in sync mode at 200J and converted to sinus domingo. Dr. Norris Stevens is ok with HR in the 40s. EKG order received  1231- Patient resting comfortably tolerated procedure well.

## 2022-10-06 NOTE — ANESTHESIA PRE PROCEDURE
Department of Anesthesiology  Preprocedure Note       Name:  Sonu Cid   Age:  66 y.o.  :  1950                                          MRN:  [id number]         Date:  10/6/2022      Surgeon: * No surgeons listed *    Procedure: * No procedures listed *    Medications prior to admission:   Prior to Admission medications    Medication Sig Start Date End Date Taking? Authorizing Provider   propafenone (RYTHMOL SR) 225 MG extended release capsule Take 1 capsule by mouth 2 times daily 22   Marisela Garcia MD   ELIQUIS 5 MG TABS tablet Take 1 tablet by mouth in the morning and at bedtime 22   Marisela Garcia MD   topiramate (TOPAMAX) 25 MG tablet Take Topiramate two tablets twice daily.  22   Yani Wilson MD   XIIDRA 5 % SOLN 2 times daily 22   Historical Provider, MD   metoprolol succinate (TOPROL XL) 100 MG extended release tablet Take 100 mg by mouth daily    Historical Provider, MD   acetaminophen (TYLENOL) 650 MG extended release tablet Take 650 mg by mouth every 8 hours as needed for Pain    Historical Provider, MD   erythromycin (ROMYCIN) 5 MG/GM ophthalmic ointment APPLY TO BOTH EYES AT BEDTIME AS DIRECTED 8/10/17   Historical Provider, MD   celecoxib (CELEBREX) 200 MG capsule Take 200 mg by mouth daily    Historical Provider, MD   docusate sodium (COLACE) 100 MG capsule Take 100 mg by mouth daily    Historical Provider, MD   polyvinyl alcohol (LIQUIFILM TEARS) 1.4 % ophthalmic solution Place 1 drop into both eyes as needed    Historical Provider, MD   Cyanocobalamin (VITAMIN B 12 PO) Take 250 mg by mouth daily    Historical Provider, MD   Omega-3 Fatty Acids (OMEGA 3 PO) Take 2,000 mg by mouth daily     Historical Provider, MD   Multiple Vitamins-Minerals (MULTIVITAL PO) Take by mouth daily    Historical Provider, MD   irbesartan-hydrochlorothiazide (AVALIDE) 150-12.5 MG per tablet Take 2 tablets by mouth daily     Historical Provider, MD       Current medications: Current Outpatient Medications   Medication Sig Dispense Refill    propafenone (RYTHMOL SR) 225 MG extended release capsule Take 1 capsule by mouth 2 times daily 60 capsule 3    ELIQUIS 5 MG TABS tablet Take 1 tablet by mouth in the morning and at bedtime 180 tablet 3    topiramate (TOPAMAX) 25 MG tablet Take Topiramate two tablets twice daily.  120 tablet 3    XIIDRA 5 % SOLN 2 times daily      metoprolol succinate (TOPROL XL) 100 MG extended release tablet Take 100 mg by mouth daily      acetaminophen (TYLENOL) 650 MG extended release tablet Take 650 mg by mouth every 8 hours as needed for Pain      erythromycin (ROMYCIN) 5 MG/GM ophthalmic ointment APPLY TO BOTH EYES AT BEDTIME AS DIRECTED  6    celecoxib (CELEBREX) 200 MG capsule Take 200 mg by mouth daily      docusate sodium (COLACE) 100 MG capsule Take 100 mg by mouth daily      polyvinyl alcohol (LIQUIFILM TEARS) 1.4 % ophthalmic solution Place 1 drop into both eyes as needed      Cyanocobalamin (VITAMIN B 12 PO) Take 250 mg by mouth daily      Omega-3 Fatty Acids (OMEGA 3 PO) Take 2,000 mg by mouth daily       Multiple Vitamins-Minerals (MULTIVITAL PO) Take by mouth daily      irbesartan-hydrochlorothiazide (AVALIDE) 150-12.5 MG per tablet Take 2 tablets by mouth daily        Current Facility-Administered Medications   Medication Dose Route Frequency Provider Last Rate Last Admin    lactated ringers infusion   IntraVENous Continuous Sherry Vernon MD        sodium chloride flush 0.9 % injection 5-40 mL  5-40 mL IntraVENous 2 times per day Ochoa Lew MD        sodium chloride flush 0.9 % injection 5-40 mL  5-40 mL IntraVENous PRN Ochoa Lew MD        0.9 % sodium chloride infusion   IntraVENous PRN Ochoa Lew MD         Facility-Administered Medications Ordered in Other Encounters   Medication Dose Route Frequency Provider Last Rate Last Admin    sodium chloride flush 0.9 % injection 10 mL  10 mL IntraVENous 2 times per day ADALI Otoole        sodium chloride flush 0.9 % injection 10 mL  10 mL IntraVENous PRN ADALI Greenfield        0.9 % sodium chloride infusion   IntraVENous Continuous ADALI Otoole        midazolam (VERSED) injection 1 mg  1 mg IntraVENous Q5 Min PRN Brenda Osullivan DO   1 mg at 09/20/16 8815       Allergies:     Allergies   Allergen Reactions    Pcn [Penicillins] Hives, Itching and Swelling    Morphine Nausea Only       Problem List:    Patient Active Problem List   Diagnosis Code    Moderate obesity E66.8    Overactive bladder N32.81    Essential hypertension I10    Lumbar stenosis with neurogenic claudication M48.062    Lumbar radiculopathy M54.16    Anterolisthesis M43.10    Chronic bilateral low back pain without sciatica M54.50, G89.29    Facet arthropathy M47.819    Palpitations R00.2    Paroxysmal atrial fibrillation (HCC) I48.0    Osteoarthritis M19.90       Past Medical History:        Diagnosis Date    A-fib ([facility] [place] [facility]     Arthritis     Class 2 severe obesity due to excess calories with serious comorbidity and body mass index (BMI) of 37.0 to 37.9 in adult (HCC)     Dry eyes, bilateral     Hypertension     OAB (overactive bladder)     PONV (postoperative nausea and vomiting)        Past Surgical History:        Procedure Laterality Date    ABDOMEN SURGERY      BLADDER SURGERY  01/01/2022    bladder stimulator    BUNIONECTOMY      bilteral twice    CARDIAC SURGERY  2006    heart cath in [place]    CHOLECYSTECTOMY      COLONOSCOPY      x 2    HYSTERECTOMY (CERVIX STATUS UNKNOWN)  11/27/2017    robotic assisted bilateral alpingo-oophorectomy lap robotic assisted abdominal sacral colopexy with y mesh    JOINT REPLACEMENT      bilateral knees, right shoulder replaced    KNEE ARTHROPLASTY Bilateral     NERVE BLOCK Bilateral 06/28/2018    facet L3-5 #1    PAIN MANAGEMENT PROCEDURE N/A 12/17/2021    PERIPHERAL NERVE EVALUATION performed by Kaleen Bence Memo, DO at [facility]    RI ENOC NOSE/CLEFT LIP/TIP Bilateral 06/28/2018    BILATERAL INTRA-ARTICULAR FACET JOINT INJECTION WITH FLUOROSCOPIC GUIDANCE AT L3-4 AND L4-5 #1 performed by Jessica Morgan DO at [place] [id number]  09/20/2016    right total shoulder reverse arthroplasty       Social History:    Social History     Tobacco Use    Smoking status: Former    Smokeless tobacco: Former    Tobacco comments:     quit smoking 40 yrs ago   Substance Use Topics    Alcohol use: Yes     Comment: social                                Counseling given: Not Answered  Tobacco comments: quit smoking 40 yrs ago      Vital Signs (Current):   Vitals:                                              BP Readings from Last 3 Encounters:   09/27/22 118/82   08/23/22 112/72   08/17/22 (!) 142/85       NPO Status: Time of last liquid consumption: 0730 (sip with meds )                        Time of last solid consumption: 1900                        Date of last liquid consumption: 10/06/22                        Date of last solid food consumption: 10/05/22    BMI:   Wt Readings from Last 3 Encounters:   09/27/22 215 lb (97.5 kg)   09/08/22 216 lb (98 kg)   08/23/22 216 lb (98 kg)     There is no height or weight on file to calculate BMI.    CBC:   Lab Results   Component Value Date/Time    WBC 13.1 11/28/2017 04:44 AM    RBC 3.09 11/28/2017 04:44 AM    HGB 9.7 11/28/2017 04:44 AM    HCT 29.8 11/28/2017 04:44 AM    MCV 96.4 11/28/2017 04:44 AM    RDW 13.4 11/28/2017 04:44 AM     11/28/2017 04:44 AM       CMP:   Lab Results   Component Value Date/Time     11/28/2017 04:44 AM    K 4.3 11/28/2017 04:44 AM     11/28/2017 04:44 AM    CO2 25 11/28/2017 04:44 AM    BUN 23 11/28/2017 04:44 AM    CREATININE 0.6 11/28/2017 04:44 AM    GFRAA >60 11/28/2017 04:44 AM    LABGLOM >60 11/28/2017 04:44 AM    GLUCOSE 154 11/28/2017 04:44 AM    PROT 5.4 11/28/2017 01:07 AM    CALCIUM 8.4 11/28/2017 04:44 AM BILITOT 0.7 11/28/2017 01:07 AM    ALKPHOS 48 11/28/2017 01:07 AM    AST 19 11/28/2017 01:07 AM    ALT 19 11/28/2017 01:07 AM       POC Tests: No results for input(s): POCGLU, POCNA, POCK, POCCL, POCBUN, POCHEMO, POCHCT in the last 72 hours. Coags: No results found for: PROTIME, INR, APTT    HCG (If Applicable): No results found for: PREGTESTUR, PREGSERUM, HCG, HCGQUANT     ABGs: No results found for: PHART, PO2ART, PEL3TIK, ORA8IJE, BEART, P3KMEIAG     Type & Screen (If Applicable):  No results found for: LABABO, LABRH    Drug/Infectious Status (If Applicable):  No results found for: HIV, HEPCAB    COVID-19 Screening (If Applicable): No results found for: COVID19     ECHO  TTE procedure:Echo Complete W/Doppler & Color Flow. Procedure Date  Date: 07/28/2022 Start: 08:25 AM     Study Location: Echo Lab  Technical Quality: Good visualization     Indications:Atrial fibrillation.     Patient Status: Routine     Height: 67 inches Weight: 220 pounds BSA: 2.11 m^2 BMI: 34.46 kg/m^2      Findings      Left Ventricle   Left ventricular internal dimensions were normal in diastole and systole. No regional wall motion abnormalities seen. Normal left ventricular ejection fraction. Ejection fraction is visually estimated at 65%. Indeterminate diastolic function. Right Ventricle   Normal right ventricular size and function. Left Atrium   The left atrium is mildly dilated. Interatrial septum appears intact. Right Atrium   Normal right atrium size. Mitral Valve   Structurally normal mitral valve. Mild mitral annular calcification. Moderate centrally directed mitral regurgitation. ERO 0.2cm2      Tricuspid Valve   The tricuspid valve appears structurally normal.   Mild tricuspid regurgitation. Aortic Valve   The aortic valve appears mildly sclerotic. Pulmonic Valve   The pulmonic valve was not well visualized. Physiologic and/or trace pulmonic regurgitation present. Pericardial Effusion   No evidence of pericardial effusion. Aorta   Aortic root dimension within normal limits. Conclusions      Summary   Left ventricular internal dimensions were normal in diastole and systole. No regional wall motion abnormalities seen. Normal left ventricular ejection fraction. The left atrium is mildly dilated. Mild mitral annular calcification. Moderate centrally directed mitral regurgitation. The aortic valve appears mildly sclerotic. Mild tricuspid regurgitation. Signature      ----------------------------------------------------------------   Electronically signed by Miriam Sam MD(Interpreting   physician) on 07/28/2022 09:21 AM   ----------------------------------------------------------------      EKG  Narrative & Impression    Atrial fibrillation  Abnormal ECG  When compared with ECG of 26-JUL-2022 13:53,  Atrial fibrillation has replaced Sinus rhythm  Nonspecific T wave abnormality, worse in Inferior leads      Specimen Collected: 10/06/22 11:15 EDT Last Resulted: 10/06/22 11:29 EDT                Anesthesia Evaluation  Patient summary reviewed and Nursing notes reviewed history of anesthetic complications (patient denies issues with anesthesia):    Airway: Mallampati: II  TM distance: >3 FB   Neck ROM: full  Mouth opening: > = 3 FB   Dental: normal exam   (+) bridge      Pulmonary:Negative Pulmonary ROS and normal exam  breath sounds clear to auscultation      (-) COPD, asthma, shortness of breath and not a current smoker                          ROS comment: Former smoker   Cardiovascular:  Exercise tolerance: good (>4 METS),   (+) hypertension: no interval change, dysrhythmias: atrial fibrillation,     (-) pacemaker, past MI and CAD    ECG reviewed  Rhythm: irregular  Rate: normal  Echocardiogram reviewed  Stress test reviewed  Cleared by cardiology              Neuro/Psych:   Negative Neuro/Psych ROS     (-) seizures, neuromuscular disease, CVA, headaches and depression/anxiety            GI/Hepatic/Renal:        (-) hiatal hernia, GERD, hepatitis and no renal disease      ROS comment: Bladder stimulator. Endo/Other:    (+) hypothyroidism: arthritis: OA., no malignancy/cancer. (-) diabetes mellitus, no electrolyte abnormalities, no malignancy/cancer               Abdominal:       Abdomen: soft. Vascular: negative vascular ROS. - PVD, DVT and PE. Other Findings:           Anesthesia Plan      MAC     ASA 3       Induction: intravenous. Anesthetic plan and risks discussed with patient. Use of blood products discussed with patient whom consented to blood products. Plan discussed with attending. Discussed plan of care with patient, patient verbalizes understanding and gives consent for anesthesia plan and procedure.    Elliott Jimenez, CHANDRA - CRNA   10/6/2022

## 2022-10-06 NOTE — PROCEDURES
: Kervin Malik MD     Procedure Date: 10/6/22      PROCEDURE(S): Synchronized direct-current cardioversion. INDICATION(S): Atrial fibrillation. CLINICAL SUMMARY:  Darshana Johnson is a 66 y.o. female with history of a fib who presents for JYOTI/DCC. The risks and benefits of synchronized direct current cardioversion and moderate sedation were discussed with the patient today to include but not limited skin burn, stroke/CVA, allergic reaction,breathing problems that require a breathing tube, infection, embolus, arrhythmias, MI, external pacing, temporary and or permanent pacemaker use of ACLS, death. It was discussed that there is a <1% risk of significant complication associated with the synchronized direct current cardioversion and moderate sedation. The patient verbalizes the  Understanding of these and other unnamed risks and wishes to proceed. The patient is on anticoagulation with Eliquis and status of anticoagulant agent was confirmed. The patient is on above-mentioned anticoagulation for at least > 6 weeks without missing a dose. DESCRIPTION OF PROCEDURE(S):  After taking written informed consent, the patient was brought to the procedure room and the pads were placed appropriately. NPO status was confirmed with the patient. A proper time-out was performed. The patient was sedated by the anesthesia service. Patient then underwent synchronized biphasic cardioversion with 200 J with successful conversion to sinus rhythm. The patient tolerated the procedure very well without any complication. PLAN:   Successful synchronized biphasic cardioversion with 200 J  from A fib to  sinus rhythm.     The patient tolerated the procedure very well without any complication  Continue anticoagulation and cardiac meds for rate/rhythm control  Follow up with your cardiologist  Patient  was updated with above           Lencho Dunn MD  10/6/22  12:43 PM

## 2022-10-06 NOTE — ANESTHESIA POSTPROCEDURE EVALUATION
Department of Anesthesiology  Postprocedure Note    Patient: Daniele Lenz  MRN: [id number]  YOB: 1950  Date of evaluation: 10/6/2022      Procedure Summary     Date: 10/06/22 Room / Location: [facility] PACU    Anesthesia Start: 1159 Anesthesia Stop: 1230    Procedure: CARDIOVERSION Diagnosis: Encounter for cardioversion procedure    Scheduled Providers:  Responsible Provider: Shanae Laura MD    Anesthesia Type: MAC ASA Status: 3          Anesthesia Type: No value filed.     Temo Phase I: Temo Score: 10    Temo Phase II:        Anesthesia Post Evaluation    Patient location during evaluation: PACU  Patient participation: complete - patient participated  Level of consciousness: awake and alert  Pain score: 3  Airway patency: patent  Nausea & Vomiting: no nausea  Complications: no  Cardiovascular status: blood pressure returned to baseline  Respiratory status: acceptable  Hydration status: euvolemic

## 2022-10-11 ENCOUNTER — HOSPITAL ENCOUNTER (OUTPATIENT)
Dept: GENERAL RADIOLOGY | Age: 72
Discharge: HOME | End: 2022-10-13
Payer: MEDICARE

## 2022-10-11 DIAGNOSIS — Z12.31 VISIT FOR SCREENING MAMMOGRAM: ICD-10-CM

## 2022-10-11 PROCEDURE — 77063 BREAST TOMOSYNTHESIS BI: CPT

## 2022-10-11 NOTE — H&P
H&P    Name: Coretta Dickerson    Age: 66 y.o. Date of Admission: 10/6/2022 10:39 AM    Date of Service: 10/11/2022    Reason for Consultation: No chief complaint on file. Referring Physician: No admitting provider for patient encounter. History of Present Illness: 68-year-old female with history of atrial fibrillation presented for elective cardioversion.              Past Medical History:  Past Medical History:   Diagnosis Date    A-fib ([facility] [address]     Arthritis     Class 2 severe obesity due to excess calories with serious comorbidity and body mass index (BMI) of 37.0 to 37.9 in adult ([address]     Dry eyes, bilateral     Hypertension     OAB (overactive bladder)     PONV (postoperative nausea and vomiting)        Past Surgical History:  Past Surgical History:   Procedure Laterality Date    ABDOMEN SURGERY      BLADDER SURGERY  01/01/2022    bladder stimulator    BREAST BIOPSY      BUNIONECTOMY      bilteral twice    CARDIAC SURGERY  2006    heart cath in [facility]      x 2    HYSTERECTOMY (CERVIX STATUS UNKNOWN)  11/27/2017    robotic assisted bilateral alpingo-oophorectomy lap robotic assisted abdominal sacral colopexy with y mesh    JOINT REPLACEMENT      bilateral knees, right shoulder replaced    KNEE ARTHROPLASTY Bilateral     NERVE BLOCK Bilateral 06/28/2018    facet L3-5 #1    PAIN MANAGEMENT PROCEDURE N/A 12/17/2021    PERIPHERAL NERVE EVALUATION performed by Gene Moreno DO at [address] NOSE/CLEFT LIP/TIP Bilateral 06/28/2018    BILATERAL INTRA-ARTICULAR FACET JOINT INJECTION WITH FLUOROSCOPIC GUIDANCE AT L3-4 AND L4-5 #1 performed by Home Sahni DO at [place] [address]  09/20/2016    right total shoulder reverse arthroplasty       Family History:  Family History   Problem Relation Age of Onset    Stroke Mother     Stroke Father     Uterine Cancer Sister     Cancer Sister     Prostate Cancer Brother     Prostate Cancer Brother     Colon Cancer Niece        Social History:  Social History     Socioeconomic History    Marital status:      Spouse name: Not on file    Number of children: Not on file    Years of education: Not on file    Highest education level: Not on file   Occupational History    Not on file   Tobacco Use    Smoking status: Former    Smokeless tobacco: Former    Tobacco comments:     quit smoking 40 yrs ago   Vaping Use    Vaping Use: Never used   Substance and Sexual Activity    Alcohol use: Yes     Comment: social    Drug use: No    Sexual activity: Not on file   Other Topics Concern    Not on file   Social History Narrative    Not on file     Social Determinants of Health     Financial Resource Strain: Not on file   Food Insecurity: Not on file   Transportation Needs: Not on file   Physical Activity: Not on file   Stress: Not on file   Social Connections: Not on file   Intimate Partner Violence: Not on file   Housing Stability: Not on file       Allergies: Allergies   Allergen Reactions    Pcn [Penicillins] Hives, Itching and Swelling    Morphine Nausea Only       Home Medications:  Prior to Admission medications    Medication Sig Start Date End Date Taking? Authorizing Provider   propafenone (RYTHMOL SR) 225 MG extended release capsule Take 1 capsule by mouth 2 times daily 9/28/22   Lluvia Root MD   ELIQUIS 5 MG TABS tablet Take 1 tablet by mouth in the morning and at bedtime 8/23/22   Lluvia Root MD   topiramate (TOPAMAX) 25 MG tablet Take Topiramate two tablets twice daily.  8/17/22   Yani Wilson MD   XIIDRA 5 % SOLN 2 times daily 5/16/22   Historical Provider, MD   metoprolol succinate (TOPROL XL) 100 MG extended release tablet Take 100 mg by mouth daily    Historical Provider, MD   acetaminophen (TYLENOL) 650 MG extended release tablet Take 650 mg by mouth every 8 hours as needed for Pain    Historical Provider, MD   erythromycin (ROMYCIN) 5 MG/GM ophthalmic ointment APPLY TO BOTH EYES AT BEDTIME AS DIRECTED 8/10/17   Historical Provider, MD   celecoxib (CELEBREX) 200 MG capsule Take 200 mg by mouth daily    Historical Provider, MD   docusate sodium (COLACE) 100 MG capsule Take 100 mg by mouth daily    Historical Provider, MD   polyvinyl alcohol (LIQUIFILM TEARS) 1.4 % ophthalmic solution Place 1 drop into both eyes as needed    Historical Provider, MD   Cyanocobalamin (VITAMIN B 12 PO) Take 250 mg by mouth daily    Historical Provider, MD   Omega-3 Fatty Acids (OMEGA 3 PO) Take 2,000 mg by mouth daily     Historical Provider, MD   Multiple Vitamins-Minerals (MULTIVITAL PO) Take by mouth daily    Historical Provider, MD   irbesartan-hydrochlorothiazide (AVALIDE) 150-12.5 MG per tablet Take 2 tablets by mouth daily     Historical Provider, MD       Current Medications:  Current Outpatient Medications   Medication Sig Dispense Refill    propafenone (RYTHMOL SR) 225 MG extended release capsule Take 1 capsule by mouth 2 times daily 60 capsule 3    ELIQUIS 5 MG TABS tablet Take 1 tablet by mouth in the morning and at bedtime 180 tablet 3    topiramate (TOPAMAX) 25 MG tablet Take Topiramate two tablets twice daily.  120 tablet 3    XIIDRA 5 % SOLN 2 times daily      metoprolol succinate (TOPROL XL) 100 MG extended release tablet Take 100 mg by mouth daily      acetaminophen (TYLENOL) 650 MG extended release tablet Take 650 mg by mouth every 8 hours as needed for Pain      erythromycin (ROMYCIN) 5 MG/GM ophthalmic ointment APPLY TO BOTH EYES AT BEDTIME AS DIRECTED  6    celecoxib (CELEBREX) 200 MG capsule Take 200 mg by mouth daily      docusate sodium (COLACE) 100 MG capsule Take 100 mg by mouth daily      polyvinyl alcohol (LIQUIFILM TEARS) 1.4 % ophthalmic solution Place 1 drop into both eyes as needed      Cyanocobalamin (VITAMIN B 12 PO) Take 250 mg by mouth daily      Omega-3 Fatty Acids (OMEGA 3 PO) Take 2,000 mg by mouth daily       Multiple Vitamins-Minerals (MULTIVITAL PO) Take by mouth daily      irbesartan-hydrochlorothiazide (AVALIDE) 150-12.5 MG per tablet Take 2 tablets by mouth daily        No current facility-administered medications for this encounter. Facility-Administered Medications Ordered in Other Encounters   Medication Dose Route Frequency Provider Last Rate Last Admin    sodium chloride flush 0.9 % injection 10 mL  10 mL IntraVENous 2 times per day ADALI Greenfield        sodium chloride flush 0.9 % injection 10 mL  10 mL IntraVENous PRN ADALI Greenfield        0.9 % sodium chloride infusion   IntraVENous Continuous ADALI Greenfield        midazolam (VERSED) injection 1 mg  1 mg IntraVENous Q5 Min PRN Dellar Raid, DO   1 mg at 09/20/16 5834             Review of Systems:   Cardiac: As per HPI  General: Denies fever or chills  Pulmonary: As per HPI  HEENT: Denies runny nose  GI: No complaints  : No complaints  Endocrine: Denies night sweats  Musculoskeletal: No complaints  Skin: Dry skin  Neuro: No complaints  Psych: Denies depression    Physical Exam:  /61   Pulse 50   Temp 97.2 °F (36.2 °C)   Resp 18   SpO2 99%   Wt Readings from Last 3 Encounters:   09/27/22 215 lb (97.5 kg)   09/08/22 216 lb (98 kg)   08/23/22 216 lb (98 kg)       Appearance: Alert and oriented x3 not in acute distress. Skin: Dry skin  Head: Atraumatic  Eyes: Intact extraocular muscles   ENMT: Mucous membranes are moist  Neck: Supple  Lungs: Clear to auscultation  Cardiac: Normal S1 and S2  Abdomen: Protuberant  Extremities: Intact range of motion  Neurologic: No focal neurological deficits  Peripheral Pulses: 2+ peripheral pulses      Intake/Output:  No intake or output data in the 24 hours ending 10/11/22 1252  No intake/output data recorded. Laboratory Tests:  No results for input(s): NA, K, CL, CO2, BUN, CREATININE, GLUCOSE, CALCIUM in the last 72 hours. No results found for: MG  No results for input(s): ALKPHOS, ALT, AST, PROT, BILITOT, BILIDIR, LABALBU in the last 72 hours.   No results for input(s): WBC, RBC, HGB, HCT, MCV, MCH, MCHC, RDW, PLT, MPV in the last 72 hours. No results found for: CKTOTAL, CKMB, CKMBINDEX, TROPONINI  No results for input(s): CKTOTAL, CKMB, CKMBINDEX, TROPHS in the last 72 hours. No results found for: INR, PROTIME  No results found for: TSHFT4, TSH  No results found for: LABA1C  No results found for: EAG  No results found for: CHOL  No results found for: TRIG  No results found for: HDL  No results found for: LDLCALC, LDLCHOLESTEROL  No results found for: LABVLDL, VLDL  No results found for: CHOLHDLRATIO  No results for input(s): PROBNP in the last 72 hours. CXR reviewed: The ASCVD Risk score (Luzmaria DK, et al., 2019) failed to calculate for the following reasons:    Cannot find a previous HDL lab    Cannot find a previous total cholesterol lab    ASSESSMENT / PLAN:  Atrial fibrillation:  Patient report has been on Eliquis without missing any dose for at least more than 6-week  Proceed with synchronized biphasic cardioversion            Thank you for allowing me to participate in your patient's care. Please feel free to contact me if you have any questions or concerns.     Lizzette Childs MD  [facility]) Cardiology

## 2022-10-12 LAB
EKG ATRIAL RATE: 46 BPM
EKG ATRIAL RATE: 58 BPM
EKG P AXIS: 54 DEGREES
EKG P-R INTERVAL: 222 MS
EKG Q-T INTERVAL: 364 MS
EKG Q-T INTERVAL: 466 MS
EKG QRS DURATION: 74 MS
EKG QRS DURATION: 78 MS
EKG QTC CALCULATION (BAZETT): 407 MS
EKG QTC CALCULATION (BAZETT): 440 MS
EKG R AXIS: 43 DEGREES
EKG R AXIS: 7 DEGREES
EKG T AXIS: 12 DEGREES
EKG T AXIS: 9 DEGREES
EKG VENTRICULAR RATE: 46 BPM
EKG VENTRICULAR RATE: 88 BPM

## 2022-10-17 ENCOUNTER — OFFICE VISIT (OUTPATIENT)
Dept: BARIATRICS/WEIGHT MGMT | Age: 72
End: 2022-10-17
Payer: MEDICARE

## 2022-10-17 VITALS
WEIGHT: 214 LBS | HEIGHT: 67 IN | SYSTOLIC BLOOD PRESSURE: 152 MMHG | TEMPERATURE: 97.7 F | BODY MASS INDEX: 33.59 KG/M2 | DIASTOLIC BLOOD PRESSURE: 79 MMHG | HEART RATE: 50 BPM

## 2022-10-17 DIAGNOSIS — I10 ESSENTIAL HYPERTENSION: Primary | ICD-10-CM

## 2022-10-17 DIAGNOSIS — E88.81 INSULIN RESISTANCE: ICD-10-CM

## 2022-10-17 DIAGNOSIS — E66.09 CLASS 1 OBESITY DUE TO EXCESS CALORIES WITH SERIOUS COMORBIDITY AND BODY MASS INDEX (BMI) OF 33.0 TO 33.9 IN ADULT: ICD-10-CM

## 2022-10-17 PROCEDURE — 1123F ACP DISCUSS/DSCN MKR DOCD: CPT | Performed by: INTERNAL MEDICINE

## 2022-10-17 PROCEDURE — 99211 OFF/OP EST MAY X REQ PHY/QHP: CPT

## 2022-10-17 PROCEDURE — 99214 OFFICE O/P EST MOD 30 MIN: CPT | Performed by: INTERNAL MEDICINE

## 2022-10-17 RX ORDER — SEMAGLUTIDE 1.34 MG/ML
INJECTION, SOLUTION SUBCUTANEOUS
Qty: 3 ML | Refills: 0 | Status: SHIPPED | OUTPATIENT
Start: 2022-10-17

## 2022-10-17 NOTE — PROGRESS NOTES
CC -   Follow up of: HTN, Weight gain    Would like to be <200 lbs. BACKGROUND -   Last visit: 8/17/22  First visit: 4/14/22    Obesity (all weight in lbs)  Began in childhood  Initial BMI 37.81, Wt 243.2, Ht 67.25\"  HS Grad wt ~200 lbs (down from 300)   Lowest   wt 175 lbs (on weight watchers)  Highest  wt 300 (at 10years of age)  Pattern of wt gain: gradual  Wt change past yr: similar (235-245)  Most wt lost: 100 lbs (in high school)  Other diets attempted: [employer], diet pills (multiple)     Desire to lose weight: 10/10 (does not want surgery)    Initial Diet:    Number of meals per day - 3    Number of snacks per day - 1    Meal volume - 12\" plate,  sometimes seconds    Fast food/convenience store - 3-4x/week (eg a breakfast sandwich)    Restaurants (not fast food) - 1-2x/week   Sweets - 0d/week   Chips - 1d/week   Crackers/pretzels - 1-2d/week   Nuts - 1d/week   Peanut Butter - 0d/week   Popcorn - 1d/week   Dried fruit - 0d/week   Whole fruit - 7d/week (berries, oranges, grapes, apples in season)   Breakfast cereal - 2d/week   Granola/Protein/Energy bar - 0d/week   Sugar sweetened beverages - none   Protein - No supplements   Fiber - No supplements     Initial Exercise:    Gym membership - silver sneakers    Walking - marching around the house with leg lifts etc    Running - no    Resistance - no    Aerobic class - no        Initial Sleep: Bedtime: 11pm-midnight, wake up time: 6:30-7:00 - usu rested, daytime naps: no    Weight scale at home: yes, takes weight: 1x/wk  Food scale: yes  ______________________    [facility] [place] -  Past Medical History:   Diagnosis Date    A-fib ([facility] [address]     Arthritis     Class 2 severe obesity due to excess calories with serious comorbidity and body mass index (BMI) of 37.0 to 37.9 in adult ([facility])     Dry eyes, bilateral     Hypertension     OAB (overactive bladder)     PONV (postoperative nausea and vomiting)    Atrial fibrillation - recently diagnosed, on Eliquis and Metoprolol now.     Past Surgical History:   Procedure Laterality Date    ABDOMEN SURGERY      BLADDER SURGERY  01/01/2022    bladder stimulator    BREAST BIOPSY      BUNIONECTOMY      bilteral twice    CARDIAC SURGERY  2006    heart cath in [address]      x 2    HYSTERECTOMY (CERVIX STATUS UNKNOWN)  11/27/2017    robotic assisted bilateral alpingo-oophorectomy lap robotic assisted abdominal sacral colopexy with y mesh    JOINT REPLACEMENT      bilateral knees, right shoulder replaced    KNEE ARTHROPLASTY Bilateral     NERVE BLOCK Bilateral 06/28/2018    facet L3-5 #1    PAIN MANAGEMENT PROCEDURE N/A 12/17/2021    PERIPHERAL NERVE EVALUATION performed by Maria Fernanda Moreno DO at [address] [place] [address] NOSE/CLEFT LIP/TIP Bilateral 06/28/2018    BILATERAL INTRA-ARTICULAR FACET JOINT INJECTION WITH FLUOROSCOPIC GUIDANCE AT L3-4 AND L4-5 #1 performed by Jerson Caballero DO at [place] [address]  09/20/2016    right total shoulder reverse arthroplasty     Prior to Admission medications    Medication Sig Start Date End Date Taking? Authorizing Provider   propafenone (RYTHMOL SR) 225 MG extended release capsule Take 1 capsule by mouth 2 times daily 9/28/22   Rico Greco MD   ELIQUIS 5 MG TABS tablet Take 1 tablet by mouth in the morning and at bedtime 8/23/22   iRco Greco MD   topiramate (TOPAMAX) 25 MG tablet Take Topiramate two tablets twice daily.  8/17/22   Yani Wilson MD   XIIDRA 5 % SOLN 2 times daily 5/16/22   Historical Provider, MD   metoprolol succinate (TOPROL XL) 100 MG extended release tablet Take 100 mg by mouth daily    Historical Provider, MD   acetaminophen (TYLENOL) 650 MG extended release tablet Take 650 mg by mouth every 8 hours as needed for Pain    Historical Provider, MD   erythromycin (ROMYCIN) 5 MG/GM ophthalmic ointment APPLY TO BOTH EYES AT BEDTIME AS DIRECTED 8/10/17   Historical Provider, MD   celecoxib (CELEBREX) 200 MG capsule Take 200 mg by mouth daily    Historical Provider, MD   docusate sodium (COLACE) 100 MG capsule Take 100 mg by mouth daily    Historical Provider, MD   polyvinyl alcohol (LIQUIFILM TEARS) 1.4 % ophthalmic solution Place 1 drop into both eyes as needed    Historical Provider, MD   Cyanocobalamin (VITAMIN B 12 PO) Take 250 mg by mouth daily    Historical Provider, MD   Omega-3 Fatty Acids (OMEGA 3 PO) Take 2,000 mg by mouth daily     Historical Provider, MD   Multiple Vitamins-Minerals (MULTIVITAL PO) Take by mouth daily    Historical Provider, MD   irbesartan-hydrochlorothiazide (AVALIDE) 150-12.5 MG per tablet Take 2 tablets by mouth daily     Historical Provider, MD   xiidra eye drops bid. Family history: DM: brother, Heart disease: half brothers (3) all had heart ds. Allergies: Allergies   Allergen Reactions    Pcn [Penicillins] Hives, Itching and Swelling    Morphine Nausea Only       Social history: smoking: quit 44 years ago ; Alcohol: couple of times week (a glass of wine or other drink). ROS -  Card - no CP, but difficulty with mobility which she feels is due to weight. GI - no N/V, has Constipation colace helps. PE -  Gen : BP (!) 152/79 (Site: Left Upper Arm, Position: Sitting, Cuff Size: Large Adult)   Pulse 50   Temp 97.7 °F (36.5 °C) (Temporal)   Ht 5' 7.25\" (1.708 m)   Wt 214 lb (97.1 kg)   BMI 33.27 kg/m²    WN, WD, NAD  Lung: Nml resp effort, CTA B/L  Heart:  RRR w/o MGR, trace LE pitting edema ramona on left  Psych: Normal mood   Full affect  Neuro: Moves all ext well  ______________________    HISTORY & ASSESSMENT/PLAN -     Problem 1  - Hypertension   HPI   - Ongoing, compliant with medications, patient had cardioversion, on Metoprolol and Avalide 150-12.5, tolerating, pt asymptomatic. Assessment  - was fairly controlled, high BP today   Plan   - Has follow up with cardiologist, current medication seems to be working well. Weight reduction can help with BP - may help de-escalate dose.  Weight reduction per plan below    Problem 2  - Obesity   HPI   - See above Background for description    Weight  Date    243.2  4/14/22   Ozempic - did not get covered    227.8  6/17/22   Topiramate    215.8  8/17/22   Topiramate    214.0  10/17/22  Taper topiramate - retry Ozempic (IR)    Total weight change to date: -29.2 lbs. Average daily energy variance:  4/14/2022 - 6/17/2022: -13.8 lbs (45789 Dioni)/63 d = -767 Dioni/d deficit. 6/17/2022 - 8/17/2022: -12 lbs (39238 Dioni)/61 d = -689 Dioni/d deficit. 8/17/2022 - 10/17/2022: -1.8 lbs (6300 Dioni)/61 d = -103 Dioni/d deficit. DEN (est.)= 1926 Dioni/d = 89083 Dioni/wk    Update:  Calorie monitoring: calorie conscious, watching portions. Close to recommended but may be little low on fibers. Sweets: 0 d/month (has tried to avoid it completely  SSB: none  Restaurant food: 2x/wk (but watches  Appetite suppressant: Topiramate 50 mg bid, not as much appetite suppression with it, no s/e (had gout since last visit). Home BP monitoring: has wrist BP machine    Assessment  - improving    Plan   - We planned on trying VLCD. Topiramate has not been working, and also had gout so we planned to taper it. She would like to see if Ozempic gets covered. Based on prior result that we have ( back in 2017) and obesity she has insulin resistance, so I reordered Ozempic. Planned as follows  Patient Instructions   Topiramate:  Take 1 tablet (25 mg) twice a day for 3 days, and then 1 tablet (25 mg) nightly for 3 nights and then stop. Breakfast -     one high protein shake                            + 20 grams of fiber. Do this by either eating 12 tablespoons of the original, plain Fiber One cereal every day or 4 tablespoons of wheat dextrin powder (Benefiber or a generic brand) every day. Work up to this amount slowly by starting with only one-eighth to one-fourth of the target amount and then adding another one-eighth to one-fourth every one or two weeks until reaching the target.      Lunch - one high protein shake                           + one a fat snack item     Dinner -          one frozen meal                           + one snack item     Shake options (<200 gonzalez, >25 grams/protein) :  Nectar, Pure Protein, Premier, Boost Max, Ensure Max, BeneProtein and [employer] (which is lactose-free) are milk-based options; Nectar, Premier Protein Clear, IsoPure Protein Drink, and Protein 2 O are water-based options; (Premier Protein Clear, the water-based option, comes in a 20 oz bottle with 20 grams of protein and 90 calories. So you have to drink three each day which increases the cost.)  (Disclaimer: Dietary supplements rarely have their listed ingredients and the amount of each verified by a third party other. Sometimes they give verification for their claims to be GMO and gluten free and to be organic. However, even such verifications as these may still be untrustworthy.)     Fat snack options (<150 gonzalez, >11 grams of fat): 22 almonds, 1 1/2 tablespoon of a oil-based dressing or 4 tablespoons of [place] dressing on a bed of salad greens, 1 1/2 tablespoons of peanut butter, 1 Cranberry [employer] options (<100 gonzalez, no sweets): fruit, low fat/high protein [place] yogurt, mozzarella cheese stick, nuts, salad with dressing, peanut butter, chips/crackers/pretzels     Frozen meal options (<350 gonzalez, <800 mg sodium):  Weight Watchers Smart Ones, [employer] Cuisine, Healthy Choice, Nemo's, Kathy's     Food substitutes for the shakes:  4 oz of baked, grilled or broiled chicken, turkey or fish, 8 egg whites     Take a multivitamin daily     Walk 30 min every day    Ozempic:  Start Wegovy by taking 0.25 mg once each week for four weeks, then increase to 0.5 mg once each weeks; at the end of four weeks contact me for the next prescription. Follow up in 1 month. Medication management: Taper Topiramate, Ozempic. German Lemus MD  Internal Medicine/Obesity Medicine  10/17/2022.

## 2022-10-17 NOTE — PATIENT INSTRUCTIONS
Topiramate:  Take 1 tablet (25 mg) twice a day for 3 days, and then 1 tablet (25 mg) nightly for 3 nights and then stop. Breakfast -     one high protein shake                            + 20 grams of fiber. Do this by either eating 12 tablespoons of the original, plain Fiber One cereal every day or 4 tablespoons of wheat dextrin powder (Benefiber or a generic brand) every day. Work up to this amount slowly by starting with only one-eighth to one-fourth of the target amount and then adding another one-eighth to one-fourth every one or two weeks until reaching the target. Lunch -           one high protein shake                           + one a fat snack item     Dinner -          one frozen meal                           + one snack item     Shake options (<200 gonzalez, >25 grams/protein) :  Nectar, Pure Protein, Premier, Boost Max, Ensure Max, BeneProtein and [employer] (which is lactose-free) are milk-based options; Nectar, Premier Protein Clear, IsoPure Protein Drink, and Protein 2 O are water-based options; (Premier Protein Clear, the water-based option, comes in a 20 oz bottle with 20 grams of protein and 90 calories. So you have to drink three each day which increases the cost.)  (Disclaimer: Dietary supplements rarely have their listed ingredients and the amount of each verified by a third party other. Sometimes they give verification for their claims to be GMO and gluten free and to be organic.  However, even such verifications as these may still be untrustworthy.)     Fat snack options (<150 gonzalez, >11 grams of fat): 22 almonds, 1 1/2 tablespoon of a oil-based dressing or 4 tablespoons of [place] dressing on a bed of salad greens, 1 1/2 tablespoons of peanut butter, 1 Cranberry [employer] options (<100 gonzalez, no sweets): fruit, low fat/high protein [place] yogurt, mozzarella cheese stick, nuts, salad with dressing, peanut butter, chips/crackers/pretzels     Frozen meal options (<350 gonzalez, <800 mg sodium):  Weight Watchers Smart Ones, [employer] Cuisine, Healthy Choice, Nemo's, Kathy's     Food substitutes for the shakes:  4 oz of baked, grilled or broiled chicken, turkey or fish, 8 egg whites     Take a multivitamin daily     Walk 30 min every day    Ozempic:  Start Wegovy by taking 0.25 mg once each week for four weeks, then increase to 0.5 mg once each weeks; at the end of four weeks contact me for the next prescription. Follow up in 1 month.

## 2022-10-19 ENCOUNTER — OFFICE VISIT (OUTPATIENT)
Dept: CARDIOLOGY CLINIC | Age: 72
End: 2022-10-19
Payer: MEDICARE

## 2022-10-19 VITALS
WEIGHT: 216 LBS | HEART RATE: 52 BPM | HEIGHT: 67 IN | SYSTOLIC BLOOD PRESSURE: 130 MMHG | OXYGEN SATURATION: 99 % | RESPIRATION RATE: 18 BRPM | DIASTOLIC BLOOD PRESSURE: 86 MMHG | BODY MASS INDEX: 33.9 KG/M2

## 2022-10-19 DIAGNOSIS — E88.81 INSULIN RESISTANCE: ICD-10-CM

## 2022-10-19 DIAGNOSIS — I48.0 PAROXYSMAL ATRIAL FIBRILLATION (HCC): Primary | ICD-10-CM

## 2022-10-19 DIAGNOSIS — E66.8 MODERATE OBESITY: ICD-10-CM

## 2022-10-19 DIAGNOSIS — E66.09 CLASS 1 OBESITY DUE TO EXCESS CALORIES WITH SERIOUS COMORBIDITY AND BODY MASS INDEX (BMI) OF 33.0 TO 33.9 IN ADULT: ICD-10-CM

## 2022-10-19 DIAGNOSIS — I10 ESSENTIAL HYPERTENSION: ICD-10-CM

## 2022-10-19 DIAGNOSIS — I44.0 FIRST DEGREE ATRIOVENTRICULAR BLOCK: ICD-10-CM

## 2022-10-19 LAB
ALBUMIN SERPL-MCNC: 4.3 G/DL (ref 3.5–5.2)
ALP BLD-CCNC: 54 U/L (ref 35–104)
ALT SERPL-CCNC: 15 U/L (ref 0–32)
ANION GAP SERPL CALCULATED.3IONS-SCNC: 11 MMOL/L (ref 7–16)
AST SERPL-CCNC: 20 U/L (ref 0–31)
BILIRUB SERPL-MCNC: 0.8 MG/DL (ref 0–1.2)
BUN BLDV-MCNC: 20 MG/DL (ref 6–23)
CALCIUM SERPL-MCNC: 9.6 MG/DL (ref 8.6–10.2)
CHLORIDE BLD-SCNC: 107 MMOL/L (ref 98–107)
CHOLESTEROL, TOTAL: 169 MG/DL (ref 0–199)
CO2: 24 MMOL/L (ref 22–29)
CREAT SERPL-MCNC: 0.6 MG/DL (ref 0.5–1)
GFR SERPL CREATININE-BSD FRML MDRD: >60 ML/MIN/1.73
GLUCOSE BLD-MCNC: 102 MG/DL (ref 74–99)
HDLC SERPL-MCNC: 49 MG/DL
LDL CHOLESTEROL CALCULATED: 105 MG/DL (ref 0–99)
POTASSIUM SERPL-SCNC: 3.8 MMOL/L (ref 3.5–5)
SODIUM BLD-SCNC: 142 MMOL/L (ref 132–146)
TOTAL PROTEIN: 7.2 G/DL (ref 6.4–8.3)
TRIGL SERPL-MCNC: 76 MG/DL (ref 0–149)
VLDLC SERPL CALC-MCNC: 15 MG/DL

## 2022-10-19 PROCEDURE — 93000 ELECTROCARDIOGRAM COMPLETE: CPT | Performed by: INTERNAL MEDICINE

## 2022-10-19 PROCEDURE — 1123F ACP DISCUSS/DSCN MKR DOCD: CPT | Performed by: INTERNAL MEDICINE

## 2022-10-19 PROCEDURE — 99214 OFFICE O/P EST MOD 30 MIN: CPT | Performed by: INTERNAL MEDICINE

## 2022-10-19 RX ORDER — PROPAFENONE HYDROCHLORIDE 225 MG/1
225 CAPSULE, EXTENDED RELEASE ORAL 2 TIMES DAILY
Qty: 180 CAPSULE | Refills: 3 | Status: SHIPPED | OUTPATIENT
Start: 2022-10-19

## 2022-10-19 RX ORDER — METOPROLOL SUCCINATE 50 MG/1
TABLET, EXTENDED RELEASE ORAL
Qty: 90 TABLET | Refills: 3 | Status: SHIPPED | OUTPATIENT
Start: 2022-10-19

## 2022-10-19 NOTE — PROGRESS NOTES
OUTPATIENT CARDIOLOGY FOLLOW-UP    Name: Ning Vanessa    Age: 66 y.o. Primary Care Physician: Enid Portillo MD    Date of Service: 10/19/2022    Chief Complaint: Paroxysmal atrial fibrillation, first-degree atrioventricular block, hypertension, moderate obesity    Interim History: Since her most recent evaluation and the initiation of antiarrhythmic therapy, the patient underwent successful cardioversion restoring sinus rhythm with present maintenance. She relates intermittent transient episodes of palpitations of a brief duration and without additional symptomatology. In the interim, she is additionally been diagnosed with gouty arthritis with the ongoing continuation of a Pinto II agent and the development of mildly progressive lower extremity and predominantly pedal edema. At the time of present evaluation her weight remains essentially stable and she is normotensive. She is presently undergoing weight reduction assessment to assist in management of her moderate obesity. Review of Systems: The remainder of a complete multisystem review including consitutional, central nervous, respiratory, circulatory, gastrointestinal, genitourinary, endocrinologic, hematologic, musculoskeletal and psychiatric are negative.     Past Medical History:  Past Medical History:   Diagnosis Date    A-fib ([facility] [address]     Arthritis     Class 2 severe obesity due to excess calories with serious comorbidity and body mass index (BMI) of 37.0 to 37.9 in adult ([address]     Dry eyes, bilateral     Hypertension     OAB (overactive bladder)     PONV (postoperative nausea and vomiting)        Past Surgical History:  Past Surgical History:   Procedure Laterality Date    ABDOMEN SURGERY      BLADDER SURGERY  01/01/2022    bladder stimulator    BREAST BIOPSY      BUNIONECTOMY      bilteral twice    CARDIAC SURGERY  2006    heart cath in [facility]      x 2    HYSTERECTOMY (CERVIX STATUS UNKNOWN) 11/27/2017    robotic assisted bilateral alpingo-oophorectomy lap robotic assisted abdominal sacral colopexy with y mesh    JOINT REPLACEMENT      bilateral knees, right shoulder replaced    KNEE ARTHROPLASTY Bilateral     NERVE BLOCK Bilateral 06/28/2018    facet L3-5 #1    PAIN MANAGEMENT PROCEDURE N/A 12/17/2021    PERIPHERAL NERVE EVALUATION performed by Kaleen Bence Memo, DO at [address] NOSE/CLEFT LIP/TIP Bilateral 06/28/2018    BILATERAL INTRA-ARTICULAR FACET JOINT INJECTION WITH FLUOROSCOPIC GUIDANCE AT L3-4 AND L4-5 #1 performed by Nury Avina DO at [place] [address]  09/20/2016    right total shoulder reverse arthroplasty       Family History:  Family History   Problem Relation Age of Onset    Stroke Mother     Stroke Father     Uterine Cancer Sister     Cancer Sister     Prostate Cancer Brother     Prostate Cancer Brother     Colon Cancer Niece        Social History:  Social History     Socioeconomic History    Marital status:      Spouse name: Not on file    Number of children: Not on file    Years of education: Not on file    Highest education level: Not on file   Occupational History    Not on file   Tobacco Use    Smoking status: Former    Smokeless tobacco: Former    Tobacco comments:     quit smoking 40 yrs ago   Vaping Use    Vaping Use: Never used   Substance and Sexual Activity    Alcohol use: Yes     Comment: social    Drug use: No    Sexual activity: Not on file   Other Topics Concern    Not on file   Social History Narrative    1 cup coffee daily      Social Determinants of Health     Financial Resource Strain: Not on file   Food Insecurity: Not on file   Transportation Needs: Not on file   Physical Activity: Not on file   Stress: Not on file   Social Connections: Not on file   Intimate Partner Violence: Not on file   Housing Stability: Not on file       Allergies:   Allergies   Allergen Reactions    Pcn [Penicillins] Hives, Itching and Swelling Morphine Nausea Only       Current Medications:  Current Outpatient Medications   Medication Sig Dispense Refill    propafenone (RYTHMOL SR) 225 MG extended release capsule Take 1 capsule by mouth 2 times daily 60 capsule 3    ELIQUIS 5 MG TABS tablet Take 1 tablet by mouth in the morning and at bedtime 180 tablet 3    topiramate (TOPAMAX) 25 MG tablet Take Topiramate two tablets twice daily. 120 tablet 3    XIIDRA 5 % SOLN 2 times daily      metoprolol succinate (TOPROL XL) 100 MG extended release tablet Take 100 mg by mouth daily      acetaminophen (TYLENOL) 650 MG extended release tablet Take 650 mg by mouth every 8 hours as needed for Pain      erythromycin (ROMYCIN) 5 MG/GM ophthalmic ointment APPLY TO BOTH EYES AT BEDTIME AS DIRECTED  6    celecoxib (CELEBREX) 200 MG capsule Take 200 mg by mouth daily      docusate sodium (COLACE) 100 MG capsule Take 100 mg by mouth daily      polyvinyl alcohol (LIQUIFILM TEARS) 1.4 % ophthalmic solution Place 1 drop into both eyes as needed      Cyanocobalamin (VITAMIN B 12 PO) Take 250 mg by mouth daily      Omega-3 Fatty Acids (OMEGA 3 PO) Take 2,000 mg by mouth daily       Multiple Vitamins-Minerals (MULTIVITAL PO) Take by mouth daily      irbesartan-hydrochlorothiazide (AVALIDE) 150-12.5 MG per tablet Take 2 tablets by mouth daily       Semaglutide,0.25 or 0.5MG/DOS, (OZEMPIC, 0.25 OR 0.5 MG/DOSE,) 2 MG/1.5ML SOPN Start Ozempic by taking 0.25 mg once each week for four weeks, then increase to 0.5 mg once each weeks; at the end of four weeks contact me for the next prescription. (Patient not taking: Reported on 10/19/2022) 3 mL 0     No current facility-administered medications for this visit.      Facility-Administered Medications Ordered in Other Visits   Medication Dose Route Frequency Provider Last Rate Last Admin    sodium chloride flush 0.9 % injection 10 mL  10 mL IntraVENous 2 times per day ADALI Greenfield        sodium chloride flush 0.9 % injection 10 mL  10 mL IntraVENous PRN ADALI Greenfield        0.9 % sodium chloride infusion   IntraVENous Continuous ADALI Greenfield        midazolam (VERSED) injection 1 mg  1 mg IntraVENous Q5 Min PRN Miquel Gaytan DO   1 mg at 09/20/16 0627         Physical Exam:  /86 (Site: Left Upper Arm, Position: Sitting, Cuff Size: Medium Adult)   Pulse 52   Resp 18   Ht 5' 7\" (1.702 m)   Wt 216 lb (98 kg)   SpO2 99%   BMI 33.83 kg/m²   Wt Readings from Last 3 Encounters:   10/19/22 216 lb (98 kg)   10/17/22 214 lb (97.1 kg)   09/27/22 215 lb (97.5 kg)     The patient is awake, alert and in no discomfort or distress. No gross musculoskeletal deformity is present. No significant skin or nail changes are present. Gross examination of head, eyes, nose and throat are negative. Jugular venous pressure is normal and no carotid bruits are present. Normal respiratory effort is noted with no accessory muscle usage present. Lung fields are clear to ascultation. Cardiac examination is notable for a regular rate and rhythm with no palpable thrill. No gallop rhythm or cardiac murmur are identified. A benign abdominal examination is present with no masses or organomegaly. Intact pulses are present throughout all extremities and no peripheral edema is present. No focal neurologic deficits are present.     Laboratory Tests:  Lab Results   Component Value Date    CREATININE 0.6 11/28/2017    BUN 23 11/28/2017     11/28/2017    K 4.3 11/28/2017     11/28/2017    CO2 25 11/28/2017     No results found for: BNP  Lab Results   Component Value Date/Time    WBC 13.1 11/28/2017 04:44 AM    RBC 3.09 11/28/2017 04:44 AM    HGB 9.7 11/28/2017 04:44 AM    HCT 29.8 11/28/2017 04:44 AM    MCV 96.4 11/28/2017 04:44 AM    MCH 31.4 11/28/2017 04:44 AM    MCHC 32.6 11/28/2017 04:44 AM    RDW 13.4 11/28/2017 04:44 AM     11/28/2017 04:44 AM    MPV 12.1 11/28/2017 04:44 AM     No results for input(s): ALKPHOS, ALT, AST, PROT, BILITOT, BILIDIR, LABALBU in the last 72 hours. No results found for: MG  No results found for: PROTIME, INR  No results found for: TSH  No components found for: CHLPL  No results found for: TRIG  No results found for: HDL  No results found for: [address]    Cardiac Tests:  ECG: A resting electrocardiogram demonstrates evidence of sinus bradycardia with a first-degree atrioventricular block and PA interval approximately 235 ms with nonspecific ST changes      ASSESSMENT / PLAN: On a clinical basis, the patient remains compensated from a cardiovascular standpoint at present I have modified her medical regimen on the basis of the development of a first-degree atrioventricular block following alteration of her medical regimen restoration of sinus rhythm with a reduction of her metoprolol dosage from 150 mg daily with plans of the continuation of her propafenone. Careful monitoring of her blood pressure will remain essential to ongoing stabilization of diastolic cardiac function. As previously outlined, based on the adverse effects of her anti-inflammatory therapy, an alternative would be advisable to reduce risk of fluid retention and potential adverse effects on renal function in conjunction with her antihypertensive regimen. In addition, a formal assessment of the presence or absence of obstructive sleep apnea would be advisable to reduce risk of adverse cardiovascular effects both related to diastolic heart failure and recurrence of her atrial arrhythmias. Ongoing appropriate risk factor modification of blood pressure and serum lipids will remain essential to reducing risk of future atherosclerotic development. I presently plan her clinical reevaluation approximately 3 months and would happily reevaluate her in the interim should additional cardiovascular difficulties or concerns arise.     The patient's current medication list, allergies, problem list and results of all previously ordered testing were reviewed at today's visit. Follow-up office visit in 3 months      Note: This report was completed using computerized voice recognition software. Every effort has been made to ensure accuracy, however; inadvertent computerized transcription errors may be present. Tejal Abdi.  [facility], [address] [place] [address]    An electronic copy of this follow-up progress note was forwarded to Dr. Kryie Vazquez

## 2022-10-20 ENCOUNTER — TELEPHONE (OUTPATIENT)
Dept: BARIATRICS/WEIGHT MGMT | Age: 72
End: 2022-10-20

## 2022-10-24 DIAGNOSIS — E66.09 CLASS 1 OBESITY DUE TO EXCESS CALORIES WITH SERIOUS COMORBIDITY AND BODY MASS INDEX (BMI) OF 33.0 TO 33.9 IN ADULT: ICD-10-CM

## 2022-10-25 RX ORDER — TOPIRAMATE 25 MG/1
TABLET ORAL
Qty: 60 TABLET | Refills: 0 | OUTPATIENT
Start: 2022-10-25

## 2022-11-07 SDOH — HEALTH STABILITY: PHYSICAL HEALTH: ON AVERAGE, HOW MANY DAYS PER WEEK DO YOU ENGAGE IN MODERATE TO STRENUOUS EXERCISE (LIKE A BRISK WALK)?: 0 DAYS

## 2022-11-09 ENCOUNTER — TELEPHONE (OUTPATIENT)
Dept: ORTHOPEDIC SURGERY | Age: 72
End: 2022-11-09

## 2022-11-09 DIAGNOSIS — M25.532 LEFT WRIST PAIN: Primary | ICD-10-CM

## 2022-11-10 ENCOUNTER — OFFICE VISIT (OUTPATIENT)
Dept: ORTHOPEDIC SURGERY | Age: 72
End: 2022-11-10
Payer: MEDICARE

## 2022-11-10 VITALS — HEIGHT: 67 IN | WEIGHT: 217 LBS | BODY MASS INDEX: 34.06 KG/M2

## 2022-11-10 DIAGNOSIS — M19.132 SLAC (SCAPHOLUNATE ADVANCED COLLAPSE) OF WRIST, LEFT: Primary | ICD-10-CM

## 2022-11-10 DIAGNOSIS — M19.032 ARTHRITIS OF LEFT WRIST: ICD-10-CM

## 2022-11-10 PROCEDURE — 20605 DRAIN/INJ JOINT/BURSA W/O US: CPT | Performed by: ORTHOPAEDIC SURGERY

## 2022-11-10 PROCEDURE — 99203 OFFICE O/P NEW LOW 30 MIN: CPT | Performed by: ORTHOPAEDIC SURGERY

## 2022-11-10 PROCEDURE — 1123F ACP DISCUSS/DSCN MKR DOCD: CPT | Performed by: ORTHOPAEDIC SURGERY

## 2022-11-10 RX ORDER — BETAMETHASONE SODIUM PHOSPHATE AND BETAMETHASONE ACETATE 3; 3 MG/ML; MG/ML
6 INJECTION, SUSPENSION INTRA-ARTICULAR; INTRALESIONAL; INTRAMUSCULAR; SOFT TISSUE ONCE
Status: COMPLETED | OUTPATIENT
Start: 2022-11-10 | End: 2022-11-10

## 2022-11-10 RX ADMIN — BETAMETHASONE SODIUM PHOSPHATE AND BETAMETHASONE ACETATE 6 MG: 3; 3 INJECTION, SUSPENSION INTRA-ARTICULAR; INTRALESIONAL; INTRAMUSCULAR; SOFT TISSUE at 13:47

## 2022-11-10 NOTE — PROGRESS NOTES
Department of Orthopedic Surgery  History and Physical      CHIEF COMPLAINT:  left wrist pain    HISTORY OF PRESENT ILLNESS:                The patient is a RHD 66 y.o. female who presents with left wrist pain. Patient reports that she has had left wrist pain for the last 6 to 8 months. She states she has been using a cane to her left hand for over the last 10 years. She denies any injury. She reports the pain to her wrist is progressively worsening. She denies any numbness or tingling. She did have bilateral carpal tunnel releases previously. She has not had any treatments for this. She does report decreased  strength. She does have a history of gout in her left great toe. She does not report gout to her hands.     Past Medical History:        Diagnosis Date    A-fib ([address]     Arthritis     Class 2 severe obesity due to excess calories with serious comorbidity and body mass index (BMI) of 37.0 to 37.9 in adult ([address]     Dry eyes, bilateral     Hypertension     OAB (overactive bladder)     PONV (postoperative nausea and vomiting)      Past Surgical History:        Procedure Laterality Date    ABDOMEN SURGERY      BLADDER SURGERY  01/01/2022    bladder stimulator    BREAST BIOPSY      BUNIONECTOMY      bilteral twice    CARDIAC SURGERY  2006    heart cath in [facility]      x 2    HYSTERECTOMY (CERVIX STATUS UNKNOWN)  11/27/2017    robotic assisted bilateral alpingo-oophorectomy lap robotic assisted abdominal sacral colopexy with y mesh    JOINT REPLACEMENT      bilateral knees, right shoulder replaced    KNEE ARTHROPLASTY Bilateral     NERVE BLOCK Bilateral 06/28/2018    facet L3-5 #1    PAIN MANAGEMENT PROCEDURE N/A 12/17/2021    PERIPHERAL NERVE EVALUATION performed by Ame Rivero Memo,  at [address] NOSE/CLEFT LIP/TIP Bilateral 06/28/2018    BILATERAL INTRA-ARTICULAR FACET JOINT INJECTION WITH FLUOROSCOPIC GUIDANCE AT L3-4 AND L4-5 #1 performed by Isma Corona DO Jo at [facility]  09/20/2016    right total shoulder reverse arthroplasty     Current Medications:   No current facility-administered medications for this visit. Facility-Administered Medications Ordered in Other Visits: sodium chloride flush 0.9 % injection 10 mL, 10 mL, IntraVENous, 2 times per day  sodium chloride flush 0.9 % injection 10 mL, 10 mL, IntraVENous, PRN  0.9 % sodium chloride infusion, , IntraVENous, Continuous  midazolam (VERSED) injection 1 mg, 1 mg, IntraVENous, Q5 Min PRN  Allergies:  Pcn [penicillins] and Morphine    Social History:   TOBACCO:   reports that she has quit smoking. She has quit using smokeless tobacco.  ETOH:   reports current alcohol use. DRUGS:   reports no history of drug use.   ACTIVITIES OF DAILY LIVING:    OCCUPATION:    Family History:       Problem Relation Age of Onset    Stroke Mother     Stroke Father     Uterine Cancer Sister     Cancer Sister     Prostate Cancer Brother     Prostate Cancer Brother     Colon Cancer Niece        REVIEW OF SYSTEMS:  CONSTITUTIONAL:  negative  EYES:  negative  HEENT:  negative  RESPIRATORY:  negative  CARDIOVASCULAR:  HTN, a-fib  GASTROINTESTINAL:  negative  GENITOURINARY:  negative  INTEGUMENT/BREAST:  negative  HEMATOLOGIC/LYMPHATIC:  negative  ALLERGIC/IMMUNOLOGIC:  negative  ENDOCRINE:  negative  MUSCULOSKELETAL:  left wrist pain  NEUROLOGICAL:  negative  BEHAVIOR/PSYCH:  negative    PHYSICAL EXAM:    VITALS:  Ht 5' 7\" (1.702 m)   Wt 217 lb (98.4 kg)   BMI 33.99 kg/m²   CONSTITUTIONAL:  awake, alert, cooperative, no apparent distress, and appears stated age  EYES:  Lids and lashes normal, pupils equal, round and reactive to light, extra ocular muscles intact, sclera clear, conjunctiva normal  ENT:  Normocephalic, without obvious abnormality, atraumatic, sinuses nontender on palpation, external ears without lesions, oral pharynx with moist mucus membranes, tonsils without erythema or exudates, gums normal and good dentition. NECK:  Supple, symmetrical, trachea midline, no adenopathy, thyroid symmetric, not enlarged and no tenderness, skin normal  NEUROLOGIC:  Awake, alert, oriented to name, place and time. Cranial nerves II-XII are grossly intact. Motor is 5 out of 5 bilaterally. Sensory is intact.  gait is normal.  MUSCULOSKELETAL:      Left upper extremity: Left wrist and hand with swelling and deformity. Stiffness with flexion and extension of the finger. -Tinel's of the cubital tunnel, negative Tinel's at the carpal tunnel, negative Durkan's. Negative CMC grind. Significant pain and tenderness to the dorsal aspect of the wrist.  Pain with Mackenzie's maneuver. Wrist extension 30 degrees, wrist flexion 35 degrees, radial deviation 0, ulnar deviation 25 degrees. Median, ulnar, radial nerves intact light touch. Brisk capillary refill. Otherwise neurovascular intact. DATA:    CBC:   Lab Results   Component Value Date/Time    WBC 13.1 11/28/2017 04:44 AM    RBC 3.09 11/28/2017 04:44 AM    HGB 9.7 11/28/2017 04:44 AM    HCT 29.8 11/28/2017 04:44 AM    MCV 96.4 11/28/2017 04:44 AM    MCH 31.4 11/28/2017 04:44 AM    MCHC 32.6 11/28/2017 04:44 AM    RDW 13.4 11/28/2017 04:44 AM     11/28/2017 04:44 AM    MPV 12.1 11/28/2017 04:44 AM     PT/INR:  No results found for: PROTIME, INR    Radiology Review:  Xray: x-rays of the left wrist were obtained today in the office and reviewed with the patient. 3 views: AP/lateral/oblique: demonstrate no acute fractures. 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 Discussed conservative and surgical management with the patient. Recommended a cortisone injection to the dorsal aspect of the wrist along with a cock up wrist brace for use as needed.   Did discuss if no relief with the injection she could consider surgical management. Did discuss concern for with a wrist arthroplasty secondary to her use of a cane. Discussed the wrist fusion may be a better option for her if necessary in the future. Patient follow-up in 6 weeks to assess response of injection. If she is doing well she may cancel. All questions answered. Procedure Note Wrist Cortisone Injection    The left wrist was identified as the injection site. The risk and benefits of a cortisone injection were explained and the patient consented to the injection. Under sterile conditions, the wrist was injected with a mixture of 1 mL of 1% Lidocaine and 1 mL of 6 mg/mL Betamethasone without complication. A sterile bandage was applied. I have seen and evaluated the patient and agree with the above assessment and plan on today's visit. I have performed the key components of the history and physical examination with significant findings of end-stage wrist arthritis. I concur with the findings and plan as documented.     Danny Douglas MD  11/10/2022

## 2022-12-13 ENCOUNTER — OFFICE VISIT (OUTPATIENT)
Dept: BARIATRICS/WEIGHT MGMT | Age: 72
End: 2022-12-13
Payer: MEDICARE

## 2022-12-13 VITALS
WEIGHT: 218.4 LBS | HEIGHT: 67 IN | TEMPERATURE: 97.2 F | BODY MASS INDEX: 34.28 KG/M2 | SYSTOLIC BLOOD PRESSURE: 142 MMHG | DIASTOLIC BLOOD PRESSURE: 82 MMHG | HEART RATE: 49 BPM

## 2022-12-13 DIAGNOSIS — E88.81 INSULIN RESISTANCE: ICD-10-CM

## 2022-12-13 DIAGNOSIS — E66.09 CLASS 1 OBESITY DUE TO EXCESS CALORIES WITH SERIOUS COMORBIDITY AND BODY MASS INDEX (BMI) OF 34.0 TO 34.9 IN ADULT: ICD-10-CM

## 2022-12-13 DIAGNOSIS — I10 ESSENTIAL HYPERTENSION: Primary | ICD-10-CM

## 2022-12-13 PROCEDURE — 1123F ACP DISCUSS/DSCN MKR DOCD: CPT | Performed by: INTERNAL MEDICINE

## 2022-12-13 PROCEDURE — 3078F DIAST BP <80 MM HG: CPT | Performed by: INTERNAL MEDICINE

## 2022-12-13 PROCEDURE — 99214 OFFICE O/P EST MOD 30 MIN: CPT | Performed by: INTERNAL MEDICINE

## 2022-12-13 PROCEDURE — 99211 OFF/OP EST MAY X REQ PHY/QHP: CPT | Performed by: INTERNAL MEDICINE

## 2022-12-13 PROCEDURE — 3074F SYST BP LT 130 MM HG: CPT | Performed by: INTERNAL MEDICINE

## 2022-12-13 NOTE — PROGRESS NOTES
CC -   Follow up of: HTN, Weight gain    Would like to be <200 lbs. BACKGROUND -   Last visit: 10/17/22  First visit: 4/14/22    Obesity (all weight in lbs)  Began in childhood  Initial BMI 37.81, Wt 243.2, Ht 67.25\"  HS Grad wt ~200 lbs (down from 300)   Lowest   wt 175 lbs (on weight watchers)  Highest  wt 300 (at 10years of age)  Pattern of wt gain: gradual  Wt change past yr: similar (235-245)  Most wt lost: 100 lbs (in high school)  Other diets attempted: 88 Teresa Simeon, diet pills (multiple)     Desire to lose weight: 10/10 (does not want surgery)    Initial Diet:    Number of meals per day - 3    Number of snacks per day - 1    Meal volume - 12\" plate,  sometimes seconds    Fast food/convenience store - 3-4x/week (eg a breakfast sandwich)    Restaurants (not fast food) - 1-2x/week   Sweets - 0d/week   Chips - 1d/week   Crackers/pretzels - 1-2d/week   Nuts - 1d/week   Peanut Butter - 0d/week   Popcorn - 1d/week   Dried fruit - 0d/week   Whole fruit - 7d/week (berries, oranges, grapes, apples in season)   Breakfast cereal - 2d/week   Granola/Protein/Energy bar - 0d/week   Sugar sweetened beverages - none   Protein - No supplements   Fiber - No supplements     Initial Exercise:    Gym membership - silver sneakers    Walking - marching around the house with leg lifts etc    Running - no    Resistance - no    Aerobic class - no        Initial Sleep: Bedtime: 11pm-midnight, wake up time: 6:30-7:00 - usu rested, daytime naps: no    Weight scale at home: yes, takes weight: 1x/wk  Food scale: yes  ______________________    [facility] -  Past Medical History:   Diagnosis Date    A-fib ([facility] [address]     Arthritis     Class 2 severe obesity due to excess calories with serious comorbidity and body mass index (BMI) of 37.0 to 37.9 in adult (HCC)     Dry eyes, bilateral     Hypertension     OAB (overactive bladder)     PONV (postoperative nausea and vomiting)    Atrial fibrillation - recently diagnosed, on Eliquis and Metoprolol now.     Past Surgical History:   Procedure Laterality Date    ABDOMEN SURGERY      BLADDER SURGERY  01/01/2022    bladder stimulator    BREAST BIOPSY      BUNIONECTOMY      bilteral twice    CARDIAC SURGERY  2006    heart cath in [address]      x 2    HYSTERECTOMY (CERVIX STATUS UNKNOWN)  11/27/2017    robotic assisted bilateral alpingo-oophorectomy lap robotic assisted abdominal sacral colopexy with y mesh    JOINT REPLACEMENT      bilateral knees, right shoulder replaced    KNEE ARTHROPLASTY Bilateral     NERVE BLOCK Bilateral 06/28/2018    facet L3-5 #1    PAIN MANAGEMENT PROCEDURE N/A 12/17/2021    PERIPHERAL NERVE EVALUATION performed by Evita Moreno DO at [address] [place] [address] NOSE/CLEFT LIP/TIP Bilateral 06/28/2018    BILATERAL INTRA-ARTICULAR FACET JOINT INJECTION WITH FLUOROSCOPIC GUIDANCE AT L3-4 AND L4-5 #1 performed by Mimi Peña DO at [place] [address]  09/20/2016    right total shoulder reverse arthroplasty     Prior to Admission medications    Medication Sig Start Date End Date Taking?  Authorizing Provider   propafenone (RYTHMOL SR) 225 MG extended release capsule Take 1 capsule by mouth 2 times daily 10/19/22   Tamara Savage MD   metoprolol succinate (TOPROL XL) 50 MG extended release tablet 1 tablet daily 10/19/22   Tamara Savage MD   ELIQUIS 5 MG TABS tablet Take 1 tablet by mouth in the morning and at bedtime 8/23/22   Tamara Savage MD   Alvira Revel 5 % SOLN 2 times daily 5/16/22   Historical Provider, MD   acetaminophen (TYLENOL) 650 MG extended release tablet Take 650 mg by mouth every 8 hours as needed for Pain    Historical Provider, MD   erythromycin (ROMYCIN) 5 MG/GM ophthalmic ointment APPLY TO BOTH EYES AT BEDTIME AS DIRECTED 8/10/17   Historical Provider, MD   celecoxib (CELEBREX) 200 MG capsule Take 200 mg by mouth daily    Historical Provider, MD   docusate sodium (COLACE) 100 MG capsule Take 100 mg by mouth daily    Historical Provider, MD   polyvinyl alcohol (LIQUIFILM TEARS) 1.4 % ophthalmic solution Place 1 drop into both eyes as needed    Historical Provider, MD   Cyanocobalamin (VITAMIN B 12 PO) Take 250 mg by mouth daily    Historical Provider, MD   Omega-3 Fatty Acids (OMEGA 3 PO) Take 2,000 mg by mouth daily     Historical Provider, MD   Multiple Vitamins-Minerals (MULTIVITAL PO) Take by mouth daily    Historical Provider, MD   irbesartan-hydrochlorothiazide (AVALIDE) 150-12.5 MG per tablet Take 2 tablets by mouth daily     Historical Provider, MD   xiidra eye drops bid.    12/13/22: medications were adjusted (see above list)    Family history: DM: brother, Heart disease: half brothers (3) all had heart ds. Allergies: Allergies   Allergen Reactions    Pcn [Penicillins] Hives, Itching and Swelling    Morphine Nausea Only       Social history: smoking: quit 44 years ago ; Alcohol: couple of times week (a glass of wine or other drink). ROS -  Card - no CP, but difficulty with mobility which she feels is due to weight. GI - no N/V, has Constipation colace helps. PE -  Gen : BP (!) 142/82 (Site: Left Upper Arm, Position: Sitting, Cuff Size: Large Adult)   Pulse (!) 49   Temp 97.2 °F (36.2 °C) (Temporal)   Ht 5' 7\" (1.702 m)   Wt 218 lb 6.4 oz (99.1 kg)   BMI 34.21 kg/m²    WN, WD, NAD  Lung: Nml resp effort, CTA B/L  Heart:  RRR w/o MGR, trace LE pitting edema ramona on left  Psych: Normal mood   Full affect  Neuro: Moves all ext well  ______________________    HISTORY & ASSESSMENT/PLAN -     Problem 1  - Hypertension   HPI   - Ongoing, compliant with medications, patient had cardioversion, on Metoprolol and Avalide 150-12.5, tolerating, pt asymptomatic. Assessment  - was fairly controlled, slightly high BP today   Plan   - Has follow up with cardiologist, current medication seems to be working well. Weight reduction can help with BP - may help de-escalate dose.  Weight reduction per plan below    Problem 2  - Obesity HPI   - See above Background for description    Weight  Date    243.2  4/14/22   Ozempic - did not get covered    227.8  6/17/22   Topiramate    215.8  8/17/22   Topiramate    214.0  10/17/22  Taper topiramate - retry Ozempic (IR)    218.4  12/13/22 Saxenda    Total weight change to date: -24.8 lbs. Average daily energy variance:  4/14/2022 - 6/17/2022: -13.8 lbs (92518 Dioni)/63 d = -767 Dioni/d deficit. 6/17/2022 - 8/17/2022: -12 lbs (62084 Dioni)/61 d = -689 Dioni/d deficit. 8/17/2022 - 10/17/2022: -1.8 lbs (6300 Dioni)/61 d = -103 Dioni/d deficit. 10/17/2022 - 12/13/2022: +4.4 lbs (67532 Dioni)/57 d = +270 Dioni/d excess. DEN (est.)= 1926 Dioni/d = 57245 Dioni/wk    Update:  VLCD: has restarted those VLCD  Fiber: with fiber one cereal, or powder  Sweets: 2-3 d/month (does not have it in house)  SSB: none  Restaurant food: 2x/wk (but watches)  Appetite suppressant: Topiramate was tapered off. Ozempic was not covered. Home BP monitoring: has wrist BP machine    Assessment  - improving    Plan   - She has not been doing well but now started VLCD. She feels she needs an appetite suppressant. We tried [employer] but was not covered by insurance. After going over options (unable to use Phentermine d/t h/o arrhythmia) we planned on trying Saxenda and see if insurance covers Planned as follows  Patient Instructions   Breakfast -     one high protein shake                            + 20 grams of fiber. Do this by either eating 12 tablespoons of the original, plain Fiber One cereal every day or 4 tablespoons of wheat dextrin powder (Benefiber or a generic brand) every day. Work up to this amount slowly by starting with only one-eighth to one-fourth of the target amount and then adding another one-eighth to one-fourth every one or two weeks until reaching the target.      Lunch -           one high protein shake                           + one a fat snack item     Dinner -          one frozen meal                           + one snack item     Shake options (<200 gonzalez, >25 grams/protein) :  Nectar, Pure Protein, Premier, Boost Max, Ensure Max, BeneProtein and [employer] (which is lactose-free) are milk-based options; Nectar, Premier Protein Clear, IsoPure Protein Drink, and Protein 2 O are water-based options; (Premier Protein Clear, the water-based option, comes in a 20 oz bottle with 20 grams of protein and 90 calories. So you have to drink three each day which increases the cost.)  (Disclaimer: Dietary supplements rarely have their listed ingredients and the amount of each verified by a third party other. Sometimes they give verification for their claims to be GMO and gluten free and to be organic. However, even such verifications as these may still be untrustworthy.)     Fat snack options (<150 gonzalez, >11 grams of fat): 22 almonds, 1 1/2 tablespoon of a oil-based dressing or 4 tablespoons of [place] dressing on a bed of salad greens, 1 1/2 tablespoons of peanut butter, 1 Cranberry [employer] options (<100 gonzalez, no sweets): fruit, low fat/high protein [place] yogurt, mozzarella cheese stick, nuts, salad with dressing, peanut butter, chips/crackers/pretzels     Frozen meal options (<350 gonzalez, <800 mg sodium):  Weight Watchers Smart Ones, [employer] Cuisine, Healthy Choice, Nemo's, Kathy's     Food substitutes for the shakes:  4 oz of baked, grilled or broiled chicken, turkey or fish, 8 egg whites     Take a multivitamin daily     Walk 30 min every day, or chair exercises    Saxenda:  Inject 0.6 mg of Saxenda under the skin once each day; every week add 0.6 mg to the daily dose; keep increasing the dose until reaching 3 mg each day    Follow up in 4-6 weeks. Medication management: Ashley Eastman MD  Internal Medicine/Obesity Medicine  12/13/2022.

## 2022-12-13 NOTE — PATIENT INSTRUCTIONS
Breakfast -     one high protein shake                            + 20 grams of fiber. Do this by either eating 12 tablespoons of the original, plain Fiber One cereal every day or 4 tablespoons of wheat dextrin powder (Benefiber or a generic brand) every day. Work up to this amount slowly by starting with only one-eighth to one-fourth of the target amount and then adding another one-eighth to one-fourth every one or two weeks until reaching the target. Lunch -           one high protein shake                           + one a fat snack item     Dinner -          one frozen meal                           + one snack item     Shake options (<200 gonzalez, >25 grams/protein) :  Nectar, Pure Protein, Premier, Boost Max, Ensure Max, BeneProtein and [employer] (which is lactose-free) are milk-based options; Nectar, Premier Protein Clear, IsoPure Protein Drink, and Protein 2 O are water-based options; (Premier Protein Clear, the water-based option, comes in a 20 oz bottle with 20 grams of protein and 90 calories. So you have to drink three each day which increases the cost.)  (Disclaimer: Dietary supplements rarely have their listed ingredients and the amount of each verified by a third party other. Sometimes they give verification for their claims to be GMO and gluten free and to be organic.  However, even such verifications as these may still be untrustworthy.)     Fat snack options (<150 gonzalez, >11 grams of fat): 22 almonds, 1 1/2 tablespoon of a oil-based dressing or 4 tablespoons of [place] dressing on a bed of salad greens, 1 1/2 tablespoons of peanut butter, 1 Cranberry [employer] options (<100 gonzalez, no sweets): fruit, low fat/high protein [place] yogurt, mozzarella cheese stick, nuts, salad with dressing, peanut butter, chips/crackers/pretzels     Frozen meal options (<350 gonzalez, <800 mg sodium):  Weight Watchers Smart Ones, [employer] Cuisine, Healthy Choice, Nemo's, Kathy's     Food substitutes for the shakes: 4 oz of baked, grilled or broiled chicken, turkey or fish, 8 egg whites     Take a multivitamin daily     Walk 30 min every day, or chair exercises    Saxenda:  Inject 0.6 mg of Saxenda under the skin once each day; every week add 0.6 mg to the daily dose; keep increasing the dose until reaching 3 mg each day    Follow up in 4-6 weeks.

## 2022-12-14 ENCOUNTER — TELEPHONE (OUTPATIENT)
Dept: BARIATRICS/WEIGHT MGMT | Age: 72
End: 2022-12-14

## 2022-12-14 NOTE — PROGRESS NOTES
[place] was denied by insurance. Discntinued from medication list.    Lissette Alexis MD  Internal Medicine/Obesity Medicine  12/14/2022.

## 2022-12-16 DIAGNOSIS — E66.09 CLASS 1 OBESITY DUE TO EXCESS CALORIES WITH SERIOUS COMORBIDITY AND BODY MASS INDEX (BMI) OF 34.0 TO 34.9 IN ADULT: Primary | ICD-10-CM

## 2022-12-16 RX ORDER — VENLAFAXINE HYDROCHLORIDE 75 MG/1
CAPSULE, EXTENDED RELEASE ORAL
Qty: 60 CAPSULE | Refills: 1 | Status: SHIPPED | OUTPATIENT
Start: 2022-12-16

## 2022-12-16 NOTE — PROGRESS NOTES
Talked to Ms. Moe Collet as she wanted to talk about Jake Vences being denies. I will write an appeal letter if that can help us get it approved, but we may not get it approved if it is plan exclusion. We also talked about Venlafaxine for appetite suppression off label, she would like to try it. I ordered it and sent to pharmacy. Marcia Lizarraga MD  Internal Medicine/Obesity Medicine  12/16/2022.

## 2023-01-09 ENCOUNTER — OFFICE VISIT (OUTPATIENT)
Dept: CARDIOLOGY CLINIC | Age: 73
End: 2023-01-09
Payer: MEDICARE

## 2023-01-09 VITALS
RESPIRATION RATE: 18 BRPM | BODY MASS INDEX: 34.21 KG/M2 | SYSTOLIC BLOOD PRESSURE: 128 MMHG | DIASTOLIC BLOOD PRESSURE: 70 MMHG | HEART RATE: 76 BPM | WEIGHT: 218 LBS | HEIGHT: 67 IN

## 2023-01-09 DIAGNOSIS — I48.0 PAROXYSMAL ATRIAL FIBRILLATION (HCC): Primary | ICD-10-CM

## 2023-01-09 DIAGNOSIS — R00.2 PALPITATIONS: ICD-10-CM

## 2023-01-09 DIAGNOSIS — I10 ESSENTIAL HYPERTENSION: ICD-10-CM

## 2023-01-09 DIAGNOSIS — E66.8 MODERATE OBESITY: ICD-10-CM

## 2023-01-09 PROCEDURE — 3078F DIAST BP <80 MM HG: CPT | Performed by: INTERNAL MEDICINE

## 2023-01-09 PROCEDURE — 93000 ELECTROCARDIOGRAM COMPLETE: CPT | Performed by: INTERNAL MEDICINE

## 2023-01-09 PROCEDURE — 3074F SYST BP LT 130 MM HG: CPT | Performed by: INTERNAL MEDICINE

## 2023-01-09 PROCEDURE — 1123F ACP DISCUSS/DSCN MKR DOCD: CPT | Performed by: INTERNAL MEDICINE

## 2023-01-09 PROCEDURE — 99215 OFFICE O/P EST HI 40 MIN: CPT | Performed by: INTERNAL MEDICINE

## 2023-01-09 RX ORDER — LIRAGLUTIDE 6 MG/ML
INJECTION, SOLUTION SUBCUTANEOUS
COMMUNITY
Start: 2022-12-23

## 2023-01-09 NOTE — PROGRESS NOTES
OUTPATIENT CARDIOLOGY FOLLOW-UP    Name: Mitch Reese    Age: 74 y.o. Primary Care Physician: Emily Cardona MD    Date of Service: 1/9/2023    Chief Complaint: Paroxysmal atrial fibrillation, hypertension, moderate obesity    Interim History: Since her most recent evaluation, the patient has developed recurrent paroxysmal atrial fibrillation with acceptable rate control in the absence of additional symptoms of decompensated left ventricular systolic dysfunction or volume overload nor arrhythmia related symptoms. She is made no progress in weight reduction with recent initiation of therapy conjunction with bariatric evaluation with recent initiation of disease management with Saxenda. She relates that it was significant changes of her functional capabilities. She has experienced no symptoms of a focal neurological origin nor bleeding in the face of oral anticoagulation. At the time of her present evaluation she is normotensive. Review of Systems: The remainder of a complete multisystem review including consitutional, central nervous, respiratory, circulatory, gastrointestinal, genitourinary, endocrinologic, hematologic, musculoskeletal and psychiatric are negative.     Past Medical History:  Past Medical History:   Diagnosis Date    A-fib ([facility] [address]     Arthritis     Class 2 severe obesity due to excess calories with serious comorbidity and body mass index (BMI) of 37.0 to 37.9 in adult ([facility] [address]     Dry eyes, bilateral     Hypertension     OAB (overactive bladder)     PONV (postoperative nausea and vomiting)        Past Surgical History:  Past Surgical History:   Procedure Laterality Date    ABDOMEN SURGERY      BLADDER SURGERY  01/01/2022    bladder stimulator    BREAST BIOPSY      BUNIONECTOMY      bilteral twice    CARDIAC SURGERY  2006    heart cath in [address] [facility]      x 2    HYSTERECTOMY (CERVIX STATUS UNKNOWN)  11/27/2017    robotic assisted bilateral alpingo-oophorectomy lap robotic assisted abdominal sacral colopexy with y mesh    JOINT REPLACEMENT      bilateral knees, right shoulder replaced    KNEE ARTHROPLASTY Bilateral     NERVE BLOCK Bilateral 06/28/2018    facet L3-5 #1    PAIN MANAGEMENT PROCEDURE N/A 12/17/2021    PERIPHERAL NERVE EVALUATION performed by Remy Moreno DO at [address] [facility] W/COLUM [address] TIP ONLY Bilateral 06/28/2018    BILATERAL INTRA-ARTICULAR FACET JOINT INJECTION WITH FLUOROSCOPIC GUIDANCE AT L3-4 AND L4-5 #1 performed by Clinton Hernandez DO at [place] [address]  09/20/2016    right total shoulder reverse arthroplasty       Family History:  Family History   Problem Relation Age of Onset    Stroke Mother     Stroke Father     Uterine Cancer Sister     Cancer Sister     Prostate Cancer Brother     Prostate Cancer Brother     Colon Cancer Niece        Social History:  Social History     Socioeconomic History    Marital status:      Spouse name: Not on file    Number of children: Not on file    Years of education: Not on file    Highest education level: Not on file   Occupational History    Not on file   Tobacco Use    Smoking status: Former    Smokeless tobacco: Former    Tobacco comments:     quit smoking 40 yrs ago   Vaping Use    Vaping Use: Never used   Substance and Sexual Activity    Alcohol use: Yes     Comment: social    Drug use: No    Sexual activity: Not on file   Other Topics Concern    Not on file   Social History Narrative    1 cup coffee daily      Social Determinants of Health     Financial Resource Strain: Not on file   Food Insecurity: Not on file   Transportation Needs: Not on file   Physical Activity: Unknown    Days of Exercise per Week: 0 days    Minutes of Exercise per Session: Not on file   Stress: Not on file   Social Connections: Not on file   Intimate Partner Violence: Not At Risk    Fear of Current or Ex-Partner: No    Emotionally Abused: No    Physically Abused:  No Sexually Abused: No   Housing Stability: Not on file       Allergies: Allergies   Allergen Reactions    Pcn [Penicillins] Hives, Itching and Swelling    Morphine Nausea Only       Current Medications:  Current Outpatient Medications   Medication Sig Dispense Refill    SAXENDA 18 MG/3ML SOPN INJECT 0.6 MG OF SAXENDA UNDER THE SKIN ONCE EACH DAY; EVERY WEEK ADD 0.6 MG TO THE DAILY DOSE; KEEP INCREASING THE DOSE UNTIL REACHING 3 MG      Insulin Pen Needle 32G X 4 MM MISC 1 each by Does not apply route daily 100 each 3    propafenone (RYTHMOL SR) 225 MG extended release capsule Take 1 capsule by mouth 2 times daily 180 capsule 3    metoprolol succinate (TOPROL XL) 50 MG extended release tablet 1 tablet daily 90 tablet 3    ELIQUIS 5 MG TABS tablet Take 1 tablet by mouth in the morning and at bedtime 180 tablet 3    XIIDRA 5 % SOLN 2 times daily      acetaminophen (TYLENOL) 650 MG extended release tablet Take 650 mg by mouth every 8 hours as needed for Pain      erythromycin (ROMYCIN) 5 MG/GM ophthalmic ointment APPLY TO BOTH EYES AT BEDTIME AS DIRECTED  6    celecoxib (CELEBREX) 200 MG capsule Take 200 mg by mouth daily      docusate sodium (COLACE) 100 MG capsule Take 100 mg by mouth daily      polyvinyl alcohol (LIQUIFILM TEARS) 1.4 % ophthalmic solution Place 1 drop into both eyes as needed      Cyanocobalamin (VITAMIN B 12 PO) Take 250 mg by mouth daily      Omega-3 Fatty Acids (OMEGA 3 PO) Take 2,000 mg by mouth daily       Multiple Vitamins-Minerals (MULTIVITAL PO) Take by mouth daily      irbesartan-hydrochlorothiazide (AVALIDE) 150-12.5 MG per tablet Take 2 tablets by mouth daily       venlafaxine (EFFEXOR XR) 75 MG extended release capsule Start taking venlafaxine (Effexor) XR 75 mg, one tablet every day. If no appetite suppression after one week, then increase to two tablets every day. If no appetite suppression after one week on this dose, then stop taking it.  (Patient not taking: Reported on 1/9/2023) 60 capsule 1     No current facility-administered medications for this visit. Facility-Administered Medications Ordered in Other Visits   Medication Dose Route Frequency Provider Last Rate Last Admin    sodium chloride flush 0.9 % injection 10 mL  10 mL IntraVENous 2 times per day ADALI Greenfield        sodium chloride flush 0.9 % injection 10 mL  10 mL IntraVENous PRN ADALI Greenfield        0.9 % sodium chloride infusion   IntraVENous Continuous ADALI Greenfield        midazolam (VERSED) injection 1 mg  1 mg IntraVENous Q5 Min PRN Kirill Vazquez DO   1 mg at 09/20/16 3892         Physical Exam:  /70   Pulse 76   Resp 18   Ht 5' 7\" (1.702 m)   Wt 218 lb (98.9 kg)   BMI 34.14 kg/m²   Wt Readings from Last 3 Encounters:   01/09/23 218 lb (98.9 kg)   12/13/22 218 lb 6.4 oz (99.1 kg)   11/10/22 217 lb (98.4 kg)     The patient is awake, alert and in no discomfort or distress. No gross musculoskeletal deformity is present. No significant skin or nail changes are present. Gross examination of head, eyes, nose and throat are negative. Jugular venous pressure is normal and no carotid bruits are present. Normal respiratory effort is noted with no accessory muscle usage present. Lung fields are clear to ascultation. Cardiac examination is notable for an irregular rate and rhythm with no palpable thrill. No gallop rhythm or cardiac murmur are identified. A benign abdominal examination is present with the exception of obesity no masses or organomegaly. Intact pulses are present throughout all extremities and chronic lymphedema is present. No focal neurologic deficits are present.     Laboratory Tests:  Lab Results   Component Value Date    CREATININE 0.6 10/19/2022    BUN 20 10/19/2022     10/19/2022    K 3.8 10/19/2022     10/19/2022    CO2 24 10/19/2022     No results found for: BNP  Lab Results   Component Value Date/Time    WBC 13.1 11/28/2017 04:44 AM    RBC 3.09 11/28/2017 04:44 AM    HGB 9.7 11/28/2017 04:44 AM    HCT 29.8 11/28/2017 04:44 AM    MCV 96.4 11/28/2017 04:44 AM    MCH 31.4 11/28/2017 04:44 AM    MCHC 32.6 11/28/2017 04:44 AM    RDW 13.4 11/28/2017 04:44 AM     11/28/2017 04:44 AM    MPV 12.1 11/28/2017 04:44 AM     No results for input(s): ALKPHOS, ALT, AST, PROT, BILITOT, BILIDIR, LABALBU in the last 72 hours. No results found for: MG  No results found for: PROTIME, INR  No results found for: TSH  No components found for: CHLPL  Lab Results   Component Value Date    TRIG 76 10/19/2022     Lab Results   Component Value Date    HDL 49 10/19/2022     Lab Results   Component Value Date    LDLCALC 105 (H) 10/19/2022       Cardiac Tests:  ECG: A resting electrocardiogram demonstrates evidence of atrial fibrillation with a mean ventricular response of approximately 75 bpm with nonspecific ST changes      ASSESSMENT / PLAN: On a clinical basis, the patient appears compensated from a cardiovascular standpoint in the face of her recurrent paroxysmal atrial fibrillation with multifactorial contributing factors including that of her obesity. I have extensively discussed options with her in the absence of symptomatology inclusive of on a short-term basis the continuation of her existing medical regimen and observation during attempts of weight reduction, alteration of her existing antiarrhythmic dosing and electrical cardioversion attempting to restore sinus rhythm or that of electrophysiology assessment. Presently, she wishes to continue a rate control and anticoagulation strategy on a short-term basis and based on the absence of symptomatology or decompensation I have agreed with this option. Independent of these options, her risk of obstructive sleep apnea is significant and based on its potential contribution to her atrial arrhythmias, I have recommended your consideration of a formal sleep assessment and initiation of nocturnal CPAP as appropriate.   I additionally have discussed ongoing careful monitoring of her symptomatic status prior to her proposed reassessment. Ongoing aggressive risk factor modification of blood pressure and serum lipids will remain essential to reducing risk of future atherosclerotic development. I presently plan her clinical reevaluation in approximately 3 months and would happily reassess her in the interim should additional cardiovascular difficulties or concerns arise. The patient's current medication list, allergies, problem list and results of all previously ordered testing were reviewed at today's visit. Follow-up office visit in 3 months    The duration of discussion counseling conjunction with the present encounter exceeded 45 minutes      Note: This report was completed using computerized voice recognition software. Every effort has been made to ensure accuracy, however; inadvertent computerized transcription errors may be present. Vincent Heath.  [place], [facility]

## 2023-01-24 ENCOUNTER — OFFICE VISIT (OUTPATIENT)
Dept: BARIATRICS/WEIGHT MGMT | Age: 73
End: 2023-01-24
Payer: MEDICARE

## 2023-01-24 VITALS
TEMPERATURE: 97.2 F | DIASTOLIC BLOOD PRESSURE: 60 MMHG | WEIGHT: 211 LBS | SYSTOLIC BLOOD PRESSURE: 133 MMHG | BODY MASS INDEX: 33.12 KG/M2 | HEART RATE: 51 BPM | HEIGHT: 67 IN

## 2023-01-24 DIAGNOSIS — I10 ESSENTIAL HYPERTENSION: Primary | ICD-10-CM

## 2023-01-24 DIAGNOSIS — E66.09 CLASS 1 OBESITY DUE TO EXCESS CALORIES WITH SERIOUS COMORBIDITY AND BODY MASS INDEX (BMI) OF 33.0 TO 33.9 IN ADULT: ICD-10-CM

## 2023-01-24 PROCEDURE — 99211 OFF/OP EST MAY X REQ PHY/QHP: CPT

## 2023-01-24 PROCEDURE — 99214 OFFICE O/P EST MOD 30 MIN: CPT | Performed by: INTERNAL MEDICINE

## 2023-01-24 PROCEDURE — 3075F SYST BP GE 130 - 139MM HG: CPT | Performed by: INTERNAL MEDICINE

## 2023-01-24 PROCEDURE — 1123F ACP DISCUSS/DSCN MKR DOCD: CPT | Performed by: INTERNAL MEDICINE

## 2023-01-24 PROCEDURE — 3078F DIAST BP <80 MM HG: CPT | Performed by: INTERNAL MEDICINE

## 2023-01-24 RX ORDER — LIRAGLUTIDE 6 MG/ML
3 INJECTION, SOLUTION SUBCUTANEOUS DAILY
Qty: 5 ADJUSTABLE DOSE PRE-FILLED PEN SYRINGE | Refills: 1 | Status: SHIPPED | OUTPATIENT
Start: 2023-01-24

## 2023-01-24 NOTE — PATIENT INSTRUCTIONS
Breakfast -     one high protein shake                            + 20 grams of fiber. Do this by either eating 12 tablespoons of the original, plain Fiber One cereal every day or 4 tablespoons of wheat dextrin powder (Benefiber or a generic brand) every day. Work up to this amount slowly by starting with only one-eighth to one-fourth of the target amount and then adding another one-eighth to one-fourth every one or two weeks until reaching the target. Lunch -           one high protein shake                           + one a fat snack item     Dinner -          one frozen meal                           + one snack item     Shake options (<200 gonzalez, >25 grams/protein) :  Nectar, Pure Protein, Premier, Boost Max, Ensure Max, BeneProtein and [employer] (which is lactose-free) are milk-based options; Nectar, Premier Protein Clear, IsoPure Protein Drink, and Protein 2 O are water-based options; (Premier Protein Clear, the water-based option, comes in a 20 oz bottle with 20 grams of protein and 90 calories. So you have to drink three each day which increases the cost.)  (Disclaimer: Dietary supplements rarely have their listed ingredients and the amount of each verified by a third party other. Sometimes they give verification for their claims to be GMO and gluten free and to be organic.  However, even such verifications as these may still be untrustworthy.)     Fat snack options (<150 gonzalez, >11 grams of fat): 22 almonds, 1 1/2 tablespoon of a oil-based dressing or 4 tablespoons of [place] dressing on a bed of salad greens, 1 1/2 tablespoons of peanut butter, 1 Cranberry [place] [employer] options (<100 gonzalez, no sweets): fruit, low fat/high protein [place] yogurt, mozzarella cheese stick, nuts, salad with dressing, peanut butter, chips/crackers/pretzels     Frozen meal options (<350 gonzalez, <800 mg sodium):  Weight Watchers Smart Ones, [employer] Cuisine, Healthy Choice, Nemo's, Kathy's     Food substitutes for the shakes: 4 oz of baked, grilled or broiled chicken, turkey or fish, 8 egg whites     Take a multivitamin daily     Walk 30 min every day, or chair exercises    Saxenda:  Continue Saxenda 2.4 mg under the skin daily. Can increase to 3 mg daily. Follow up in 6-8 weeks.

## 2023-01-24 NOTE — PROGRESS NOTES
CC -   Follow up of: HTN, Weight gain    Would like to be <200 lbs. BACKGROUND -   Last visit: 12/13/22  First visit: 4/14/22    Obesity (all weight in lbs)  Began in childhood  Initial BMI 37.81, Wt 243.2, Ht 67.25\"  HS Grad wt ~200 lbs (down from 300)   Lowest   wt 175 lbs (on weight watchers)  Highest  wt 300 (at 10years of age)  Pattern of wt gain: gradual  Wt change past yr: similar (235-245)  Most wt lost: 100 lbs (in high school)  Other diets attempted: [employer], diet pills (multiple)     Desire to lose weight: 10/10 (does not want surgery)    Initial Diet:    Number of meals per day - 3    Number of snacks per day - 1    Meal volume - 12\" plate,  sometimes seconds    Fast food/convenience store - 3-4x/week (eg a breakfast sandwich)    Restaurants (not fast food) - 1-2x/week   Sweets - 0d/week   Chips - 1d/week   Crackers/pretzels - 1-2d/week   Nuts - 1d/week   Peanut Butter - 0d/week   Popcorn - 1d/week   Dried fruit - 0d/week   Whole fruit - 7d/week (berries, oranges, grapes, apples in season)   Breakfast cereal - 2d/week   Granola/Protein/Energy bar - 0d/week   Sugar sweetened beverages - none   Protein - No supplements   Fiber - No supplements     Initial Exercise:    Gym membership - silver sneakers    Walking - marching around the house with leg lifts etc    Running - no    Resistance - no    Aerobic class - no        Initial Sleep: Bedtime: 11pm-midnight, wake up time: 6:30-7:00 - usu rested, daytime naps: no    Weight scale at home: yes, takes weight: 1x/wk  Food scale: yes  ______________________    [facility] -  Past Medical History:   Diagnosis Date    A-fib ([facility] [address]     Arthritis     Class 2 severe obesity due to excess calories with serious comorbidity and body mass index (BMI) of 37.0 to 37.9 in adult (HCC)     Dry eyes, bilateral     Hypertension     OAB (overactive bladder)     PONV (postoperative nausea and vomiting)    Atrial fibrillation - recently diagnosed, on Eliquis and Metoprolol now.     Past Surgical History:   Procedure Laterality Date    ABDOMEN SURGERY      BLADDER SURGERY  01/01/2022    bladder stimulator    BREAST BIOPSY      BUNIONECTOMY      bilteral twice    CARDIAC SURGERY  2006    heart cath in [address]      x 2    HYSTERECTOMY (CERVIX STATUS UNKNOWN)  11/27/2017    robotic assisted bilateral alpingo-oophorectomy lap robotic assisted abdominal sacral colopexy with y mesh    JOINT REPLACEMENT      bilateral knees, right shoulder replaced    KNEE ARTHROPLASTY Bilateral     NERVE BLOCK Bilateral 06/28/2018    facet L3-5 #1    PAIN MANAGEMENT PROCEDURE N/A 12/17/2021    PERIPHERAL NERVE EVALUATION performed by Lily Moreno DO at [address] [address] [facility] [address] TIP ONLY Bilateral 06/28/2018    BILATERAL INTRA-ARTICULAR FACET JOINT INJECTION WITH FLUOROSCOPIC GUIDANCE AT L3-4 AND L4-5 #1 performed by Treva Schmitt DO at [place] [id number]  09/20/2016    right total shoulder reverse arthroplasty     Prior to Admission medications    Medication Sig Start Date End Date Taking?  Authorizing Provider   ciclopirox (LOPROX) 0.77 % cream APPLY TOPICALLY TO BOTH FEET AND LEGS TWICE A DAY 1/17/23   Historical Provider, MD   SAXENDA 18 MG/3ML SOPN INJECT 0.6 MG OF SAXENDA UNDER THE SKIN ONCE EACH DAY; EVERY WEEK ADD 0.6 MG TO THE DAILY DOSE; KEEP INCREASING THE DOSE UNTIL REACHING 3 MG 12/23/22   Historical Provider, MD   Insulin Pen Needle 32G X 4 MM MISC 1 each by Does not apply route daily 12/13/22   Yani Wilson MD   propafenone (RYTHMOL SR) 225 MG extended release capsule Take 1 capsule by mouth 2 times daily 10/19/22   Deshaun Wesley MD   metoprolol succinate (TOPROL XL) 50 MG extended release tablet 1 tablet daily 10/19/22   Deshaun Wesley MD   ELIQUIS 5 MG TABS tablet Take 1 tablet by mouth in the morning and at bedtime 8/23/22   Deshaun Wesley MD   Federica Chris 5 % SOLN 2 times daily 5/16/22   Historical Provider, MD acetaminophen (TYLENOL) 650 MG extended release tablet Take 650 mg by mouth every 8 hours as needed for Pain    Historical Provider, MD   erythromycin (ROMYCIN) 5 MG/GM ophthalmic ointment APPLY TO BOTH EYES AT BEDTIME AS DIRECTED 8/10/17   Historical Provider, MD   celecoxib (CELEBREX) 200 MG capsule Take 200 mg by mouth daily    Historical Provider, MD   docusate sodium (COLACE) 100 MG capsule Take 100 mg by mouth daily    Historical Provider, MD   polyvinyl alcohol (LIQUIFILM TEARS) 1.4 % ophthalmic solution Place 1 drop into both eyes as needed    Historical Provider, MD   Cyanocobalamin (VITAMIN B 12 PO) Take 250 mg by mouth daily    Historical Provider, MD   Omega-3 Fatty Acids (OMEGA 3 PO) Take 2,000 mg by mouth daily     Historical Provider, MD   Multiple Vitamins-Minerals (MULTIVITAL PO) Take by mouth daily    Historical Provider, MD   irbesartan-hydrochlorothiazide (AVALIDE) 150-12.5 MG per tablet Take 2 tablets by mouth daily     Historical Provider, MD   xiidra eye drops bid.    12/13/22: medications were adjusted (see above list)    1/24/23: Chinmay Amin got approved, added colace for constipation (3/d), plus prunes. Family history: DM: brother, Heart disease: half brothers (3) all had heart ds. Allergies: Allergies   Allergen Reactions    Pcn [Penicillins] Hives, Itching and Swelling    Morphine Nausea Only       Social history: smoking: quit 44 years ago ; Alcohol: couple of times week (a glass of wine or other drink). ROS -  Card - no CP, but difficulty with mobility which she feels is due to weight. GI - no N/V, has Constipation colace helps.     PE -  Gen : /60 (Site: Left Upper Arm, Position: Sitting, Cuff Size: Large Adult)   Pulse 51   Temp 97.2 °F (36.2 °C) (Temporal)   Ht 5' 7\" (1.702 m)   Wt 211 lb (95.7 kg)   BMI 33.05 kg/m²    WN, WD, NAD  Lung: Nml resp effort, CTA B/L  Heart:  RRR w/o MGR, trace LE pitting edema ramona on left  Psych: Normal mood   Full affect  Neuro: Moves all ext well  ______________________    HISTORY & ASSESSMENT/PLAN -     Problem 1  - Hypertension   HPI   - Ongoing, compliant with medications, patient had cardioversion, on Metoprolol and Avalide 150-12.5, tolerating, pt asymptomatic. Assessment  - was fairly controlled, slightly high BP today   Plan   - Has follow up with cardiologist, current medication seems to be working well. Weight reduction can help with BP - may help de-escalate dose. Weight reduction per plan below    Problem 2  - Obesity   HPI   - See above Background for description    Weight  Date    243.2  4/14/22   Ozempic - did not get covered    227.8  6/17/22   Topiramate    215.8  8/17/22   Topiramate    214.0  10/17/22  Taper topiramate - retry Ozempic (IR)    218.4  12/13/22 Saxenda    211.0  1/24/23    Total weight change to date: -32.2 lbs. Average daily energy variance:  4/14/2022 - 6/17/2022: -13.8 lbs (05953 Dioni)/63 d = -767 Dioni/d deficit. 6/17/2022 - 8/17/2022: -12 lbs (93014 Dioni)/61 d = -689 Dioni/d deficit. 8/17/2022 - 10/17/2022: -1.8 lbs (6300 Dioni)/61 d = -103 Dioni/d deficit. 10/17/2022 - 12/13/2022: +4.4 lbs (28605 Dioni)/57 d = +270 Dioni/d excess. 12/13/2022 - 1/24/2023: -7.4 lbs (21132 Dioni)/42 d = -617 Dioni/d deficit. DEN (est.)= 1926 Dioni/d = 03002 Dioni/wk    Update:  VLCD: about 3d/wk, calorie conscious other times  Fiber: with fiber one cereal, or powder  Sweets: 2-3 d/month (does not have it in house)  SSB: none  Restaurant food: 2x/wk (but watches)  Appetite suppressant: Venlafaxine - did not make her feel, increased her BP so she stopped. Saxenda 2.4 giving 8/10 appetite suppression - side effect: constipation  Home BP monitoring: has wrist BP machine    Assessment  - improving    Plan   - she is doing very well with current plan and would like to continue. Did not tolerate Venlafaxine, it increased her BP, so she discontinued. [facility] was eventually approved, and is helping and we planned to continue.  Planned as follows  Patient Instructions   Breakfast -     one high protein shake                            + 20 grams of fiber. Do this by either eating 12 tablespoons of the original, plain Fiber One cereal every day or 4 tablespoons of wheat dextrin powder (Benefiber or a generic brand) every day. Work up to this amount slowly by starting with only one-eighth to one-fourth of the target amount and then adding another one-eighth to one-fourth every one or two weeks until reaching the target. Lunch -           one high protein shake                           + one a fat snack item     Dinner -          one frozen meal                           + one snack item     Shake options (<200 gonzalez, >25 grams/protein) :  Nectar, Pure Protein, Premier, Boost Max, Ensure Max, BeneProtein and [employer] (which is lactose-free) are milk-based options; Nectar, Premier Protein Clear, IsoPure Protein Drink, and Protein 2 O are water-based options; (Premier Protein Clear, the water-based option, comes in a 20 oz bottle with 20 grams of protein and 90 calories. So you have to drink three each day which increases the cost.)  (Disclaimer: Dietary supplements rarely have their listed ingredients and the amount of each verified by a third party other. Sometimes they give verification for their claims to be GMO and gluten free and to be organic.  However, even such verifications as these may still be untrustworthy.)     Fat snack options (<150 gonzalez, >11 grams of fat): 22 almonds, 1 1/2 tablespoon of a oil-based dressing or 4 tablespoons of [place] dressing on a bed of salad greens, 1 1/2 tablespoons of peanut butter, 1 Cranberry [place] [employer] options (<100 gonzalez, no sweets): fruit, low fat/high protein [place] yogurt, mozzarella cheese stick, nuts, salad with dressing, peanut butter, chips/crackers/pretzels     Frozen meal options (<350 gonzalez, <800 mg sodium):  Weight Watchers Smart Ones, [employer], Healthy Choice, Jone Torres Food substitutes for the shakes:  4 oz of baked, grilled or broiled chicken, turkey or fish, 8 egg whites     Take a multivitamin daily     Walk 30 min every day, or chair exercises    Saxenda:  Continue Saxenda 2.4 mg under the skin daily. Can increase to 3 mg daily. Follow up in 6-8 weeks. Medication management: Daniel Carpenter MD  Internal Medicine/Obesity Medicine  1/24/2023.

## 2023-02-10 ENCOUNTER — OFFICE VISIT (OUTPATIENT)
Dept: ORTHOPEDIC SURGERY | Age: 73
End: 2023-02-10

## 2023-02-10 VITALS — BODY MASS INDEX: 32.8 KG/M2 | WEIGHT: 209 LBS | HEIGHT: 67 IN

## 2023-02-10 DIAGNOSIS — M19.132 SLAC (SCAPHOLUNATE ADVANCED COLLAPSE) OF WRIST, LEFT: Primary | ICD-10-CM

## 2023-02-10 RX ORDER — BETAMETHASONE SODIUM PHOSPHATE AND BETAMETHASONE ACETATE 3; 3 MG/ML; MG/ML
6 INJECTION, SUSPENSION INTRA-ARTICULAR; INTRALESIONAL; INTRAMUSCULAR; SOFT TISSUE ONCE
Status: COMPLETED | OUTPATIENT
Start: 2023-02-10 | End: 2023-02-10

## 2023-02-10 RX ADMIN — BETAMETHASONE SODIUM PHOSPHATE AND BETAMETHASONE ACETATE 6 MG: 3; 3 INJECTION, SUSPENSION INTRA-ARTICULAR; INTRALESIONAL; INTRAMUSCULAR; SOFT TISSUE at 11:30

## 2023-02-10 NOTE — PROGRESS NOTES
Chief Complaint   Patient presents with    Wrist Pain     Lt wrist SLAC wrist, last injection 11/10         Shyann Grant is a 74y.o. year old  who presents for follow up of left wrist pain. Patient is RHD. Patients previous treatments have included 1 cortisone injection to the left wrist.  She has also been provided a cock up wrist brace which she has been wearing. She states her last cortisone injection provided her about 1 month of relief. Her last injection was in November. Currently, the patient is still complaining of persistent left wrist pain. This is worse when she is walking with her walker or doing any gripping activities. She denies any new injury. She states she is not quite ready for surgical management at this time. She denies any numbness or tingling. She denies any new injury. Patient is happy with their response to cortisone previously and is requesting repeat injections today.        Past Medical History:   Diagnosis Date    A-fib ([facility] [address]     Arthritis     Class 2 severe obesity due to excess calories with serious comorbidity and body mass index (BMI) of 37.0 to 37.9 in adult ([address]     Dry eyes, bilateral     Hypertension     OAB (overactive bladder)     PONV (postoperative nausea and vomiting)      Past Surgical History:   Procedure Laterality Date    ABDOMEN SURGERY      BLADDER SURGERY  01/01/2022    bladder stimulator    BREAST BIOPSY      BUNIONECTOMY      bilteral twice    CARDIAC SURGERY  2006    heart cath [facility]      x 2    HYSTERECTOMY (CERVIX STATUS UNKNOWN)  11/27/2017    robotic assisted bilateral alpingo-oophorectomy lap robotic assisted abdominal sacral colopexy with y mesh    JOINT REPLACEMENT      bilateral knees, right shoulder replaced    KNEE ARTHROPLASTY Bilateral     NERVE BLOCK Bilateral 06/28/2018    facet L3-5 #1    PAIN MANAGEMENT PROCEDURE N/A 12/17/2021    PERIPHERAL NERVE EVALUATION performed by Aimee Moreno DO at [address] [address] [facility] [address] TIP ONLY Bilateral 06/28/2018    BILATERAL INTRA-ARTICULAR FACET JOINT INJECTION WITH FLUOROSCOPIC GUIDANCE AT L3-4 AND L4-5 #1 performed by Adrianna Monae DO at [facility]  09/20/2016    right total shoulder reverse arthroplasty       Current Outpatient Medications:     ciclopirox (LOPROX) 0.77 % cream, APPLY TOPICALLY TO BOTH FEET AND LEGS TWICE A DAY, Disp: , Rfl:     SAXENDA 18 MG/3ML SOPN, Inject 3 mg into the skin daily, Disp: 5 Adjustable Dose Pre-filled Pen Syringe, Rfl: 1    Insulin Pen Needle 32G X 4 MM MISC, 1 each by Does not apply route daily, Disp: 100 each, Rfl: 3    propafenone (RYTHMOL SR) 225 MG extended release capsule, Take 1 capsule by mouth 2 times daily, Disp: 180 capsule, Rfl: 3    metoprolol succinate (TOPROL XL) 50 MG extended release tablet, 1 tablet daily, Disp: 90 tablet, Rfl: 3    ELIQUIS 5 MG TABS tablet, Take 1 tablet by mouth in the morning and at bedtime, Disp: 180 tablet, Rfl: 3    XIIDRA 5 % SOLN, 2 times daily, Disp: , Rfl:     acetaminophen (TYLENOL) 650 MG extended release tablet, Take 650 mg by mouth every 8 hours as needed for Pain, Disp: , Rfl:     erythromycin (ROMYCIN) 5 MG/GM ophthalmic ointment, APPLY TO BOTH EYES AT BEDTIME AS DIRECTED, Disp: , Rfl: 6    celecoxib (CELEBREX) 200 MG capsule, Take 200 mg by mouth daily, Disp: , Rfl:     docusate sodium (COLACE) 100 MG capsule, Take 100 mg by mouth daily, Disp: , Rfl:     polyvinyl alcohol (LIQUIFILM TEARS) 1.4 % ophthalmic solution, Place 1 drop into both eyes as needed, Disp: , Rfl:     Cyanocobalamin (VITAMIN B 12 PO), Take 250 mg by mouth daily, Disp: , Rfl:     Omega-3 Fatty Acids (OMEGA 3 PO), Take 2,000 mg by mouth daily , Disp: , Rfl:     Multiple Vitamins-Minerals (MULTIVITAL PO), Take by mouth daily, Disp: , Rfl:     irbesartan-hydrochlorothiazide (AVALIDE) 150-12.5 MG per tablet, Take 2 tablets by mouth daily , Disp: , Rfl:   Allergies Allergen Reactions    Pcn [Penicillins] Hives, Itching and Swelling    Morphine Nausea Only     Social History     Socioeconomic History    Marital status:      Spouse name: Not on file    Number of children: Not on file    Years of education: Not on file    Highest education level: Not on file   Occupational History    Not on file   Tobacco Use    Smoking status: Former    Smokeless tobacco: Former    Tobacco comments:     quit smoking 40 yrs ago   Vaping Use    Vaping Use: Never used   Substance and Sexual Activity    Alcohol use: Yes     Comment: social    Drug use: No    Sexual activity: Not on file   Other Topics Concern    Not on file   Social History Narrative    1 cup coffee daily      Social Determinants of Health     Financial Resource Strain: Not on file   Food Insecurity: Not on file   Transportation Needs: Not on file   Physical Activity: Unknown    Days of Exercise per Week: 0 days    Minutes of Exercise per Session: Not on file   Stress: Not on file   Social Connections: Not on file   Intimate Partner Violence: Not At Risk    Fear of Current or Ex-Partner: No    Emotionally Abused: No    Physically Abused: No    Sexually Abused: No   Housing Stability: Not on file     Family History   Problem Relation Age of Onset    Stroke Mother     Stroke Father     Uterine Cancer Sister     Cancer Sister     Prostate Cancer Brother     Prostate Cancer Brother     Colon Cancer Niece        Skin: (-) rash,(-) psoriasis,(-) eczema, (-)skin cancer. Musculoskeletal: Left wrist pain  Neurologic: (-) epilepsy, (-)seizures,(-) brain tumor,(-) TIA, (-)stroke, (-)headaches, (-)Parkinson disease,(-) memory loss, (-) LOC. Cardiovascular: (-) Chest pain, (-) swelling in legs/feet, (-) SOB, (-) cramping in legs/feet with walking. SUBJECTIVE:      Constitutional:    The patient is alert and oriented x 3, appears to be stated age and in no distress.        Left upper extremity: Left wrist and hand with swelling and deformity. Stiffness with flexion and extension of the fingers. -Tinel's of the cubital tunnel, negative Tinel's at the carpal tunnel, negative Durkan's. Negative CMC grind. Significant pain and tenderness to the dorsal aspect of the wrist.  Pain with Mackenzie's maneuver. Wrist extension 30 degrees, wrist flexion 35 degrees, radial deviation 0, ulnar deviation 25 degrees. Median, ulnar, radial nerves intact light touch. Brisk capillary refill. Otherwise neurovascular intact. Impression:   Encounter Diagnosis   Name Primary? Slac (scapholunate advanced collapse) of wrist, left Yes   HTN  A-fib    Plan:   Discussed findings with the patient at today's visit. Discussed conservative options for the patients left wrist arthritis including rest, ice, antiinflammatories, Voltaren gel, tylenol, bracing, supervised physician home exercise program, OT/PT, and cortisone injections. Patient is on Celebrex. Continue Celebrex. Discussed surgical management in the form of a left wrist fusion versus a left wrist arthroplasty. Did discuss again she may be a better candidate for left wrist fusion secondary to using a walker. Risks and benefits explained. Patient would like to avoid surgery at the time. Patient is happy with their response to cortisone previously. Patient wished to proceed with injections. Repeat injection provided to the left dorsal wrist. Patient consented and this was tolerated well. Patient may repeat injections every 3 months as needed. Follow up as needed for repeat injections vs surgical management. All questions answered. Procedure Note Wrist Cortisone Injection    The left wrist was identified as the injection site. The risk and benefits of a cortisone injection were explained and the patient consented to the injection. Under sterile conditions, the wrist was injected with a mixture of 1 mL of 1% Lidocaine and 1 mL of 6 mg/mL Betamethasone without complication.  A sterile bandage was applied.

## 2023-03-08 RX ORDER — APIXABAN 5 MG/1
5 TABLET, FILM COATED ORAL 2 TIMES DAILY
Qty: 180 TABLET | Refills: 3 | Status: SHIPPED | OUTPATIENT
Start: 2023-03-08

## 2023-03-10 ENCOUNTER — OFFICE VISIT (OUTPATIENT)
Dept: FAMILY MEDICINE CLINIC | Age: 73
End: 2023-03-10
Payer: MEDICARE

## 2023-03-10 VITALS
HEART RATE: 83 BPM | OXYGEN SATURATION: 98 % | BODY MASS INDEX: 32.8 KG/M2 | DIASTOLIC BLOOD PRESSURE: 80 MMHG | RESPIRATION RATE: 17 BRPM | SYSTOLIC BLOOD PRESSURE: 123 MMHG | WEIGHT: 209 LBS | HEIGHT: 67 IN | TEMPERATURE: 97.6 F

## 2023-03-10 DIAGNOSIS — M79.89 LEG SWELLING: Primary | ICD-10-CM

## 2023-03-10 PROCEDURE — 99213 OFFICE O/P EST LOW 20 MIN: CPT

## 2023-03-10 PROCEDURE — 1123F ACP DISCUSS/DSCN MKR DOCD: CPT

## 2023-03-10 PROCEDURE — 3074F SYST BP LT 130 MM HG: CPT

## 2023-03-10 PROCEDURE — 3079F DIAST BP 80-89 MM HG: CPT

## 2023-03-10 SDOH — ECONOMIC STABILITY: FOOD INSECURITY: WITHIN THE PAST 12 MONTHS, THE FOOD YOU BOUGHT JUST DIDN'T LAST AND YOU DIDN'T HAVE MONEY TO GET MORE.: NEVER TRUE

## 2023-03-10 SDOH — ECONOMIC STABILITY: FOOD INSECURITY: WITHIN THE PAST 12 MONTHS, YOU WORRIED THAT YOUR FOOD WOULD RUN OUT BEFORE YOU GOT MONEY TO BUY MORE.: NEVER TRUE

## 2023-03-10 SDOH — ECONOMIC STABILITY: HOUSING INSECURITY
IN THE LAST 12 MONTHS, WAS THERE A TIME WHEN YOU DID NOT HAVE A STEADY PLACE TO SLEEP OR SLEPT IN A SHELTER (INCLUDING NOW)?: NO

## 2023-03-10 SDOH — ECONOMIC STABILITY: INCOME INSECURITY: HOW HARD IS IT FOR YOU TO PAY FOR THE VERY BASICS LIKE FOOD, HOUSING, MEDICAL CARE, AND HEATING?: NOT HARD AT ALL

## 2023-03-10 ASSESSMENT — PATIENT HEALTH QUESTIONNAIRE - PHQ9
SUM OF ALL RESPONSES TO PHQ QUESTIONS 1-9: 0
1. LITTLE INTEREST OR PLEASURE IN DOING THINGS: 0
DEPRESSION UNABLE TO ASSESS: FUNCTIONAL CAPACITY MOTIVATION LIMITS ACCURACY
SUM OF ALL RESPONSES TO PHQ QUESTIONS 1-9: 0
2. FEELING DOWN, DEPRESSED OR HOPELESS: 0
SUM OF ALL RESPONSES TO PHQ QUESTIONS 1-9: 0
SUM OF ALL RESPONSES TO PHQ9 QUESTIONS 1 & 2: 0
SUM OF ALL RESPONSES TO PHQ QUESTIONS 1-9: 0

## 2023-03-10 NOTE — PROGRESS NOTES
Chief Complaint       Joint Swelling (Left knee swelling and leg)      History of Present Illness   Source of history provided by:  patient. Cj Lisa is a 74 y.o. old female presenting to the walk in clinic for evaluation of left knee pain for the past 5 days. States the pain is located over the behind the knee aspect and does not radiate. States the pain is progressive. Denies any known injury to the knee. Reports associated swelling and moderate pain with ROM. Pain is also exacerbated by ambulation. Denies any weakness, paresthesias, calf pain/edema, foot/ankle pain, hip pain, back pain, abrasions, fever, chills, rash, or any other symptoms. There has been a history or prior knee problems, pt has a hx of total knee replacement Patient has history of previous knee surgery. Has been taking Voltaren and Biofreeze OTC without relief. ROS    Unless otherwise stated in this report or unable to obtain because of the patient's clinical or mental status as evidenced by the medical record, this patients's positive and negative responses for Review of Systems, constitutional, psych, eyes, ENT, cardiovascular, respiratory, gastrointestinal, neurological, genitourinary, musculoskeletal, integument systems and systems related to the presenting problem are either stated in the preceding or were not pertinent or were negative for the symptoms and/or complaints related to the medical problem.maylin. Physical Exam         VS:  /80   Pulse 83   Temp 97.6 °F (36.4 °C) (Temporal)   Resp 17   Ht 5' 7\" (1.702 m)   Wt 209 lb (94.8 kg)   SpO2 98%   BMI 32.73 kg/m²    Oxygen Saturation Interpretation: Normal.    Constitutional:  Alert, development consistent with age. Lungs: CTAB without wheezing, rales, or rhonchi. CV: RRR without pathologic murmurs or gallops. Knee: Inspection: right knee and lower leg without obvious deformity. Tenderness:  Mild TTP over back of knee. Swelling/Effusion: mild edema noted over lower right leg. Deformity: No obvious deformity. ROM: ROM 0º-120º with mild to moderate discomfort. Skin:  No bruising noted. No abrasions, rashes, or erythema noted. Drawer:  Negative anterior/posterior drawer sign. Lenny's: Negative Lenny's sign. Valgus/Varus Stress: Negative Varus/Valgus stress sign. Crepitus: No crepitus noted with flexion and extension. Hip:            Tenderness:  No TTP.              ROM: FROM hip without pain. Joint(s) Below: No calf/ankle/foot                Tenderness:  No TTP over calf, ankle, or foot. No edema noted. Negative Shaniqua's sign. ROM: FROM without pain or deficits. Neurovascular:             Sensory deficit: Sensation intact above and below the injury site. Pulse deficit: Pulses 2+ and bounding. Capillary refill: Less then 2 sec throughout. Gait:  Slightly antalgic gait, but able to bear weight with mild difficulty. Lymphatics: No lymphangitis or adenopathy noted. Neurological:  Alert and oriented. Motor functions intact. Lab / Imaging Results   (All laboratory and radiology results have been personally reviewed by myself)  Labs:  No results found for this visit on 03/10/23. Imaging: All Radiology results interpreted by Radiologist unless otherwise noted. Assessment / Plan     Impression(s):  Adelfo Beltran was seen today for joint swelling. Diagnoses and all orders for this visit:    Leg swelling  -     US DUP LOWER EXTREMITY LEFT PREMA; Future    Disposition:  Disposition: Discharge to Home. Order given for Ultra sound of leg, will call with results once available. Patient will take water pill and Ibuprofen OTC. RICE protocol advised. F/u with PCP in 1-2 weeks if symptoms persist. ED sooner if symptoms worsen or change.  ED immediately with any calf pain/swelling, severe/worsening knee pain, paresthesias, weakness, fever, CP, or SOB. Pt states understanding and is in agreement with this care plan. All questions answered. Nettie Farah, APRN - CNP    **This report was transcribed using voice recognition software. Every effort was made to ensure accuracy; however, inadvertent computerized transcription errors may be present.

## 2023-03-15 ENCOUNTER — APPOINTMENT (OUTPATIENT)
Dept: ULTRASOUND IMAGING | Age: 73
End: 2023-03-15
Payer: MEDICARE

## 2023-03-15 ENCOUNTER — HOSPITAL ENCOUNTER (INPATIENT)
Age: 73
LOS: 2 days | Discharge: ANOTHER HOSPITAL | End: 2023-03-18
Attending: EMERGENCY MEDICINE | Admitting: INTERNAL MEDICINE
Payer: MEDICARE

## 2023-03-15 ENCOUNTER — APPOINTMENT (OUTPATIENT)
Dept: GENERAL RADIOLOGY | Age: 73
End: 2023-03-15
Payer: MEDICARE

## 2023-03-15 DIAGNOSIS — D64.9 CHRONIC ANEMIA: ICD-10-CM

## 2023-03-15 DIAGNOSIS — R60.9 PERIPHERAL EDEMA: ICD-10-CM

## 2023-03-15 DIAGNOSIS — M10.9 GOUT OF RIGHT FOOT, UNSPECIFIED CAUSE, UNSPECIFIED CHRONICITY: ICD-10-CM

## 2023-03-15 DIAGNOSIS — I48.91 ATRIAL FIBRILLATION WITH RVR (HCC): Primary | ICD-10-CM

## 2023-03-15 DIAGNOSIS — N30.00 ACUTE CYSTITIS WITHOUT HEMATURIA: ICD-10-CM

## 2023-03-15 LAB — LACTATE BLDV-SCNC: 1.6 MMOL/L (ref 0.5–1.9)

## 2023-03-15 PROCEDURE — 87150 DNA/RNA AMPLIFIED PROBE: CPT

## 2023-03-15 PROCEDURE — 96374 THER/PROPH/DIAG INJ IV PUSH: CPT

## 2023-03-15 PROCEDURE — 87186 SC STD MICRODIL/AGAR DIL: CPT

## 2023-03-15 PROCEDURE — 2580000003 HC RX 258: Performed by: EMERGENCY MEDICINE

## 2023-03-15 PROCEDURE — 83735 ASSAY OF MAGNESIUM: CPT

## 2023-03-15 PROCEDURE — 83605 ASSAY OF LACTIC ACID: CPT

## 2023-03-15 PROCEDURE — 85025 COMPLETE CBC W/AUTO DIFF WBC: CPT

## 2023-03-15 PROCEDURE — 84550 ASSAY OF BLOOD/URIC ACID: CPT

## 2023-03-15 PROCEDURE — 82010 KETONE BODYS QUAN: CPT

## 2023-03-15 PROCEDURE — 87077 CULTURE AEROBIC IDENTIFY: CPT

## 2023-03-15 PROCEDURE — 83880 ASSAY OF NATRIURETIC PEPTIDE: CPT

## 2023-03-15 PROCEDURE — 84484 ASSAY OF TROPONIN QUANT: CPT

## 2023-03-15 PROCEDURE — 80053 COMPREHEN METABOLIC PANEL: CPT

## 2023-03-15 PROCEDURE — 71045 X-RAY EXAM CHEST 1 VIEW: CPT

## 2023-03-15 PROCEDURE — 87040 BLOOD CULTURE FOR BACTERIA: CPT

## 2023-03-15 PROCEDURE — 93005 ELECTROCARDIOGRAM TRACING: CPT | Performed by: EMERGENCY MEDICINE

## 2023-03-15 PROCEDURE — 99285 EMERGENCY DEPT VISIT HI MDM: CPT

## 2023-03-15 PROCEDURE — 96361 HYDRATE IV INFUSION ADD-ON: CPT

## 2023-03-15 PROCEDURE — 93970 EXTREMITY STUDY: CPT

## 2023-03-15 PROCEDURE — 2500000003 HC RX 250 WO HCPCS: Performed by: EMERGENCY MEDICINE

## 2023-03-15 RX ORDER — METOPROLOL TARTRATE 5 MG/5ML
5 INJECTION INTRAVENOUS ONCE
Status: COMPLETED | OUTPATIENT
Start: 2023-03-15 | End: 2023-03-15

## 2023-03-15 RX ORDER — 0.9 % SODIUM CHLORIDE 0.9 %
500 INTRAVENOUS SOLUTION INTRAVENOUS ONCE
Status: COMPLETED | OUTPATIENT
Start: 2023-03-15 | End: 2023-03-16

## 2023-03-15 RX ORDER — PROPAFENONE HYDROCHLORIDE 150 MG/1
225 TABLET, COATED ORAL ONCE
Status: COMPLETED | OUTPATIENT
Start: 2023-03-15 | End: 2023-03-16

## 2023-03-15 RX ADMIN — SODIUM CHLORIDE 500 ML: 9 INJECTION, SOLUTION INTRAVENOUS at 23:39

## 2023-03-15 RX ADMIN — METOPROLOL TARTRATE 5 MG: 5 INJECTION, SOLUTION INTRAVENOUS at 23:43

## 2023-03-15 ASSESSMENT — ENCOUNTER SYMPTOMS
RHINORRHEA: 0
ABDOMINAL PAIN: 0
CHEST TIGHTNESS: 0
VOMITING: 0
WHEEZING: 0
NAUSEA: 0
BACK PAIN: 0
DIARRHEA: 0
SINUS PRESSURE: 0
COUGH: 0
SORE THROAT: 0
SHORTNESS OF BREATH: 0

## 2023-03-15 ASSESSMENT — PAIN - FUNCTIONAL ASSESSMENT: PAIN_FUNCTIONAL_ASSESSMENT: NONE - DENIES PAIN

## 2023-03-15 NOTE — LETTER
[place] [employer] Medicaid  CERTIFICATION OF NECESSITY  FOR NON-EMERGENCY TRANSPORTATION   BY GROUND AMBULANCE      Individual Information   1. Name: Leonardo Saenz 2. [place] Medicaid Billing Number: [id number]   7. Address: [address] [place] [address]      Transportation Provider Information   4. Provider Name:    5. [place] [employer] Provider Number:  National Provider Identifier (NPI):      Certification  7. Criteria:  During transport, this individual requires:  [x] Medical treatment or continuous     supervision by an EMT. [] The administration or regulation of oxygen by another person. [] Supervised protective restraint. 8. Period Beginning Date:    5. Length  [x] Not more than 1 day(s)  [] One Year     Additional Information Relevant to Certification   10. Comments or Explanations, If Necessary or Appropriate   AFib with RVR, infected knee hardware, acute cystitis, peripheral edema     Certifying Practitioner Information   11. Name of Practitioner: Dr. Dorie Barry   12. [place] Medicaid Provider Number, If Applicable:  [address] Provider Identifier (NPI):      Signature Information   14. Date of Signature: 3/17/2023 15. Name of Person Signing: Velia Costello   12. Signature and Professional Designation: Electronically signed by Claudell Liter, RN-BC on 3/17/2023 at 11:56 AM       [facility] [id number]  Rev. 7/2015       [facility] Kaela Veloz Encounter Date/Time: 3/15/2023 2054    Hospital Account: [de-identified]    MRN: [id number]    Patient: Leonardo Saenz    Contact Serial #: [id number]      ENCOUNTER          Patient Class: I Private Enc?   No Unit  BD: [address] [place] [address] Service: Intermediate   Encounter DX: Peripheral edema [R60.9]   ADM Provider: Sole Mora DO   Procedure:     ATT Provider: Sole Mora DO   REF Provider:        Admission DX: Peripheral edema, Chronic anemia, Atrial fibrillation with RVR ([address] Acute cystitis without hematuria, Gout of right foot, unspecified cause, unspecified chronicity and DX codes: R60.9, D64.9, I48.91, N30.00, M10.9      PATIENT                 Name: Sienna Simmons : 1950 (74 yrs)   Address: [facility] Sex: Female   City: [facility] [address]         Marital Status:    Employer: [employer]   Primary Care Provider: Keena Antonio MD         Primary Phone: [phone]   EMERGENCY CONTACT   Contact Name Legal Guardian? Relationship to Patient Home Phone Work Phone   1. Patti Cisneros      Spouse  Child [phone]                 GUARANTOR            Guarantor: Sienna Simmons     : 1950   Address: [address] [place] [address] Sex: Female     [address] [place] [address]     Relation to Patient: Self       Home Phone: [phone]   Guarantor ID: [id number]       Work Phone:     Guarantor Employer: RETIRED         Status: RETIRED      COVERAGE        PRIMARY INSURANCE   Payor: Bebeto Grey MEDICARE Plan: Rad Hackett*   Payor Address: [facility] [id number] [place] [id number]       Group Number: [id number] Insurance Type: [id number] Name: Helen Matthews : 1950   Subscriber ID: [id number] Pat. Rel. to Sub: Self   SECONDARY INSURANCE   Payor:   Plan:     Payor Address:  ,           Group Number:   Insurance Type:     Subscriber Name:   Subscriber :     Subscriber ID:   Pat.  Rel. to Sub:        CSN: [id number]

## 2023-03-16 ENCOUNTER — APPOINTMENT (OUTPATIENT)
Dept: GENERAL RADIOLOGY | Age: 73
End: 2023-03-16
Payer: MEDICARE

## 2023-03-16 ENCOUNTER — APPOINTMENT (OUTPATIENT)
Dept: CT IMAGING | Age: 73
End: 2023-03-16
Payer: MEDICARE

## 2023-03-16 PROBLEM — I48.91 ATRIAL FIBRILLATION WITH RVR (HCC): Status: ACTIVE | Noted: 2023-03-16

## 2023-03-16 LAB
ALBUMIN SERPL-MCNC: 2.5 G/DL (ref 3.5–5.2)
ALBUMIN SERPL-MCNC: 2.8 G/DL (ref 3.5–5.2)
ALP SERPL-CCNC: 102 U/L (ref 35–104)
ALP SERPL-CCNC: 92 U/L (ref 35–104)
ALT SERPL-CCNC: 27 U/L (ref 0–32)
ALT SERPL-CCNC: 30 U/L (ref 0–32)
ANION GAP SERPL CALCULATED.3IONS-SCNC: 12 MMOL/L (ref 7–16)
ANION GAP SERPL CALCULATED.3IONS-SCNC: 14 MMOL/L (ref 7–16)
APPEARANCE FLUID: NORMAL
AST SERPL-CCNC: 30 U/L (ref 0–31)
AST SERPL-CCNC: 34 U/L (ref 0–31)
BACTERIA URNS QL MICRO: ABNORMAL /HPF
BASOPHILS # BLD: 0 E9/L (ref 0–0.2)
BASOPHILS # BLD: 0.04 E9/L (ref 0–0.2)
BASOPHILS NFR BLD: 0.2 % (ref 0–2)
BASOPHILS NFR BLD: 0.2 % (ref 0–2)
BETA-HYDROXYBUTYRATE: 1.05 MMOL/L (ref 0.02–0.27)
BILIRUB SERPL-MCNC: 1.2 MG/DL (ref 0–1.2)
BILIRUB SERPL-MCNC: 1.3 MG/DL (ref 0–1.2)
BILIRUB UR QL STRIP: NEGATIVE
BNP BLD-MCNC: 2462 PG/ML (ref 0–450)
BUN SERPL-MCNC: 24 MG/DL (ref 6–23)
BUN SERPL-MCNC: 30 MG/DL (ref 6–23)
BURR CELLS: ABNORMAL
CALCIUM SERPL-MCNC: 9 MG/DL (ref 8.6–10.2)
CALCIUM SERPL-MCNC: 9.4 MG/DL (ref 8.6–10.2)
CELL COUNT FLUID TYPE: NORMAL
CHLORIDE SERPL-SCNC: 96 MMOL/L (ref 98–107)
CHLORIDE SERPL-SCNC: 99 MMOL/L (ref 98–107)
CHOLESTEROL, TOTAL: 78 MG/DL (ref 0–199)
CLARITY UR: ABNORMAL
CO2 SERPL-SCNC: 22 MMOL/L (ref 22–29)
CO2 SERPL-SCNC: 23 MMOL/L (ref 22–29)
COLOR FLUID: NORMAL
COLOR UR: YELLOW
CREAT SERPL-MCNC: 0.6 MG/DL (ref 0.5–1)
CREAT SERPL-MCNC: 0.9 MG/DL (ref 0.5–1)
EOSINOPHIL # BLD: 0 E9/L (ref 0.05–0.5)
EOSINOPHIL # BLD: 0.03 E9/L (ref 0.05–0.5)
EOSINOPHIL NFR BLD: 0.1 % (ref 0–6)
EOSINOPHIL NFR BLD: 0.3 % (ref 0–6)
EPI CELLS #/AREA URNS HPF: ABNORMAL /HPF
ERYTHROCYTE [DISTWIDTH] IN BLOOD BY AUTOMATED COUNT: 13 FL (ref 11.5–15)
ERYTHROCYTE [DISTWIDTH] IN BLOOD BY AUTOMATED COUNT: 13.1 FL (ref 11.5–15)
ERYTHROCYTE [SEDIMENTATION RATE] IN BLOOD BY WESTERGREN METHOD: 130 MM/HR (ref 0–20)
FLUID TYPE: NORMAL
FOLATE SERPL-MCNC: >20 NG/ML (ref 4.8–24.2)
GLUCOSE FLD-MCNC: 31 MG/DL
GLUCOSE SERPL-MCNC: 115 MG/DL (ref 74–99)
GLUCOSE SERPL-MCNC: 96 MG/DL (ref 74–99)
GLUCOSE UR STRIP-MCNC: NEGATIVE MG/DL
HBA1C MFR BLD: 5.2 % (ref 4–5.6)
HCT VFR BLD AUTO: 26.3 % (ref 34–48)
HCT VFR BLD AUTO: 28.2 % (ref 34–48)
HDLC SERPL-MCNC: 30 MG/DL
HGB BLD-MCNC: 8.7 G/DL (ref 11.5–15.5)
HGB BLD-MCNC: 9.3 G/DL (ref 11.5–15.5)
HGB UR QL STRIP: ABNORMAL
HYPOCHROMIA: ABNORMAL
IMM GRANULOCYTES # BLD: 0.22 E9/L
IMM GRANULOCYTES NFR BLD: 1.1 % (ref 0–5)
KETONES UR STRIP-MCNC: NEGATIVE MG/DL
LACTATE BLDV-SCNC: 1.3 MMOL/L (ref 0.5–1.9)
LDLC SERPL CALC-MCNC: 38 MG/DL (ref 0–99)
LEUKOCYTE ESTERASE UR QL STRIP: ABNORMAL
LYMPHOCYTES # BLD: 0.98 E9/L (ref 1.5–4)
LYMPHOCYTES # BLD: 1.63 E9/L (ref 1.5–4)
LYMPHOCYTES NFR BLD: 4.3 % (ref 20–42)
LYMPHOCYTES NFR BLD: 7.9 % (ref 20–42)
MAGNESIUM SERPL-MCNC: 1.8 MG/DL (ref 1.6–2.6)
MAGNESIUM SERPL-MCNC: 1.9 MG/DL (ref 1.6–2.6)
MCH RBC QN AUTO: 30.6 PG (ref 26–35)
MCH RBC QN AUTO: 31 PG (ref 26–35)
MCHC RBC AUTO-ENTMCNC: 33 % (ref 32–34.5)
MCHC RBC AUTO-ENTMCNC: 33.1 % (ref 32–34.5)
MCV RBC AUTO: 92.8 FL (ref 80–99.9)
MCV RBC AUTO: 93.6 FL (ref 80–99.9)
MONOCYTE, FLUID: 6 %
MONOCYTES # BLD: 0.49 E9/L (ref 0.1–0.95)
MONOCYTES # BLD: 1.62 E9/L (ref 0.1–0.95)
MONOCYTES NFR BLD: 1.7 % (ref 2–12)
MONOCYTES NFR BLD: 7.8 % (ref 2–12)
NEUTROPHIL, FLUID: 94 %
NEUTROPHILS # BLD: 17.2 E9/L (ref 1.8–7.3)
NEUTROPHILS # BLD: 23.12 E9/L (ref 1.8–7.3)
NEUTS SEG NFR BLD: 82.9 % (ref 43–80)
NEUTS SEG NFR BLD: 93.9 % (ref 43–80)
NITRITE UR QL STRIP: NEGATIVE
NUCLEATED CELLS FLUID: NORMAL /UL
OVALOCYTES: ABNORMAL
PH UR STRIP: 5.5 [PH] (ref 5–9)
PHOSPHATE SERPL-MCNC: 3.8 MG/DL (ref 2.5–4.5)
PLATELET # BLD AUTO: 427 E9/L (ref 130–450)
PLATELET # BLD AUTO: 464 E9/L (ref 130–450)
PMV BLD AUTO: 10.1 FL (ref 7–12)
PMV BLD AUTO: 10.1 FL (ref 7–12)
POIKILOCYTES: ABNORMAL
POIKILOCYTES: ABNORMAL
POLYCHROMASIA: ABNORMAL
POLYCHROMASIA: ABNORMAL
POTASSIUM SERPL-SCNC: 3.3 MMOL/L (ref 3.5–5)
POTASSIUM SERPL-SCNC: 3.4 MMOL/L (ref 3.5–5)
PROT SERPL-MCNC: 6 G/DL (ref 6.4–8.3)
PROT SERPL-MCNC: 6.5 G/DL (ref 6.4–8.3)
PROT UR STRIP-MCNC: NEGATIVE MG/DL
RBC # BLD AUTO: 2.81 E12/L (ref 3.5–5.5)
RBC # BLD AUTO: 3.04 E12/L (ref 3.5–5.5)
RBC #/AREA URNS HPF: >20 /HPF (ref 0–2)
RBC FLUID: NORMAL /UL
REASON FOR REJECTION: NORMAL
REJECTED TEST: NORMAL
SCHISTOCYTES: ABNORMAL
SODIUM SERPL-SCNC: 132 MMOL/L (ref 132–146)
SODIUM SERPL-SCNC: 134 MMOL/L (ref 132–146)
SP GR UR STRIP: <=1.005 (ref 1–1.03)
TEAR DROP CELLS: ABNORMAL
TRIGL SERPL-MCNC: 50 MG/DL (ref 0–149)
TROPONIN, HIGH SENSITIVITY: 16 NG/L (ref 0–9)
TROPONIN, HIGH SENSITIVITY: 17 NG/L (ref 0–9)
TSH SERPL-MCNC: 1.16 UIU/ML (ref 0.27–4.2)
URATE SERPL-MCNC: 7 MG/DL (ref 2.4–5.7)
UROBILINOGEN UR STRIP-ACNC: 0.2 E.U./DL
VIT B12 SERPL-MCNC: 1926 PG/ML (ref 211–946)
VLDLC SERPL CALC-MCNC: 10 MG/DL
WBC # BLD: 20.7 E9/L (ref 4.5–11.5)
WBC # BLD: 24.6 E9/L (ref 4.5–11.5)
WBC #/AREA URNS HPF: >20 /HPF (ref 0–5)

## 2023-03-16 PROCEDURE — 99223 1ST HOSP IP/OBS HIGH 75: CPT | Performed by: INTERNAL MEDICINE

## 2023-03-16 PROCEDURE — 87205 SMEAR GRAM STAIN: CPT

## 2023-03-16 PROCEDURE — 2580000003 HC RX 258: Performed by: EMERGENCY MEDICINE

## 2023-03-16 PROCEDURE — 87088 URINE BACTERIA CULTURE: CPT

## 2023-03-16 PROCEDURE — 73110 X-RAY EXAM OF WRIST: CPT

## 2023-03-16 PROCEDURE — 89051 BODY FLUID CELL COUNT: CPT

## 2023-03-16 PROCEDURE — 73630 X-RAY EXAM OF FOOT: CPT

## 2023-03-16 PROCEDURE — APPSS60 APP SPLIT SHARED TIME 46-60 MINUTES: Performed by: PHYSICIAN ASSISTANT

## 2023-03-16 PROCEDURE — 84100 ASSAY OF PHOSPHORUS: CPT

## 2023-03-16 PROCEDURE — 2580000003 HC RX 258: Performed by: INTERNAL MEDICINE

## 2023-03-16 PROCEDURE — 82607 VITAMIN B-12: CPT

## 2023-03-16 PROCEDURE — 6360000002 HC RX W HCPCS: Performed by: INTERNAL MEDICINE

## 2023-03-16 PROCEDURE — 89060 EXAM SYNOVIAL FLUID CRYSTALS: CPT

## 2023-03-16 PROCEDURE — 83036 HEMOGLOBIN GLYCOSYLATED A1C: CPT

## 2023-03-16 PROCEDURE — 84443 ASSAY THYROID STIM HORMONE: CPT

## 2023-03-16 PROCEDURE — 87186 SC STD MICRODIL/AGAR DIL: CPT

## 2023-03-16 PROCEDURE — 83605 ASSAY OF LACTIC ACID: CPT

## 2023-03-16 PROCEDURE — 73560 X-RAY EXAM OF KNEE 1 OR 2: CPT

## 2023-03-16 PROCEDURE — 6370000000 HC RX 637 (ALT 250 FOR IP): Performed by: EMERGENCY MEDICINE

## 2023-03-16 PROCEDURE — 82746 ASSAY OF FOLIC ACID SERUM: CPT

## 2023-03-16 PROCEDURE — 87070 CULTURE OTHR SPECIMN AEROBIC: CPT

## 2023-03-16 PROCEDURE — 6370000000 HC RX 637 (ALT 250 FOR IP): Performed by: INTERNAL MEDICINE

## 2023-03-16 PROCEDURE — 82947 ASSAY GLUCOSE BLOOD QUANT: CPT

## 2023-03-16 PROCEDURE — 87077 CULTURE AEROBIC IDENTIFY: CPT

## 2023-03-16 PROCEDURE — 85651 RBC SED RATE NONAUTOMATED: CPT

## 2023-03-16 PROCEDURE — 80053 COMPREHEN METABOLIC PANEL: CPT

## 2023-03-16 PROCEDURE — 83735 ASSAY OF MAGNESIUM: CPT

## 2023-03-16 PROCEDURE — 80061 LIPID PANEL: CPT

## 2023-03-16 PROCEDURE — 73700 CT LOWER EXTREMITY W/O DYE: CPT

## 2023-03-16 PROCEDURE — 81001 URINALYSIS AUTO W/SCOPE: CPT

## 2023-03-16 PROCEDURE — 84484 ASSAY OF TROPONIN QUANT: CPT

## 2023-03-16 PROCEDURE — 87075 CULTR BACTERIA EXCEPT BLOOD: CPT

## 2023-03-16 PROCEDURE — 6370000000 HC RX 637 (ALT 250 FOR IP): Performed by: PHYSICIAN ASSISTANT

## 2023-03-16 PROCEDURE — 2060000000 HC ICU INTERMEDIATE R&B

## 2023-03-16 PROCEDURE — 85025 COMPLETE CBC W/AUTO DIFF WBC: CPT

## 2023-03-16 RX ORDER — LOSARTAN POTASSIUM 50 MG/1
25 TABLET ORAL DAILY
Status: DISCONTINUED | OUTPATIENT
Start: 2023-03-17 | End: 2023-03-18 | Stop reason: HOSPADM

## 2023-03-16 RX ORDER — POTASSIUM CHLORIDE 20 MEQ/1
40 TABLET, EXTENDED RELEASE ORAL ONCE
Status: COMPLETED | OUTPATIENT
Start: 2023-03-16 | End: 2023-03-16

## 2023-03-16 RX ORDER — SODIUM CHLORIDE 9 MG/ML
INJECTION, SOLUTION INTRAVENOUS PRN
Status: DISCONTINUED | OUTPATIENT
Start: 2023-03-16 | End: 2023-03-18 | Stop reason: HOSPADM

## 2023-03-16 RX ORDER — HYDROCHLOROTHIAZIDE 25 MG/1
25 TABLET ORAL DAILY
Status: DISCONTINUED | OUTPATIENT
Start: 2023-03-16 | End: 2023-03-16

## 2023-03-16 RX ORDER — DIGOXIN 0.25 MG/ML
500 INJECTION INTRAMUSCULAR; INTRAVENOUS ONCE
Status: COMPLETED | OUTPATIENT
Start: 2023-03-16 | End: 2023-03-16

## 2023-03-16 RX ORDER — ONDANSETRON 2 MG/ML
4 INJECTION INTRAMUSCULAR; INTRAVENOUS EVERY 6 HOURS PRN
Status: DISCONTINUED | OUTPATIENT
Start: 2023-03-16 | End: 2023-03-18 | Stop reason: HOSPADM

## 2023-03-16 RX ORDER — ERYTHROMYCIN 5 MG/G
OINTMENT OPHTHALMIC NIGHTLY
Status: DISCONTINUED | OUTPATIENT
Start: 2023-03-16 | End: 2023-03-18 | Stop reason: HOSPADM

## 2023-03-16 RX ORDER — SODIUM CHLORIDE, SODIUM LACTATE, POTASSIUM CHLORIDE, CALCIUM CHLORIDE 600; 310; 30; 20 MG/100ML; MG/100ML; MG/100ML; MG/100ML
INJECTION, SOLUTION INTRAVENOUS CONTINUOUS
Status: DISCONTINUED | OUTPATIENT
Start: 2023-03-16 | End: 2023-03-18 | Stop reason: HOSPADM

## 2023-03-16 RX ORDER — ATORVASTATIN CALCIUM 40 MG/1
40 TABLET, FILM COATED ORAL NIGHTLY
Status: DISCONTINUED | OUTPATIENT
Start: 2023-03-16 | End: 2023-03-18 | Stop reason: HOSPADM

## 2023-03-16 RX ORDER — CIPROFLOXACIN 500 MG/1
500 TABLET, FILM COATED ORAL ONCE
Status: COMPLETED | OUTPATIENT
Start: 2023-03-16 | End: 2023-03-16

## 2023-03-16 RX ORDER — ALLOPURINOL 100 MG/1
100 TABLET ORAL NIGHTLY
Status: DISCONTINUED | OUTPATIENT
Start: 2023-03-16 | End: 2023-03-18 | Stop reason: HOSPADM

## 2023-03-16 RX ORDER — LEVOFLOXACIN 5 MG/ML
750 INJECTION, SOLUTION INTRAVENOUS EVERY 24 HOURS
Status: DISCONTINUED | OUTPATIENT
Start: 2023-03-16 | End: 2023-03-17

## 2023-03-16 RX ORDER — LOSARTAN POTASSIUM 50 MG/1
100 TABLET ORAL DAILY
Status: DISCONTINUED | OUTPATIENT
Start: 2023-03-16 | End: 2023-03-16

## 2023-03-16 RX ORDER — PROPAFENONE HYDROCHLORIDE 225 MG/1
225 CAPSULE, EXTENDED RELEASE ORAL 2 TIMES DAILY
Status: DISCONTINUED | OUTPATIENT
Start: 2023-03-16 | End: 2023-03-16

## 2023-03-16 RX ORDER — ACETAMINOPHEN 325 MG/1
650 TABLET ORAL EVERY 4 HOURS PRN
Status: DISCONTINUED | OUTPATIENT
Start: 2023-03-16 | End: 2023-03-18 | Stop reason: HOSPADM

## 2023-03-16 RX ORDER — POLYVINYL ALCOHOL 14 MG/ML
1 SOLUTION/ DROPS OPHTHALMIC PRN
Status: DISCONTINUED | OUTPATIENT
Start: 2023-03-16 | End: 2023-03-18 | Stop reason: HOSPADM

## 2023-03-16 RX ORDER — CELECOXIB 100 MG/1
200 CAPSULE ORAL DAILY
Status: DISCONTINUED | OUTPATIENT
Start: 2023-03-16 | End: 2023-03-18 | Stop reason: HOSPADM

## 2023-03-16 RX ORDER — 0.9 % SODIUM CHLORIDE 0.9 %
500 INTRAVENOUS SOLUTION INTRAVENOUS ONCE
Status: COMPLETED | OUTPATIENT
Start: 2023-03-16 | End: 2023-03-16

## 2023-03-16 RX ORDER — SODIUM CHLORIDE 0.9 % (FLUSH) 0.9 %
5-40 SYRINGE (ML) INJECTION EVERY 12 HOURS SCHEDULED
Status: DISCONTINUED | OUTPATIENT
Start: 2023-03-16 | End: 2023-03-18 | Stop reason: HOSPADM

## 2023-03-16 RX ORDER — ONDANSETRON 4 MG/1
4 TABLET, ORALLY DISINTEGRATING ORAL EVERY 8 HOURS PRN
Status: DISCONTINUED | OUTPATIENT
Start: 2023-03-16 | End: 2023-03-18 | Stop reason: HOSPADM

## 2023-03-16 RX ORDER — SPIRONOLACTONE 25 MG/1
12.5 TABLET ORAL DAILY
Status: DISCONTINUED | OUTPATIENT
Start: 2023-03-16 | End: 2023-03-18 | Stop reason: HOSPADM

## 2023-03-16 RX ORDER — METOPROLOL SUCCINATE 50 MG/1
50 TABLET, EXTENDED RELEASE ORAL DAILY
Status: DISCONTINUED | OUTPATIENT
Start: 2023-03-16 | End: 2023-03-17

## 2023-03-16 RX ORDER — IRBESARTAN AND HYDROCHLOROTHIAZIDE 150; 12.5 MG/1; MG/1
2 TABLET, FILM COATED ORAL DAILY
Status: DISCONTINUED | OUTPATIENT
Start: 2023-03-16 | End: 2023-03-16 | Stop reason: CLARIF

## 2023-03-16 RX ORDER — SODIUM CHLORIDE 0.9 % (FLUSH) 0.9 %
5-40 SYRINGE (ML) INJECTION PRN
Status: DISCONTINUED | OUTPATIENT
Start: 2023-03-16 | End: 2023-03-18 | Stop reason: HOSPADM

## 2023-03-16 RX ORDER — SENNOSIDES 8.6 MG
650 CAPSULE ORAL EVERY 8 HOURS PRN
Status: DISCONTINUED | OUTPATIENT
Start: 2023-03-16 | End: 2023-03-16 | Stop reason: SDUPTHER

## 2023-03-16 RX ORDER — DOCUSATE SODIUM 100 MG/1
100 CAPSULE, LIQUID FILLED ORAL DAILY
Status: DISCONTINUED | OUTPATIENT
Start: 2023-03-16 | End: 2023-03-18 | Stop reason: HOSPADM

## 2023-03-16 RX ORDER — FUROSEMIDE 10 MG/ML
40 INJECTION INTRAMUSCULAR; INTRAVENOUS 2 TIMES DAILY
Status: DISCONTINUED | OUTPATIENT
Start: 2023-03-16 | End: 2023-03-18 | Stop reason: HOSPADM

## 2023-03-16 RX ADMIN — FUROSEMIDE 40 MG: 10 INJECTION, SOLUTION INTRAMUSCULAR; INTRAVENOUS at 12:46

## 2023-03-16 RX ADMIN — LEVOFLOXACIN 750 MG: 5 INJECTION, SOLUTION INTRAVENOUS at 13:41

## 2023-03-16 RX ADMIN — ALLOPURINOL 100 MG: 100 TABLET ORAL at 22:34

## 2023-03-16 RX ADMIN — ATORVASTATIN CALCIUM 40 MG: 40 TABLET, FILM COATED ORAL at 22:34

## 2023-03-16 RX ADMIN — SODIUM CHLORIDE 500 ML: 9 INJECTION, SOLUTION INTRAVENOUS at 01:18

## 2023-03-16 RX ADMIN — CELECOXIB 200 MG: 100 CAPSULE ORAL at 12:47

## 2023-03-16 RX ADMIN — CIPROFLOXACIN 500 MG: 500 TABLET, FILM COATED ORAL at 01:20

## 2023-03-16 RX ADMIN — METOPROLOL SUCCINATE 50 MG: 50 TABLET, EXTENDED RELEASE ORAL at 10:31

## 2023-03-16 RX ADMIN — POTASSIUM CHLORIDE 40 MEQ: 1500 TABLET, EXTENDED RELEASE ORAL at 12:46

## 2023-03-16 RX ADMIN — POTASSIUM CHLORIDE 40 MEQ: 1500 TABLET, EXTENDED RELEASE ORAL at 10:31

## 2023-03-16 RX ADMIN — ERYTHROMYCIN: 5 OINTMENT OPHTHALMIC at 23:18

## 2023-03-16 RX ADMIN — DIGOXIN 500 MCG: 0.25 INJECTION INTRAMUSCULAR; INTRAVENOUS at 12:49

## 2023-03-16 RX ADMIN — DOCUSATE SODIUM 100 MG: 100 CAPSULE, LIQUID FILLED ORAL at 10:31

## 2023-03-16 RX ADMIN — FUROSEMIDE 40 MG: 10 INJECTION, SOLUTION INTRAMUSCULAR; INTRAVENOUS at 18:20

## 2023-03-16 RX ADMIN — Medication 10 ML: at 12:47

## 2023-03-16 RX ADMIN — SPIRONOLACTONE 12.5 MG: 25 TABLET ORAL at 12:46

## 2023-03-16 RX ADMIN — Medication 10 ML: at 22:34

## 2023-03-16 RX ADMIN — PROPAFENONE HYDROCHLORIDE 225 MG: 150 TABLET, FILM COATED ORAL at 00:24

## 2023-03-16 ASSESSMENT — PAIN SCALES - GENERAL: PAINLEVEL_OUTOF10: 0

## 2023-03-16 NOTE — PROGRESS NOTES
Date:3/16/2023  Patient Name: Cate Schulz  MRN: [id number]  : 1950  ROOM #: [id number]    Occupational Therapy order received, chart reviewed and evaluation attempted this date. Patient is unavailable for OT evaluation due to off floor for knee x-ray. Orthopedics consulted. Will re-attempt OT evaluation at a later time. Thank you.      Austin Jerez OTR/L #[id number]

## 2023-03-16 NOTE — ED PROVIDER NOTES
Chief complaint:  Leg pain    HPI history provided by the patient  Patient presents with a history of atrial fibrillation on Eliquis and Rythmol.  Complains of a history of gout in the right ankle and foot which has been there for a couple of weeks and still present.  Also now complaining of a history of Baker's cyst in the left knee and has increasing left leg edema compared to baseline although has chronic bilateral leg edema the left one is more swollen now causing more discomfort in the leg and now she is having difficulty ambulating due to pain in both legs.  No new chest pain, palpitations or shortness of breath and no lightheadedness or syncope.  No fevers, sweats or chills.  No fall or injury.  No back pain.  No abdominal pain.    Review of Systems   Constitutional:  Negative for chills, diaphoresis, fatigue and fever.   HENT:  Negative for congestion, rhinorrhea, sinus pressure and sore throat.    Respiratory:  Negative for cough, chest tightness, shortness of breath and wheezing.    Cardiovascular:  Positive for leg swelling. Negative for chest pain and palpitations.   Gastrointestinal:  Negative for abdominal pain, diarrhea, nausea and vomiting.   Genitourinary:  Negative for dysuria, flank pain, frequency and urgency.   Musculoskeletal:  Positive for arthralgias and gait problem. Negative for back pain, joint swelling, myalgias, neck pain and neck stiffness.   Skin:  Negative for rash and wound.   Neurological:  Negative for dizziness, seizures, syncope, weakness, light-headedness, numbness and headaches.   All other systems reviewed and are negative.     Physical Exam  Vitals and nursing note reviewed.   Constitutional:       General: She is awake. She is not in acute distress.     Appearance: She is well-developed. She is not ill-appearing, toxic-appearing or diaphoretic.   HENT:      Head: Normocephalic and atraumatic.   Eyes:      General: No scleral icterus.     Pupils: Pupils are equal, round, and  reactive to light. Cardiovascular:      Rate and Rhythm: Tachycardia present. Rhythm irregularly irregular. Heart sounds: Normal heart sounds. No murmur heard. Pulmonary:      Effort: Pulmonary effort is normal. No respiratory distress. Breath sounds: Normal breath sounds. No stridor, decreased air movement or transmitted upper airway sounds. No decreased breath sounds, wheezing, rhonchi or rales. Chest:      Chest wall: No tenderness. Abdominal:      General: Bowel sounds are normal. There is no distension. Palpations: Abdomen is soft. Tenderness: There is no abdominal tenderness. There is no right CVA tenderness, left CVA tenderness, guarding or rebound. Musculoskeletal:         General: Tenderness present. No swelling, deformity or signs of injury. Cervical back: Full passive range of motion without pain, normal range of motion and neck supple. No signs of trauma or rigidity. No spinous process tenderness or muscular tenderness. Normal range of motion. Right lower leg: Edema present. Left lower leg: Edema present. Comments: Arms and legs are neurovascular intact. Warm skin with good cap refill throughout the extremities including the bilateral lower extremities. Bilateral lower extremities do have +2 edema bilaterally with more of a +3 edema on the left leg with some yellowish to bruised appearing skin to the left lower leg diffusely from the mid tibia distally, patient states this is chronic and unchanged. There is no crepitance with it. She has mild tenderness to palpation of both lower extremities diffusely including the right ankle and foot. No erythema. No sign of acute bony or joint injury and no calf pain bilaterally. Upper extremities are neurovascular intact and have no edema. No cervical, thoracic or lumbar spine tenderness. Skin:     General: Skin is warm and dry. Coloration: Skin is not cyanotic, jaundiced, mottled or pale.       Findings: No bruising, erythema or rash. Neurological:      General: No focal deficit present. Mental Status: She is alert and oriented to person, place, and time. GCS: GCS eye subscore is 4. GCS verbal subscore is 5. GCS motor subscore is 6. Cranial Nerves: Cranial nerves 2-12 are intact. No cranial nerve deficit. Sensory: Sensation is intact. Motor: Motor function is intact. Coordination: Coordination is intact. Coordination normal.   Psychiatric:         Behavior: Behavior is cooperative. Procedures     MDM     History provided by: The patient    Patient is here states she is having trouble ambulating and getting around at home secondary to gout in the right foot and then developed increasing swelling in the left leg compared to the right with a Baker's cyst causing increased pain when she ambulates. Denies chest pain, palpitations or shortness of breath. Known history of atrial fibrillation on Eliquis and Rythmol. Physical exam shows edema in bilateral legs left greater than right with no current cellulitis although bruising appearance of the left leg which the patient states is chronic and unchanged. Abdomen soft and nontender. Heart rate irregular and mildly tachycardic. She is sitting up in the bed awake and alert and in no distress with clear lung fields and no respiratory distress. See above rest of physical exam.      Social factors affecting care: None  Chronic conditions affecting care: Atrial fibrillation, chronic, edema  Chart reviewed: Previous EKGs compared    Differential includes but not limited to: Atrial fibrillation RVR, pneumonia, CHF, anemia, renal failure, dehydration, DVT, electrolyte abnormality, gout    Work up includes with interpretations: Troponin is 16 and 17 negative delta troponin no ischemic suggestion. EKG atrial fibrillation RVR with no acute ischemic findings. proBNP 2462 with chest x-ray read by radiologist showing no acute process.   Uric acid mildly elevated at 7 consistent with her stated she has a history of known gout in the right foot. Beta hydroxybutyrate 1.05 with blood sugar 115 and CO2 22 and chemistries with an anion gap of 14 not reflective of DKA. Potassium 3.4 minimally low. BUN 30 which is mildly elevated with a creatinine 0.9. Overall GFR greater than 60. LFTs unremarkable although total bili is 1.3 which is mildly elevated. Lactic acid 1.6. Ultrasound read by radiologist of the bilateral lower extremity shows no DVTs. Advanced directive discussion: None    Treatment in ER: IV Lopressor, p.o. Rythmol, IV fluids, p.o. Cipro. Patient observed for several hours in the ER with mild slow improvement in her heart rate with mildly fluctuating blood pressures between about 90 and 536 systolic she is stable throughout her stay with mildly improving vital signs. Consultations in ER:  Internal medicine consulted for admission    Diagnosis and disposition: Atrial fibrillation RVR, UTI, renal insufficiency, peripheral edema, gout    ED Course as of 03/16/23 0130   Thu Mar 16, 2023   0117 Case discussed with Dr. Vishnu Fried, detailed review given, he will admit the patient [NC]      ED Course User Index  [NC] Burak Kelley DO        EKG Interpretation    Interpreted by emergency department physician    Rhythm: atrial fibrillation - rapid  Rate: 128  Axis: normal  Ectopy: none  Conduction: normal  ST Segments: no acute change  T Waves: no acute change  Q Waves: none    Clinical Impression: atrial fibrillation (chronic)    Burak Kelley DO    ED Course as of 03/16/23 0133   Thu Mar 16, 2023   0117 Case discussed with Dr. Vishnu Fried, detailed review given, he will admit the patient [NC]      ED Course User Index  [NC] Burak Kelley DO       --------------------------------------------- PAST HISTORY ---------------------------------------------  Past Medical History:  has a past medical history of A-fib ([address] Arthritis, Class 2 severe obesity due to excess calories with serious comorbidity and body mass index (BMI) of 37.0 to 37.9 in adult [facility]), Dry eyes, bilateral, Hypertension, OAB (overactive bladder), and PONV (postoperative nausea and vomiting). Past Surgical History:  has a past surgical history that includes Knee Arthroplasty (Bilateral); Bunionectomy; Cholecystectomy; shoulder surgery (09/20/2016); Colonoscopy; joint replacement; Hysterectomy (11/27/2017); Abdomen surgery; Nerve Block (Bilateral, 06/28/2018); pr rhinp dfrm w/colum lngth tip only (Bilateral, 06/28/2018); Pain management procedure (N/A, 12/17/2021); Bladder surgery (01/01/2022); Cardiac surgery (2006); and Breast biopsy. Social History:  reports that she has quit smoking. She has quit using smokeless tobacco. She reports current alcohol use. She reports that she does not use drugs. Family History: family history includes Cancer in her sister; Colon Cancer in her niece; Prostate Cancer in her brother and brother; Stroke in her father and mother; Uterine Cancer in her sister. The patients home medications have been reviewed.     Allergies: Pcn [penicillins] and Morphine    -------------------------------------------------- RESULTS -------------------------------------------------    LABS:  Results for orders placed or performed during the hospital encounter of 03/15/23   Lactate, Sepsis   Result Value Ref Range    Lactic Acid, Sepsis 1.6 0.5 - 1.9 mmol/L   CBC with Auto Differential   Result Value Ref Range    WBC 24.6 (H) 4.5 - 11.5 E9/L    RBC 3.04 (L) 3.50 - 5.50 E12/L    Hemoglobin 9.3 (L) 11.5 - 15.5 g/dL    Hematocrit 28.2 (L) 34.0 - 48.0 %    MCV 92.8 80.0 - 99.9 fL    MCH 30.6 26.0 - 35.0 pg    MCHC 33.0 32.0 - 34.5 %    RDW 13.0 11.5 - 15.0 fL    Platelets 229 (H) 092 - 450 E9/L    MPV 10.1 7.0 - 12.0 fL    Neutrophils % 93.9 (H) 43.0 - 80.0 %    Lymphocytes % 4.3 (L) 20.0 - 42.0 %    Monocytes % 1.7 (L) 2.0 - 12.0 %    Eosinophils % 0.3 0.0 - 6.0 %    Basophils % 0.2 0.0 - 2.0 %    Neutrophils Absolute 23.12 (H) 1.80 - 7.30 E9/L    Lymphocytes Absolute 0.98 (L) 1.50 - 4.00 E9/L    Monocytes Absolute 0.49 0.10 - 0.95 E9/L    Eosinophils Absolute 0.00 (L) 0.05 - 0.50 E9/L    Basophils Absolute 0.00 0.00 - 0.20 E9/L    Polychromasia 1+     Poikilocytes 2+     Schistocytes 1+     Thony Cells 2+     Tear Drop Cells 1+    Comprehensive Metabolic Panel   Result Value Ref Range    Sodium 132 132 - 146 mmol/L    Potassium 3.4 (L) 3.5 - 5.0 mmol/L    Chloride 96 (L) 98 - 107 mmol/L    CO2 22 22 - 29 mmol/L    Anion Gap 14 7 - 16 mmol/L    Glucose 115 (H) 74 - 99 mg/dL    BUN 30 (H) 6 - 23 mg/dL    Creatinine 0.9 0.5 - 1.0 mg/dL    Est, Glom Filt Rate >60 >=60 mL/min/1.73    Calcium 9.4 8.6 - 10.2 mg/dL    Total Protein 6.5 6.4 - 8.3 g/dL    Albumin 2.8 (L) 3.5 - 5.2 g/dL    Total Bilirubin 1.3 (H) 0.0 - 1.2 mg/dL    Alkaline Phosphatase 102 35 - 104 U/L    ALT 30 0 - 32 U/L    AST 34 (H) 0 - 31 U/L   Brain Natriuretic Peptide   Result Value Ref Range    Pro-BNP 2,462 (H) 0 - 450 pg/mL   Magnesium   Result Value Ref Range    Magnesium 1.9 1.6 - 2.6 mg/dL   Beta-Hydroxybutyrate   Result Value Ref Range    Beta-Hydroxybutyrate 1.05 (H) 0.02 - 0.27 mmol/L   Troponin   Result Value Ref Range    Troponin, High Sensitivity 17 (H) 0 - 9 ng/L   Uric Acid   Result Value Ref Range    Uric Acid, Serum 7.0 (H) 2.4 - 5.7 mg/dL   Troponin   Result Value Ref Range    Troponin, High Sensitivity 16 (H) 0 - 9 ng/L   Urinalysis with Microscopic   Result Value Ref Range    Color, UA Yellow Straw/Yellow    Clarity, UA SLCLOUDY Clear    Glucose, Ur Negative Negative mg/dL    Bilirubin Urine Negative Negative    Ketones, Urine Negative Negative mg/dL    Specific Gravity, UA <=1.005 1.005 - 1.030    Blood, Urine MODERATE (A) Negative    pH, UA 5.5 5.0 - 9.0    Protein, UA Negative Negative mg/dL    Urobilinogen, Urine 0.2 <2.0 E.U./dL    Nitrite, Urine Negative Negative    Leukocyte Esterase, Urine LARGE (A) Negative    WBC, UA >20 (A) 0 - 5 /HPF    RBC, UA >20 0 - 2 /HPF    Epithelial Cells, UA MANY /HPF    Bacteria, UA MANY (A) None Seen /HPF   EKG 12 Lead   Result Value Ref Range    Ventricular Rate 128 BPM    Atrial Rate 101 BPM    QRS Duration 76 ms    Q-T Interval 272 ms    QTc Calculation (Bazett) 397 ms    R Axis 25 degrees    T Axis -168 degrees       RADIOLOGY:  XR CHEST 1 VIEW   Final Result   No acute process. US DUP LOWER EXTREMITIES BILATERAL VENOUS   Final Result   1. No evidence of DVT in either lower extremity. 2. Subcutaneous edema and a large complex left popliteal bursal collection   suggested. ------------------------- NURSING NOTES AND VITALS REVIEWED ---------------------------  Date / Time Roomed:  3/15/2023  8:54 PM  ED Bed Assignment:  03/03    The nursing notes within the ED encounter and vital signs as below have been reviewed.      Patient Vitals for the past 24 hrs:   BP Temp Temp src Pulse Resp SpO2 Height Weight   03/16/23 0121 104/79 -- -- (!) 125 29 -- -- --   03/16/23 0111 -- -- -- (!) 115 18 97 % -- --   03/16/23 0103 89/68 -- -- (!) 119 18 99 % -- --   03/16/23 0101 -- -- -- (!) 110 21 -- -- --   03/16/23 0053 -- -- -- (!) 117 21 97 % -- --   03/16/23 0051 -- -- -- (!) 117 21 95 % -- --   03/16/23 0043 -- -- -- (!) 119 19 98 % -- --   03/16/23 0041 -- -- -- (!) 113 19 96 % -- --   03/16/23 0033 -- -- -- (!) 119 (!) 38 100 % -- --   03/16/23 0032 100/76 -- -- (!) 116 17 -- -- --   03/16/23 0023 -- -- -- (!) 124 18 95 % -- --   03/16/23 0022 -- -- -- (!) 122 19 96 % -- --   03/16/23 0013 -- -- -- (!) 109 20 (!) 86 % -- --   03/16/23 0012 -- -- -- (!) 123 15 (!) 89 % -- --   03/16/23 0002 110/86 -- -- (!) 132 19 99 % -- --   03/15/23 2352 90/66 -- -- (!) 133 25 (!) 86 % -- --   03/15/23 2342 -- -- -- (!) 122 19 100 % -- --   03/15/23 2332 125/79 -- -- (!) 114 13 91 % -- --   03/15/23 2322 96/68 -- -- (!) 108 17 95 % -- --   03/15/23 2312 -- -- -- (!) 110 17 -- -- --   03/15/23 2142 -- -- -- (!) 116 22 98 % -- --   03/15/23 2132 88/62 -- -- (!) 113 22 93 % -- --   03/15/23 2122 -- -- -- (!) 121 (!) 34 93 % -- --   03/15/23 2112 -- -- -- (!) 123 21 97 % -- --   03/15/23 2111 -- -- -- (!) 120 -- 98 % -- --   03/15/23 2104 -- -- -- (!) 135 -- -- -- --   03/15/23 2057 98/81 97.3 °F (36.3 °C) Temporal -- 20 -- 5' 7\" (1.702 m) 209 lb (94.8 kg)       Oxygen Saturation Interpretation: Normal    ------------------------------------------ PROGRESS NOTES ------------------------------------------  Re-evaluation(s):  Time: 0120  Patient’s symptoms are improving  Repeat physical examination is improved    Counseling:  I have spoken with the patient and discussed today’s results, in addition to providing specific details for the plan of care and counseling regarding the diagnosis and prognosis.  Their questions are answered at this time and they are agreeable with the plan of admission.    --------------------------------- ADDITIONAL PROVIDER NOTES ---------------------------------  Consultations:  This patient's ED course included: a personal history and physicial examination, re-evaluation prior to disposition, multiple bedside re-evaluations, IV medications, cardiac monitoring, continuous pulse oximetry, and complex medical decision making and emergency management    This patient has remained hemodynamically stable during their ED course.    CRITICAL CARE:   35 MINUTES.          Please note that the withdrawal or failure to initiate urgent interventions for this patient would likely result in a life threatening deterioration or permanent disability.  Accordingly this patient received the above mentioned time, excluding separately billable procedures.           Diagnosis:  1. Atrial fibrillation with RVR (HCC)    2. Peripheral edema    3. Gout of right foot, unspecified cause, unspecified chronicity    4. Acute cystitis without hematuria    5. Chronic anemia   Disposition:  Patient's disposition: Admit to IMCU  Patient's condition is stable.          Ngozi Trinidad DO  03/16/23 0134

## 2023-03-16 NOTE — ED NOTES
Pt was told that her med Propafenone needs to be brought from home per pharmacy.       Horacio Weldon RN  03/16/23 6040

## 2023-03-16 NOTE — PLAN OF CARE
Problem: Discharge Planning  Goal: Discharge to home or other facility with appropriate resources  Outcome: Progressing  Flowsheets (Taken 3/16/2023 0855)  Discharge to home or other facility with appropriate resources: Identify barriers to discharge with patient and caregiver     Problem: Safety - Adult  Goal: Free from fall injury  Outcome: Progressing  Flowsheets (Taken 3/16/2023 1225)  Free From Fall Injury: Instruct family/caregiver on patient safety     Problem: ABCDS Injury Assessment  Goal: Absence of physical injury  Outcome: Progressing  Flowsheets (Taken 3/16/2023 1225)  Absence of Physical Injury: Implement safety measures based on patient assessment     Problem: Skin/Tissue Integrity  Goal: Absence of new skin breakdown  Description: 1. Monitor for areas of redness and/or skin breakdown  2. Assess vascular access sites hourly  3. Every 4-6 hours minimum:  Change oxygen saturation probe site  4. Every 4-6 hours:  If on nasal continuous positive airway pressure, respiratory therapy assess nares and determine need for appliance change or resting period.   Outcome: Progressing

## 2023-03-16 NOTE — ED NOTES
Awaiting call back from the floor. Unable to give report at this time per the floor.       Jaspal Espinosa RN  03/16/23 [id number]

## 2023-03-16 NOTE — ED NOTES
Report called to the floor. No further questions at this time.       Adria Iglesias RN  03/16/23 [id number]

## 2023-03-16 NOTE — CONSULTS
INPATIENT CARDIOLOGY CONSULT     Reason for Consult: AF RVR    Cardiologist: Dr. Nan Siddiqui    Requesting Physician: Dr. Jolanta Lockwood    Date of Consultation: 3/16/2023    HISTORY OF PRESENT ILLNESS:   Patient is a 74year old WF known to Dr. Benedict Ruiz. She is being seen in consultation this hospital admission by Dr. Nan Siddiqui for evaluation and recommendations regarding AF RVR. PMH: CAD, VHD, persistent AF on Propafenone and Eliquis therapy, HTN, insulin requiring T2DM, obesity, gouty arthritis and chronic anemia    Patient presented to [facility] on March 15, 2023 with complaints of worsening peripheral edema and LLE pain. Per patient, over the past week and a half, she has noticed progressively worsening peripheral edema bilaterally, L > R. She also began to notice LLE pain, and feels like she is having a gout/arthritis attack. Due to worsening of the above noted symptoms, patient began noticing gait instability and severe difficulty with ambulation. She thus presented to the ED for evaluation. She denies ever having dyspnea, chest pain, N/V, diaphoresis, palpitations, dizziness, near syncope or syncope. Denies PND, orthopnea. Denies issues with bleeding, including dark/bloody stools. Admits to medication compliance -- states she is not on a loop diuretic at home. Notes of LLE abscess/cyst, for which she has been following outpatient with orthopedic surgery regarding need for possible I&D. Has not made an appointment with Sleep Medicine for GRETTA as of yet. Please note: past medical records were reviewed per electronic medical record (EMR) - see detailed reports under Past Medical/ Surgical History.    PAST MEDICAL HISTORY:    Obesity  Gouty arthritis  Chronic anemia  CAD  VHD  Persistent AF, on Propafenone and Eliquis therapy  CHADsVASc score of 4 (age, female, HTN, DM)  Initiated on Propafenone therapy 9/2022 (Dr. Benedict Ruiz)  S/p successful DCCV 10/2022  OV EKG 10/2022 maintaining SR   OV EKG 1/2023 AF CVR --> patient requested to continue medical therapy on short term basis with re-evaluation in 3 months  HTN  Insulin requiring T2DM      CARDIAC TESTING    Cardiac catheterization ([facility], 2006)  Anatomic findings   Left main trunk: The left main trunk is a normal vessel. Left anterior descending: There is a 50% stenosis in a medium (2.5-3.0mm) first diagonal.   Right coronary artery: The right coronary artery has mild non-obstructive stenosis and is a dominant vessel. Left circumflex: The left circumflex has mild non-obstructive stenosis and is a non-dominant vessel. The left heart catheterization reveals a left main trunk with no angiographic evidence of disease prior to its bifurcation into the LAD and circumflex arteries. The LAD gives rise to a bifurcating diagonal branch that has a 50-60% narrowing in the inferior division of the diagonal.  The circumflex artery has trivial atherosclerosis. The large dominant RCA has minimal luminal irregularities in the proximal segment. Tilt table test ([facility], 2006)  Conclusions:   During the tilt, patient complaints, SBP, DBP, HR, EKG changes are attached. SUPINE SYSTOLIC HYPERTENSION AND BRADYCARDIA (ON TOPROL XL Rx). THERE WAS INITIAL NORMAL SYSTOLIC BLOOD PRESSURE RESPONSE TO HUT FOLLOWED BY   MODERATE SYSTOLIC POH BEGINNING AT THE 19TH MIN INTO 70 DEGREE TILT. THERE   WAS A NORMAL DIASTOLIC BLOOD PRESSURE RESPONSE TO TILT. THERE WAS A GRADUAL   MODERATE INCREASE IN HEART RATE DURING HUT. PVC\"S DURING TEST AS IN DATA   PAGE. EKG MONITORING DURING TEST--LII    THROUGHOUT AND 12 L INTERMITTENTLY--   SHOWED    FLATTENED T IN aVL, FLAT T IN V1 AT BASELINE. THERE WAS LOW   POSITIVE T IN LIII AND MILD DECREASE OF T AMPLITUDE IN V4-6 DURING 70 DEGREE   TILT. TILT-RELATED T CHANGES RESOLVED DURING RECOVERY    Lexiscan MPS 9/2022  FINDINGS: The overall quality of the study was good.    Left ventricular cavity size was noted to be normal.  Rotational analog analysis demonstrated no patient motion or abnormal extracardiac radioactivity and soft tissue breast attenuation. The gated SPECT rest and stress imaging in the short, vertical long, and horizontal long axis demonstrated mild decrease uptake of the radioactive tracer on rest and stress images with normal wall motion, most likely represents breast attenuation artifact. Gated SPECT left ventricular ejection fraction was calculated to be 52%, with normal myocardial thickening and wall motion. Impression:    Electrocardiographically normal regadenoson infusion with a clinically non-ischemic response  Myocardial perfusion imaging was normal with attenuation artifact. Overall left ventricular systolic function was normal without regional wall motion abnormalities. 4. Low risk general pharmacologic stress test.    TTE (Dr. Fatuma Cisneros, 7/2022)  Summary  Left ventricular internal dimensions were normal in diastole and systole. No regional wall motion abnormalities seen. Normal left ventricular ejection fraction. EF 65%. The left atrium is mildly dilated. Mild mitral annular calcification. Moderate centrally directed mitral regurgitation. The aortic valve appears mildly sclerotic. Mild tricuspid regurgitation.     PAST SURGICAL HISTORY:    Past Surgical History:   Procedure Laterality Date    ABDOMEN SURGERY      BLADDER SURGERY  01/01/2022    bladder stimulator    BREAST BIOPSY      BUNIONECTOMY      bilteral twice    CARDIAC SURGERY  2006    heart cath in [facility]      x 2    HYSTERECTOMY (CERVIX STATUS UNKNOWN)  11/27/2017    robotic assisted bilateral alpingo-oophorectomy lap robotic assisted abdominal sacral colopexy with y mesh    JOINT REPLACEMENT      bilateral knees, right shoulder replaced    KNEE ARTHROPLASTY Bilateral     NERVE BLOCK Bilateral 06/28/2018    facet L3-5 #1    PAIN MANAGEMENT PROCEDURE N/A 12/17/2021    PERIPHERAL NERVE EVALUATION performed by Bienvenido Moreno DO at [facility] OR    VIBHA GARCIAP DFRM W/COLUM LNGTH TIP ONLY Bilateral 06/28/2018    BILATERAL INTRA-ARTICULAR FACET JOINT INJECTION WITH FLUOROSCOPIC GUIDANCE AT L3-4 AND L4-5 #1 performed by Ting Osorio DO at [facility] OR    SHOULDER SURGERY  09/20/2016    right total shoulder reverse arthroplasty       HOME MEDICATIONS:  Prior to Admission medications    Medication Sig Start Date End Date Taking? Authorizing Provider   ELIQUIS 5 MG TABS tablet Take 1 tablet by mouth in the morning and at bedtime 3/8/23   Dereck Nelson MD   ciclopirox (LOPROX) 0.77 % cream APPLY TOPICALLY TO BOTH FEET AND LEGS TWICE A DAY 1/17/23   Historical Provider, MD   SAXENDA 18 MG/3ML SOPN Inject 3 mg into the skin daily 1/24/23   Yani Wilson MD   Insulin Pen Needle 32G X 4 MM MISC 1 each by Does not apply route daily 12/13/22   Yani Wilson MD   propafenone (RYTHMOL SR) 225 MG extended release capsule Take 1 capsule by mouth 2 times daily 10/19/22   Dereck Nelson MD   metoprolol succinate (TOPROL XL) 50 MG extended release tablet 1 tablet daily 10/19/22   Dereck Nelson MD   XIIDRA 5 % SOLN 2 times daily 5/16/22   Historical Provider, MD   acetaminophen (TYLENOL) 650 MG extended release tablet Take 650 mg by mouth every 8 hours as needed for Pain    Historical Provider, MD   erythromycin (ROMYCIN) 5 MG/GM ophthalmic ointment APPLY TO BOTH EYES AT BEDTIME AS DIRECTED 8/10/17   Historical Provider, MD   celecoxib (CELEBREX) 200 MG capsule Take 200 mg by mouth daily    Historical Provider, MD   docusate sodium (COLACE) 100 MG capsule Take 100 mg by mouth daily    Historical Provider, MD   polyvinyl alcohol (LIQUIFILM TEARS) 1.4 % ophthalmic solution Place 1 drop into both eyes as needed    Historical Provider, MD   Cyanocobalamin (VITAMIN B 12 PO) Take 250 mg by mouth daily    Historical Provider, MD   Omega-3 Fatty Acids (OMEGA 3 PO) Take 2,000 mg by mouth daily      Historical Provider, MD   Multiple Vitamins-Minerals (MULTIVITAL PO) Take by mouth daily    Historical Provider, MD   irbesartan-hydrochlorothiazide (AVALIDE) 150-12.5 MG per tablet Take 2 tablets by mouth daily     Historical Provider, MD       CURRENT MEDICATIONS:      Current Facility-Administered Medications:     sodium chloride flush 0.9 % injection 5-40 mL, 5-40 mL, IntraVENous, 2 times per day, Ashu Benítez DO    sodium chloride flush 0.9 % injection 5-40 mL, 5-40 mL, IntraVENous, PRN, Ashu Benítez DO    0.9 % sodium chloride infusion, , IntraVENous, PRN, Donovan Benítez DO    acetaminophen (TYLENOL) tablet 650 mg, 650 mg, Oral, Q4H PRN, Ashu Benítez DO    ondansetron (ZOFRAN-ODT) disintegrating tablet 4 mg, 4 mg, Oral, Q8H PRN **OR** ondansetron (ZOFRAN) injection 4 mg, 4 mg, IntraVENous, Q6H PRN, Ashu Benítez DO    levoFLOXacin (LEVAQUIN) 750 MG/150ML infusion 750 mg, 750 mg, IntraVENous, Q24H, Ashu Benítez DO    docusate sodium (COLACE) capsule 100 mg, 100 mg, Oral, Daily, Ashu Benítez DO    propafenone (RYTHMOL SR) extended release capsule 225 mg, 225 mg, Oral, BID, Ashu Benítez DO    metoprolol succinate (TOPROL XL) extended release tablet 50 mg, 50 mg, Oral, Daily, Ashu Benítez DO    celecoxib (CELEBREX) capsule 200 mg, 200 mg, Oral, Daily, Ashu Benítez DO    polyvinyl alcohol (LIQUIFILM TEARS) 1.4 % ophthalmic solution 1 drop, 1 drop, Both Eyes, PRN, Ashu Benítez DO    erythromycin (ROMYCIN) ophthalmic ointment, , Both Eyes, Nightly, Ashu Benítez DO    apixaban (ELIQUIS) tablet 5 mg, 5 mg, Oral, BID, Ashu Benítez DO    lactated ringers IV soln infusion, , IntraVENous, Continuous, Ashu Benítez DO    losartan (COZAAR) tablet 100 mg, 100 mg, Oral, Daily **AND** hydroCHLOROthiazide (HYDRODIURIL) tablet 25 mg, 25 mg, Oral, Daily, Ashu Benítez, DO    Current Outpatient Medications:     ELIQUIS 5 MG TABS tablet, Take 1 tablet by mouth in the morning and at bedtime, Disp: 180 tablet, Rfl: 3    ciclopirox (LOPROX) 0.77 % cream, APPLY TOPICALLY TO BOTH FEET AND LEGS TWICE A DAY, Disp: , Rfl:     SAXENDA 18 MG/3ML SOPN, Inject 3 mg into the skin daily, Disp: 5 Adjustable Dose Pre-filled Pen Syringe, Rfl: 1    Insulin Pen Needle 32G X 4 MM MISC, 1 each by Does not apply route daily, Disp: 100 each, Rfl: 3    propafenone (RYTHMOL SR) 225 MG extended release capsule, Take 1 capsule by mouth 2 times daily, Disp: 180 capsule, Rfl: 3    metoprolol succinate (TOPROL XL) 50 MG extended release tablet, 1 tablet daily, Disp: 90 tablet, Rfl: 3    XIIDRA 5 % SOLN, 2 times daily, Disp: , Rfl:     acetaminophen (TYLENOL) 650 MG extended release tablet, Take 650 mg by mouth every 8 hours as needed for Pain, Disp: , Rfl:     erythromycin (ROMYCIN) 5 MG/GM ophthalmic ointment, APPLY TO BOTH EYES AT BEDTIME AS DIRECTED, Disp: , Rfl: 6    celecoxib (CELEBREX) 200 MG capsule, Take 200 mg by mouth daily, Disp: , Rfl:     docusate sodium (COLACE) 100 MG capsule, Take 100 mg by mouth daily, Disp: , Rfl:     polyvinyl alcohol (LIQUIFILM TEARS) 1.4 % ophthalmic solution, Place 1 drop into both eyes as needed, Disp: , Rfl:     Cyanocobalamin (VITAMIN B 12 PO), Take 250 mg by mouth daily, Disp: , Rfl:     Omega-3 Fatty Acids (OMEGA 3 PO), Take 2,000 mg by mouth daily , Disp: , Rfl:     Multiple Vitamins-Minerals (MULTIVITAL PO), Take by mouth daily, Disp: , Rfl:     irbesartan-hydrochlorothiazide (AVALIDE) 150-12.5 MG per tablet, Take 2 tablets by mouth daily , Disp: , Rfl:     Facility-Administered Medications Ordered in Other Encounters:     sodium chloride flush 0.9 % injection 10 mL, 10 mL, IntraVENous, 2 times per day, ADALI [place]    sodium chloride flush 0.9 % injection 10 mL, 10 mL, IntraVENous, PRN, ADALI [place]    0.9 % sodium chloride infusion, , IntraVENous, Continuous, ADALI [place]    midazolam (VERSED) injection 1 mg, 1 mg, IntraVENous, Q5 Min PRN, Annie Mills DO, 1 mg at 09/20/16 1367    ALLERGIES:  Pcn [penicillins] and Morphine    SOCIAL HISTORY:    Lives at home with . Uses walker for ambulation assistance. Denies former/current tobacco, alcohol or illicit drug use. FAMILY HISTORY:   Non-contributory at this time due to patient's advanced age. REVIEW OF SYSTEMS:     Negative except as noted above in HPI      PHYSICAL EXAM:   /63   Pulse (!) 117   Temp 97.7 °F (36.5 °C) (Oral)   Resp 25   Ht 5' 7\" (1.702 m)   Wt 209 lb (94.8 kg)   SpO2 96%   BMI 32.73 kg/m²   Wt Readings from Last 3 Encounters:   01/09/23 218 lb (98.9 kg)   12/13/22 218 lb 6.4 oz (99.1 kg)   11/10/22 217 lb (98.4 kg)   CONST:  Well developed, obese WF who appears stated age. Awake, alert, cooperative, no apparent distress. HEENT:   Head- Normocephalic, atraumatic. Eyes- Conjunctivae pink, anicteric. Neck-  No stridor, trachea midline, +JVD. CHEST: Chest symmetrical and non-tender to palpation. No accessory muscle use or intercostal retractions. RESPIRATORY: Lung sounds - clear throughout fields. No wheezing, rales or rhonchi. CARDIOVASCULAR:     No noted carotid bruit. Heart Ausculation- Irregularly irregular rhythm with fast rate, no apparent murmur. PV: 2-3+ bilateral lower extremity edema. Pedal pulses palpable, no clubbing or cyanosis. ABDOMEN: Soft, non-tender to light palpation. Bowel sounds present. MS: Good muscle strength and tone. No atrophy or abnormal movements. SKIN: Warm and dry. NEURO / PSYCH: Oriented to person, place and time. Speech clear and appropriate. Follows all commands. Pleasant affect. DATA:    Telemetry: AF with HR in the 110s-120s    Diagnostic:  All diagnostic testing and lab work thus far this admission reviewed in detail. LE Venous US 3/15/2023  Impression:  1. No evidence of DVT in either lower extremity. 2. Subcutaneous edema and a large complex left popliteal bursal collection  suggested.     CXR 3/15/2023  Impression:  No acute process.     Labs:   CBC:   Recent Labs     03/15/23  2258 03/16/23  0652   WBC 24.6* 20.7*   HGB 9.3* 8.7*   HCT 28.2* 26.3*   * 427     BMP:   Recent Labs     03/15/23  2258 03/16/23  0652    134   K 3.4* 3.3*   CO2 22 23   BUN 30* 24*   CREATININE 0.9 0.6   LABGLOM >60 >60   CALCIUM 9.4 9.0     Mag:   Recent Labs     03/15/23  2258 03/16/23  0652   MG 1.9 1.8     Phos:   Recent Labs     03/16/23 0652   PHOS 3.8     TSH:   Recent Labs     03/16/23  0652   TSH 1.160     FASTING LIPID PANEL:  Lab Results   Component Value Date/Time    CHOL 78 03/16/2023 06:52 AM    HDL 30 03/16/2023 06:52 AM    LDLCALC 38 03/16/2023 06:52 AM    TRIG 50 03/16/2023 06:52 AM     LIVER PROFILE:  Recent Labs     03/15/23  2258 03/16/23  0652   AST 34* 30   ALT 30 27   LABALBU 2.8* 2.5*     Component Ref Range & Units 3/16/23 0652 3/15/23 2258   Lactic Acid, Sepsis 0.5 - 1.9 mmol/L 1.3  1.6      Component Ref Range & Units 3/16/23 0032   Color, UA Straw/Yellow Yellow    Clarity, UA Clear SLCLOUDY    Glucose, Ur Negative mg/dL Negative    Bilirubin Urine Negative Negative    Ketones, Urine Negative mg/dL Negative    Specific Gravity, UA 1.005 - 1.030 <=1.005    Blood, Urine Negative MODERATE Abnormal     pH, UA 5.0 - 9.0 5.5    Protein, UA Negative mg/dL Negative    Urobilinogen, Urine <2.0 E.U./dL 0.2    Nitrite, Urine Negative Negative    Leukocyte Esterase, Urine Negative LARGE Abnormal     WBC, UA 0 - 5 /HPF >20 Abnormal     RBC, UA 0 - 2 /HPF >20    Epithelial Cells, UA /HPF MANY    Bacteria, UA None Seen /HPF MANY Abnormal       Component Ref Range & Units 3/16/23 0032 3/15/23 2258   Troponin, High Sensitivity 0 - 9 ng/L 16 High   17 High  CM      Component Ref Range & Units 3/15/23 2258 11/28/17 0107   Pro-BNP 0 - 450 pg/mL 2,462 High   556 High  R      Component Ref Range & Units 3/15/23 2258   Beta-Hydroxybutyrate 0.02 - 0.27 mmol/L 1.05 High       Component Ref Range & Units 3/15/23 2258   Uric Acid, Serum 2.4 - 5.7 mg/dL 7.0 High       Component Ref Range & Units 3/16/23 0652   Sed Rate 0 - 20 mm/Hr 130 High       Blood cultures pending        Assessment and plan to follow as per Dr. Bridger Hernandez. NOTE: This report was transcribed using voice recognition software. Every effort was made to ensure accuracy; however, inadvertent computerized transcription errors may be present. Qasimel Rinne, [address] [place] [address], [place] [address] Cardiology    Electronically signed by Maribel Colindres PA-C on 3/16/2023 at 7:50 AM      CARDIOLOGY ATTENDING ATTESTATION   I spent > 51% of the total time involved with completing the encounter. The total time included the following:  Independently interviewing the patient (HPI, ROS, PMH, PSH, FMH, SH, allergies, and medications)  Independently performing a medically appropriate examination  Reviewing the above documentation completed by the LEONARD  Ordering medications, tests, and/or procedures  Formulating the assessment/plan and reviewing the rationale for the above recommendations  Reviewing available records, results of all previously ordered testing/procedures, and current problem list  Counseling/educating the patient  Coordinating care with other healthcare professionals  Communicating results to the patient's family/caregiver  Documenting clinical information in the patient's electronic health record    Physical Exam   BP (!) 103/91   Pulse (!) 106   Temp 97.7 °F (36.5 °C) (Oral)   Resp 22   Ht 5' 7\" (1.702 m)   Wt 223 lb 1.6 oz (101.2 kg)   SpO2 100%   BMI 34.94 kg/m²   Constitutional: Oriented to person, place, and time. Well-developed and well-nourished. No distress. Head: Normocephalic and atraumatic. Eyes: EOM are normal. Pupils are equal, round, and reactive to light. Neck: Normal range of motion. Neck supple. No hepatojugular reflux and no JVD present. Carotid bruit is not present. No tracheal deviation present. No thyromegaly present.    Cardiovascular: Normal rate, regular rhythm, normal heart sounds and intact distal pulses. Exam reveals no gallop and no friction rub. No murmur heard. Pulmonary/Chest: Effort normal and breath sounds normal. No respiratory distress. No wheezes. No rales. No tenderness. Abdominal: Soft. Bowel sounds are normal. No distension and no mass. No tenderness. No rebound and no guarding. Musculoskeletal: Normal range of motion. No edema and no tenderness. Lymphadenopathy:   No cervical adenopathy. Neurological: Alert and oriented to person, place, and time. Skin: Skin is warm and dry. No rash noted. Not diaphoretic. No erythema. Psychiatric: Normal mood and affect. Behavior is normal.     See accompanying documentation for full consult. ASSESSMENT AND PLAN:  Patient Active Problem List   Diagnosis    Moderate obesity    Overactive bladder    Essential hypertension    Lumbar stenosis with neurogenic claudication    Lumbar radiculopathy    Anterolisthesis    Chronic bilateral low back pain without sciatica    Facet arthropathy    Palpitations    Paroxysmal atrial fibrillation (HCC)    Osteoarthritis    Atrial fibrillation with RVR ([address]     1. Acute on diastolic CHF/Edema:     Chart/labs/EKG reviewed. Echo to guide therapy given change of status. Gently diurese with IV lasix. Continue BB/ARB. Add aldactone. 2. Persistent AFib:     On eliquis. Rate control. Dose dig. Titrate BB as needed. Address current acute issues. 3. CAD/Mildly elevated troponin:    Mild to moderate CAD by 2006 [facility] cath. Low risk stress 9/2022. Medically manage. BB/statin. No ASA due to eliquis. 4. VHD: Moderate MR/mild TR by 7/2022 echo. Echo.     5. HTN: Observe. 6. Lipids: Statin. 7. Anemia: Follow labs. 8. DM: Per primary service. 9. ID issues/UTI: Sed rate 130. Leukocytosis. Pyuria. Per primary service. Cade Burnett D.O.   Cardiologist  Cardiology, [facility]

## 2023-03-16 NOTE — H&P
[address]                              HISTORY AND PHYSICAL    PATIENT NAME: Bia Parson                   :        1950  MED REC NO:   [id number]                            ROOM:       [id number]  ACCOUNT NO:   [de-identified]                           ADMIT DATE: 03/15/2023  PROVIDER:     Joana Taveras DO    CHIEF COMPLAINT/HISTORY OF CHIEF COMPLAINT:  A 24-year-old white female  who was admitted to [address] [place] [address]. The patient presented to the  hospital here on 03/15/2023. The patient presented to the hospital here  with several different processes going on. The patient does have a  history of atrial fibrillation, currently controlled on Eliquis and  Rythmol. The patient also relates having history of gout. The patient  apparently has been having problem. Over the past couple of weeks, she  has been having ambulatory dysfunction with inability to ambulate with  swelling of the left knee with increasing left lower extremity edema. The patient at this time was seen and evaluated. Ultrasound at this  time showed no evidence of abnormality. Inflammatory marker include a  high sed rate. She was admitted to the hospital to the immediate care  unit. The patient was seen at this time, interviewed in the presence of  her . From a cardiac standpoint view, the patient does have a  history of atrial fibrillation. She is currently on Rythmol and Eliquis  and does follow up with [facility]. Her EKG does show atrial  fibrillation with a moderately rapid ventricular response. Currently,  she appeared to be resting comfortably and without chest pain. She  denies any shortness of breath. She does have swelling of the lower  extremity, left leg affected more than the right. Respiratory dee, she  denies any smoking history. She denies any productive cough, TB or  asthma.   Gastrointestinal wise, the patient has lost weight  approximately 40 pounds. She denies any recent change in bowel habits,  hematochezia or rectal bleeding. Denies any nausea, vomiting, diarrhea  or constipation. Genitourinary wise, does relate to having urinary  incontinence. She also has a bladder stimulator in. Neurologically,  she denies any history of stroke, TIAs, etc.  She does have pain and  swelling to the left leg with a suspected diagnosis of a Baker's cyst.   The patient is scheduled to see Dr. Ash Scanlon. She has requested to see  either Dr. Aidee Jensen or Dr. Ash Scanlon on this admission. ALLERGIES:  She is allergic to PENICILLIN and MORPHINE. CURRENT MEDICATIONS:  At the present time include the following. She is  on Eliquis 5 b.i.d., Loprox cream, Saxenda 3 mg subcu daily, Rythmol SR  225 b.i.d., Toprol-XL 50 daily, Tylenol p.r.n., erythromycin ointment,  Celebrex 200 daily, vitamin B12 250 daily, omega-3 2000 mg daily and  Avalide 150/12.5 t.i.d. PAST MEDICAL HISTORY:  Positive for usual childhood diseases, history of  atrial fibrillation or arthritis, history of obesity with elevated BMI  of 34.91, history of dry eyes, essential hypertension, overactive  bladder with history of the stimulator, postop nausea and vomiting. PAST SURGICAL HISTORY:  Include abdominal surgery. In 2022, she had  a bladder stimulator, history of breast biopsy in the past, bunion  surgery, heart cath in [facility] [employer] [facility] in , cholecystectomy, colonoscopic  exam, hysterectomy in 2017, bilateral salpingo-oophorectomy, total  abdominal hysterectomy, history of bilateral knee replacement, history  of nerve block, shoulder surgery. FAMILY HISTORY:  Parents are . SOCIAL HISTORY:  The patient does not smoke. Social use of alcohol. Denies any recreational drugs. She is currently  for 60 years. Mother of two children. REVIEW OF CHART:  Show the following. B12 and folate level are 1926 and  20 respectively. Sed rate is elevated at 130. TSH is 1160, magnesium  1.8, phosphorus 3.8. Cholesterol 78, HDL and LDL of 30 and 38  respectively. BUN and creatinine 24 and 0.6 respectively, sodium 134,  potassium 3.3, chloride 99, CO2 of 23. Hemoglobin and hematocrit 8.7  and 26.3 respectively,white count 20,000. Urinalysis showed large  amount of leukocyte esterase. Troponin level was 16. PHYSICAL EXAMINATION:  VITAL SIGNS:  Show height to be 5 feet 7 inches, weight is 223, BMI was  34.94, blood pressure was 103/91, pulse 80, respirations 18. GENERAL APPEARANCE:  This is a 64-year-old white female who is alert,  responsive, oriented to person, place and time. HEENT:  Normal grossly to inspection. NECK:  With adequate carotid upstrokes without associated bruits. Trachea midline. Thyroid not palpable. LUNGS:  Clear, diminished at the bases. HEART:  Irregularly irregular rhythm compatible with atrial  fibrillation. Grade 1/6 murmur. No S3. No S4.  ABDOMEN:  Obese. No hepatosplenomegaly. No abdominal bruits. EXTREMITIES:  Revealed +1 pitting edema. Significant swelling was noted  of the left knee. A brace was present on the left wrist.  Osseous  deformity of the hands was noted. Stasis dermatitis of the lower  extremities was noted with +1 of +2 pitting edema. NEUROLOGIC:  The patient was moving all extremities. BREASTS:  Not performed. RECTAL:  Not performed. GENITAL:  Not performed. DERMATOLOGICAL:  The patient did have some stasis dermatitis of the  lower extremities with ecchymosis of the buttocks area. IMPRESSION:  At the present time include;  1.  Cardiac dysrhythmia consisting of persistent atrial fibrillation  with rapid ventricular response. 2.  Coronary artery disease historically with history of catheterization  in 2006. 3.  Essential hypertension. 4.  Diffuse arthritis. 5.  Swelling of the left knee, compatible with Baker's cyst, rule out  hemarthrosis.   6.  Electrolyte imbalance of hyperkalemia. 7.  Normochromic normocytic anemia. 8.  Obesity with elevated BMI of 34.94. PLAN AND RECOMMENDATION:  At this time, currently the patient does  appear stable. The patient does have evidence of atrial fibrillation  with moderate rapid ventricular response. The patient will be  maintained back on beta-blocker and anticoagulant therapy. The patient  is on Eliquis at home. This will be held temporarily in case of  possible surgical intervention to the left knee. The left knee does  show significant amount of swelling at this time. Radiographic exam at  this time did show subcutaneous emphysema with a large complex left  popliteal fluid collection. We will obtain a CT for further evaluation  along with orthopedic consultation. The patient will be seen by  Physical Therapy. The patient also might require short-term nursing  home placement. Otherwise, we will proceed with further recommendation  at this time and proceed with further changes as clinically indicated. Pending results of diagnostic testing will dictate further care and  treatment. We will also further evaluate the anemia and her other  chronic medical problems at this time.         Jose Adkins DO    D: 03/16/2023 13:02:38       T: 03/16/2023 13:07:15     [email or web address]  Job#: [id number]     Doc#: [id number]    CC:

## 2023-03-16 NOTE — CONSULTS
Department of Orthopedic Surgery  Consult Note    Reason for Consult: Left knee pain    HISTORY OF PRESENT ILLNESS:       Patient is a 74 y.o. female who presents with left knee pain, patient is also suffering from left wrist pain. Patient states that last week she was diagnosed with gout of her bilateral feet, by podiatry and they are still bothering her. Patient has been suffering from left knee pain for approximately 1 week she reported to an urgent care where they told her she had a large Baker's cyst of the left knee. Patient also states that her left wrist started hurting for about half a week due to what she believes is increased use, she has chronic left wrist pain and significant arthritis and follows up with Dr. Danny Rodriguez who performs joint injections for her which do help with the pain. The patient had his bilateral total knee replacements done by Dr. Tierney Farley the right in 2009 and the left in 2010. Patient has noted bilateral lower extremity edema over the last week as well, she states she has chronic bilateral lower extremity edema however it is progressing. She is unable to walk on her left lower extremity secondary to pain. This is why she presented to the emergency room. Denies numbness/tingling/paresthesias. Denies any other orthopedic complaints at this time.   Patient is currently on Eliquis for A-fib    Past Medical History:        Diagnosis Date    A-fib [facility])     Arthritis     Class 2 severe obesity due to excess calories with serious comorbidity and body mass index (BMI) of 37.0 to 37.9 in adult ([facility] [address]     Dry eyes, bilateral     Hypertension     OAB (overactive bladder)     PONV (postoperative nausea and vomiting)      Past Surgical History:        Procedure Laterality Date    ABDOMEN SURGERY      BLADDER SURGERY  01/01/2022    bladder stimulator    BREAST BIOPSY      BUNIONECTOMY      bilteral twice    CARDIAC SURGERY  2006    heart cath in [facility] x 2    HYSTERECTOMY (CERVIX STATUS UNKNOWN)  11/27/2017    robotic assisted bilateral alpingo-oophorectomy lap robotic assisted abdominal sacral colopexy with y mesh    JOINT REPLACEMENT      bilateral knees, right shoulder replaced    KNEE ARTHROPLASTY Bilateral     NERVE BLOCK Bilateral 06/28/2018    facet L3-5 #1    PAIN MANAGEMENT PROCEDURE N/A 12/17/2021    PERIPHERAL NERVE EVALUATION performed by Marilyn Moreno DO at [address] [facility] [address] TIP ONLY Bilateral 06/28/2018    BILATERAL INTRA-ARTICULAR FACET JOINT INJECTION WITH FLUOROSCOPIC GUIDANCE AT L3-4 AND L4-5 #1 performed by Wes Briceño DO at [place] [address]  09/20/2016    right total shoulder reverse arthroplasty     Current Medications:   Current Facility-Administered Medications: sodium chloride flush 0.9 % injection 5-40 mL, 5-40 mL, IntraVENous, 2 times per day  sodium chloride flush 0.9 % injection 5-40 mL, 5-40 mL, IntraVENous, PRN  0.9 % sodium chloride infusion, , IntraVENous, PRN  acetaminophen (TYLENOL) tablet 650 mg, 650 mg, Oral, Q4H PRN  ondansetron (ZOFRAN-ODT) disintegrating tablet 4 mg, 4 mg, Oral, Q8H PRN **OR** ondansetron (ZOFRAN) injection 4 mg, 4 mg, IntraVENous, Q6H PRN  levoFLOXacin (LEVAQUIN) 750 MG/150ML infusion 750 mg, 750 mg, IntraVENous, Q24H  docusate sodium (COLACE) capsule 100 mg, 100 mg, Oral, Daily  metoprolol succinate (TOPROL XL) extended release tablet 50 mg, 50 mg, Oral, Daily  celecoxib (CELEBREX) capsule 200 mg, 200 mg, Oral, Daily  polyvinyl alcohol (LIQUIFILM TEARS) 1.4 % ophthalmic solution 1 drop, 1 drop, Both Eyes, PRN  erythromycin (ROMYCIN) ophthalmic ointment, , Both Eyes, Nightly  [Held by provider] apixaban (ELIQUIS) tablet 5 mg, 5 mg, Oral, BID  [Held by provider] lactated ringers IV soln infusion, , IntraVENous, Continuous  allopurinol (ZYLOPRIM) tablet 100 mg, 100 mg, Oral, Nightly  spironolactone (ALDACTONE) tablet 12.5 mg, 12.5 mg, Oral, Daily  [START ON 3/17/2023] losartan (COZAAR) tablet 25 mg, 25 mg, Oral, Daily **AND** [DISCONTINUED] hydroCHLOROthiazide (HYDRODIURIL) tablet 25 mg, 25 mg, Oral, Daily  perflutren lipid microspheres (DEFINITY) injection 1.5 mL, 1.5 mL, IntraVENous, ONCE PRN  furosemide (LASIX) injection 40 mg, 40 mg, IntraVENous, BID  atorvastatin (LIPITOR) tablet 40 mg, 40 mg, Oral, Nightly  Facility-Administered Medications Ordered in Other Encounters: sodium chloride flush 0.9 % injection 10 mL, 10 mL, IntraVENous, 2 times per day  sodium chloride flush 0.9 % injection 10 mL, 10 mL, IntraVENous, PRN  0.9 % sodium chloride infusion, , IntraVENous, Continuous  midazolam (VERSED) injection 1 mg, 1 mg, IntraVENous, Q5 Min PRN  Allergies:  Pcn [penicillins] and Morphine    Social History:   TOBACCO:   reports that she has quit smoking. She has quit using smokeless tobacco.  ETOH:   reports current alcohol use.  DRUGS:   reports no history of drug use.  ACTIVITIES OF DAILY LIVING:    OCCUPATION:    Family History:       Problem Relation Age of Onset    Stroke Mother     Stroke Father     Uterine Cancer Sister     Cancer Sister     Prostate Cancer Brother     Prostate Cancer Brother     Colon Cancer Niece        REVIEW OF SYSTEMS:  CONSTITUTIONAL:  negative for  fevers, chills  EYES:  negative for blurred vision, visual disturbance  HEENT:  negative for  hearing loss, voice change  RESPIRATORY:  negative for  dyspnea, wheezing  CARDIOVASCULAR:  negative for  chest pain, palpitations  GASTROINTESTINAL:  negative for nausea, vomiting  GENITOURINARY:  negative for frequency, urinary incontinence  HEMATOLOGIC/LYMPHATIC:  negative for bleeding and petechiae  MUSCULOSKELETAL:  positive for  pain left wrist left knee left bilateral  feet  NEUROLOGICAL:  negative for headaches, dizziness  BEHAVIOR/PSYCH:  negative for increased agitation and anxiety    PHYSICAL EXAM:    VITALS:  /65   Pulse 95   Temp 97.8 °F (36.6 °C) (Oral)   Resp 19   Ht 5' 7\"  (1.702 m)   Wt 223 lb 1.6 oz (101.2 kg)   SpO2 100%   BMI 34.94 kg/m²   CONSTITUTIONAL:  awake, alert, cooperative, no apparent distress, and appears stated age  General appearance:  No apparent distress, appears stated age. HEENT:  Normal cephalic, atraumatic without obvious deformity. Pupils equal, round, and reactive to light. Extra ocular muscles intact. Conjunctivae/corneas clear. Neck: Supple, with full range of motion. No jugular venous distention. Trachea midline. Respiratory:  Normal respiratory effort. Clear to auscultation,   Cardiovascular:  RRR  Abdomen: Soft, non-tender, non-distended with normal bowel sounds. Skin: Please see exam below  Neurologic:   Cranial nerves: II-XII intact,   Psychiatric:  Alert and oriented x 3   MUSCULOSKELETAL:  Left lower Extremity:   Skin is intact circumferentially  The left knee is visibly swollen when compared to the right knee, there is no erythema noted, mild effusion noted  Patient has pitting edema up to the mid thigh  +TTP circumferentially about the left knee, patient is very sensitive to pain with light palpation. Compartments soft and compressible  brisk cap refill to toes, foot warm and perfused, unable to palpate pulses secondary to  pitting edema  Sensation intact to light touch in sural/deep peroneal/superficial peroneal/saphenous/posterior tibial nerve distributions to foot/ankle  Demonstrates active ankle plantar/dorsiflexion/great toe extension   Patient cannot stand any range of motion to the left knee active or passive. Wound left foot first digit   left Upper Extremity   Skin intact circumferentially  Visible deformity noticed about the left wrist, with erythema and swelling noted.   Palpable fusion noted  +TTP globally about the left wrist and metacarpals   compartments soft and compressible  +AIN/PIN/Ulnar nerve function intact grossly  +Radial pulse, Brisk Cap refill, hand warm and perfused  Sensation intact to touch in radial/ulnar/median nerve distributions to hand   Patient is not able to withstand any range of motion of the left wrist secondary to pain. Secondary Exam:   rightUE: No obvious signs of trauma. -TTP to fingers, hand, wrist, forearm, elbow, humerus, shoulder or clavicle. -- Patient able to flex/extend fingers, wrist, elbow and shoulder with active and passive ROM without pain, +2/4 Radial pulse, cap refill <3sec, +AIN/PIN/Radial/Ulnar/Median N, distal sensation grossly intact to C4-T1 dermatomes, compartments soft and compressible. rightLE: No obvious signs of trauma. -TTP to foot, ankle, leg, knee, thigh, hip.-- Patient able to flex/extend toes, ankle, knee and hip with active and passive ROM without pain,+2/4 DP & PT pulses, cap refill <3sec, +5/5 PF/DF/EHL, distal sensation grossly intact to L4-S1 dermatomes, compartments soft and compressible. Visible scar on the anterior aspect of the knee, pitting edema about the right lower extremity, chronic skin changes noted in the lower extremity    Pelvis: -TTP, -Log roll, -Heel strike     DATA:    CBC:   Lab Results   Component Value Date/Time    WBC 20.7 03/16/2023 06:52 AM    RBC 2.81 03/16/2023 06:52 AM    HGB 8.7 03/16/2023 06:52 AM    HCT 26.3 03/16/2023 06:52 AM    MCV 93.6 03/16/2023 06:52 AM    MCH 31.0 03/16/2023 06:52 AM    MCHC 33.1 03/16/2023 06:52 AM    RDW 13.1 03/16/2023 06:52 AM     03/16/2023 06:52 AM    MPV 10.1 03/16/2023 06:52 AM     PT/INR:  No results found for: PROTIME, INR    Radiology Review:  CT of the left knee on 3-16 demonstrate no acute fractures dislocation, patient status post left total knee replacement. Large osteophyte noted on the base of the left patella.     X-ray of left knee on 3-16 demonstrate no acute fracture dislocation, similar finding to CT. once again large osteophyte noted at the base of the patella    X-ray of the left wrist on 3- demonstrates significant carpometacarpal arthritis of the first joint, significant degenerative changes of the carpal and carpal metacarpal joints, patient has a chronic carpal dislocation volarly. This was confirmed with comparison of previous imaging    IMPRESSION:  Left knee pain rule out septic arthritis  Left wrist pain rule out septic arthritis    PLAN:  Weightbearing as tolerated  N.p.o. at midnight  Pain management per admitting  Patient's left knee was sterilely prepped with alcohol for left knee aspiration, 18-gauge needle was then used to aspirate synovial fluid, approximately 8 cc of fluid were obtained this fluid will be sent to lab for cultures, Gram stain, crystals, glucose, cell count differential  Patient's left wristwas sterilely prepped with alcohol for left knee aspiration, 18-gauge needle was then used to aspirate synovial fluid, approximately 1 cc of fluid were obtained this fluid will be sent to lab for cultures, Gram stain, crystals, glucose, cell count differential  ESR was 130  Uric acid 7  If cell count comes back elevated and concern for infection plan for OR on 3-17 for washout of the infected joint.         Will discuss with attending     Suly Ramirez DO  PGY-1 Orthopedic Surgery

## 2023-03-17 LAB
A BAUMANNII DNA BLD POS QL NAA+NON-PROBE: NOT DETECTED
ABO + RH BLD: NORMAL
ALBUMIN SERPL-MCNC: 2.4 G/DL (ref 3.5–5.2)
ALP SERPL-CCNC: 97 U/L (ref 35–104)
ALT SERPL-CCNC: 26 U/L (ref 0–32)
ANION GAP SERPL CALCULATED.3IONS-SCNC: 11 MMOL/L (ref 7–16)
APPEARANCE FLUID: NORMAL
APTT BLD: 33.3 SEC (ref 24.5–35.1)
AST SERPL-CCNC: 26 U/L (ref 0–31)
B PARAP IS1001 DNA NPH QL NAA+NON-PROBE: NOT DETECTED
B PERT.PT PRMT NPH QL NAA+NON-PROBE: NOT DETECTED
BASOPHILS # BLD: 0.03 E9/L (ref 0–0.2)
BASOPHILS NFR BLD: 0.2 % (ref 0–2)
BILIRUB SERPL-MCNC: 0.9 MG/DL (ref 0–1.2)
BLD GP AB SCN SERPL QL: NORMAL
BOTTLE TYPE: ABNORMAL
BUN SERPL-MCNC: 21 MG/DL (ref 6–23)
C ALBICANS DNA BLD POS QL NAA+NON-PROBE: NOT DETECTED
C AURIS DNA BLD POS QL NAA+PROBE: NOT DETECTED
C GLABRATA DNA BLD POS QL NAA+NON-PROBE: NOT DETECTED
C KRUSEI DNA BLD POS QL NAA+NON-PROBE: NOT DETECTED
C PARAP DNA BLD POS QL NAA+NON-PROBE: NOT DETECTED
C PNEUM DNA NPH QL NAA+NON-PROBE: NOT DETECTED
C TROPICLS DNA BLD POS QL NAA+NON-PROBE: NOT DETECTED
CALCIUM SERPL-MCNC: 8.8 MG/DL (ref 8.6–10.2)
CELL COUNT FLUID TYPE: NORMAL
CHLORIDE SERPL-SCNC: 103 MMOL/L (ref 98–107)
CO2 SERPL-SCNC: 23 MMOL/L (ref 22–29)
COLOR FLUID: NORMAL
CREAT SERPL-MCNC: 0.5 MG/DL (ref 0.5–1)
CRP SERPL HS-MCNC: 31.1 MG/DL (ref 0–0.4)
CRYPTOCOCCUS NEOFORMANS/GATTII BY PCR: NOT DETECTED
CRYSTALS, FLUID: NORMAL
E CLOAC COMP DNA BLD POS NAA+NON-PROBE: NOT DETECTED
E COLI DNA BLD POS QL NAA+NON-PROBE: NOT DETECTED
E FAECALIS DNA BLD POS QL NAA+PROBE: NOT DETECTED
E FAECIUM DNA BLD POS QL NAA+PROBE: NOT DETECTED
EKG ATRIAL RATE: 101 BPM
EKG Q-T INTERVAL: 272 MS
EKG QRS DURATION: 76 MS
EKG QTC CALCULATION (BAZETT): 397 MS
EKG R AXIS: 25 DEGREES
EKG T AXIS: -168 DEGREES
EKG VENTRICULAR RATE: 128 BPM
ENTEROBACT DNA BLD POS QL NAA+NON-PROBE: NOT DETECTED
ENTEROCOC DNA BLD POS QL NAA+NON-PROBE: NOT DETECTED
EOSINOPHIL # BLD: 0.04 E9/L (ref 0.05–0.5)
EOSINOPHIL NFR BLD: 0.2 % (ref 0–6)
ERYTHROCYTE [DISTWIDTH] IN BLOOD BY AUTOMATED COUNT: 13.4 FL (ref 11.5–15)
ERYTHROCYTE [SEDIMENTATION RATE] IN BLOOD BY WESTERGREN METHOD: 140 MM/HR (ref 0–20)
FLUAV RNA NPH QL NAA+NON-PROBE: NOT DETECTED
FLUBV RNA NPH QL NAA+NON-PROBE: NOT DETECTED
FLUID TYPE: NORMAL
GLUCOSE FLD-MCNC: 20 MG/DL
GLUCOSE SERPL-MCNC: 109 MG/DL (ref 74–99)
GN BLD CULTURE PNL BLD POS NAA+PROBE: NOT DETECTED
HADV DNA NPH QL NAA+NON-PROBE: NOT DETECTED
HCOV 229E RNA NPH QL NAA+NON-PROBE: NOT DETECTED
HCOV HKU1 RNA NPH QL NAA+NON-PROBE: NOT DETECTED
HCOV NL63 RNA NPH QL NAA+NON-PROBE: NOT DETECTED
HCOV OC43 RNA NPH QL NAA+NON-PROBE: NOT DETECTED
HCT VFR BLD AUTO: 25.2 % (ref 34–48)
HGB BLD-MCNC: 8.4 G/DL (ref 11.5–15.5)
HMPV RNA NPH QL NAA+NON-PROBE: NOT DETECTED
HPIV1 RNA NPH QL NAA+NON-PROBE: NOT DETECTED
HPIV2 RNA NPH QL NAA+NON-PROBE: NOT DETECTED
HPIV3 RNA NPH QL NAA+NON-PROBE: NOT DETECTED
HPIV4 RNA NPH QL NAA+NON-PROBE: NOT DETECTED
IMM GRANULOCYTES # BLD: 0.16 E9/L
IMM GRANULOCYTES NFR BLD: 1 % (ref 0–5)
INR BLD: 1.5
K OXYTOCA DNA BLD POS QL NAA+NON-PROBE: NOT DETECTED
K PNEUMON DNA SPEC QL NAA+PROBE: NOT DETECTED
K. AEROGENES DNA SPEC QL NAA+PROBE: NOT DETECTED
L MONOCYTOG DNA BLD POS QL NAA+NON-PROBE: NOT DETECTED
LV EF: 65 %
LVEF MODALITY: NORMAL
LYMPHOCYTES # BLD: 1.64 E9/L (ref 1.5–4)
LYMPHOCYTES NFR BLD: 9.8 % (ref 20–42)
M PNEUMO DNA NPH QL NAA+NON-PROBE: NOT DETECTED
MCH RBC QN AUTO: 31.5 PG (ref 26–35)
MCHC RBC AUTO-ENTMCNC: 33.3 % (ref 32–34.5)
MCV RBC AUTO: 94.4 FL (ref 80–99.9)
MECA+MECC ISLT/SPM QL: NOT DETECTED
MONOCYTE, FLUID: 6 %
MONOCYTES # BLD: 1.43 E9/L (ref 0.1–0.95)
MONOCYTES NFR BLD: 8.6 % (ref 2–12)
N MEN DNA BLD POS QL NAA+NON-PROBE: NOT DETECTED
NEUTROPHIL, FLUID: 95 %
NEUTROPHILS # BLD: 13.38 E9/L (ref 1.8–7.3)
NEUTS SEG NFR BLD: 80.2 % (ref 43–80)
NUCLEATED CELLS FLUID: NORMAL /UL
ORDER NUMBER: ABNORMAL
P AERUGINOSA DNA BLD POS NAA+NON-PROBE: NOT DETECTED
PLATELET # BLD AUTO: 439 E9/L (ref 130–450)
PMV BLD AUTO: 10.1 FL (ref 7–12)
POTASSIUM SERPL-SCNC: 3.5 MMOL/L (ref 3.5–5)
PROCALCITONIN: 0.1 NG/ML (ref 0–0.08)
PROT SERPL-MCNC: 6.1 G/DL (ref 6.4–8.3)
PROTEUS SP DNA BLD POS QL NAA+NON-PROBE: NOT DETECTED
PROTHROMBIN TIME: 17.2 SEC (ref 9.3–12.4)
RBC # BLD AUTO: 2.67 E12/L (ref 3.5–5.5)
RBC FLUID: NORMAL /UL
RSV RNA NPH QL NAA+NON-PROBE: NOT DETECTED
RV+EV RNA NPH QL NAA+NON-PROBE: NOT DETECTED
S AUREUS DNA BLD POS QL NAA+NON-PROBE: DETECTED
S AUREUS+CONS DNA BLD POS NAA+NON-PROBE: DETECTED
S EPIDERMIDIS DNA BLD POS QL NAA+PROBE: NOT DETECTED
S LUGDUNENSIS DNA BLD POS QL NAA+PROBE: NOT DETECTED
S MALTOPH DNA BLD POS QL NAA+PROBE: NOT DETECTED
S MARCESCENS DNA BLD POS NAA+NON-PROBE: NOT DETECTED
S PNEUM DNA BLD POS QL NAA+NON-PROBE: NOT DETECTED
S PYO DNA BLD POS QL NAA+NON-PROBE: NOT DETECTED
SALMONELLA DNA BLD POS QL NAA+PROBE: NOT DETECTED
SARS-COV-2 RDRP RESP QL NAA+PROBE: NOT DETECTED
SARS-COV-2 RNA NPH QL NAA+NON-PROBE: NOT DETECTED
SODIUM SERPL-SCNC: 137 MMOL/L (ref 132–146)
SOURCE OF BLOOD CULTURE: ABNORMAL
STREPTOCOCCUS AGALACTIAE BY PCR: NOT DETECTED
STREPTOCOCCUS DNA BLD POS NAA+NON-PROBE: NOT DETECTED
WBC # BLD: 16.7 E9/L (ref 4.5–11.5)

## 2023-03-17 PROCEDURE — 85651 RBC SED RATE NONAUTOMATED: CPT

## 2023-03-17 PROCEDURE — 36415 COLL VENOUS BLD VENIPUNCTURE: CPT

## 2023-03-17 PROCEDURE — 86140 C-REACTIVE PROTEIN: CPT

## 2023-03-17 PROCEDURE — 99233 SBSQ HOSP IP/OBS HIGH 50: CPT | Performed by: INTERNAL MEDICINE

## 2023-03-17 PROCEDURE — 87635 SARS-COV-2 COVID-19 AMP PRB: CPT

## 2023-03-17 PROCEDURE — 6370000000 HC RX 637 (ALT 250 FOR IP): Performed by: INTERNAL MEDICINE

## 2023-03-17 PROCEDURE — 86900 BLOOD TYPING SEROLOGIC ABO: CPT

## 2023-03-17 PROCEDURE — 87186 SC STD MICRODIL/AGAR DIL: CPT

## 2023-03-17 PROCEDURE — 82947 ASSAY GLUCOSE BLOOD QUANT: CPT

## 2023-03-17 PROCEDURE — 2060000000 HC ICU INTERMEDIATE R&B

## 2023-03-17 PROCEDURE — 87070 CULTURE OTHR SPECIMN AEROBIC: CPT

## 2023-03-17 PROCEDURE — 89060 EXAM SYNOVIAL FLUID CRYSTALS: CPT

## 2023-03-17 PROCEDURE — 6360000002 HC RX W HCPCS: Performed by: INTERNAL MEDICINE

## 2023-03-17 PROCEDURE — 2580000003 HC RX 258: Performed by: NURSE PRACTITIONER

## 2023-03-17 PROCEDURE — 87075 CULTR BACTERIA EXCEPT BLOOD: CPT

## 2023-03-17 PROCEDURE — 2580000003 HC RX 258: Performed by: INTERNAL MEDICINE

## 2023-03-17 PROCEDURE — 86850 RBC ANTIBODY SCREEN: CPT

## 2023-03-17 PROCEDURE — 87205 SMEAR GRAM STAIN: CPT

## 2023-03-17 PROCEDURE — 86901 BLOOD TYPING SEROLOGIC RH(D): CPT

## 2023-03-17 PROCEDURE — P9047 ALBUMIN (HUMAN), 25%, 50ML: HCPCS | Performed by: NURSE PRACTITIONER

## 2023-03-17 PROCEDURE — 85730 THROMBOPLASTIN TIME PARTIAL: CPT

## 2023-03-17 PROCEDURE — 87040 BLOOD CULTURE FOR BACTERIA: CPT

## 2023-03-17 PROCEDURE — 93010 ELECTROCARDIOGRAM REPORT: CPT | Performed by: INTERNAL MEDICINE

## 2023-03-17 PROCEDURE — 0202U NFCT DS 22 TRGT SARS-COV-2: CPT

## 2023-03-17 PROCEDURE — 89051 BODY FLUID CELL COUNT: CPT

## 2023-03-17 PROCEDURE — 85610 PROTHROMBIN TIME: CPT

## 2023-03-17 PROCEDURE — 76937 US GUIDE VASCULAR ACCESS: CPT

## 2023-03-17 PROCEDURE — 80053 COMPREHEN METABOLIC PANEL: CPT

## 2023-03-17 PROCEDURE — 87077 CULTURE AEROBIC IDENTIFY: CPT

## 2023-03-17 PROCEDURE — 93306 TTE W/DOPPLER COMPLETE: CPT

## 2023-03-17 PROCEDURE — 85025 COMPLETE CBC W/AUTO DIFF WBC: CPT

## 2023-03-17 PROCEDURE — 84145 PROCALCITONIN (PCT): CPT

## 2023-03-17 PROCEDURE — 6360000002 HC RX W HCPCS: Performed by: NURSE PRACTITIONER

## 2023-03-17 RX ORDER — LEVOFLOXACIN 500 MG/1
500 TABLET, FILM COATED ORAL DAILY
Status: DISCONTINUED | OUTPATIENT
Start: 2023-03-18 | End: 2023-03-18 | Stop reason: HOSPADM

## 2023-03-17 RX ORDER — 0.9 % SODIUM CHLORIDE 0.9 %
250 INTRAVENOUS SOLUTION INTRAVENOUS ONCE
Status: COMPLETED | OUTPATIENT
Start: 2023-03-17 | End: 2023-03-17

## 2023-03-17 RX ORDER — FENTANYL CITRATE 0.05 MG/ML
25 INJECTION, SOLUTION INTRAMUSCULAR; INTRAVENOUS EVERY 4 HOURS PRN
Status: DISCONTINUED | OUTPATIENT
Start: 2023-03-17 | End: 2023-03-18 | Stop reason: HOSPADM

## 2023-03-17 RX ORDER — ALBUMIN (HUMAN) 12.5 G/50ML
25 SOLUTION INTRAVENOUS ONCE
Status: COMPLETED | OUTPATIENT
Start: 2023-03-17 | End: 2023-03-17

## 2023-03-17 RX ORDER — POTASSIUM CHLORIDE 7.45 MG/ML
10 INJECTION INTRAVENOUS PRN
Status: DISCONTINUED | OUTPATIENT
Start: 2023-03-17 | End: 2023-03-18 | Stop reason: HOSPADM

## 2023-03-17 RX ORDER — DIGOXIN 0.25 MG/ML
250 INJECTION INTRAMUSCULAR; INTRAVENOUS ONCE
Status: COMPLETED | OUTPATIENT
Start: 2023-03-17 | End: 2023-03-17

## 2023-03-17 RX ORDER — POTASSIUM CHLORIDE 20 MEQ/1
40 TABLET, EXTENDED RELEASE ORAL PRN
Status: DISCONTINUED | OUTPATIENT
Start: 2023-03-17 | End: 2023-03-18 | Stop reason: HOSPADM

## 2023-03-17 RX ORDER — ENOXAPARIN SODIUM 100 MG/ML
1 INJECTION SUBCUTANEOUS 2 TIMES DAILY
Status: DISCONTINUED | OUTPATIENT
Start: 2023-03-17 | End: 2023-03-18 | Stop reason: HOSPADM

## 2023-03-17 RX ORDER — FENTANYL CITRATE 0.05 MG/ML
50 INJECTION, SOLUTION INTRAMUSCULAR; INTRAVENOUS EVERY 4 HOURS PRN
Status: DISCONTINUED | OUTPATIENT
Start: 2023-03-17 | End: 2023-03-17

## 2023-03-17 RX ORDER — MAGNESIUM SULFATE 1 G/100ML
1000 INJECTION INTRAVENOUS PRN
Status: DISCONTINUED | OUTPATIENT
Start: 2023-03-17 | End: 2023-03-18 | Stop reason: HOSPADM

## 2023-03-17 RX ORDER — ENOXAPARIN SODIUM 100 MG/ML
40 INJECTION SUBCUTANEOUS DAILY
Status: DISCONTINUED | OUTPATIENT
Start: 2023-03-18 | End: 2023-03-17

## 2023-03-17 RX ORDER — HYDROCODONE BITARTRATE AND ACETAMINOPHEN 5; 325 MG/1; MG/1
1 TABLET ORAL EVERY 4 HOURS PRN
Status: DISCONTINUED | OUTPATIENT
Start: 2023-03-17 | End: 2023-03-18 | Stop reason: HOSPADM

## 2023-03-17 RX ORDER — METOPROLOL SUCCINATE 50 MG/1
50 TABLET, EXTENDED RELEASE ORAL 2 TIMES DAILY
Status: DISCONTINUED | OUTPATIENT
Start: 2023-03-17 | End: 2023-03-18 | Stop reason: HOSPADM

## 2023-03-17 RX ADMIN — METOPROLOL SUCCINATE 50 MG: 50 TABLET, EXTENDED RELEASE ORAL at 10:17

## 2023-03-17 RX ADMIN — DOCUSATE SODIUM 100 MG: 100 CAPSULE, LIQUID FILLED ORAL at 10:18

## 2023-03-17 RX ADMIN — CELECOXIB 200 MG: 100 CAPSULE ORAL at 12:12

## 2023-03-17 RX ADMIN — Medication 10 ML: at 10:19

## 2023-03-17 RX ADMIN — METOPROLOL SUCCINATE 50 MG: 50 TABLET, EXTENDED RELEASE ORAL at 20:40

## 2023-03-17 RX ADMIN — ENOXAPARIN SODIUM 100 MG: 100 INJECTION SUBCUTANEOUS at 20:42

## 2023-03-17 RX ADMIN — FUROSEMIDE 40 MG: 10 INJECTION, SOLUTION INTRAMUSCULAR; INTRAVENOUS at 10:18

## 2023-03-17 RX ADMIN — ERYTHROMYCIN: 5 OINTMENT OPHTHALMIC at 20:40

## 2023-03-17 RX ADMIN — SODIUM CHLORIDE 250 ML: 9 INJECTION, SOLUTION INTRAVENOUS at 16:39

## 2023-03-17 RX ADMIN — DIGOXIN 250 MCG: 0.25 INJECTION INTRAMUSCULAR; INTRAVENOUS at 12:11

## 2023-03-17 RX ADMIN — ALBUMIN (HUMAN) 25 G: 0.25 INJECTION, SOLUTION INTRAVENOUS at 15:15

## 2023-03-17 RX ADMIN — ENOXAPARIN SODIUM 100 MG: 100 INJECTION SUBCUTANEOUS at 14:24

## 2023-03-17 RX ADMIN — ATORVASTATIN CALCIUM 40 MG: 40 TABLET, FILM COATED ORAL at 20:40

## 2023-03-17 RX ADMIN — FENTANYL CITRATE 50 MCG: 0.05 INJECTION, SOLUTION INTRAMUSCULAR; INTRAVENOUS at 13:42

## 2023-03-17 RX ADMIN — SPIRONOLACTONE 12.5 MG: 25 TABLET ORAL at 10:18

## 2023-03-17 RX ADMIN — Medication 10 ML: at 20:42

## 2023-03-17 RX ADMIN — LOSARTAN POTASSIUM 25 MG: 50 TABLET, FILM COATED ORAL at 10:17

## 2023-03-17 RX ADMIN — VANCOMYCIN HYDROCHLORIDE 1250 MG: 10 INJECTION, POWDER, LYOPHILIZED, FOR SOLUTION INTRAVENOUS at 12:22

## 2023-03-17 RX ADMIN — LEVOFLOXACIN 750 MG: 5 INJECTION, SOLUTION INTRAVENOUS at 12:22

## 2023-03-17 RX ADMIN — ALLOPURINOL 100 MG: 100 TABLET ORAL at 20:40

## 2023-03-17 ASSESSMENT — PAIN SCALES - GENERAL
PAINLEVEL_OUTOF10: 0
PAINLEVEL_OUTOF10: 8

## 2023-03-17 ASSESSMENT — PAIN DESCRIPTION - DESCRIPTORS: DESCRIPTORS: SHARP

## 2023-03-17 ASSESSMENT — PAIN DESCRIPTION - LOCATION: LOCATION: ARM

## 2023-03-17 ASSESSMENT — PAIN DESCRIPTION - ORIENTATION: ORIENTATION: LEFT

## 2023-03-17 NOTE — PROGRESS NOTES
Pharmacist Review and Automatic Dose Adjustment of Prophylactic Enoxaparin         The reviewing pharmacist has made an adjustment to the ordered enoxaparin dose or converted to UFH per the approved [facility] protocol and table as identified below. Martínez Knight is a 74 y.o. female. Recent Labs     03/15/23  2258 03/16/23  0652 03/17/23  0645   CREATININE 0.9 0.6 0.5       Estimated Creatinine Clearance: 121 mL/min (based on SCr of 0.5 mg/dL).     Recent Labs     03/16/23  0652 03/17/23  0645   HGB 8.7* 8.4*   HCT 26.3* 25.2*    439     Recent Labs     03/17/23  0645   INR 1.5       Height:   Ht Readings from Last 1 Encounters:   03/16/23 5' 7\" (1.702 m)     Weight:  Wt Readings from Last 1 Encounters:   03/16/23 223 lb 1.6 oz (101.2 kg)               Plan: Based upon the patient's weight and renal function    Ordered: Enoxaparin 40mg SUBQ Daily    Changed/converted to    New Order: Enoxaparin 30mg SUBQ BID      Thank you,  Ace Myers, [facility]  3/17/2023, 11:31 AM

## 2023-03-17 NOTE — PROGRESS NOTES
Talked to the [facility] patient jackson been accepted  to [facility] by Dr Bower Nurse and we are waiting on bed assignment. Patient is still on the wait list unsure when bed may become available at this time.  [facility] spoke to [facility] at 1600 3/17/2023

## 2023-03-17 NOTE — CONSULTS
[address]  Phone [phone]   [phone]      Admit Date: 3/15/2023  8:54 PM  Pt Name: Barbara Christine  MRN: [id number]  : 1950  Reason for Consult:    Chief Complaint   Patient presents with    Difficulty Walking     Requesting Physician:  Royer Alcantar DO  PCP: Luis Daniel Caballero MD  History Obtained From:  patient, chart   ID consulted for Peripheral edema [R60.9]  Chronic anemia [D64.9]  Atrial fibrillation with RVR ([address] [I48.91]  Acute cystitis without hematuria [N30.00]  Gout of right foot, unspecified cause, unspecified chronicity [M10.9]  on hospital day 1  Face to face encounter   CHIEF COMPLAINT       Chief Complaint   Patient presents with    Difficulty Walking     HISTORYOF PRESENT ILLNESS   Barbara Christine is a 74 y.o. female who presents with   has a past medical history of A-fib ([address] Arthritis, Class 2 severe obesity due to excess calories with serious comorbidity and body mass index (BMI) of 37.0 to 37.9 in [facility]), Dry eyes, bilateral, Hypertension, OAB (overactive bladder), and PONV (postoperative nausea and vomiting). ED TRIAGEVITALS  BP: 119/61, Temp: 99.2 °F (37.3 °C), Heart Rate: (!) 121, Resp: 25, SpO2: 95 %  HPI:  ID was consulted on 23 for infection management  Pt presented to ER on 3/15/2023 with diagnosis of  Peripheral edema [R60.9]  Chronic anemia [D64.9]  Atrial fibrillation with RVR (HCC) [I48.91]  Acute cystitis without hematuria [N30.00]  Gout of right foot, unspecified cause, unspecified chronicity [M10.9]    Patient was admitted to hospital for difficulty ambulating over the last few weeks. The patient reports she does walk with a cane, but has had increased pain to left leg/ knee lately. She also reports her left great toe has an ulcer on it and she has been following with her Podiatrist for treatment. She reports she has been on and off PO antibiotics over the last few months.       Patient denies any fevers, chills, nausea, vomiting, or diarrhea. No rash. She is in afib. Orthopedics is following- patient has a history of left knee replacement many years ago. Her sed rate and crp are elevated. WBC is 24.6 on admission   Blood cultures have turned positive for staph aureus   ECHO is ordered   Patient had a rai placed- her urine culture is positive for e.coli - patient did not report any urinary symptoms   Patient has been started on IV vancomycin and has been on levaquin for UTI  Patient has a bladder stimulator     Patient is not known to to ID service.   ID has been asked to evaluate and manage antibiotics   REVIEW OF SYSTEMS     CONSTITUTIONAL:   No fever, chills, weight loss  ALLERGIES:    No rash or itch  EYES:     No visual changes  ENT:      No  hearing loss or sore throat  CARDIOVASCULAR:   No chest pain or palpitations, + Afib   RESPIRATORY:   No cough, sob  ENDOCRINE:    No increase thirst, urination   HEME-LYMPH:   No easy bruising or bleeding disorder  GI:     No nausea, vomiting or diarrhea  :     No urinary complaints- has rai   NEURO:    No seizures, stroke, HA  MUSCULOSKELETAL:  + knee pain , + arthritis   SKIN:     No rash, ulcers or wounds  PSYCH:    No depression or anxiety    Medications Prior to Admission: ELIQUIS 5 MG TABS tablet, Take 1 tablet by mouth in the morning and at bedtime  ciclopirox (LOPROX) 0.77 % cream, APPLY TOPICALLY TO BOTH FEET AND LEGS TWICE A DAY  SAXENDA 18 MG/3ML SOPN, Inject 3 mg into the skin daily  Insulin Pen Needle 32G X 4 MM MISC, 1 each by Does not apply route daily  propafenone (RYTHMOL SR) 225 MG extended release capsule, Take 1 capsule by mouth 2 times daily  metoprolol succinate (TOPROL XL) 50 MG extended release tablet, 1 tablet daily  XIIDRA 5 % SOLN, 2 times daily  acetaminophen (TYLENOL) 650 MG extended release tablet, Take 650 mg by mouth every 8 hours as needed for Pain  erythromycin (ROMYCIN) 5 MG/GM ophthalmic ointment, APPLY TO BOTH EYES AT BEDTIME AS DIRECTED (Patient not taking: Reported on 3/16/2023)  celecoxib (CELEBREX) 200 MG capsule, Take 200 mg by mouth daily  docusate sodium (COLACE) 100 MG capsule, Take 100 mg by mouth daily  polyvinyl alcohol (LIQUIFILM TEARS) 1.4 % ophthalmic solution, Place 1 drop into both eyes as needed  Cyanocobalamin (VITAMIN B 12 PO), Take 250 mg by mouth daily  Omega-3 Fatty Acids (OMEGA 3 PO), Take 2,000 mg by mouth daily   Multiple Vitamins-Minerals (MULTIVITAL PO), Take by mouth daily  irbesartan-hydrochlorothiazide (AVALIDE) 150-12.5 MG per tablet, Take 2 tablets by mouth daily   CURRENT MEDICATIONS     Current Facility-Administered Medications:     HYDROcodone-acetaminophen (NORCO) 5-325 MG per tablet 1 tablet, 1 tablet, Oral, Q4H PRN, CHANDRA Silva - CNP    fentaNYL (SUBLIMAZE) injection 50 mcg, 50 mcg, IntraVENous, Q4H PRN, CHANDRA Silva CNP    digoxin (LANOXIN) injection 250 mcg, 250 mcg, IntraVENous, Once, Eugenio Ace DO    metoprolol succinate (TOPROL XL) extended release tablet 50 mg, 50 mg, Oral, BID, Parmjit Jara DO    sodium chloride flush 0.9 % injection 5-40 mL, 5-40 mL, IntraVENous, 2 times per day, Ashu Benítez DO, 10 mL at 03/17/23 1019    sodium chloride flush 0.9 % injection 5-40 mL, 5-40 mL, IntraVENous, PRN, Ashu Benítez DO    0.9 % sodium chloride infusion, , IntraVENous, PRN, Nico Benítez DO    acetaminophen (TYLENOL) tablet 650 mg, 650 mg, Oral, Q4H PRN, Ashu Benítez DO    ondansetron (ZOFRAN-ODT) disintegrating tablet 4 mg, 4 mg, Oral, Q8H PRN **OR** ondansetron (ZOFRAN) injection 4 mg, 4 mg, IntraVENous, Q6H PRN, Ashu Benítez DO    levoFLOXacin (LEVAQUIN) 750 MG/150ML infusion 750 mg, 750 mg, IntraVENous, Q24H, Ashu Benítez DO, Stopped at 03/16/23 1630    docusate sodium (COLACE) capsule 100 mg, 100 mg, Oral, Daily, Ashu Benítez DO, 100 mg at 03/17/23 1018    celecoxib (CELEBREX) capsule 200 mg, 200 mg, Oral, Daily, Ashu Benítez DO, 200 mg at 03/16/23 1247    polyvinyl alcohol (LIQUIFILM TEARS) 1.4 % ophthalmic solution 1 drop, 1 drop, Both Eyes, PRN, Jad Benítez DO    erythromycin [facility]) ophthalmic ointment, , Both Eyes, Nightly, [place] Half, DO, Given at 03/16/23 2318    [Held by provider] apixaban (ELIQUIS) tablet 5 mg, 5 mg, Oral, BID, Jad Benítez DO    [Held by provider] lactated ringers IV soln infusion, , IntraVENous, Continuous, Ashu Benítez DO    allopurinol (ZYLOPRIM) tablet 100 mg, 100 mg, Oral, Nightly, Ashu Benítez DO, 100 mg at 03/16/23 2234    spironolactone (ALDACTONE) tablet 12.5 mg, 12.5 mg, Oral, Daily, Gayla Emanuel DO, 12.5 mg at 03/17/23 1018    losartan (COZAAR) tablet 25 mg, 25 mg, Oral, Daily, 25 mg at 03/17/23 1017 **AND** [DISCONTINUED] hydroCHLOROthiazide (HYDRODIURIL) tablet 25 mg, 25 mg, Oral, Daily, Ashu Benítez DO    perflutren lipid microspheres (DEFINITY) injection 1.5 mL, 1.5 mL, IntraVENous, ONCE PRN, Gayla Emanuel DO    furosemide (LASIX) injection 40 mg, 40 mg, IntraVENous, BID, Parmjit Jara DO, 40 mg at 03/17/23 1018    atorvastatin (LIPITOR) tablet 40 mg, 40 mg, Oral, Nightly, Gayla Emanuel DO, 40 mg at 03/16/23 2234    Facility-Administered Medications Ordered in Other Encounters:     0.9 % sodium chloride infusion, , IntraVENous, Continuous, ADALI [place]    midazolam (VERSED) injection 1 mg, 1 mg, IntraVENous, Q5 Min PRN, Phil Myles DO, 1 mg at 09/20/16 2268  ALLERGIES     Pcn [penicillins] and Morphine  Immunization History   Administered Date(s) Administered    COVID-19, PFIZER PURPLE top, DILUTE for use, (age 15 y+), 30mcg/0.3mL 02/09/2021, 03/02/2021, 10/15/2021      Internal Administration   First Dose COVID-19, PFIZER PURPLE top, DILUTE for use, (age 15 y+), 30mcg/0.3mL  02/09/2021   Second Dose COVID-19, PFIZER PURPLE top, DILUTE for use, (age 15 y+), 30mcg/0.3mL   03/02/2021       Last COVID Lab No results found for: SARS-COV-2, SARS-COV-2 RNA, SARS-COV-2, SARS-COV-2, SARS-COV-2 BY PCR, SARS-COV-2, SARS-COV-2, SARS-COV-2     PAST MEDICAL HISTORY     Past Medical History:   Diagnosis Date    A-fib ([facility]     Arthritis     Class 2 severe obesity due to excess calories with serious comorbidity and body mass index (BMI) of 37.0 to 37.9 in adult ([facility]     Dry eyes, bilateral     Hypertension     OAB (overactive bladder)     PONV (postoperative nausea and vomiting)      SURGICAL HISTORY       Past Surgical History:   Procedure Laterality Date    ABDOMEN SURGERY      BLADDER SURGERY  01/01/2022    bladder stimulator    BREAST BIOPSY      BUNIONECTOMY      bilteral twice    CARDIAC SURGERY  2006    heart cath in [facility]      x 2    HYSTERECTOMY (CERVIX STATUS UNKNOWN)  11/27/2017    robotic assisted bilateral alpingo-oophorectomy lap robotic assisted abdominal sacral colopexy with y mesh    JOINT REPLACEMENT      bilateral knees, right shoulder replaced    KNEE ARTHROPLASTY Bilateral     NERVE BLOCK Bilateral 06/28/2018    facet L3-5 #1    PAIN MANAGEMENT PROCEDURE N/A 12/17/2021    PERIPHERAL NERVE EVALUATION performed by Liza Moreno DO at [address] [place] [address] W/COLUM [address] TIP ONLY Bilateral 06/28/2018    BILATERAL INTRA-ARTICULAR FACET JOINT INJECTION WITH FLUOROSCOPIC GUIDANCE AT L3-4 AND L4-5 #1 performed by Jamarcus Bravo DO at [place] [address]  09/20/2016    right total shoulder reverse arthroplasty     FAMILY HISTORY       Family History   Problem Relation Age of Onset    Stroke Mother     Stroke Father     Uterine Cancer Sister     Cancer Sister     Prostate Cancer Brother     Prostate Cancer Brother     Colon Cancer Niece      SOCIAL HISTORY       Social History     Socioeconomic History    Marital status:    Tobacco Use    Smoking status: Former    Smokeless tobacco: Former    Tobacco comments:     quit smoking 40 yrs ago   Vaping Use    Vaping Use: Never used   Substance and Sexual Activity    Alcohol use: Yes     Comment: social    Drug use: No   Social History Narrative    1 cup coffee daily      Social Determinants of Health     Financial Resource Strain: Low Risk     Difficulty of Paying Living Expenses: Not hard at all   Food Insecurity: No Food Insecurity    Worried About Running Out of Food in the Last Year: Never true    Ran Out of Food in the Last Year: Never true   Transportation Needs: Unknown    Lack of Transportation (Non-Medical): No   Physical Activity: Unknown    Days of Exercise per Week: 0 days   Intimate Partner Violence: Not At Risk    Fear of Current or Ex-Partner: No    Emotionally Abused: No    Physically Abused: No    Sexually Abused: No   Housing Stability: Unknown    Unstable Housing in the Last Year: No   Lives with    Retired teacher     PHYSICAL EXAM        Vitals:    Vitals:    03/16/23 2230 03/17/23 0400 03/17/23 0812 03/17/23 1017   BP: 99/68 112/83 119/61 119/61   Pulse: (!) 105 (!) 110 (!) 121    Resp: 22 20 25    Temp: 97.7 °F (36.5 °C) 97.7 °F (36.5 °C) 99.2 °F (37.3 °C)    TempSrc: Infrared Infrared Oral    SpO2: 97% 95% 95%    Weight:       Height:            CONSTITUTIONAL:  awake, alert, cooperative, no apparent distress, and appears stated age  ENT:  Normocephalic, without obvious abnormality, atraumatic  NECK:  Supple, symmetrical, trachea midline  LUNGS:  No increased work of breathing, good air exchange, clear to auscultation bilaterally, no crackles or wheezing  CARDIOVASCULAR:  Irregular- ? Soft murmur   ABDOMEN:  No scars, normal bowel sounds, soft, non-distended, non-tender, no masses palpated   MUSCULOSKELETAL:  There is + edema to left leg and knee area. Incision to left knee is healed - with no erythema. Left wrist with pain- in brace     Left great toe      NEUROLOGIC:  Awake, alert, oriented to name, place and time. Cranial nerves II-XII are grossly intact.      SKIN:  no bruising or bleeding and normal skin color, texture, turgor  Montoya- yellow urine     Peripheral Intravenous Line:  Peripheral IV 03/16/23 Right; Anterior Antecubital (Active)   Site Assessment Clean, dry & intact 03/16/23 1942   Line Status Clotted 03/16/23 1942   Line Care Cap changed 03/16/23 1942   Phlebitis Assessment No symptoms 03/16/23 1942   Infiltration Assessment 0 03/16/23 1942   Alcohol Cap Used No 03/16/23 1942   Dressing Status Clean, dry & intact 03/16/23 1942   Dressing Type Transparent 03/16/23 1942     Drain(s):  Urinary Catheter 03/17/23 Montoya;2 Way (Active)   Output (mL) 1100 mL 03/17/23 0832     DIAGNOSTIC RESULTS   RADIOLOGY:   XR WRIST LEFT (MIN 3 VIEWS)    Result Date: 3/16/2023  EXAMINATION: 3 XRAY VIEWS OF THE LEFT WRIST 3/16/2023 4:31 pm COMPARISON: 11/10/2022 HISTORY: ORDERING SYSTEM PROVIDED HISTORY: pain TECHNOLOGIST PROVIDED HISTORY: Reason for exam:->pain FINDINGS: Compared to the prior exam, there has been interval progression of scapholunate advanced collapse now demonstrating complete radiocarpal dislocation anteriorly. Additional moderate to severe degenerative changes seen throughout the carpal bones and the 1st [facility] joint. Soft tissue swelling of the wrist is seen with scattered calcifications suggestive of possible underlying CPPD disease. No acute fracture is identified. 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 XR KNEE LEFT (1-2 VIEWS)    Result Date: 3/16/2023  EXAMINATION: TWO XRAY VIEWS OF THE LEFT KNEE 3/16/2023 1:22 pm COMPARISON: CT left knee 03/16/2023 at 12:10 HISTORY: ORDERING SYSTEM PROVIDED HISTORY: pain TECHNOLOGIST PROVIDED HISTORY: Reason for exam:->pain FINDINGS: A left total knee arthroplasty is identified with adequate alignment. No acute fracture is identified. No concerning periprosthetic lucency is seen. The joint effusion seen on CT is not well visualized radiographically.      Stable appearance of the left total knee arthroplasty compared to earlier exam.  No acute osseous abnormality. XR FOOT LEFT (MIN 3 VIEWS)    Result Date: 3/16/2023  EXAMINATION: THREE XRAY VIEWS OF THE LEFT FOOT; THREE XRAY VIEWS OF THE RIGHT FOOT 3/16/2023 5:41 pm COMPARISON: None. HISTORY: ORDERING SYSTEM PROVIDED HISTORY: pain TECHNOLOGIST PROVIDED HISTORY: Reason for exam:->pain FINDINGS: No acute fracture is identified. No osseous destruction is seen. No dislocation is visualized. Bilateral postsurgical changes of the great toe are seen. Additional postsurgical changes of the left 2nd through 5th MTP joints are noted. There is bilateral pes planus with advanced forefoot degenerative change including hammertoe deformities. Soft tissue swelling of the feet are seen, left greater than right. No acute osseous abnormality identified. Extensive bilateral postsurgical and degenerative changes, as described above. XR FOOT RIGHT (MIN 3 VIEWS)    Result Date: 3/16/2023  EXAMINATION: THREE XRAY VIEWS OF THE LEFT FOOT; THREE XRAY VIEWS OF THE RIGHT FOOT 3/16/2023 5:41 pm COMPARISON: None. HISTORY: ORDERING SYSTEM PROVIDED HISTORY: pain TECHNOLOGIST PROVIDED HISTORY: Reason for exam:->pain FINDINGS: No acute fracture is identified. No osseous destruction is seen. No dislocation is visualized. Bilateral postsurgical changes of the great toe are seen. Additional postsurgical changes of the left 2nd through 5th MTP joints are noted. There is bilateral pes planus with advanced forefoot degenerative change including hammertoe deformities. Soft tissue swelling of the feet are seen, left greater than right. No acute osseous abnormality identified. Extensive bilateral postsurgical and degenerative changes, as described above.      CT KNEE LEFT WO CONTRAST    Result Date: 3/16/2023  EXAMINATION: CT OF THE LEFT KNEE WITHOUT CONTRAST 3/16/2023 11:50 am TECHNIQUE: CT of the left knee was performed without the administration of intravenous contrast.  Multiplanar reformatted images are provided for review. Automated exposure control, iterative reconstruction, and/or weight based adjustment of the mA/kV was utilized to reduce the radiation dose to as low as reasonably achievable. COMPARISON: Left lower extremity deep venous ultrasound 03/15/2023 HISTORY ORDERING SYSTEM PROVIDED HISTORY: pain/ swelling TECHNOLOGIST PROVIDED HISTORY: Reason for exam:->pain/ swelling FINDINGS: Left total knee arthroplasty and with unavoidable CT reconstruction artifact related to metallic hardware. CT artifact partly limits detail. As correlated with the preliminary CT scanogram, left knee prosthetic components appear in appropriate position. 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 No fracture or dislocation is seen. A left knee effusion is present and estimated to be large in size. Associated large popliteal cyst which spans an approximately 15 cm craniocaudal length and measures 7 x 4 cm in the short axis. Inferiorly, the popliteal cyst extends into the upper left calf posteriorly. There is nonspecific generalized superficial edema with fat stranding of the knee, lower thigh, and upper left leg. No soft tissue gas or abscess seen. 1.  Left total knee arthroplasty with large effusion and large popliteal cyst formation. Details above. 2.  Nonspecific superficial edema of the knee, lower thigh, and upper calf on the left. No abscess or focal infection seen. XR CHEST 1 VIEW    Result Date: 3/15/2023  EXAMINATION: ONE XRAY VIEW OF THE CHEST 3/15/2023 7:59 pm COMPARISON: 09/01/2006 HISTORY: ORDERING SYSTEM PROVIDED HISTORY: eval CHF TECHNOLOGIST PROVIDED HISTORY: Reason for exam:->eval CHF FINDINGS: The lungs are without acute focal process. There is no effusion or pneumothorax. The cardiomediastinal silhouette is without acute process.  The osseous structures are without acute process. No acute process. US DUP LOWER EXTREMITY LEFT PREMA    Result Date: 3/10/2023  EXAMINATION: DUPLEX VENOUS ULTRASOUND OF THE LEFT LOWER EXTREMITY 3/10/2023 11:52 am TECHNIQUE: Duplex ultrasound using B-mode/gray scaled imaging and Doppler spectral analysis and color flow was obtained of the deep venous structures of the left lower extremity. COMPARISON: None. HISTORY: ORDERING SYSTEM PROVIDED HISTORY: Leg swelling TECHNOLOGIST PROVIDED HISTORY: R/O DVT Reason for exam:->Leg swelling What reading provider will be dictating this exam?->CRC FINDINGS: The visualized veins of the left lower extremity are patent and free of echogenic thrombus. The veins demonstrate good compressibility with normal color flow study and spectral analysis. Complex popliteal cyst measuring approximately 6.4 cm in long axis. No evidence of DVT in the left lower extremity. Complex popliteal cyst.     US DUP LOWER EXTREMITIES BILATERAL VENOUS    Result Date: 3/15/2023  EXAMINATION: DUPLEX VENOUS ULTRASOUND OF THE BILATERAL LOWER EXTREMITIES3/15/2023 9:36 pm TECHNIQUE: Duplex ultrasound using B-mode/gray scaled imaging, Doppler spectral analysis and color flow Doppler was obtained of the deep venous structures of the lower bilateral extremities. COMPARISON: None. HISTORY: ORDERING SYSTEM PROVIDED HISTORY: leg edema, rule out DVT, attn left leg TECHNOLOGIST PROVIDED HISTORY: Reason for exam:->leg edema, rule out DVT, attn left leg What reading provider will be dictating this exam?->CRC FINDINGS: The visualized veins of the bilateral lower extremities are patent and free of echogenic thrombus. The veins demonstrate good compressibility with normal color flow study and spectral analysis. There is subcutaneous edema mostly on the left being evident, and at the popliteal fossa a complex fluid collection measuring 10 cm suggestive of Baker's cyst with internal echogenicity possibly debris or loose bodies     1.   No evidence of DVT in either lower extremity. 2. Subcutaneous edema and a large complex left popliteal bursal collection suggested. LABS  Recent Labs     03/15/23  2258 03/16/23  0652 03/17/23  0645   WBC 24.6* 20.7* 16.7*   HGB 9.3* 8.7* 8.4*   HCT 28.2* 26.3* 25.2*   MCV 92.8 93.6 94.4   * 427 439     Recent Labs     03/15/23  2258 03/16/23  0652 03/17/23  0645    134 137   K 3.4* 3.3* 3.5   CL 96* 99 103   CO2 22 23 23   BUN 30* 24* 21   CREATININE 0.9 0.6 0.5   LABGLOM >60 >60 >60   GLUCOSE 115* 96 109*   PROT 6.5 6.0* 6.1*   LABALBU 2.8* 2.5* 2.4*   CALCIUM 9.4 9.0 8.8   BILITOT 1.3* 1.2 0.9   ALKPHOS 102 92 97   AST 34* 30 26   ALT 30 27 26     No results for input(s): PROCAL in the last 72 hours. Lab Results   Component Value Date    CRP 31.1 (H) 03/17/2023     Lab Results   Component Value Date    SEDRATE 130 (H) 03/16/2023     COVID-19/BLAINE-COV2 LABS  Recent Labs     03/15/23  2258 03/16/23  0652 03/17/23 0645   CRP  --   --  31.1*   INR  --   --  1.5   PROTIME  --   --  17.2*   AST 34* 30 26   ALT 30 27 26   TRIG  --  50  --      Lab Results   Component Value Date/Time    CHOL 78 03/16/2023 06:52 AM    TRIG 50 03/16/2023 06:52 AM    HDL 30 03/16/2023 06:52 AM    LDLCALC 38 03/16/2023 06:52 AM    LABVLDL 10 03/16/2023 06:52 AM     MICROBIOLOGY:     Cultures :   Recent Labs     03/15/23  2258   BC Gram stain performed from blood culture bottle media  Gram positive cocci  *     Recent Labs     03/15/23  2258   BLOODCULT2 Gram stain performed from blood culture bottle media  Gram positive cocci  *     WOUND/ABSCESS   Date Value Ref Range Status   09/26/2022   Final    Mixed gifty isolated. Further workup and sensitivity testing  is not routinely indicated and will not be performed.   Mixed gifty isolated includes:  Coagulase negative Staph species  Gram positive chapito  Gram negative rods       Urine Culture, Routine   Date Value Ref Range Status   08/25/2022 >100,000 CFU/ml  Final   02/28/2022 >100,000 CFU/ml Final   02/01/2022 >100,000 CFU/ml  Final     MRSA Culture Only   Date Value Ref Range Status   09/16/2016 Methicillin resistant Staph aureus not isolated  Final     FINAL IMPRESSION    Patient is a 74 y.o. female who presented with   Chief Complaint   Patient presents with    Difficulty Walking    and admitted for Peripheral edema [R60.9]  Chronic anemia [D64.9]  Atrial fibrillation with RVR ([address] [I48.91]  Acute cystitis without hematuria [N30.00]  Gout of right foot, unspecified cause, unspecified chronicity [M10.9]  Staph Aureus Bacteremia   Rule out septic left knee infection - with prosthetic joint   Left great toe ulcer   Bacteruria   Leukocytosis   S/p left knee bedside aspiration       Plan:   Stop Levaquin IV- change to Levaquin 500 mg PO daily for now  Start vancomycin   Pharmacy dosing   Repeat blood cultures  Orthopedics following  Follow-up cultures   Check ECHO  Patient wishes to be transferred to [facility]- however due to difficulty getting a bed- patient is ok getting treatment here. Monitor labs   CRP-31.1  sed rate- 140    Available labs, imaging studies, microbiologic studies have been reviewed. The patient/FAMILY  was educated about the diagnosis, prognosis, indications, risks and benefits of treatment. An opportunity to ask questions was given to the patient/FAMILY and questions were answered. Thank you for involving me in the care of Catalina Tenorio. Please do not hesitate to call [phone]  for any questions or concerns. Electronically signed by CHANDRA Blue on 3/17/2023 at 11:13 AM    As above  This is a face to face encounter with Catalina Tenorio on 03/17/23. Patient was evaluated independently of  NURSE PRACTITIONER. I discussed the findings and plans with CHANDRA Blue and agree as documented in note. See below for additional details and treatment plan.       Catalina Tenorio is a 74 y.o. female who presents with   Chief Complaint   Patient presents with    Difficulty Walking    and has  has a past medical history of A-fib ([facility]), Arthritis, Class 2 severe obesity due to excess calories with serious comorbidity and body mass index (BMI) of 37.0 to 37.9 in adult [facility]), Dry eyes, bilateral, Hypertension, OAB (overactive bladder), and PONV (postoperative nausea and vomiting). ID was consulted for   Admit DX:  Peripheral edema [R60.9]  Chronic anemia [D64.9]  Atrial fibrillation with RVR (HCC) [I48.91]  Acute cystitis without hematuria [N30.00]  Gout of right foot, unspecified cause, unspecified chronicity [M10.9]    Id following for  leukocytosis  Staph aureus sepsis  Eval for Left wrist septic joint   POSS Left PJI knee seen by ortho s/p aspiration   Left foot great to chronic ulcer followed by Dr Rosaura Shahid  E coli bacteruria h/o bladder stimulation   -esr/crp   -blood cx    Updated   vancomycin (VANCOCIN) 1,250 mg in sodium chloride 0.9 % 250 mL IVPB, Q12H  [START ON 3/18/2023] levoFLOXacin (LEVAQUIN) tablet 500 mg, Daily     F/u blood cx  JYOTI      Imaging and labs were reviewed. Wes Peña was informed of their diagnosis, indications, risks and benefits of treatment. Wes Peña had the opportunity to ask questions. All questions were answered. Thank you for involving me in the care of Wes Peña. Please do not hesitate to call for any questions or concerns.     Electronically signed by Dhaval Coleman MD on 3/17/2023 at 5:06 PM

## 2023-03-17 NOTE — PROGRESS NOTES
OT SESSION ATTEMPT     Date:3/17/2023  Patient Name: Mike Alegre  MRN: [id number]  : 1950  Room: [address] [place] [address]     Attempted OT session this date:    [] unavailable due to other medical staff currently with pt   [x] unavailable per nursing staff recommendation-awaiting transfer to [address] [place] [address] for possible hardware removal of knee    [] Unavailable per nursing staff secondary to lab / radiology results    [] pt declined due to ____. Benefits of participation in therapy reviewed with pt.    [] off unit   [] Other:     Will reattempt OT evaluation and/or treatment at a later time.     Eyvonne Libman OTR/L; [id number]

## 2023-03-17 NOTE — PROGRESS NOTES
Internal Medicine Progress Note    [facility]    [facility]. Liam Crawford., BIAOORLANDO Bustamante D.O., BIAOOG Maki, MSN, APRN, NP-C  Judge Stokes. Cici Bucio, MSN, APRN-CNP     Primary Care Physician: Lc Norman MD   Admitting Physician:  Federico Garza DO  Admission date and time: 3/15/2023  8:54 PM    Room:  [address] [place] [address]  Admitting diagnosis: Peripheral edema [R60.9]  Chronic anemia [D64.9]  Atrial fibrillation with RVR ([address] [I48.91]  Acute cystitis without hematuria [N30.00]  Gout of right foot, unspecified cause, unspecified chronicity [M10.9]    Patient Name: Stan Broussard  MRN: [id number]    Date of Service: 3/17/2023     Subjective:  Tito Santillan is a 74 y.o. female who was seen and examined today,3/17/2023, at the bedside. Complaint of pain, redness and swelling of the left wrist there is improved very marginally with the brace. Continues to complain of some discomfort in the left knee as well. We have reviewed the results of the work-up and plan of care moving forward. They are adamant that they be transferred to [facility] for evaluation by an orthopedic surgeon with whom they have an established relationship with. The patient's  has actually reached out to [facility] himself and is attempted to make arrangements. We have discussed the transfer process and that we cannot promise that an urgent transfer can be arranged. They are agreeable with continuing with treatment uninterrupted in the interim.  present during my examination. Review of System:   Constitutional:   Positive for intermittent fevers and chills. Positive for malaise. HEENT:   Denies ear pain, sore throat, sinus or eye problems. Cardiovascular:   Chest pain. Positive for history of atrial fibrillation on chronic Eliquis therapy.   Respiratory:   Denies shortness of breath, coughing, sputum production, hemoptysis, or wheezing. Gastrointestinal:   Denies nausea, vomiting, diarrhea, or constipation. Denies any abdominal pain. Genitourinary:    Denies any urgency, frequency, hematuria. Voiding  without difficulty. Extremities:   Joint complaints as below, otherwise denies lower extremity swelling, edema or cyanosis. Neurology:    Denies any headache or focal neurological deficits, + mild generalized weakness without focal complaints. Psch:   Denies being anxious or depressed. Musculoskeletal:    Positive for pain, redness, swelling and decreased range of motion to the left wrist.  Positive for pain and swelling with effusion of the left knee. Positive for remote prior joint replacement in that knee. Positive for recurrent infectious issues with the left great toe. Positive for recent recovery from gouty arthritis of the right foot. Integumentary:   Orthopedic issues as above. Denies any rashes, ulcers, or excoriations. Denies bruising. Hematologic/Lymphatic:  Denies bruising or bleeding. Physical Exam:  I/O this shift:  In: -   Out: 1100 [Urine:1100]    Intake/Output Summary (Last 24 hours) at 3/17/2023 1104  Last data filed at 3/17/2023 0832  Gross per 24 hour   Intake --   Output 1500 ml   Net -1500 ml   I/O last 3 completed shifts:  In: -   Out: 400 [Urine:400]  Patient Vitals for the past 96 hrs (Last 3 readings):   Weight   03/16/23 0900 223 lb 1.6 oz (101.2 kg)   03/15/23 2057 209 lb (94.8 kg)     Vital Signs:   Blood pressure 119/61, pulse (!) 121, temperature 99.2 °F (37.3 °C), temperature source Oral, resp. rate 25, height 5' 7\" (1.702 m), weight 223 lb 1.6 oz (101.2 kg), SpO2 95 %. General appearance:  Alert, responsive, oriented to person, place, and time. Ill and uncomfortable appearing, no distress. Head:  Normocephalic. No masses, lesions or tenderness. Eyes:  PERRLA. EOMI. Sclera clear. ENT:  Ears normal. Mucosa normal.  Neck:    Supple. Trachea midline. No thyromegaly. No JVD.  No bruits. Heart:    Irregular rhythm with mildly tachycardic rate, S1 and S2. Systolic murmur. Lungs:    Symmetrical.  Mildly diminished bibasilar air exchange. Clear to auscultation bilaterally. No wheezes. No rhonchi. No rales. Abdomen:   Soft. Obese. Non-tender. Non-distended. Bowel sounds positive. No organomegaly or masses. No pain on palpation. Extremities:    Peripheral pulses present. No significant pitting peripheral edema. No ulcers. No cyanosis. No clubbing. Neurologic:    Alert x 3. Generally weak without focal deficit. Cranial nerves grossly intact. No focal weakness. Psych:   Behavior is normal. Mood appears normal. Speech is not rapid and/or pressured. Musculoskeletal:   Noted swelling, erythema, warmth and tenderness to the left wrist circumferentially with severely reduced range of motion due to pain. The aspiration site is unremarkable in appearance with a Band-Aid in place. No palpable crepitation on very limited range of motion examination. Distal neurovascular examination is intact. The left knee has a midline remote appearing prior incisional scar consistent with history of joint replacement. Sepsis risks a high lateral puncture site with a small amount of ecchymosis adjacent with the Band-Aid in place overlying consistent with the recent aspiration. No palpable crepitation limited range of motion examination. The left great toe has a dressing in place which is not removed for direct visualization however it does not have any significant strikethrough or drainage appreciated. Gait not assessed. Integumentary:  Orthopedic exam as above. No rashes  Skin normal color and texture.   Genitalia/Breast:  Deferred    Medication:  Scheduled Meds:   sodium chloride flush  5-40 mL IntraVENous 2 times per day    levofloxacin  750 mg IntraVENous Q24H    docusate sodium  100 mg Oral Daily    metoprolol succinate  50 mg Oral Daily    celecoxib  200 mg Oral Daily    erythromycin   Both Eyes Nightly    [Held by provider] apixaban  5 mg Oral BID    allopurinol  100 mg Oral Nightly    spironolactone  12.5 mg Oral Daily    losartan  25 mg Oral Daily    furosemide  40 mg IntraVENous BID    atorvastatin  40 mg Oral Nightly     Continuous Infusions:   sodium chloride      [Held by provider] lactated ringers IV soln         Objective Data:  CBC with Differential:    Lab Results   Component Value Date/Time    WBC 16.7 03/17/2023 06:45 AM    RBC 2.67 03/17/2023 06:45 AM    HGB 8.4 03/17/2023 06:45 AM    HCT 25.2 03/17/2023 06:45 AM     03/17/2023 06:45 AM    MCV 94.4 03/17/2023 06:45 AM    MCH 31.5 03/17/2023 06:45 AM    MCHC 33.3 03/17/2023 06:45 AM    RDW 13.4 03/17/2023 06:45 AM    LYMPHOPCT 9.8 03/17/2023 06:45 AM    MONOPCT 8.6 03/17/2023 06:45 AM    BASOPCT 0.2 03/17/2023 06:45 AM    MONOSABS 1.43 03/17/2023 06:45 AM    LYMPHSABS 1.64 03/17/2023 06:45 AM    EOSABS 0.04 03/17/2023 06:45 AM    BASOSABS 0.03 03/17/2023 06:45 AM     BMP:    Lab Results   Component Value Date/Time     03/17/2023 06:45 AM    K 3.5 03/17/2023 06:45 AM     03/17/2023 06:45 AM    CO2 23 03/17/2023 06:45 AM    BUN 21 03/17/2023 06:45 AM    LABALBU 2.4 03/17/2023 06:45 AM    CREATININE 0.5 03/17/2023 06:45 AM    CALCIUM 8.8 03/17/2023 06:45 AM    GFRAA >60 11/28/2017 04:44 AM    LABGLOM >60 03/17/2023 06:45 AM    GLUCOSE 109 03/17/2023 06:45 AM       Wound Documentation:   Wound 03/16/23 Pretibial Left; Anterior; Lower (Active)   Wound Etiology Other 03/16/23 0855   Dressing/Treatment Open to air 03/16/23 2230   Wound Assessment Pink/red 03/16/23 2230   Drainage Amount None 03/16/23 2230   Number of days: 1       Wound 03/16/23 Toe (Comment  which one) Anterior; Left 0.8 cmx1.2 cm (Active)   Wound Etiology Surgical 03/16/23 0855   Dressing Status Clean;Dry; Intact 03/16/23 2230   Wound Cleansed Cleansed with saline 03/16/23 0855   Dressing/Treatment Alginate;Dry dressing 03/16/23 2230   Wound Length (cm) 1.2 cm 03/16/23 0855   Wound Width (cm) 0.8 cm 03/16/23 0855   Wound Surface Area (cm^2) 0.96 cm^2 03/16/23 0855   Wound Assessment Eschar dry; Purple/maroon 03/16/23 0855   Drainage Amount Small 03/16/23 0855   Drainage Description Serosanguinous 03/16/23 0855   Number of days: 1       Wound 03/16/23 Perineum Posterior;Mid redness blancheable (Active)   Wound Etiology Pressure Stage 1 03/16/23 0855   Dressing/Treatment Open to air 03/16/23 2230   Number of days: 1     Results       Procedure Component Value Units Date/Time    COVID-19, Rapid [[id number]]     Order Status: No result Specimen: Nasopharyngeal Swab     Respiratory Panel, Molecular, with COVID-19 (Restricted: peds pts or suitable admitted adults) [[id number]]     Order Status: No result Specimen: Nasopharyngeal     Gram Stain [[id number]]     Order Status: Sent Specimen: Body Fluid     Culture, Body Fluid [[id number]]     Order Status: Sent Specimen: Body Fluid     Culture, Anaerobic [[id number]] Collected: 03/16/23 1900    Order Status: Sent Specimen: Bursa/Synovial Cyst Updated: 03/17/23 0902    Gram Stain [[id number]] Collected: 03/16/23 1900    Order Status: Sent Specimen: Body Fluid from Synovial Fluid Updated: 03/17/23 0902    Culture, Anaerobic [[id number]] Collected: 03/16/23 1900    Order Status: Canceled Specimen: Bursa/Synovial Cyst     Culture, Body Fluid [[id number]] Collected: 03/16/23 1900    Order Status: Sent Specimen:  Body Fluid from Synovial Fluid Updated: 03/17/23 0902    Culture, Urine [[id number]]     Order Status: Canceled Specimen: Urine, clean catch     Culture, Urine [[id number]] Collected: 03/16/23 0032    Order Status: Sent Specimen: Urine, clean catch Updated: 03/16/23 0954    Culture, Blood 1 [[id number]]  (Abnormal) Collected: 03/15/23 9540    Order Status: Completed Specimen: Blood Updated: 03/17/23 0649     Blood Culture, Routine --     Gram stain performed from blood culture bottle media  Gram positive cocci      Narrative: [facility]  Microbiology results called to and read back by Clay Jackson RN,  03/17/2023 06:47, by [facility]    Culture, Blood 2 [[id number]]  (Abnormal) Collected: 03/15/23 2258    Order Status: Completed Specimen: Blood Updated: 03/17/23 0658     Culture, Blood 2 --     Gram stain performed from blood culture bottle media  Gram positive cocci      Narrative:      [address] [place] [address] tel. ,  Microbiology results called to and read back by Clay Jackson RN,  03/17/2023 06:58, by [facility]    Blood ID, Molecular [[id number]]  (Abnormal) Collected: 03/15/23 2258    Order Status: Completed Updated: 03/17/23 0817     Bottle Type Aerobic     Source of Blood Culture No site given     Order Number [id number]     Acinetobacter calcoac baumannii complex by PCR Not Detected     Bacteroides fragilis by PCR Not Detected     Enterobacteriaceae by PCR Not Detected     Enterobacter cloacae complex by PCR Not Detected     Enterococcus faecalis by PCR Not Detected     Enterococcus faecium by PCR Not Detected     Escherichia coli by PCR Not Detected     Haemophilus Influenzae by PCR Not Detected     Klebsiella aerogenes by PCR Not Detected     Klebsiella oxytoca by PCR Not Detected     Klebsiella pneumoniae group by PCR Not Detected     Listeria monocytogenes by PCR Not Detected     Neisseria meningitidis by PCR Not Detected     Proteus species by PCR Not Detected     Pseudomonas aeruginosa by PCR Not Detected     Salmonella species by PCR Not Detected     Streptococcus agalactiae by PCR Not Detected     Staphylococcus aureus by PCR DETECTED     Staphylococcus epidermidis by PCR Not Detected     Staphylococcus lugdunensis by PCR Not Detected     Staphylococcus species by PCR DETECTED     Serratia marcescens by PCR Not Detected     Streptococcus pneumoniae by PCR Not Detected     Streptococcus pyogenes  by PCR Not Detected     Streptococcus species by PCR Not Detected     Stenotrophomonas maltophilia by PCR Not Detected Candida albicans by PCR Not Detected     Candida auris by PCR Not Detected     Candida glabrata by PCR Not Detected     Candida krusei by PCR Not Detected     Candida parapsilosis by PCR Not Detected     Candida tropicalis by PCR Not Detected     Cryptococcus neoformans/gattii by PCR Not Detected     Methicillin Resistance mecA/C and MREJ by PCR Not Detected    Narrative:      CALL  [facility]  [facility] tel. ,  Microbiology results called to and read back by Neymar Barfield RN,  03/17/2023 06:47, by [address] tel. 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 She was found to have what appears to be septic arthritis of the left wrist with history of stage IV scapholunate advanced collapse having previously followed with hand specialty orthopedist and received injections to that area in November of last year. She also has a prior history of remote knee replacement in the left knee around 2009 or 2010 with what was described to her at urgent care as a \"Baker's cyst\" but what appears to be more consistent with a joint effusion and concern for septic arthritis of this joint as well.   The examination of the wrist itself is more concerning clinically then the knee however, with the underlying prosthesis, orthopedic surgery is following both joints have been aspirated. There is concern for inadequate sampling and repeat samples will need to be obtained by the orthopedic surgery team as per their discretion. Blood cultures have returned positive for MSSA which may be coming from the joint infection. ID will follow. Sed rate and CRP are markedly elevated as above and vancomycin will be added to the patient's regimen with pharmacy to dose. We will follow procalcitonin's and other inflammatory/infectious markers along with the ID team.  To note, the patient also has a UTI with type I hypersensitive reaction to penicillins as well. Further antibiotic adjustments as per their discretion. Cardiovascular team is following due to poorly controlled atrial fibrillation with RVR on antiarrhythmic and Eliquis as an outpatient. Eliquis has been placed on hold due to potential need for orthopedic procedure and the cardiovascular team is following as mentioned above. Potential need for JYOTI upon ID recommendations. Symptomatic and supportive care will be implemented. Septic shock. Lovenox will be utilized for VTE prophylaxis until Eliquis may be resumed. PT, OT and social work will follow for discharge planning purposes. Chronic comorbidities, labs and vital signs are being monitored and addressed accordingly. To note, the patient and her  have an established relationship with an orthopedic surgeon in the [facility] area who works with [facility], Dr. Lyndle Kanner, and are requesting transfer to that facility for specialty care and management of the presumed septic arthritis. The [address] [place] [address] has been called and the patient's pertinent information has been relayed to facilitate a transfer discussion between the attending physician and the excepting team at [facility].   We will update the patient and her  with the results once the conversation has been had. Continue current therapy. See orders for further plan of care. More than 50% of my  time was spent at the bedside counseling/coordinating care with the patient and/or family with face to face contact. This time was spent reviewing notes and laboratory data as well as instructing and counseling the patient. Time I spent with the family or surrogate(s) is included only if the patient was incapable of providing the necessary information or participating in medical decisions. I also discussed the differential diagnosis and all of the proposed management plans with the patient and individuals accompanying the patient. Saray Whyte requires this high level of physician care and nursing on the IMC/Telemetry unit due the complexity of decision management and chance of rapid decline or death. Continued cardiac monitoring and higher level of nursing are required. I am readily available for any further decision-making and intervention.      CHANDRA Quiroz - CNP  3/17/2023  11:04 AM

## 2023-03-17 NOTE — PROGRESS NOTES
Physical Therapy    Room #: 3527/2256-14  Date: 3/17/2023       Patient Name: Radha Hart  : 1950      MRN: [id number]     Patient unavailable for physical therapy eval due to pain, ortho intervention and trf to [facility] .         Anika Dunn, PT

## 2023-03-17 NOTE — PROGRESS NOTES
[facility] Cardiology Inpatient Progress Note    Patient is a 74 y.o. female of Amy Emanuel MD seen in hospital follow up. Chief complaint: Afib    HPI: No CP. Some SOB.      Patient Active Problem List   Diagnosis    Moderate obesity    Overactive bladder    Essential hypertension    Lumbar stenosis with neurogenic claudication    Lumbar radiculopathy    Anterolisthesis    Chronic bilateral low back pain without sciatica    Facet arthropathy    Palpitations    Paroxysmal atrial fibrillation (HCC)    Osteoarthritis    Atrial fibrillation with RVR (HCC)       Allergies   Allergen Reactions    Pcn [Penicillins] Hives, Itching and Swelling    Morphine Nausea Only       Current Facility-Administered Medications   Medication Dose Route Frequency Provider Last Rate Last Admin    HYDROcodone-acetaminophen (NORCO) 5-325 MG per tablet 1 tablet  1 tablet Oral Q4H PRN Jara Conch, APRN - CNP        metoprolol succinate (TOPROL XL) extended release tablet 50 mg  50 mg Oral BID Dufm Schooling, DO        vancomycin (VANCOCIN) 1,250 mg in sodium chloride 0.9 % 250 mL IVPB  1,250 mg IntraVENous Q12H Jara Conch, APRN - .7 mL/hr at 03/17/23 1222 1,250 mg at 03/17/23 1222    magnesium sulfate 1000 mg in dextrose 5% 100 mL IVPB  1,000 mg IntraVENous PRN Ajra Conch, APRN - CNP        sodium phosphate 16.5 mmol in sodium chloride 0.9 % 250 mL IVPB  16.5 mmol IntraVENous PRN Jara Conch, APRN - CNP        Or    sodium phosphate 33 mmol in sodium chloride 0.9 % 500 mL IVPB  33 mmol IntraVENous PRN Jara Conch, APRN - CNP        potassium chloride (KLOR-CON M) extended release tablet 40 mEq  40 mEq Oral PRN Jara Conch, APRN - CNP        Or    potassium bicarb-citric acid (EFFER-K) effervescent tablet 40 mEq  40 mEq Oral PRN Jara Conch, APRN - CNP        Or    potassium chloride 10 mEq/100 mL IVPB (Peripheral Line)  10 mEq IntraVENous PRN Jara Conch, APRN - CNP        enoxaparin (LOVENOX) injection 100 mg 1 mg/kg SubCUTAneous BID Melchor Brian, DO   100 mg at 03/17/23 1424    [START ON 3/18/2023] levoFLOXacin (LEVAQUIN) tablet 500 mg  500 mg Oral Daily CHANDRA Thacker - CNS        albumin human 25 % IV solution 25 g  25 g IntraVENous Once Genesis Hoit, APRN -  mL/hr at 03/17/23 1515 25 g at 03/17/23 1515    0.9 % sodium chloride bolus  250 mL IntraVENous Once Genesis Hoit, APRN - CNP        fentaNYL (SUBLIMAZE) injection 25 mcg  25 mcg IntraVENous Q4H PRN Genesis Hoit, APRN - CNP        sodium chloride flush 0.9 % injection 5-40 mL  5-40 mL IntraVENous 2 times per day Tristian Benítez, DO   10 mL at 03/17/23 1019    sodium chloride flush 0.9 % injection 5-40 mL  5-40 mL IntraVENous PRN Ashu Benítez, DO        0.9 % sodium chloride infusion   IntraVENous PRN Tristian Benítez, DO        acetaminophen (TYLENOL) tablet 650 mg  650 mg Oral Q4H PRN Ashu Benítez, DO        ondansetron (ZOFRAN-ODT) disintegrating tablet 4 mg  4 mg Oral Q8H PRN Tristian Benítez, DO        Or    ondansetron (ZOFRAN) injection 4 mg  4 mg IntraVENous Q6H PRN Tristianchristian Benítez, DO        docusate sodium (COLACE) capsule 100 mg  100 mg Oral Daily Ashu Benítez, DO   100 mg at 03/17/23 1018    [Held by provider] celecoxib (CELEBREX) capsule 200 mg  200 mg Oral Daily Ashu Benítez, DO   200 mg at 03/17/23 1212    polyvinyl alcohol (LIQUIFILM TEARS) 1.4 % ophthalmic solution 1 drop  1 drop Both Eyes PRN Cortney Chester, DO        erythromycin [facility]) ophthalmic ointment   Both Eyes Nightly Cortney Chester, DO   Given at 03/16/23 2318    [Held by provider] apixaban (ELIQUIS) tablet 5 mg  5 mg Oral BID Cortney Chester, DO        [Held by provider] lactated ringers IV soln infusion   IntraVENous Continuous Ashu Benítez DO        allopurinol (ZYLOPRIM) tablet 100 mg  100 mg Oral Nightly Ashu Benítez DO   100 mg at 03/16/23 5074    [Held by provider] spironolactone (ALDACTONE) tablet 12.5 mg  12.5 mg Oral Daily Jaki Gibbons DO 12.5 mg at 03/17/23 1018    [Held by provider] losartan (COZAAR) tablet 25 mg  25 mg Oral Daily Cunningham Reaper, DO   25 mg at 03/17/23 1017    perflutren lipid microspheres (DEFINITY) injection 1.5 mL  1.5 mL IntraVENous ONCE PRN Cunningham Reaper, DO        [Held by provider] furosemide (LASIX) injection 40 mg  40 mg IntraVENous BID Cunningham Reaper, DO   40 mg at 03/17/23 1018    atorvastatin (LIPITOR) tablet 40 mg  40 mg Oral Nightly Cunningham Reaper, DO   40 mg at 03/16/23 2234     Facility-Administered Medications Ordered in Other Encounters   Medication Dose Route Frequency Provider Last Rate Last Admin    0.9 % sodium chloride infusion   IntraVENous Continuous ADALI Greenfield        midazolam (VERSED) injection 1 mg  1 mg IntraVENous Q5 Min PRN Oletha Mettle, DO   1 mg at 09/20/16 0724       Review of systems:   Heart: as above   Lungs: as above   Eyes: denies changes in vision or discharge. Ears: denies changes in hearing or pain. Nose: denies epistaxis or masses   Throat: denies sore throat or trouble swallowing. Neuro: denies numbness, tingling, tremors. Skin: denies rashes or itching. : denies hematuria, dysuria   GI: denies vomiting, diarrhea   Psych: denies mood changed, anxiety, depression. Physical Exam   BP (!) 88/58   Pulse 98   Temp 98.2 °F (36.8 °C) (Oral)   Resp 17   Ht 5' 7\" (1.702 m)   Wt 223 lb 1.6 oz (101.2 kg)   SpO2 95%   BMI 34.94 kg/m²   Constitutional: Oriented to person, place, and time. No distress. Well developed. Head: Normocephalic and atraumatic. Neck: Neck supple. No hepatojugular reflux. No JVD present. Carotid bruit is not present. No tracheal deviation present. No thyromegaly present. Cardiovascular: Normal rate, regular rhythm, normal heart sounds. intact distal pulses. No gallop and no friction rub. No murmur heard. Pulmonary: Breath sounds normal. No respiratory distress. No wheezes. No rales. Chest: Effort normal. No tenderness.   Abdominal: Soft. Bowel sounds are normal. No distension or mass. No tenderness, rebound or guarding. Musculoskeletal: . No tenderness. No clubbing or cyanosis. Extremitites: Intact distal pulses. No edema  Neurological: Alert and oriented to person, place, and time. Skin: Skin is warm and dry. No rash noted. Not diaphoretic. No erythema. Psychiatric: Normal mood and affect. Behavior is normal.   Lymphadenopathy: No cervical adenopathy. No groin adenopathy. CBC:   Lab Results   Component Value Date/Time    WBC 16.7 03/17/2023 06:45 AM    RBC 2.67 03/17/2023 06:45 AM    HGB 8.4 03/17/2023 06:45 AM    HCT 25.2 03/17/2023 06:45 AM    MCV 94.4 03/17/2023 06:45 AM    MCH 31.5 03/17/2023 06:45 AM    MCHC 33.3 03/17/2023 06:45 AM    RDW 13.4 03/17/2023 06:45 AM     03/17/2023 06:45 AM    MPV 10.1 03/17/2023 06:45 AM     BMP:   Lab Results   Component Value Date/Time     03/17/2023 06:45 AM    K 3.5 03/17/2023 06:45 AM     03/17/2023 06:45 AM    CO2 23 03/17/2023 06:45 AM    BUN 21 03/17/2023 06:45 AM    LABALBU 2.4 03/17/2023 06:45 AM    CREATININE 0.5 03/17/2023 06:45 AM    CALCIUM 8.8 03/17/2023 06:45 AM    GFRAA >60 11/28/2017 04:44 AM    LABGLOM >60 03/17/2023 06:45 AM     Magnesium:    Lab Results   Component Value Date/Time    MG 1.8 03/16/2023 06:52 AM     Cardiac Enzymes:   Lab Results   Component Value Date    TROPHS 16 (H) 03/16/2023    TROPHS 17 (H) 03/15/2023      PT/INR:    Lab Results   Component Value Date/Time    PROTIME 17.2 03/17/2023 06:45 AM    INR 1.5 03/17/2023 06:45 AM     TSH:    Lab Results   Component Value Date/Time    TSH 1.160 03/16/2023 06:52 AM     Rhythm Strip: atrial fibrillation. Cardiac catheterization ([facility], 2006)  Anatomic findings   Left main trunk: The left main trunk is a normal vessel. Left anterior descending:   There is a 50% stenosis in a medium (2.5-3.0mm) first diagonal.   Right coronary artery: The right coronary artery has mild non-obstructive stenosis and is a dominant vessel. Left circumflex: The left circumflex has mild non-obstructive stenosis and is a non-dominant vessel. The left heart catheterization reveals a left main trunk with no angiographic evidence of disease prior to its bifurcation into the LAD and circumflex arteries. The LAD gives rise to a bifurcating diagonal branch that has a 50-60% narrowing in the inferior division of the diagonal.  The circumflex artery has trivial atherosclerosis. The large dominant RCA has minimal luminal irregularities in the proximal segment. Tilt table test ([facility], 2006)  Conclusions:   During the tilt, patient complaints, SBP, DBP, HR, EKG changes are attached. SUPINE SYSTOLIC HYPERTENSION AND BRADYCARDIA (ON TOPROL XL Rx). THERE WAS INITIAL NORMAL SYSTOLIC BLOOD PRESSURE RESPONSE TO HUT FOLLOWED BY   MODERATE SYSTOLIC POH BEGINNING AT THE 19TH MIN INTO 70 DEGREE TILT. THERE   WAS A NORMAL DIASTOLIC BLOOD PRESSURE RESPONSE TO TILT. THERE WAS A GRADUAL   MODERATE INCREASE IN HEART RATE DURING HUT. PVC\"S DURING TEST AS IN DATA   PAGE. EKG MONITORING DURING TEST--LII    THROUGHOUT AND 12 L INTERMITTENTLY--   SHOWED    FLATTENED T IN aVL, FLAT T IN V1 AT BASELINE. THERE WAS LOW   POSITIVE T IN LIII AND MILD DECREASE OF T AMPLITUDE IN V4-6 DURING 70 DEGREE   TILT. TILT-RELATED T CHANGES RESOLVED DURING RECOVERY    Lexiscan MPS 9/2022  FINDINGS: The overall quality of the study was good. Left ventricular cavity size was noted to be normal.  Rotational analog analysis demonstrated no patient motion or abnormal extracardiac radioactivity and soft tissue breast attenuation. The gated SPECT rest and stress imaging in the short, vertical long, and horizontal long axis demonstrated mild decrease uptake of the radioactive tracer on rest and stress images with normal wall motion, most likely represents breast attenuation artifact.   Gated SPECT left ventricular ejection fraction was calculated to be 52%, with normal myocardial thickening and wall motion. Impression:    Electrocardiographically normal regadenoson infusion with a clinically non-ischemic response  Myocardial perfusion imaging was normal with attenuation artifact. Overall left ventricular systolic function was normal without regional wall motion abnormalities. 4. Low risk general pharmacologic stress test.    TTE (Dr. Dong Connelly, 7/2022)  Summary  Left ventricular internal dimensions were normal in diastole and systole. No regional wall motion abnormalities seen. Normal left ventricular ejection fraction. EF 65%. The left atrium is mildly dilated. Mild mitral annular calcification. Moderate centrally directed mitral regurgitation. The aortic valve appears mildly sclerotic. Mild tricuspid regurgitation. Echo Summary 3/17/2023:   Ejection fraction is visually estimated at 65%. No regional wall motion abnormalities seen. Mild left ventricular concentric hypertrophy noted. Dilated right ventricle with reduced function. Left atrial volume index of 51 ml per meters squared BSA. Physiologic and/or trace mitral regurgitation is present. Mild tricuspid regurgitation. No evidence for hemodynamically significant pericardial effusion. ASSESSMENT & PLAN:    Patient Active Problem List   Diagnosis    Moderate obesity    Overactive bladder    Essential hypertension    Lumbar stenosis with neurogenic claudication    Lumbar radiculopathy    Anterolisthesis    Chronic bilateral low back pain without sciatica    Facet arthropathy    Palpitations    Paroxysmal atrial fibrillation (HCC)    Osteoarthritis    Atrial fibrillation with RVR (HCC)      1. Acute on diastolic CHF/Edema:     Chart/labs/EKG reviewed. Echo with normal LVEF and minimal VHD. Gently diurese with IV lasix. Continue BB/ARB/aldactone. 2. Persistent AFib:     On eliquis. Rate control. Dose dig. Titrate BB as needed. Address current acute issues.     3. CAD/Mildly elevated troponin:    Mild to moderate CAD by 2006 [facility] cath. Low risk stress 9/2022. Medically manage. BB/statin. No ASA due to eliquis. 4. HTN: Observe. 5. Lipids: Statin. 6. Anemia: Follow labs. 7. DM: Per primary service. 8. ID issues/UTI: Per primary service. Miguel Mcclendon D.O.   Cardiologist  [place], [facility]

## 2023-03-17 NOTE — PROGRESS NOTES
Pharmacy Consultation Note  (Antibiotic Dosing and Monitoring)    Initial consult date: 3/17/23  Consulting physician/provider: JUDY Snow  Drug: Vancomycin  Indication: bloodstream, bone/joint infection MSSA bacteremia    Age/  Gender Height Weight IBW  Allergy Information   72 y.o./female 5' 7\" (170.2 cm) 209 lb (94.8 kg)     Ideal body weight: 61.6 kg (135 lb 12.9 oz)  Adjusted ideal body weight: 77.4 kg (170 lb 11.5 oz)   Pcn [penicillins] and Morphine      Renal Function:  Recent Labs     03/15/23  2258 03/16/23  0652 03/17/23  0645   BUN 30* 24* 21   CREATININE 0.9 0.6 0.5       Intake/Output Summary (Last 24 hours) at 3/17/2023 1123  Last data filed at 3/17/2023 1521  Gross per 24 hour   Intake --   Output 1500 ml   Net -1500 ml       Vancomycin Monitoring:  Trough:  No results for input(s): VANCOTROUGH in the last 72 hours. Random:  No results for input(s): VANCORANDOM in the last 72 hours. Recent Labs     03/15/23  2258   BLOODCULT2 Gram stain performed from blood culture bottle media  Gram positive cocci  *        Historical Cultures:  Organism   Date Value Ref Range Status   08/25/2022 Escherichia coli (A)  Final     Recent Labs     03/15/23  2258   BC Gram stain performed from blood culture bottle media  Gram positive cocci  *       Vancomycin Administration Times:  Recent vancomycin administrations        No vancomycin IV orders with administrations found. Orders not given:            vancomycin (VANCOCIN) 1,250 mg in sodium chloride 0.9 % 250 mL IVPB                    Assessment:  Patient is a 74 y.o. female who has been initiated on vancomycin  Estimated Creatinine Clearance: 121 mL/min (based on SCr of 0.5 mg/dL).   To dose vancomycin, pharmacy will be utilizing [employer] calculation software for goal AUC/JONNY 400-600 mg/L-hr  3/17: SCr 0.5mg/dl, baseline~0.7mg/dl, UOP not documented      Plan:  Trial vancomycin 1250mg Q12h, AUC/JONNY~455mg/L.h  Will check vancomycin levels when appropriate  Will continue to monitor renal function   Pharmacy to follow      Willard Hodges [id number] [facility] 3/17/2023 11:23 AM

## 2023-03-17 NOTE — PROGRESS NOTES
Orthopedic surgery progress note    I spoke with patient and  and son who stated that the 5-day wait time for [facility] may be too long for them, they wish to proceed with orthopedic treatment at [facility].    Due to the inadequate sample of the left knee for the lab to run for cell count, the left knee was sterilely prepped with alcohol, then aspirated, approximately 38 cc of aspirate synovial fluid contained, fluid was cloudy with blood-tinged.     Aspirate synovial fluid will be sent to the lab for cell count, Gram stain, culture anaerobic and aerobic, glucose, crystals    We will monitor for results      Skinny Fields,

## 2023-03-17 NOTE — PROGRESS NOTES
Department of Orthopedic Surgery  Resident Progress Note    Patient seen and examined. Patient is still having significant pain of the left knee and left wrist, she states this is not improved. No new complaints. Denies chest pain, shortness of breath, dizziness/lightheadedness. VITALS:  /83   Pulse (!) 110   Temp 97.7 °F (36.5 °C) (Infrared)   Resp 20   Ht 5' 7\" (1.702 m)   Wt 223 lb 1.6 oz (101.2 kg)   SpO2 95%   BMI 34.94 kg/m²     General: Awake alert oriented x3    MUSCULOSKELETAL:   left lower extremity:  Patient has visible swelling of the left knee, no erythema noted   compartments soft and compressible  +PF/DF/EHL   Brisk Cap refill, Toes warm and perfused, significant edema of the left lower extremity  Distal sensation grossly intact to Peroneals, Sural, Saphenous, and tibial nrs  Patient is still unable to tolerate any range of motion of the left knee due to pain, tenderness to palpation globally about the left knee    left Upper Extremity   Skin intact circumferentially  Visible deformity about the left wrist, left wrist is still notably swollen with erythema today, similar to yesterday.   +TTP globally about the left wrist   compartments soft and compressible  +AIN/PIN/Ulnar nerve function intact grossly  +Radial pulse, Brisk Cap refill, hand warm and perfused  Sensation intact to touch in radial/ulnar/median nerve distributions to hand   She does have chronic wound of the left foot first digit    CBC:   Lab Results   Component Value Date/Time    WBC 20.7 03/16/2023 06:52 AM    HGB 8.7 03/16/2023 06:52 AM    HCT 26.3 03/16/2023 06:52 AM     03/16/2023 06:52 AM     PT/INR:  No results found for: PROTIME, INR    ASSESSMENT  Left wrist pain   Rule out left knee periprosthetic septic joint    PLAN    Weightbearing as tolerated  Cell count resulted on the left wrist of 40,000 nucleated cells, this is under threshold for septic joint, no acute washout needed of the left wrist or intervention  Cell count of the left knee was unable to be ran due to inadequate specimen. Spoke with patient this morning and over the phone and alerted her that if her left knee was  infected she would likely need a two-stage procedure with explantation of her current prosthesis versus a poly exchange if this is deemed a septic prosthetic joint. I alerted the patient and her  over the phone that the lab had resulted the knee aspirate as an inadequate sample, so we are unable to rule in or rule out a septic left knee joint. The patient's  informed me that they would like to be transferred to [facility] clinic. Patient would like to be transferred to [facility] for second opinion, this is okay from the orthopedic surgery standpoint.   Orthopedic surgery may be consulted again if the patient wishes to pursue treatment at [facility].

## 2023-03-17 NOTE — PROGRESS NOTES
At [address] [place] [address] Dr Miriam Álvarez transferred into the room to talk to the patient and her spouse. They were notified that patient and spouse request transfer to [facility] and they do want surgery at this time.

## 2023-03-17 NOTE — CARE COORDINATION
Case Management Assessment  Initial Evaluation    Date/Time of Evaluation: 3/17/2023 1:31 PM  Assessment Completed by: Amy Ornelas RN    If patient is discharged prior to next notation, then this note serves as note for discharge by case management. Patient Name: Camden Harrison                   YOB: 1950  Diagnosis: Peripheral edema [R60.9]  Chronic anemia [D64.9]  Atrial fibrillation with RVR ([address] [I48.91]  Acute cystitis without hematuria [N30.00]  Gout of right foot, unspecified cause, unspecified chronicity [M10.9]                   Date / Time: 3/15/2023  8:54 PM    Patient Admission Status: Inpatient   Readmission Risk (Low < 19, Mod (19-27), High > 27): Readmission Risk Score: 15.4    Current PCP: Jad Harrison MD  PCP verified by CM? Yes    Chart Reviewed: Yes      History Provided by: Patient  Patient Orientation: Alert and Oriented    Patient Cognition: Alert    Hospitalization in the last 30 days (Readmission):  No        Advance Directives:      Code Status: Full Code   Patient's Primary Decision Maker is: Legal Next of Kin    Primary Decision MakerEvovilma Sahu - Spouse - [phone]    Secondary Decision Maker: Jenelle Ennis Child    Secondary Decision Maker: Suad Ennis Child    Discharge Planning:    Patient lives with: Spouse/Significant Other Type of Home: House  Primary Care Giver: Self  Patient Support Systems include: Spouse/Significant Other, Children     Current services prior to admission: None            Current DME: ww, orthotic soft brace on left wrist             Type of Home Care services:  OT, PT    ADLS  Prior functional level: Independent in ADLs/IADLs  Current functional level: Independent in ADLs/IADLs    Family can provide assistance at DC: Yes  Would you like Case Management to discuss the discharge plan with any other family members/significant others, and if so, who?  No  Plans to Return to Present Housing: Yes  Other Identified Issues/Barriers to RETURNING to current housing:   Potential Assistance needed at discharge: N/A            Potential DME:  wc  Patient expects to discharge to: [address] [place] [address] for transportation at discharge:  planning to transfer to [facility]- ambulance. IF home family will provide transporation     Financial    Payor: Anne Mata / Plan: Yuridia Bangura PPO / Product Type: Medicare /     Potential assistance Purchasing Medications: No  Meds-to-Beds request: No      YAIMA Tobias [address], [address] [phone] Aliyah Stephenson [address] [facility] [address]  Phone: [phone] Fax: [phone] [address] [place] [address] [phone] - f [phone]  [address] [address]  Phone: [phone] Fax: [phone]      Notes:    Factors facilitating achievement of predicted outcomes: Motivated, Cooperative, Pleasant, Good insight into deficits, and Has needed Durable Medical Equipment at home    Barriers to discharge: Pain, Upper extremity weakness, Lower extremity weakness, and Long standing deficits    Additional Case Management Notes: 3/17/2023 PCU, pt lives in ranch home with . Pt has a bladder stimulator, currently with a rai catheter in place due to urinary retention. She has a soft orthotic brace on her left wrist and has great pain in this wrist and her left Knee. Plans for transfer to [facility]- as requested. Pending acceptance with their orthopedic surgeon. Pt uses a ww at home. She has had both knees replaced in 2009 and 2010 she states. Ambulance sheet in soft chart. llk    Addendum: Pt requesting to continue treatment at [facility] since [facility] may be an extended wait time. Family will provide a ride at discharge.  Electronically signed by Luz Welsh RN-BC on 3/17/2023 at 2:29 PM    Luz Welsh RN  Case Management Department  Ph: [phone]

## 2023-03-17 NOTE — ACP (ADVANCE CARE PLANNING)
Advance Care Planning   Healthcare Decision Maker:    Primary Decision Maker: Fransisca Sender - [phone]    Secondary Decision Maker: Renan Donaldson - Child    Secondary Decision Maker: Lesvia Moura - Child    Today we documented Decision Maker(s) consistent with Legal Next of Kin hierarchy. Reviewed with pt.  Electronically signed by DELROY Sepulveda on 3/17/2023 at 1:29 PM

## 2023-03-17 NOTE — CONSULTS
Department of Orthopedic Surgery  Consult Note    Reason for Consult: Left knee pain    HISTORY OF PRESENT ILLNESS:       Patient is a 74 y.o. female who presents with left knee pain, patient is also suffering from left wrist pain. Patient states that last week she was diagnosed with gout of her bilateral feet, by podiatry and they are still bothering her. Patient has been suffering from left knee pain for approximately 1 week she reported to an urgent care where they told her she had a large Baker's cyst of the left knee. Patient also states that her left wrist started hurting for about half a week due to what she believes is increased use, she has chronic left wrist pain and significant arthritis and follows up with Dr. Jaden Venegas who performs joint injections for her which do help with the pain. The patient had his bilateral total knee replacements done by Dr. Danya Rosenberg the right in 2009 and the left in 2010. Patient has noted bilateral lower extremity edema over the last week as well, she states she has chronic bilateral lower extremity edema however it is progressing. She is unable to walk on her left lower extremity secondary to pain. This is why she presented to the emergency room. Denies numbness/tingling/paresthesias. Denies any other orthopedic complaints at this time.   Patient is currently on Eliquis for A-fib    Past Medical History:        Diagnosis Date    A-fib [facility])     Arthritis     Class 2 severe obesity due to excess calories with serious comorbidity and body mass index (BMI) of 37.0 to 37.9 in adult ([facility] [address]     Dry eyes, bilateral     Hypertension     OAB (overactive bladder)     PONV (postoperative nausea and vomiting)      Past Surgical History:        Procedure Laterality Date    ABDOMEN SURGERY      BLADDER SURGERY  01/01/2022    bladder stimulator    BREAST BIOPSY      BUNIONECTOMY      bilteral twice    CARDIAC SURGERY  2006    heart cath in [facility] x 2    HYSTERECTOMY (CERVIX STATUS UNKNOWN)  11/27/2017    robotic assisted bilateral alpingo-oophorectomy lap robotic assisted abdominal sacral colopexy with y mesh    JOINT REPLACEMENT      bilateral knees, right shoulder replaced    KNEE ARTHROPLASTY Bilateral     NERVE BLOCK Bilateral 06/28/2018    facet L3-5 #1    PAIN MANAGEMENT PROCEDURE N/A 12/17/2021    PERIPHERAL NERVE EVALUATION performed by Sarita Moreno DO at [address] [address] TIP ONLY Bilateral 06/28/2018    BILATERAL INTRA-ARTICULAR FACET JOINT INJECTION WITH FLUOROSCOPIC GUIDANCE AT L3-4 AND L4-5 #1 performed by Karla Burgess DO at [place] [address]  09/20/2016    right total shoulder reverse arthroplasty     Current Medications:   Current Facility-Administered Medications: HYDROcodone-acetaminophen (NORCO) 5-325 MG per tablet 1 tablet, 1 tablet, Oral, Q4H PRN  metoprolol succinate (TOPROL XL) extended release tablet 50 mg, 50 mg, Oral, BID  vancomycin (VANCOCIN) 1,250 mg in sodium chloride 0.9 % 250 mL IVPB, 1,250 mg, IntraVENous, Q12H  magnesium sulfate 1000 mg in dextrose 5% 100 mL IVPB, 1,000 mg, IntraVENous, PRN  sodium phosphate 16.5 mmol in sodium chloride 0.9 % 250 mL IVPB, 16.5 mmol, IntraVENous, PRN **OR** sodium phosphate 33 mmol in sodium chloride 0.9 % 500 mL IVPB, 33 mmol, IntraVENous, PRN  potassium chloride (KLOR-CON M) extended release tablet 40 mEq, 40 mEq, Oral, PRN **OR** potassium bicarb-citric acid (EFFER-K) effervescent tablet 40 mEq, 40 mEq, Oral, PRN **OR** potassium chloride 10 mEq/100 mL IVPB (Peripheral Line), 10 mEq, IntraVENous, PRN  enoxaparin (LOVENOX) injection 100 mg, 1 mg/kg, SubCUTAneous, BID  [START ON 3/18/2023] levoFLOXacin (LEVAQUIN) tablet 500 mg, 500 mg, Oral, Daily  fentaNYL (SUBLIMAZE) injection 25 mcg, 25 mcg, IntraVENous, Q4H PRN  sodium chloride flush 0.9 % injection 5-40 mL, 5-40 mL, IntraVENous, 2 times per day  sodium chloride flush 0.9 % injection 5-40 mL, 5-40 mL, IntraVENous, PRN  0.9 % sodium chloride infusion, , IntraVENous, PRN  acetaminophen (TYLENOL) tablet 650 mg, 650 mg, Oral, Q4H PRN  ondansetron (ZOFRAN-ODT) disintegrating tablet 4 mg, 4 mg, Oral, Q8H PRN **OR** ondansetron (ZOFRAN) injection 4 mg, 4 mg, IntraVENous, Q6H PRN  docusate sodium (COLACE) capsule 100 mg, 100 mg, Oral, Daily  [Held by provider] celecoxib (CELEBREX) capsule 200 mg, 200 mg, Oral, Daily  polyvinyl alcohol (LIQUIFILM TEARS) 1.4 % ophthalmic solution 1 drop, 1 drop, Both Eyes, PRN  erythromycin (ROMYCIN) ophthalmic ointment, , Both Eyes, Nightly  [Held by provider] apixaban (ELIQUIS) tablet 5 mg, 5 mg, Oral, BID  [Held by provider] lactated ringers IV soln infusion, , IntraVENous, Continuous  allopurinol (ZYLOPRIM) tablet 100 mg, 100 mg, Oral, Nightly  [Held by provider] spironolactone (ALDACTONE) tablet 12.5 mg, 12.5 mg, Oral, Daily  [Held by provider] losartan (COZAAR) tablet 25 mg, 25 mg, Oral, Daily **AND** [DISCONTINUED] hydroCHLOROthiazide (HYDRODIURIL) tablet 25 mg, 25 mg, Oral, Daily  perflutren lipid microspheres (DEFINITY) injection 1.5 mL, 1.5 mL, IntraVENous, ONCE PRN  [Held by provider] furosemide (LASIX) injection 40 mg, 40 mg, IntraVENous, BID  atorvastatin (LIPITOR) tablet 40 mg, 40 mg, Oral, Nightly  Facility-Administered Medications Ordered in Other Encounters: 0.9 % sodium chloride infusion, , IntraVENous, Continuous  midazolam (VERSED) injection 1 mg, 1 mg, IntraVENous, Q5 Min PRN  Allergies:  Pcn [penicillins] and Morphine    Social History:   TOBACCO:   reports that she has quit smoking. She has quit using smokeless tobacco.  ETOH:   reports current alcohol use. DRUGS:   reports no history of drug use.   ACTIVITIES OF DAILY LIVING:    OCCUPATION:    Family History:       Problem Relation Age of Onset    Stroke Mother     Stroke Father     Uterine Cancer Sister     Cancer Sister     Prostate Cancer Brother     Prostate Cancer Brother     Colon Cancer Niece REVIEW OF SYSTEMS:  CONSTITUTIONAL:  negative for  fevers, chills  EYES:  negative for blurred vision, visual disturbance  HEENT:  negative for  hearing loss, voice change  RESPIRATORY:  negative for  dyspnea, wheezing  CARDIOVASCULAR:  negative for  chest pain, palpitations  GASTROINTESTINAL:  negative for nausea, vomiting  GENITOURINARY:  negative for frequency, urinary incontinence  HEMATOLOGIC/LYMPHATIC:  negative for bleeding and petechiae  MUSCULOSKELETAL:  positive for  pain left wrist left knee left bilateral  feet  NEUROLOGICAL:  negative for headaches, dizziness  BEHAVIOR/PSYCH:  negative for increased agitation and anxiety    PHYSICAL EXAM:    VITALS:  BP 93/60   Pulse 98   Temp 98.2 °F (36.8 °C) (Oral)   Resp 17   Ht 5' 7\" (1.702 m)   Wt 223 lb 1.6 oz (101.2 kg)   SpO2 95%   BMI 34.94 kg/m²   CONSTITUTIONAL:  awake, alert, cooperative, no apparent distress, and appears stated age  General appearance:  No apparent distress, appears stated age. HEENT:  Normal cephalic, atraumatic without obvious deformity. Pupils equal, round, and reactive to light. Extra ocular muscles intact. Conjunctivae/corneas clear. Neck: Supple, with full range of motion. No jugular venous distention. Trachea midline. Respiratory:  Normal respiratory effort. Clear to auscultation,   Cardiovascular:  RRR  Abdomen: Soft, non-tender, non-distended with normal bowel sounds. Skin: Please see exam below  Neurologic:   Cranial nerves: II-XII intact,   Psychiatric:  Alert and oriented x 3   MUSCULOSKELETAL:  Left lower Extremity:   Skin is intact circumferentially  The left knee is visibly swollen when compared to the right knee, there is no erythema noted, mild effusion noted  Patient has pitting edema up to the mid thigh  +TTP circumferentially about the left knee, patient is very sensitive to pain with light palpation.     Compartments soft and compressible  brisk cap refill to toes, foot warm and perfused, unable to palpate pulses secondary to  pitting edema  Sensation intact to light touch in sural/deep peroneal/superficial peroneal/saphenous/posterior tibial nerve distributions to foot/ankle  Demonstrates active ankle plantar/dorsiflexion/great toe extension   Patient cannot stand any range of motion to the left knee active or passive. Wound left foot first digit   left Upper Extremity   Skin intact circumferentially  Visible deformity noticed about the left wrist, with erythema and swelling noted. Palpable fusion noted  +TTP globally about the left wrist and metacarpals   compartments soft and compressible  +AIN/PIN/Ulnar nerve function intact grossly  +Radial pulse, Brisk Cap refill, hand warm and perfused  Sensation intact to touch in radial/ulnar/median nerve distributions to hand   Patient is not able to withstand any range of motion of the left wrist secondary to pain. Secondary Exam:   rightUE: No obvious signs of trauma. -TTP to fingers, hand, wrist, forearm, elbow, humerus, shoulder or clavicle. -- Patient able to flex/extend fingers, wrist, elbow and shoulder with active and passive ROM without pain, +2/4 Radial pulse, cap refill <3sec, +AIN/PIN/Radial/Ulnar/Median N, distal sensation grossly intact to C4-T1 dermatomes, compartments soft and compressible. rightLE: No obvious signs of trauma. -TTP to foot, ankle, leg, knee, thigh, hip.-- Patient able to flex/extend toes, ankle, knee and hip with active and passive ROM without pain,+2/4 DP & PT pulses, cap refill <3sec, +5/5 PF/DF/EHL, distal sensation grossly intact to L4-S1 dermatomes, compartments soft and compressible.   Visible scar on the anterior aspect of the knee, pitting edema about the right lower extremity, chronic skin changes noted in the lower extremity    Pelvis: -TTP, -Log roll, -Heel strike     DATA:    CBC:   Lab Results   Component Value Date/Time    WBC 16.7 03/17/2023 06:45 AM    RBC 2.67 03/17/2023 06:45 AM    HGB 8.4 03/17/2023 06:45 AM    HCT 25.2 03/17/2023 06:45 AM    MCV 94.4 03/17/2023 06:45 AM    MCH 31.5 03/17/2023 06:45 AM    MCHC 33.3 03/17/2023 06:45 AM    RDW 13.4 03/17/2023 06:45 AM     03/17/2023 06:45 AM    MPV 10.1 03/17/2023 06:45 AM     PT/INR:    Lab Results   Component Value Date/Time    PROTIME 17.2 03/17/2023 06:45 AM    INR 1.5 03/17/2023 06:45 AM       Radiology Review:  CT of the left knee on 3-16 demonstrate no acute fractures dislocation, patient status post left total knee replacement. Large osteophyte noted on the base of the left patella. X-ray of left knee on 3-16 demonstrate no acute fracture dislocation, similar finding to CT. once again large osteophyte noted at the base of the patella    X-ray of the left wrist on 3- demonstrates significant carpometacarpal arthritis of the first joint, significant degenerative changes of the carpal and carpal metacarpal joints, patient has a chronic carpal dislocation volarly. This was confirmed with comparison of previous imaging    IMPRESSION:  Left knee prosthetic joint infection  Left wrist pain rule out septic arthritis    PLAN:  Weightbearing as tolerated  Pain management per admitting  Patient's left knee was sterilely prepped with alcohol for left knee aspiration, 18-gauge needle was then used to aspirate synovial fluid, approximately 8 cc of fluid were obtained this fluid will be sent to lab for cultures, Gram stain, crystals, glucose, cell count differential  Patient's left wristwas sterilely prepped with alcohol for left knee aspiration, 18-gauge needle was then used to aspirate synovial fluid, approximately 1 cc of fluid were obtained this fluid will be sent to lab for cultures, Gram stain, crystals, glucose, cell count differential  ESR was 130  Uric acid 7  Cell count with differential of the left knee demonstrated over 56,000 nucleated cells and greater than 95% neutrophils. This is concerning for prosthetic joint infection.   We did recommend irrigation debridement with polyethylene exchange of the left knee and discussed with the patient we will be available to do this in the near future. Also  discussed with the patient that there is concerned that her left wrist may also be infected for which this was also offered for irrigation and debridement in the operating room. After lengthy discussion with the patient as well as the family at this point they would like to proceed with IV antibiotic therapy alone for her left wrist.  As far as her left knee she would like to transfer to the [facility] for second opinion for evaluation and management of her knee prosthetic joint infection. Currently there is no urgency for irrigation debridement and polyethylene exchange of the patient's left knee. Orthopedics remains available for irrigation debridement and polyethylene exchange of the knee if the patient were to become acutely ill or septic due to this. Please call orthopedics with any questions or concerns.         Discussed with attending    Jake Whitaker, DO

## 2023-03-18 VITALS
DIASTOLIC BLOOD PRESSURE: 62 MMHG | RESPIRATION RATE: 20 BRPM | HEIGHT: 67 IN | HEART RATE: 86 BPM | TEMPERATURE: 99.3 F | WEIGHT: 223.1 LBS | OXYGEN SATURATION: 95 % | BODY MASS INDEX: 35.02 KG/M2 | SYSTOLIC BLOOD PRESSURE: 107 MMHG

## 2023-03-18 LAB
ALBUMIN SERPL-MCNC: 2.5 G/DL (ref 3.5–5.2)
ALP SERPL-CCNC: 116 U/L (ref 35–104)
ALT SERPL-CCNC: 47 U/L (ref 0–32)
ANION GAP SERPL CALCULATED.3IONS-SCNC: 10 MMOL/L (ref 7–16)
AST SERPL-CCNC: 50 U/L (ref 0–31)
BACTERIA BLD CULT ORG #2: NORMAL
BACTERIA BLD CULT: NORMAL
BACTERIA UR CULT: ABNORMAL
BACTERIA UR CULT: ABNORMAL
BASOPHILS # BLD: 0.03 E9/L (ref 0–0.2)
BASOPHILS NFR BLD: 0.2 % (ref 0–2)
BILIRUB SERPL-MCNC: 0.9 MG/DL (ref 0–1.2)
BUN SERPL-MCNC: 18 MG/DL (ref 6–23)
CALCIUM SERPL-MCNC: 9 MG/DL (ref 8.6–10.2)
CHLORIDE SERPL-SCNC: 105 MMOL/L (ref 98–107)
CO2 SERPL-SCNC: 24 MMOL/L (ref 22–29)
CREAT SERPL-MCNC: 0.5 MG/DL (ref 0.5–1)
EOSINOPHIL # BLD: 0.12 E9/L (ref 0.05–0.5)
EOSINOPHIL NFR BLD: 0.9 % (ref 0–6)
ERYTHROCYTE [DISTWIDTH] IN BLOOD BY AUTOMATED COUNT: 13.3 FL (ref 11.5–15)
GLUCOSE SERPL-MCNC: 100 MG/DL (ref 74–99)
HCT VFR BLD AUTO: 26.3 % (ref 34–48)
HGB BLD-MCNC: 8.5 G/DL (ref 11.5–15.5)
IMM GRANULOCYTES # BLD: 0.17 E9/L
IMM GRANULOCYTES NFR BLD: 1.2 % (ref 0–5)
LYMPHOCYTES # BLD: 1.86 E9/L (ref 1.5–4)
LYMPHOCYTES NFR BLD: 13.2 % (ref 20–42)
MAGNESIUM SERPL-MCNC: 1.9 MG/DL (ref 1.6–2.6)
MCH RBC QN AUTO: 30.9 PG (ref 26–35)
MCHC RBC AUTO-ENTMCNC: 32.3 % (ref 32–34.5)
MCV RBC AUTO: 95.6 FL (ref 80–99.9)
MONOCYTES # BLD: 1.18 E9/L (ref 0.1–0.95)
MONOCYTES NFR BLD: 8.4 % (ref 2–12)
NEUTROPHILS # BLD: 10.71 E9/L (ref 1.8–7.3)
NEUTS SEG NFR BLD: 76.1 % (ref 43–80)
ORGANISM: ABNORMAL
PHOSPHATE SERPL-MCNC: 2.9 MG/DL (ref 2.5–4.5)
PLATELET # BLD AUTO: 449 E9/L (ref 130–450)
PMV BLD AUTO: 9.7 FL (ref 7–12)
POTASSIUM SERPL-SCNC: 3.4 MMOL/L (ref 3.5–5)
PROT SERPL-MCNC: 6.3 G/DL (ref 6.4–8.3)
RBC # BLD AUTO: 2.75 E12/L (ref 3.5–5.5)
SODIUM SERPL-SCNC: 139 MMOL/L (ref 132–146)
VANCOMYCIN SERPL-MCNC: 7.3 MCG/ML (ref 5–40)
WBC # BLD: 14.1 E9/L (ref 4.5–11.5)

## 2023-03-18 PROCEDURE — 6360000002 HC RX W HCPCS: Performed by: INTERNAL MEDICINE

## 2023-03-18 PROCEDURE — 83735 ASSAY OF MAGNESIUM: CPT

## 2023-03-18 PROCEDURE — 36415 COLL VENOUS BLD VENIPUNCTURE: CPT

## 2023-03-18 PROCEDURE — 99233 SBSQ HOSP IP/OBS HIGH 50: CPT | Performed by: INTERNAL MEDICINE

## 2023-03-18 PROCEDURE — 6370000000 HC RX 637 (ALT 250 FOR IP): Performed by: INTERNAL MEDICINE

## 2023-03-18 PROCEDURE — 6370000000 HC RX 637 (ALT 250 FOR IP): Performed by: CLINICAL NURSE SPECIALIST

## 2023-03-18 PROCEDURE — 6360000002 HC RX W HCPCS: Performed by: NURSE PRACTITIONER

## 2023-03-18 PROCEDURE — 85025 COMPLETE CBC W/AUTO DIFF WBC: CPT

## 2023-03-18 PROCEDURE — 80202 ASSAY OF VANCOMYCIN: CPT

## 2023-03-18 PROCEDURE — 84100 ASSAY OF PHOSPHORUS: CPT

## 2023-03-18 PROCEDURE — 80053 COMPREHEN METABOLIC PANEL: CPT

## 2023-03-18 PROCEDURE — 2580000003 HC RX 258: Performed by: INTERNAL MEDICINE

## 2023-03-18 PROCEDURE — 2580000003 HC RX 258: Performed by: NURSE PRACTITIONER

## 2023-03-18 RX ADMIN — LEVOFLOXACIN 500 MG: 500 TABLET, FILM COATED ORAL at 09:12

## 2023-03-18 RX ADMIN — DOCUSATE SODIUM 100 MG: 100 CAPSULE, LIQUID FILLED ORAL at 09:12

## 2023-03-18 RX ADMIN — VANCOMYCIN HYDROCHLORIDE 1250 MG: 10 INJECTION, POWDER, LYOPHILIZED, FOR SOLUTION INTRAVENOUS at 13:15

## 2023-03-18 RX ADMIN — ENOXAPARIN SODIUM 100 MG: 100 INJECTION SUBCUTANEOUS at 09:11

## 2023-03-18 RX ADMIN — Medication 10 ML: at 09:12

## 2023-03-18 RX ADMIN — VANCOMYCIN HYDROCHLORIDE 1250 MG: 10 INJECTION, POWDER, LYOPHILIZED, FOR SOLUTION INTRAVENOUS at 00:02

## 2023-03-18 RX ADMIN — METOPROLOL SUCCINATE 50 MG: 50 TABLET, EXTENDED RELEASE ORAL at 09:11

## 2023-03-18 NOTE — PLAN OF CARE
Problem: Discharge Planning  Goal: Discharge to home or other facility with appropriate resources  Outcome: Progressing  Flowsheets (Taken 3/17/2023 7172)  Discharge to home or other facility with appropriate resources: Identify barriers to discharge with patient and caregiver     Problem: Safety - Adult  Goal: Free from fall injury  Outcome: Progressing  Flowsheets (Taken 3/17/2023 0832)  Free From Fall Injury: Instruct family/caregiver on patient safety     Problem: ABCDS Injury Assessment  Goal: Absence of physical injury  Outcome: Progressing  Flowsheets (Taken 3/17/2023 0832)  Absence of Physical Injury: Implement safety measures based on patient assessment     Problem: Skin/Tissue Integrity  Goal: Absence of new skin breakdown  Description: 1. Monitor for areas of redness and/or skin breakdown  2. Assess vascular access sites hourly  3. Every 4-6 hours minimum:  Change oxygen saturation probe site  4. Every 4-6 hours:  If on nasal continuous positive airway pressure, respiratory therapy assess nares and determine need for appliance change or resting period.   Outcome: Progressing     Problem: Pain  Goal: Verbalizes/displays adequate comfort level or baseline comfort level  Outcome: Progressing  Flowsheets (Taken 3/17/2023 0812)  Verbalizes/displays adequate comfort level or baseline comfort level:   Encourage patient to monitor pain and request assistance   Assess pain using appropriate pain scale   Administer analgesics based on type and severity of pain and evaluate response   Implement non-pharmacological measures as appropriate and evaluate response

## 2023-03-18 NOTE — PROGRESS NOTES
Department of Orthopedic Surgery  Resident Progress Note    Patient seen and examined. Patient is still having significant pain of the left knee and left wrist, she states the pain has improved to her left wrist and left knee, left knee pain has improved more than her left wrist pain has. She believes that the aspiration of the left knee is helped her. No new complaints. Denies chest pain, shortness of breath, dizziness/lightheadedness. Patient verbalizes to me that they will be pursuing care at the [facility] clinic and do not wish to move forward with surgery or intervention with orthopedic surgery at [address] [place] [address]:  BP (!) 129/99   Pulse 70   Temp 97.9 °F (36.6 °C) (Oral)   Resp 20   Ht 5' 7\" (1.702 m)   Wt 223 lb 1.6 oz (101.2 kg)   SpO2 95%   BMI 34.94 kg/m²     General: Awake alert oriented x3    MUSCULOSKELETAL:   left lower extremity:  Patient has visible swelling of the left knee, no erythema noted   compartments soft and compressible  +PF/DF/EHL   Brisk Cap refill, Toes warm and perfused, significant edema of the left lower extremity  Distal sensation grossly intact to Peroneals, Sural, Saphenous, and tibial nrs  Patient is still unable to tolerate any range of motion of the left knee due to pain, tenderness to palpation globally about the left knee however this pain has improved when compared to previous assessments    left Upper Extremity   Skin intact circumferentially  Visible deformity about the left wrist, left wrist is still notably swollen with erythema today, erythema has improved when compared to yesterday. +TTP globally about the left wrist   compartments soft and compressible  +AIN/PIN/Ulnar nerve function intact grossly  +Radial pulse, Brisk Cap refill, hand warm and perfused  Sensation intact to touch in radial/ulnar/median nerve distributions to hand   Patient has left wrist brace that she has been wearing throughout her stay.             She does have chronic wound of the left foot first digit    CBC:   Lab Results   Component Value Date/Time    WBC 14.1 03/18/2023 08:21 AM    HGB 8.5 03/18/2023 08:21 AM    HCT 26.3 03/18/2023 08:21 AM     03/18/2023 08:21 AM     PT/INR:    Lab Results   Component Value Date/Time    PROTIME 17.2 03/17/2023 06:45 AM    INR 1.5 03/17/2023 06:45 AM       ASSESSMENT  Left wrist pain   Rule out left knee periprosthetic septic joint    PLAN    Weightbearing as tolerated  Patient would like to be transferred to [facility] for second opinion, this is okay from the orthopedic surgery standpoint.   She will continue to move forward with the care of [facility], she does not wish to pursue intervention at [address] [place] [address] surgery may be consulted again if the patient wishes to pursue treatment at [address] [place] [address] surgery will sign off, we will continue to follow from the periphery, if patient does become unstable we are able to take the patient to operating room for irrigation debridement of the left knee and left wrist.

## 2023-03-18 NOTE — DISCHARGE INSTR - COC
Continuity of Care Form    Patient Name: Bernestine Kayser   :  1950  MRN:  [id number]    Admit date:  3/15/2023  Discharge date:  3/18/23    Code Status Order: Full Code   Advance Directives:     Admitting Physician:  Lisa Robles DO  PCP: Alyssa Anderson MD    Discharging Nurse: germán [facility] Unit/Room#: 7778/2973-04  Discharging Unit Phone Number: [phone]    Emergency Contact:   Extended Emergency Contact Information  Primary Emergency Contact: [facility]  Address: [address] [place] [address] Phone: [phone]  Mobile Phone: [phone]  Relation: Spouse  Secondary Emergency Contact: mónica carson  Relation: Child    Past Surgical History:  Past Surgical History:   Procedure Laterality Date    ABDOMEN SURGERY      BLADDER SURGERY  2022    bladder stimulator    BREAST BIOPSY      BUNIONECTOMY      bilteral twice    CARDIAC SURGERY  2006    heart cath in [facility]      x 2    HYSTERECTOMY (CERVIX STATUS UNKNOWN)  2017    robotic assisted bilateral alpingo-oophorectomy lap robotic assisted abdominal sacral colopexy with y mesh    JOINT REPLACEMENT      bilateral knees, right shoulder replaced    KNEE ARTHROPLASTY Bilateral     NERVE BLOCK Bilateral 2018    facet L3-5 #1    PAIN MANAGEMENT PROCEDURE N/A 2021    PERIPHERAL NERVE EVALUATION performed by America Moreno DO at [address] [place] [address] W/[address]UM [address] TIP ONLY Bilateral 2018    BILATERAL INTRA-ARTICULAR FACET JOINT INJECTION WITH FLUOROSCOPIC GUIDANCE AT L3-4 AND L4-5 #1 performed by Tiffany Guadarrama DO at [place] [address]  2016    right total shoulder reverse arthroplasty       Immunization History:   Immunization History   Administered Date(s) Administered    COVID-19, PFIZER PURPLE top, DILUTE for use, (age 15 y+), 30mcg/0.3mL 2021, 2021, 10/15/2021 Active Problems:  Patient Active Problem List   Diagnosis Code    Moderate obesity E66.8    Overactive bladder N32.81    Essential hypertension I10    Lumbar stenosis with neurogenic claudication M48.062    Lumbar radiculopathy M54.16    Anterolisthesis M43.10    Chronic bilateral low back pain without sciatica M54.50, G89.29    Facet arthropathy M47.819    Palpitations R00.2    Paroxysmal atrial fibrillation (HCC) I48.0    Osteoarthritis M19.90    Atrial fibrillation with RVR (HCC) I48.91       Isolation/Infection:   Isolation            No Isolation          Patient Infection Status       Infection Onset Added Last Indicated Last Indicated By Review Planned Expiration Resolved Resolved By    None active    Resolved    COVID-19 (Rule Out) 03/17/23 03/17/23 03/17/23 Respiratory Panel, Molecular, with COVID-19 (Restricted: peds pts or suitable admitted adults) (Ordered)   03/17/23 Rule-Out Test Resulted            Nurse Assessment:  Last Vital Signs: BP (!) 129/99   Pulse 70   Temp 97.9 °F (36.6 °C) (Oral)   Resp 20   Ht 5' 7\" (1.702 m)   Wt 223 lb 1.6 oz (101.2 kg)   SpO2 95%   BMI 34.94 kg/m²     Last documented pain score (0-10 scale): Pain Level: 0  Last Weight:   Wt Readings from Last 1 Encounters:   03/16/23 223 lb 1.6 oz (101.2 kg)     Mental Status:  oriented and alert    IV Access:  - Peripheral IV - site  left FA and wrist #22's, insertion date: ***    Nursing Mobility/ADLs:  Walking   Dependent  Transfer  Assisted  Bathing  Assisted  Dressing  Assisted  Toileting  Assisted  Feeding  Independent  Med Admin  Independent  Med Delivery   whole    Wound Care Documentation and Therapy:  Wound 03/16/23 Pretibial Left; Anterior; Lower (Active)   Wound Etiology Other 03/16/23 0855   Dressing/Treatment Open to air 03/17/23 2030   Wound Assessment Pink/red 03/17/23 0832   Drainage Amount None 03/17/23 0832   Number of days: 2       Wound 03/16/23 Toe (Comment  which one) Anterior; Left 0.8 cmx1.2 cm (Active)   Wound Etiology Surgical 03/17/23 0832   Dressing Status Dry;Clean; Intact 03/17/23 0832   Wound Cleansed Cleansed with saline 03/16/23 0855   Dressing/Treatment Alginate;Dry dressing;Gauze dressing/dressing sponge 03/17/23 0832   Wound Length (cm) 1.2 cm 03/16/23 0855   Wound Width (cm) 0.8 cm 03/16/23 0855   Wound Surface Area (cm^2) 0.96 cm^2 03/16/23 0855   Wound Assessment Eschar dry; Purple/maroon 03/16/23 0855   Drainage Amount None 03/17/23 0832   Drainage Description Serosanguinous 03/16/23 0855   Number of days: 2       Wound 03/16/23 Perineum Posterior;Mid redness blancheable (Active)   Wound Etiology Pressure Stage 1 03/17/23 0832   Dressing/Treatment Open to air 03/17/23 0832   Number of days: 2        Elimination:  Continence: Bowel: Yes  Bladder: rai  Urinary Catheter: Insertion Date: 3/17/23    Colostomy/Ileostomy/Ileal Conduit: No       Date of Last BM: 3/16/23      Intake/Output Summary (Last 24 hours) at 3/18/2023 8232  Last data filed at 3/17/2023 2300  Gross per 24 hour   Intake --   Output 2400 ml   Net -2400 ml     I/O last 3 completed shifts:  In: -   Out: 3900 [Urine:3900]    Safety Concerns:     None    Impairments/Disabilities:      None    Nutrition Therapy:  Current Nutrition Therapy:   - Oral Diet:  General    Routes of Feeding: Oral  Liquids: No Restrictions  Daily Fluid Restriction: no  Last Modified Barium Swallow with Video (Video Swallowing Test): not done    Treatments at the Time of Hospital Discharge:   Respiratory Treatments: na  Oxygen Therapy:  is not on home oxygen therapy. Ventilator:    - No ventilator support    Rehab Therapies: na  Weight Bearing Status/Restrictions: No weight bearing restrictions  Other Medical Equipment (for information only, NOT a DME order):     Other Treatments: na    Patient's personal belongings (please select all that are sent with patient):  None    RN SIGNATURE:  Electronically signed by Celestina Smith RN on 3/18/23 at 10:36 AM EDT    CASE MANAGEMENT/SOCIAL WORK SECTION    Inpatient Status Date: 3/15/23    Readmission Risk Assessment Score:  Readmission Risk              Risk of Unplanned Readmission:  11           Discharging to Facility/ Agency   Name:   Address:  Phone:  Fax:    Dialysis Facility (if applicable)   Name:  Address:  Dialysis Schedule:  Phone:  Fax:    / signature: Electronically signed by Raffi Shi RN on 3/18/23 at 10:12 AM EDT    PHYSICIAN SECTION    Prognosis: Good    Condition at Discharge: Stable    Rehab Potential (if transferring to Rehab): Good    Recommended Labs or Other Treatments After Discharge:     Physician Certification: I certify the above information and transfer of Dorothy Alexis  is necessary for the continuing treatment of the diagnosis listed and that she requires [place] for less 30 days.      Update Admission H&P: No change in H&P    PHYSICIAN SIGNATURE:  Electronically signed by Henrry Salas DO on 3/18/23 at 9:26 AM EDT

## 2023-03-18 NOTE — DISCHARGE SUMMARY
Internal Medicine Progress Note     KATHY=[facility]     Elder Pittman. Kenzie Ortiz., F.A.C.OSierraI. Walker Lennon D.O., JOHNNY.A.CSierraOORLANDO Hussein D.O. Emili Rubalcava, MSN, APRN, NP-C  Danika Rothman. Nevaeh Villalpando, MSN, [id number] [facility]       Internal Medicine  Discharge Summary    NAME: Quita Hernandez  :  1950  MRN:  [id number]  Luis Barajas MD  ADMITTED: 3/15/2023      DISCHARGED: 3/18/23    ADMITTING PHYSICIAN: Elder Benítez DO    CONSULTANT(S):   IP CONSULT TO INTERNAL MEDICINE  IP CONSULT TO CARDIOLOGY  IP CONSULT TO ORTHOPEDIC SURGERY  IP CONSULT TO INFECTIOUS DISEASES  IP CONSULT TO PHARMACY     ADMITTING DIAGNOSIS:   Peripheral edema [R60.9]  Chronic anemia [D64.9]  Atrial fibrillation with RVR ([address] [I48.91]  Acute cystitis without hematuria [N30.00]  Gout of right foot, unspecified cause, unspecified chronicity [M10.9]     DISCHARGE DIAGNOSES:   Sepsis (POA d/t HR,Leukocytosis)  Septic arthritis with source of left wrist with stage IV SLAC versus left knee complicated by prior knee replacement in situ (remote)  MSSA bacteremia, potentially secondary to septic arthritis  Urinary tract infection 2/2 E. coli versus E. coli bacteriuria with recurrent with history of bladder stimulator in situ  Atrial fibrillation with RVR on chronic Eliquis therapy  Acute on chronic normocytic normochromic anemia  Essential hypertension with intermittent hypotension    BRIEF HISTORY OF PRESENT ILLNESS:   A 75-year-old white female  who was admitted to [address] [place] [address]. The patient presented to the  hospital here on 03/15/2023. The patient presented to the hospital here  with several different processes going on. The patient does have a  history of atrial fibrillation, currently controlled on Eliquis and  Rythmol. The patient also relates having history of gout. The patient  apparently has been having problem.   Over the past couple of weeks, she  has been having ambulatory dysfunction with inability to ambulate with  swelling of the left knee with increasing left lower extremity edema. The patient at this time was seen and evaluated. Ultrasound at this  time showed no evidence of abnormality. Inflammatory marker include a  high sed rate. She was admitted to the hospital to the immediate care  unit. The patient was seen at this time, interviewed in the presence of  her . From a cardiac standpoint view, the patient does have a  history of atrial fibrillation. She is currently on Rythmol and Eliquis  and does follow up with [facility]. Her EKG does show atrial  fibrillation with a moderately rapid ventricular response. Currently,  she appeared to be resting comfortably and without chest pain. She  denies any shortness of breath. She does have swelling of the lower  extremity, left leg affected more than the right. Respiratory dee, she  denies any smoking history. She denies any productive cough, TB or  asthma. Gastrointestinal wise, the patient has lost weight  approximately 40 pounds. She denies any recent change in bowel habits,  hematochezia or rectal bleeding. Denies any nausea, vomiting, diarrhea  or constipation. Genitourinary wise, does relate to having urinary  incontinence. She also has a bladder stimulator in. Neurologically,  she denies any history of stroke, TIAs, etc.  She does have pain and  swelling to the left leg with a suspected diagnosis of a Baker's cyst.   The patient is scheduled to see Dr. Darlene Fonseca. She has requested to see  either Dr. Christoph Roy or Dr. Darlene Fonseca on this admission.     LABS[de-identified]  Lab Results   Component Value Date    WBC 16.7 (H) 03/17/2023    HGB 8.4 (L) 03/17/2023    HCT 25.2 (L) 03/17/2023     03/17/2023     03/17/2023    K 3.5 03/17/2023     03/17/2023    CREATININE 0.5 03/17/2023    BUN 21 03/17/2023    CO2 23 03/17/2023    GLUCOSE 109 (H) 03/17/2023    ALT 26 03/17/2023    AST 26 03/17/2023    INR 1.5 03/17/2023     Lab Results   Component Value Date    INR 1.5 03/17/2023    PROTIME 17.2 (H) 03/17/2023      Lab Results   Component Value Date    TSH 1.160 03/16/2023     Lab Results   Component Value Date    TRIG 50 03/16/2023    TRIG 76 10/19/2022     Lab Results   Component Value Date    HDL 30 03/16/2023    HDL 49 10/19/2022     Lab Results   Component Value Date    LDLCALC 38 03/16/2023    LDLCALC 105 (H) 10/19/2022     Lab Results   Component Value Date    LABA1C 5.2 03/16/2023     Microbiology   BLOOD CX #1  Recent Labs     03/15/23  2258   BC Gram stain performed from blood culture bottle media  Gram positive cocci  *     BLOOD CX #2  Recent Labs     03/15/23  2258   BLOODCULT2 Gram stain performed from blood culture bottle media  Gram positive cocci  *      CULTURE, RESPIRATORY   No results found for: CULTRESP  Cultures :   Organism   Date Value Ref Range Status   03/16/2023 Escherichia coli (A)  Preliminary     WOUND/ABSCESS   Date Value Ref Range Status   09/26/2022   Final    Mixed gifty isolated. Further workup and sensitivity testing  is not routinely indicated and will not be performed. Mixed gifty isolated includes:  Coagulase negative Staph species  Gram positive chapito  Gram negative rods       Blood culture   Lab Results   Component Value Date/Time    [facility]  03/15/2023 10:58 PM     Gram stain performed from blood culture bottle media  Gram positive cocci           IMAGING:  XR WRIST LEFT (MIN 3 VIEWS)    Result Date: 3/16/2023  EXAMINATION: 3 XRAY VIEWS OF THE LEFT WRIST 3/16/2023 4:31 pm COMPARISON: 11/10/2022 HISTORY: ORDERING SYSTEM PROVIDED HISTORY: pain TECHNOLOGIST PROVIDED HISTORY: Reason for exam:->pain FINDINGS: Compared to the prior exam, there has been interval progression of scapholunate advanced collapse now demonstrating complete radiocarpal dislocation anteriorly. Additional moderate to severe degenerative changes seen throughout the carpal bones and the 1st [facility] joint.   Soft tissue swelling of the wrist is seen with scattered calcifications suggestive of possible underlying CPPD disease. No acute fracture is identified. 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 XR KNEE LEFT (1-2 VIEWS)    Result Date: 3/16/2023  EXAMINATION: TWO XRAY VIEWS OF THE LEFT KNEE 3/16/2023 1:22 pm COMPARISON: CT left knee 03/16/2023 at 12:10 HISTORY: ORDERING SYSTEM PROVIDED HISTORY: pain TECHNOLOGIST PROVIDED HISTORY: Reason for exam:->pain FINDINGS: A left total knee arthroplasty is identified with adequate alignment. No acute fracture is identified. No concerning periprosthetic lucency is seen. The joint effusion seen on CT is not well visualized radiographically. Stable appearance of the left total knee arthroplasty compared to earlier exam.  No acute osseous abnormality. XR FOOT LEFT (MIN 3 VIEWS)    Result Date: 3/16/2023  EXAMINATION: THREE XRAY VIEWS OF THE LEFT FOOT; THREE XRAY VIEWS OF THE RIGHT FOOT 3/16/2023 5:41 pm COMPARISON: None. HISTORY: ORDERING SYSTEM PROVIDED HISTORY: pain TECHNOLOGIST PROVIDED HISTORY: Reason for exam:->pain FINDINGS: No acute fracture is identified. No osseous destruction is seen. No dislocation is visualized. Bilateral postsurgical changes of the great toe are seen. Additional postsurgical changes of the left 2nd through 5th MTP joints are noted. There is bilateral pes planus with advanced forefoot degenerative change including hammertoe deformities. Soft tissue swelling of the feet are seen, left greater than right. No acute osseous abnormality identified. Extensive bilateral postsurgical and degenerative changes, as described above. XR FOOT RIGHT (MIN 3 VIEWS)    Result Date: 3/16/2023  EXAMINATION: THREE XRAY VIEWS OF THE LEFT FOOT; THREE XRAY VIEWS OF THE RIGHT FOOT 3/16/2023 5:41 pm COMPARISON: None.  HISTORY: ORDERING SYSTEM PROVIDED HISTORY: pain TECHNOLOGIST PROVIDED HISTORY: Reason for exam:->pain FINDINGS: No acute fracture is identified. No osseous destruction is seen. No dislocation is visualized. Bilateral postsurgical changes of the great toe are seen. Additional postsurgical changes of the left 2nd through 5th MTP joints are noted. There is bilateral pes planus with advanced forefoot degenerative change including hammertoe deformities. Soft tissue swelling of the feet are seen, left greater than right. No acute osseous abnormality identified. Extensive bilateral postsurgical and degenerative changes, as described above. CT KNEE LEFT WO CONTRAST    Result Date: 3/16/2023  EXAMINATION: CT OF THE LEFT KNEE WITHOUT CONTRAST 3/16/2023 11:50 am TECHNIQUE: CT of the left knee was performed without the administration of intravenous contrast.  Multiplanar reformatted images are provided for review. Automated exposure control, iterative reconstruction, and/or weight based adjustment of the mA/kV was utilized to reduce the radiation dose to as low as reasonably achievable. COMPARISON: Left lower extremity deep venous ultrasound 03/15/2023 HISTORY ORDERING SYSTEM PROVIDED HISTORY: pain/ swelling TECHNOLOGIST PROVIDED HISTORY: Reason for exam:->pain/ swelling FINDINGS: Left total knee arthroplasty and with unavoidable CT reconstruction artifact related to metallic hardware. CT artifact partly limits detail. As correlated with the preliminary CT scanogram, left knee prosthetic components appear in appropriate position. 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 No fracture or dislocation is seen. A left knee effusion is present and estimated to be large in size. Associated large popliteal cyst which spans an approximately 15 cm craniocaudal length and measures 7 x 4 cm in the short axis. Inferiorly, the popliteal cyst extends into the upper left calf posteriorly.  There is nonspecific generalized superficial edema with fat stranding of the knee, lower thigh, and upper left leg. No soft tissue gas or abscess seen. 1.  Left total knee arthroplasty with large effusion and large popliteal cyst formation. Details above. 2.  Nonspecific superficial edema of the knee, lower thigh, and upper calf on the left. No abscess or focal infection seen. XR CHEST 1 VIEW    Result Date: 3/15/2023  EXAMINATION: ONE XRAY VIEW OF THE CHEST 3/15/2023 7:59 pm COMPARISON: 09/01/2006 HISTORY: ORDERING SYSTEM PROVIDED HISTORY: eval CHF TECHNOLOGIST PROVIDED HISTORY: Reason for exam:->eval CHF FINDINGS: The lungs are without acute focal process. There is no effusion or pneumothorax. The cardiomediastinal silhouette is without acute process. The osseous structures are without acute process. No acute process. US DUP LOWER EXTREMITY LEFT PREMA    Result Date: 3/10/2023  EXAMINATION: DUPLEX VENOUS ULTRASOUND OF THE LEFT LOWER EXTREMITY 3/10/2023 11:52 am TECHNIQUE: Duplex ultrasound using B-mode/gray scaled imaging and Doppler spectral analysis and color flow was obtained of the deep venous structures of the left lower extremity. COMPARISON: None. HISTORY: ORDERING SYSTEM PROVIDED HISTORY: Leg swelling TECHNOLOGIST PROVIDED HISTORY: R/O DVT Reason for exam:->Leg swelling What reading provider will be dictating this exam?->CRC FINDINGS: The visualized veins of the left lower extremity are patent and free of echogenic thrombus. The veins demonstrate good compressibility with normal color flow study and spectral analysis. Complex popliteal cyst measuring approximately 6.4 cm in long axis. No evidence of DVT in the left lower extremity.   Complex popliteal cyst.     US DUP LOWER EXTREMITIES BILATERAL VENOUS    Result Date: 3/15/2023  EXAMINATION: DUPLEX VENOUS ULTRASOUND OF THE BILATERAL LOWER EXTREMITIES3/15/2023 9:36 pm TECHNIQUE: Duplex ultrasound using B-mode/gray scaled imaging, Doppler spectral analysis and color flow Doppler was obtained of the deep venous structures of the lower bilateral extremities. COMPARISON: None. HISTORY: ORDERING SYSTEM PROVIDED HISTORY: leg edema, rule out DVT, attn left leg TECHNOLOGIST PROVIDED HISTORY: Reason for exam:->leg edema, rule out DVT, attn left leg What reading provider will be dictating this exam?->CRC FINDINGS: The visualized veins of the bilateral lower extremities are patent and free of echogenic thrombus. The veins demonstrate good compressibility with normal color flow study and spectral analysis. There is subcutaneous edema mostly on the left being evident, and at the popliteal fossa a complex fluid collection measuring 10 cm suggestive of Baker's cyst with internal echogenicity possibly debris or loose bodies     1. No evidence of DVT in either lower extremity. 2. Subcutaneous edema and a large complex left popliteal bursal collection suggested. HOSPITAL COURSE:   Roddy Simpson was admitted secondary to sepsis with concern for septic arthritis. She has multiple underlying orthopedic issues including stage IV scapholunate advanced collapse syndrome previously following with hand specialty orthopedics with last injection being around November 2022. She also has had a prior left knee arthroplasty around 2009 or 2010 with increased swelling and pain recently that was described as having a possible Baker's cyst at the urgent care per the patient and her spouse. She was admitted and infectious work-up was undertaken. Orthopedic surgery team provided consultation at this facility and the patient's left wrist and left knee were aspirated due to concern for septic arthritis. Infectious/inflammatory markers are generally elevated and the initial aspirates are concerning for septic arthritis as well. Blood cultures returned positive for MSSA with joint infection felt to be the likely source.   Antimicrobials were implemented as well as symptomatic and supportive care. The patient's Eliquis was placed on hold with injectable Lovenox being utilized in its stead. The patient's last Eliquis dose was Thursday, March 16. The ID team provided consultation and the recommendations have been noted. Also of note the patient had a urinary tract infection with recurrent UTIs and history of bladder stimulator in situ. Cardiovascular team followed as well as the patient was tachycardic with underlying A-fib during the initial phase of her hospitalization as well and required some medication adjustments. Case was discussed with [facility] so discharge via the transfer line yesterday secondary to the complex issues at play, patient's established relationship with the [facility] orthopedist and the likely need for joint explantation. The patient was excepted in transfer. A bed has been arranged. Patient is medically stable and acceptable for transfer today. BRIEF PHYSICAL EXAMINATION AND LABORATORIES ON DAY OF DISCHARGE:  VITALS:  BP (!) 129/99   Pulse 70   Temp 97.9 °F (36.6 °C) (Oral)   Resp 20   Ht 5' 7\" (1.702 m)   Wt 223 lb 1.6 oz (101.2 kg)   SpO2 95%   BMI 34.94 kg/m²     HEENT:  PERRLA. EOMI. Sclera clear. Buccal mucosa moist.    Neck:  Supple. Trachea midline. No thyromegaly. No JVD. No bruits. Heart:  Rhythm regular, rate controlled. No murmurs. Lungs:  Symmetrical. Clear to auscultation bilaterally. No wheezes. No rhonchi. No rales. Abdomen: Soft. Non-tender. Non-distended. Bowel sounds positive. No organomegaly or masses. No pain on palpation    Extremities:  Peripheral pulses present. No peripheral edema. No ulcers. Neurologic:  Alert x 3. No focal deficit. Cranial nerves grossly intact. Skin:  No petechia. No hemorrhage. No wounds.       DISPOSITION:  The patient's condition is stable for transfer to [facility] for further care and management    Current Facility-Administered Medications   Medication Dose Route Frequency Provider Last Rate Last Admin    HYDROcodone-acetaminophen ([address]) 5-325 MG per tablet 1 tablet  1 tablet Oral Q4H PRN [place] Osgood, APRN - CNP        metoprolol succinate (TOPROL XL) extended release tablet 50 mg  50 mg Oral BID Juana Elks, DO   50 mg at 03/17/23 2040    vancomycin (VANCOCIN) 1,250 mg in sodium chloride 0.9 % 250 mL IVPB  1,250 mg IntraVENous Q12H [place] Osgood, APRN - .7 mL/hr at 03/18/23 0002 1,250 mg at 03/18/23 0002    magnesium sulfate 1000 mg in dextrose 5% 100 mL IVPB  1,000 mg IntraVENous PRN [place] O[place], APRN - CNP        sodium phosphate 16.5 mmol in sodium chloride 0.9 % 250 mL IVPB  16.5 mmol IntraVENous PRN [place] Osg[place], APRN - CNP        Or    sodium phosphate 33 mmol in sodium chloride 0.9 % 500 mL IVPB  33 mmol IntraVENous PRN [place], APRN - CNP        potassium chloride (KLOR-CON M) extended release tablet 40 mEq  40 mEq Oral PRN [place] O[place], APRN - CNP        Or    potassium bicarb-citric acid (EFFER-K) effervescent tablet 40 mEq  40 mEq Oral PRN [place] Osgood, APRN - CNP        Or    potassium chloride 10 mEq/100 mL IVPB (Peripheral Line)  10 mEq IntraVENous PRN [place] Osg[place], APRN - CNP        enoxaparin (LOVENOX) injection 100 mg  1 mg/kg SubCUTAneous BID Sean Ireland, DO   100 mg at 03/17/23 2042    levoFLOXacin (LEVAQUIN) tablet 500 mg  500 mg Oral Daily [place] Rat[place], APRN - CNS        fentaNYL (SUBLIMAZE) injection 25 mcg  25 mcg IntraVENous Q4H PRN [place] Osgood, APRN - CNP        sodium chloride flush 0.9 % injection 5-40 mL  5-40 mL IntraVENous 2 times per day Sandra Smith, DO   10 mL at 03/17/23 2042    sodium chloride flush 0.9 % injection 5-40 mL  5-40 mL IntraVENous PRN Ashu Benítez, DO        0.9 % sodium chloride infusion   IntraVENous PRN Lilia Benítez, DO        acetaminophen (TYLENOL) tablet 650 mg  650 mg Oral Q4H PRN Ashu Benítez DO        ondansetron (ZOFRAN-ODT) disintegrating tablet 4 mg  4 mg Oral Q8H PRN Evetta Null Bisel, DO        Or    ondansetron (ZOFRAN) injection 4 mg  4 mg IntraVENous Q6H PRN Evetta Null Bisel, DO        docusate sodium (COLACE) capsule 100 mg  100 mg Oral Daily Ashu Benítez, DO   100 mg at 03/17/23 1018    [Held by provider] celecoxib (CELEBREX) capsule 200 mg  200 mg Oral Daily Ashu Benítez, DO   200 mg at 03/17/23 1212    polyvinyl alcohol (LIQUIFILM TEARS) 1.4 % ophthalmic solution 1 drop  1 drop Both Eyes PRN Adine Caul, DO        erythromycin [facility]) ophthalmic ointment   Both Eyes Nightly Adine Caul, DO   Given at 03/17/23 2040    [Held by provider] apixaban (ELIQUIS) tablet 5 mg  5 mg Oral BID Enrique Null Bisel, DO        [Held by provider] lactated ringers IV soln infusion   IntraVENous Continuous Ashu Benítez DO        allopurinol (ZYLOPRIM) tablet 100 mg  100 mg Oral Nightly Ashu Benítez, DO   100 mg at 03/17/23 2040    [Held by provider] spironolactone (ALDACTONE) tablet 12.5 mg  12.5 mg Oral Daily Ardine Pile, DO   12.5 mg at 03/17/23 1018    [Held by provider] losartan (COZAAR) tablet 25 mg  25 mg Oral Daily Ardine Pile, DO   25 mg at 03/17/23 1017    perflutren lipid microspheres (DEFINITY) injection 1.5 mL  1.5 mL IntraVENous ONCE PRN Ardine Pile, DO        [Held by provider] furosemide (LASIX) injection 40 mg  40 mg IntraVENous BID Ardine Pile, DO   40 mg at 03/17/23 1018    atorvastatin (LIPITOR) tablet 40 mg  40 mg Oral Nightly Ardine Pile, DO   40 mg at 03/17/23 2040     Facility-Administered Medications Ordered in Other Encounters   Medication Dose Route Frequency Provider Last Rate Last Admin    0.9 % sodium chloride infusion   IntraVENous Continuous ADALI [place]        midazolam (VERSED) injection 1 mg  1 mg IntraVENous Q5 Min PRN Horace Artis, DO   1 mg at 09/20/16 9660        Preparing for this patient's discharge, including paperwork, orders, instructions, and meeting with patient did require > 40 minutes.     Lisandro Chatterjee, APRN - CNP 3/18/2023  8:56 AM

## 2023-03-18 NOTE — PROGRESS NOTES
NAME: Hilaria Castillo  MR:  [id number]  :   1950  Admit Date:  3/15/2023        Elements of this note, were copied and pasted from Previous. Updates have been made where noted and reflect current exam and medical decision making from the DOS of this encounter.  CHIEF COMPLAINT       Chief Complaint   Patient presents with    Difficulty Walking     HISTORY OF PRESENT ILLNESS     Hilaria Castillo is a 72 y.o. female 3/15/2023 and has  has a past medical history of A-fib (HCC), Arthritis, Class 2 severe obesity due to excess calories with serious comorbidity and body mass index (BMI) of 37.0 to 37.9 in adult (HCC), Dry eyes, bilateral, Hypertension, OAB (overactive bladder), and PONV (postoperative nausea and vomiting).     This is a face to face encounter 23  In bed  present updated POC  Eating breakfast     Patient is tolerating medications. No reported adverse drug reactions.  Available labs, imaging studies, microbiologic studies have been reviewed with pt and family if present.  Assessment & Plan   Pt was admitted with   Peripheral edema [R60.9]  Chronic anemia [D64.9]  Atrial fibrillation with RVR (HCC) [I48.91]  Acute cystitis without hematuria [N30.00]  Gout of right foot, unspecified cause, unspecified chronicity [M10.9]    ID following for   leukocytosis  S aureus septicemia presumed MSSA  Left knee PJI    -for transfer to [facility]  -echo Summary   Ejection fraction is visually estimated at 65%.   No regional wall motion abnormalities seen.   Mild left ventricular concentric hypertrophy noted.   Dilated right ventricle with reduced function.   Left atrial volume index of 51 ml per meters squared BSA.   Physiologic and/or trace mitral regurgitation is present.   Mild tricuspid regurgitation.   No evidence for hemodynamically significant pericardial effusion     3/17 blood cx pending     vancomycin (VANCOCIN) 1,250 mg in sodium chloride 0.9 % 250 mL IVPB, Q12H  levoFLOXacin (LEVAQUIN) tablet  500 mg, Daily       Microbiology:   Recent Labs     03/17/23  1215   COVID19 Not Detected  Not Detected     Lab Results   Component Value Date/Time    BC  03/15/2023 10:58 PM     Gram stain performed from blood culture bottle media  Gram positive cocci      BC Sensitivity to follow 03/15/2023 10:58 PM    BLOODCULT2  03/15/2023 10:58 PM     Gram stain performed from blood culture bottle media  Gram positive cocci      BLOODCULT2 Sensitivity to follow 03/15/2023 10:58 PM    ORG Staphylococcus aureus 03/17/2023 02:15 PM    ORG Staphylococcus aureus 03/16/2023 07:00 PM    ORG Escherichia coli 03/16/2023 12:32 AM          Recent Labs     03/16/23  0032 03/16/23  1900 03/17/23  1415   LABURIN 10 to 100,000 CFU/mL  Mixed gifty isolated. Further workup and sensitivity testing  is not routinely indicated and will not be performed.  Mixed gifty isolated includes:  Mixed gram positive organisms  *  >100,000 CFU/ml  --   --    ORG Escherichia coli* Staphylococcus aureus* Staphylococcus aureus*           REVIEW OF SYSTEMS     As stated above in the chief complaint, otherwise negative.  CURRENT MEDICATIONS     Current Facility-Administered Medications:     HYDROcodone-acetaminophen (NORCO) 5-325 MG per tablet 1 tablet, 1 tablet, Oral, Q4H PRN, Ryder Trinidad APRN - CNP    metoprolol succinate (TOPROL XL) extended release tablet 50 mg, 50 mg, Oral, BID, Parmjit Jara, DO, 50 mg at 03/18/23 0911    vancomycin (VANCOCIN) 1,250 mg in sodium chloride 0.9 % 250 mL IVPB, 1,250 mg, IntraVENous, Q12H, CHANDRA Murillo - CNP, Last Rate: 166.7 mL/hr at 03/18/23 0002, 1,250 mg at 03/18/23 0002    magnesium sulfate 1000 mg in dextrose 5% 100 mL IVPB, 1,000 mg, IntraVENous, PRN, Ryder Trinidad APRN - CNP    sodium phosphate 16.5 mmol in sodium chloride 0.9 % 250 mL IVPB, 16.5 mmol, IntraVENous, PRN **OR** sodium phosphate 33 mmol in sodium chloride 0.9 % 500 mL IVPB, 33 mmol, IntraVENous, PRN, Ryder Trinidad APRN - CNP    potassium  chloride (KLOR-CON M) extended release tablet 40 mEq, 40 mEq, Oral, PRN **OR** potassium bicarb-citric acid (EFFER-K) effervescent tablet 40 mEq, 40 mEq, Oral, PRN **OR** potassium chloride 10 mEq/100 mL IVPB (Peripheral Line), 10 mEq, IntraVENous, PRN, Ruperto Suarezes, APRN - CNP    enoxaparin (LOVENOX) injection 100 mg, 1 mg/kg, SubCUTAneous, BID, Delonte Hunter DO, 100 mg at 03/18/23 0911    levoFLOXacin (LEVAQUIN) tablet 500 mg, 500 mg, Oral, Daily, Tabitha Cline APRN - CNS, 500 mg at 03/18/23 0912    fentaNYL (SUBLIMAZE) injection 25 mcg, 25 mcg, IntraVENous, Q4H PRN, Ruperto Hdz, APRN - CNP    sodium chloride flush 0.9 % injection 5-40 mL, 5-40 mL, IntraVENous, 2 times per day, Ashu Benítez DO, 10 mL at 03/18/23 0912    sodium chloride flush 0.9 % injection 5-40 mL, 5-40 mL, IntraVENous, PRN, Ashu Benítez DO    0.9 % sodium chloride infusion, , IntraVENous, PRN, Elder Benítez DO    acetaminophen (TYLENOL) tablet 650 mg, 650 mg, Oral, Q4H PRN, Ashu Benítez DO    ondansetron (ZOFRAN-ODT) disintegrating tablet 4 mg, 4 mg, Oral, Q8H PRN **OR** ondansetron (ZOFRAN) injection 4 mg, 4 mg, IntraVENous, Q6H PRN, Ashu Benítez DO    docusate sodium (COLACE) capsule 100 mg, 100 mg, Oral, Daily, Ashu Benítez DO, 100 mg at 03/18/23 0912    [Held by provider] celecoxib (CELEBREX) capsule 200 mg, 200 mg, Oral, Daily, Ashu Benítez DO, 200 mg at 03/17/23 1212    polyvinyl alcohol (LIQUIFILM TEARS) 1.4 % ophthalmic solution 1 drop, 1 drop, Both Eyes, PRN, Elder Benítez DO    erythromycin [facility]) ophthalmic ointment, , Both Eyes, Nightly, Ashu Benítez DO, Given at 03/17/23 2040    [Held by provider] apixaban (ELIQUIS) tablet 5 mg, 5 mg, Oral, BID, Ashu Benítez DO    [Held by provider] lactated ringers IV soln infusion, , IntraVENous, Continuous, Ashu Benítez DO    allopurinol (ZYLOPRIM) tablet 100 mg, 100 mg, Oral, Nightly, Ashu Benítez DO, 100 mg at 03/17/23 2040    [Held by provider] spironolactone (ALDACTONE) tablet 12.5 mg, 12.5 mg, Oral, Daily, Talya Chan DO, 12.5 mg at 23 1018    [Held by provider] losartan (COZAAR) tablet 25 mg, 25 mg, Oral, Daily, 25 mg at 23 1017 **AND** [DISCONTINUED] hydroCHLOROthiazide (HYDRODIURIL) tablet 25 mg, 25 mg, Oral, Daily, Ashu Benítez DO    perflutren lipid microspheres (DEFINITY) injection 1.5 mL, 1.5 mL, IntraVENous, ONCE PRN, Talya Chan DO    [Held by provider] furosemide (LASIX) injection 40 mg, 40 mg, IntraVENous, BID, Parmjit Jara DO, 40 mg at 23 1018    atorvastatin (LIPITOR) tablet 40 mg, 40 mg, Oral, Nightly, Talya Chan DO, 40 mg at 23 2040    Facility-Administered Medications Ordered in Other Encounters:     0.9 % sodium chloride infusion, , IntraVENous, Continuous, ADALI Greenfield    midazolam (VERSED) injection 1 mg, 1 mg, IntraVENous, Q5 Min PRN, Annie Mills DO, 1 mg at 16 0627    BP (!) 129/99   Pulse 70   Temp 97.9 °F (36.6 °C) (Oral)   Resp 20   Ht 5' 7\" (1.702 m)   Wt 223 lb 1.6 oz (101.2 kg)   SpO2 95%   BMI 34.94 kg/m²   Temp  Av.2 °F (36.8 °C)  Min: 97.9 °F (36.6 °C)  Max: 98.5 °F (36.9 °C)  CONSTITUTIONAL:  no apparent distress, and appears stated age  ENT:  Normocephalic, atraumatic,  external ears without lesions, oral pharynx with moist mucus membranes,    LUNGS:  No increased work of breathing, clear to auscultation bilaterally, no crackles or wheezing  CARDIOVASCULAR:  Normal apical impulse, irregular  rhythm, normal S1 and S2,  no murmur noted  ABDOMEN:  normal bowel sounds, soft, non-distended, non-tender  MUSCULOSKELETAL:  There is no redness, warmth, or swelling  BLE. Dec of motion   left knee swelling ,   NEUROLOGIC:  Awake, alert, oriented. Cranial nerves II-XII are grossly intact. SKIN:  normal skin color, texture, turgor and no rashes  Lines:          Peripheral Intravenous Line:  Peripheral IV 23 Right; Anterior Forearm (Active)   Site Assessment Clean, dry & intact 03/17/23 1550   Line Status Blood return noted;Brisk blood return;Flushed;Normal saline locked 03/17/23 1550   Phlebitis Assessment No symptoms 03/17/23 1550   Infiltration Assessment 0 03/17/23 1550   Dressing Status New dressing applied 03/17/23 1550   Dressing Type Transparent 03/17/23 1550   Dressing Intervention New 03/17/23 1550       Peripheral IV 03/17/23 Right;Posterior Wrist (Active)   Site Assessment Clean, dry & intact 03/17/23 1550   Line Status Normal saline locked 03/17/23 1550   Line Care Connections checked and tightened 03/17/23 1550   Phlebitis Assessment No symptoms 03/17/23 1550   Infiltration Assessment 0 03/17/23 1550   Alcohol Cap Used No 03/17/23 1550   Dressing Status Clean, dry & intact 03/17/23 1550   Dressing Type Transparent 03/17/23 1550     Hemodialysis Catheter:     Drain(s):  Urinary Catheter 03/17/23 [place];2 Way (Active)   Catheter Indications Urinary retention (acute or chronic), continuous bladder irrigation or bladder outlet obstruction 03/17/23 2300   Site Assessment No urethral drainage 03/17/23 2300   Urine Color Nessa 03/17/23 2300   Urine Appearance Cloudy 03/17/23 2300   Collection Container Standard 03/17/23 2300   Securement Method Securing device (Describe) 03/17/23 2300   Catheter Care  Perineal wipes 03/17/23 2300   Catheter Best Practices  Drainage tube clipped to bed;Catheter secured to thigh; Tamper seal intact; Bag below bladder;Bag not on floor; Lack of dependent loop in tubing;Drainage bag less than half full 03/17/23 2300   Status Draining 03/17/23 2300   Output (mL) 700 mL 03/17/23 2300     DIAGNOSTIC RESULTS     Recent Labs     03/16/23  0652 03/17/23  0645 03/18/23  0821   WBC 20.7* 16.7* 14.1*   RBC 2.81* 2.67* 2.75*   HGB 8.7* 8.4* 8.5*   HCT 26.3* 25.2* 26.3*   MCV 93.6 94.4 95.6   MCH 31.0 31.5 30.9   MCHC 33.1 33.3 32.3   RDW 13.1 13.4 13.3    439 449   MPV 10.1 10.1 9.7     Recent Labs     03/16/23  0652 03/17/23  0645 03/18/23  0821    137 139   K 3.3* 3.5 3.4*   CL 99 103 105   CO2 23 23 24   BUN 24* 21 18   CREATININE 0.6 0.5 0.5   GLUCOSE 96 109* 100*   PROT 6.0* 6.1* 6.3*   LABALBU 2.5* 2.4* 2.5*   CALCIUM 9.0 8.8 9.0   BILITOT 1.2 0.9 0.9   ALKPHOS 92 97 116*   AST 30 26 50*   ALT 27 26 47*     Lab Results   Component Value Date    SEDRATE 140 (H) 03/17/2023    SEDRATE 130 (H) 03/16/2023     Lab Results   Component Value Date    CRP 31.1 (H) 03/17/2023     Lab Results   Component Value Date    PROCAL 0.10 (H) 03/17/2023     Recent Labs     03/16/23  0652 03/17/23  0645 03/18/23  0821   CRP  --  31.1*  --    INR  --  1.5  --    PROTIME  --  17.2*  --    AST 30 26 50*   ALT 27 26 47*   TRIG 50  --   --      MRSA Culture Only   Date Value Ref Range Status   09/16/2016 Methicillin resistant Staph aureus not isolated  Final       Radiology:  XR WRIST LEFT (MIN 3 VIEWS)    Result Date: 3/16/2023  EXAMINATION: 3 XRAY VIEWS OF THE LEFT WRIST 3/16/2023 4:31 pm COMPARISON: 11/10/2022 HISTORY: ORDERING SYSTEM PROVIDED HISTORY: pain TECHNOLOGIST PROVIDED HISTORY: Reason for exam:->pain FINDINGS: Compared to the prior exam, there has been interval progression of scapholunate advanced collapse now demonstrating complete radiocarpal dislocation anteriorly. Additional moderate to severe degenerative changes seen throughout the carpal bones and the 1st [facility] joint. Soft tissue swelling of the wrist is seen with scattered calcifications suggestive of possible underlying CPPD disease. No acute fracture is identified. 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      XR KNEE LEFT (1-2 VIEWS)    Result Date: 3/16/2023  EXAMINATION: TWO XRAY VIEWS OF THE LEFT KNEE 3/16/2023 1:22 pm COMPARISON: CT left knee 03/16/2023 at 12:10 HISTORY: ORDERING SYSTEM PROVIDED HISTORY: pain TECHNOLOGIST PROVIDED HISTORY: Reason for exam:->pain FINDINGS: A left total knee arthroplasty is identified with adequate alignment. No acute fracture is identified. No concerning periprosthetic lucency is seen. The joint effusion seen on CT is not well visualized radiographically. Stable appearance of the left total knee arthroplasty compared to earlier exam.  No acute osseous abnormality. XR FOOT LEFT (MIN 3 VIEWS)    Result Date: 3/16/2023  EXAMINATION: THREE XRAY VIEWS OF THE LEFT FOOT; THREE XRAY VIEWS OF THE RIGHT FOOT 3/16/2023 5:41 pm COMPARISON: None. HISTORY: ORDERING SYSTEM PROVIDED HISTORY: pain TECHNOLOGIST PROVIDED HISTORY: Reason for exam:->pain FINDINGS: No acute fracture is identified. No osseous destruction is seen. No dislocation is visualized. Bilateral postsurgical changes of the great toe are seen. Additional postsurgical changes of the left 2nd through 5th MTP joints are noted. There is bilateral pes planus with advanced forefoot degenerative change including hammertoe deformities. Soft tissue swelling of the feet are seen, left greater than right. No acute osseous abnormality identified. Extensive bilateral postsurgical and degenerative changes, as described above. XR FOOT RIGHT (MIN 3 VIEWS)    Result Date: 3/16/2023  EXAMINATION: THREE XRAY VIEWS OF THE LEFT FOOT; THREE XRAY VIEWS OF THE RIGHT FOOT 3/16/2023 5:41 pm COMPARISON: None. HISTORY: ORDERING SYSTEM PROVIDED HISTORY: pain TECHNOLOGIST PROVIDED HISTORY: Reason for exam:->pain FINDINGS: No acute fracture is identified. No osseous destruction is seen. No dislocation is visualized. Bilateral postsurgical changes of the great toe are seen. Additional postsurgical changes of the left 2nd through 5th MTP joints are noted. There is bilateral pes planus with advanced forefoot degenerative change including hammertoe deformities. Soft tissue swelling of the feet are seen, left greater than right. No acute osseous abnormality identified.   Extensive bilateral postsurgical and degenerative changes, as described above.     CT KNEE LEFT WO CONTRAST    Result Date: 3/16/2023  EXAMINATION: CT OF THE LEFT KNEE WITHOUT CONTRAST 3/16/2023 11:50 am TECHNIQUE: CT of the left knee was performed without the administration of intravenous contrast.  Multiplanar reformatted images are provided for review. Automated exposure control, iterative reconstruction, and/or weight based adjustment of the mA/kV was utilized to reduce the radiation dose to as low as reasonably achievable. COMPARISON: Left lower extremity deep venous ultrasound 03/15/2023 HISTORY ORDERING SYSTEM PROVIDED HISTORY: pain/ swelling TECHNOLOGIST PROVIDED HISTORY: Reason for exam:->pain/ swelling FINDINGS: Left total knee arthroplasty and with unavoidable CT reconstruction artifact related to metallic hardware.  CT artifact partly limits detail. As correlated with the preliminary CT scanogram, left knee prosthetic components appear in appropriate position.  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  No fracture or dislocation is seen. A left knee effusion is present and estimated to be large in size. Associated large popliteal cyst which spans an approximately 15 cm craniocaudal length and measures 7 x 4 cm in the short axis.  Inferiorly, the popliteal cyst extends into the upper left calf posteriorly. There is nonspecific generalized superficial edema with fat stranding of the knee, lower thigh, and upper left leg.  No soft tissue gas or abscess seen.     1.  Left total knee arthroplasty with large effusion and large popliteal cyst formation.  Details above. 2.  Nonspecific superficial edema of the knee, lower thigh, and upper calf on the left.  No abscess or focal infection seen.     XR CHEST 1 VIEW    Result Date: 3/15/2023  EXAMINATION: ONE XRAY VIEW OF THE CHEST 3/15/2023 7:59 pm COMPARISON: 09/01/2006 HISTORY: ORDERING SYSTEM PROVIDED HISTORY: eval CHF  TECHNOLOGIST PROVIDED HISTORY: Reason for exam:->eval CHF FINDINGS: The lungs are without acute focal process. There is no effusion or pneumothorax. The cardiomediastinal silhouette is without acute process. The osseous structures are without acute process. No acute process. US DUP LOWER EXTREMITY LEFT PREMA    Result Date: 3/10/2023  EXAMINATION: DUPLEX VENOUS ULTRASOUND OF THE LEFT LOWER EXTREMITY 3/10/2023 11:52 am TECHNIQUE: Duplex ultrasound using B-mode/gray scaled imaging and Doppler spectral analysis and color flow was obtained of the deep venous structures of the left lower extremity. COMPARISON: None. HISTORY: ORDERING SYSTEM PROVIDED HISTORY: Leg swelling TECHNOLOGIST PROVIDED HISTORY: R/O DVT Reason for exam:->Leg swelling What reading provider will be dictating this exam?->CRC FINDINGS: The visualized veins of the left lower extremity are patent and free of echogenic thrombus. The veins demonstrate good compressibility with normal color flow study and spectral analysis. Complex popliteal cyst measuring approximately 6.4 cm in long axis. No evidence of DVT in the left lower extremity. Complex popliteal cyst.     US DUP LOWER EXTREMITIES BILATERAL VENOUS    Result Date: 3/15/2023  EXAMINATION: DUPLEX VENOUS ULTRASOUND OF THE BILATERAL LOWER EXTREMITIES3/15/2023 9:36 pm TECHNIQUE: Duplex ultrasound using B-mode/gray scaled imaging, Doppler spectral analysis and color flow Doppler was obtained of the deep venous structures of the lower bilateral extremities. COMPARISON: None. HISTORY: ORDERING SYSTEM PROVIDED HISTORY: leg edema, rule out DVT, attn left leg TECHNOLOGIST PROVIDED HISTORY: Reason for exam:->leg edema, rule out DVT, attn left leg What reading provider will be dictating this exam?->CRC FINDINGS: The visualized veins of the bilateral lower extremities are patent and free of echogenic thrombus.  The veins demonstrate good compressibility with normal color flow study and spectral analysis. There is subcutaneous edema mostly on the left being evident, and at the popliteal fossa a complex fluid collection measuring 10 cm suggestive of Baker's cyst with internal echogenicity possibly debris or loose bodies     1. No evidence of DVT in either lower extremity. 2. Subcutaneous edema and a large complex left popliteal bursal collection suggested. Imaging and labs were reviewed per medical records. Thank you for involving me in the care of Dorothy Alexis I will continue to follow. Please do not hesitate to call [phone] for any questions or concerns.     Electronically signed by Yasemin Ragland MD on 3/18/2023 at 12:48 PM

## 2023-03-18 NOTE — PROGRESS NOTES
met with patient.  provided a listening presence and prayer. Spiritual care is ongoing and as needed.

## 2023-03-18 NOTE — PLAN OF CARE
Problem: Discharge Planning  Goal: Discharge to home or other facility with appropriate resources  Outcome: Adequate for Discharge     Problem: Safety - Adult  Goal: Free from fall injury  Outcome: Adequate for Discharge     Problem: ABCDS Injury Assessment  Goal: Absence of physical injury  Outcome: Adequate for Discharge     Problem: Skin/Tissue Integrity  Goal: Absence of new skin breakdown  Description: 1. Monitor for areas of redness and/or skin breakdown  2. Assess vascular access sites hourly  3. Every 4-6 hours minimum:  Change oxygen saturation probe site  4. Every 4-6 hours:  If on nasal continuous positive airway pressure, respiratory therapy assess nares and determine need for appliance change or resting period.   Outcome: Adequate for Discharge     Problem: Pain  Goal: Verbalizes/displays adequate comfort level or baseline comfort level  Outcome: Adequate for Discharge

## 2023-03-18 NOTE — PROGRESS NOTES
Pharmacy Consultation Note  (Antibiotic Dosing and Monitoring)    Initial consult date: 3/17/23  Consulting physician/provider: JUDY Smith  Drug: Vancomycin  Indication: bloodstream, bone/joint infection MSSA bacteremia    Age/  Gender Height Weight IBW  Allergy Information   72 y.o./female 5' 7\" (170.2 cm) 209 lb (94.8 kg)     Ideal body weight: 61.6 kg (135 lb 12.9 oz)  Adjusted ideal body weight: 77.4 kg (170 lb 11.5 oz)   Pcn [penicillins] and Morphine      Renal Function:  Recent Labs     03/16/23  0652 03/17/23  0645 03/18/23  0821   BUN 24* 21 18   CREATININE 0.6 0.5 0.5         Intake/Output Summary (Last 24 hours) at 3/18/2023 1201  Last data filed at 3/17/2023 2300  Gross per 24 hour   Intake --   Output 2400 ml   Net -2400 ml         Vancomycin Monitoring:  Trough:  No results for input(s): VANCOTROUGH in the last 72 hours. Random:    Recent Labs     03/18/23  1047   VANCORANDOM 7.3       Recent Labs     03/15/23  2258   BLOODCULT2 Gram stain performed from blood culture bottle media  Gram positive cocci  *  Sensitivity to follow          Historical Cultures:  Organism   Date Value Ref Range Status   03/16/2023 Staphylococcus aureus (A)  Preliminary     Recent Labs     03/15/23  2258   BC Gram stain performed from blood culture bottle media  Gram positive cocci  *  Sensitivity to follow         Vancomycin Administration Times:  Recent vancomycin administrations                     vancomycin (VANCOCIN) 1,250 mg in sodium chloride 0.9 % 250 mL IVPB (mg) 1,250 mg New Bag 03/18/23 0002     1,250 mg New Bag 03/17/23 1222                    Assessment:  Patient is a 74 y.o. female who has been initiated on vancomycin  Estimated Creatinine Clearance: 121 mL/min (based on SCr of 0.5 mg/dL).   To dose vancomycin, pharmacy will be utilizing [employer] calculation software for goal AUC/JONNY 400-600 mg/L-hr  3/17: SCr 0.5mg/dl, baseline~0.7mg/dl, UOP not documented  3/18: Level @ 1047 = 7.3 mcg/mL, AUC/JONNY 411 mg/L-hr      Plan:  Continue Vancomycin 1250mg Q12h  Will check vancomycin levels when appropriate  Will continue to monitor renal function   Pharmacy to follow      Jasbir Blum PharmD, [facility] 3/18/2023 12:01 PM   [phone]

## 2023-03-18 NOTE — PROGRESS NOTES
[id number] [facility] Cardiology Inpatient Progress Note    Patient is a 74 y.o. female of Wilfredo Villalpando MD seen in hospital follow up. Chief complaint: Afib    Denies chest pain or shortness of breath. A-fib rate currently under control.     Patient and  report awaiting transfer to [facility] for possible explantation of knee hardware in the setting of infectious arthrocentesis    Patient Active Problem List   Diagnosis    Moderate obesity    Overactive bladder    Essential hypertension    Lumbar stenosis with neurogenic claudication    Lumbar radiculopathy    Anterolisthesis    Chronic bilateral low back pain without sciatica    Facet arthropathy    Palpitations    Paroxysmal atrial fibrillation (HCC)    Osteoarthritis    Atrial fibrillation with RVR (HCC)       Allergies   Allergen Reactions    Pcn [Penicillins] Hives, Itching and Swelling    Morphine Nausea Only       Current Facility-Administered Medications   Medication Dose Route Frequency Provider Last Rate Last Admin    HYDROcodone-acetaminophen (NORCO) 5-325 MG per tablet 1 tablet  1 tablet Oral Q4H PRN Delwyn Slider, APRN - CNP        metoprolol succinate (TOPROL XL) extended release tablet 50 mg  50 mg Oral BID Shorty Cooper, DO   50 mg at 03/18/23 0911    vancomycin (VANCOCIN) 1,250 mg in sodium chloride 0.9 % 250 mL IVPB  1,250 mg IntraVENous Q12H Delwyn Slider, APRN - .7 mL/hr at 03/18/23 1315 1,250 mg at 03/18/23 1315    magnesium sulfate 1000 mg in dextrose 5% 100 mL IVPB  1,000 mg IntraVENous PRN Delwyn Slider, APRN - CNP        sodium phosphate 16.5 mmol in sodium chloride 0.9 % 250 mL IVPB  16.5 mmol IntraVENous PRN Delwyn Slider, APRN - CNP        Or    sodium phosphate 33 mmol in sodium chloride 0.9 % 500 mL IVPB  33 mmol IntraVENous PRN Delwyn Slider, APRN - CNP        potassium chloride (KLOR-CON M) extended release tablet 40 mEq  40 mEq Oral PRN Delwyn Slider, APRN - CNP        Or    potassium bicarb-citric acid (EFFER-K) effervescent tablet 40 mEq  40 mEq Oral PRN CHANDRA Estes - CNP        Or    potassium chloride 10 mEq/100 mL IVPB (Peripheral Line)  10 mEq IntraVENous PRN Mechelle Farnsworth APRN - CNP        enoxaparin (LOVENOX) injection 100 mg  1 mg/kg SubCUTAneous BID Waverlyqian Miller, DO   100 mg at 03/18/23 0911    levoFLOXacin (LEVAQUIN) tablet 500 mg  500 mg Oral Daily CHANDRA Nevarez - CNS   500 mg at 03/18/23 0912    fentaNYL (SUBLIMAZE) injection 25 mcg  25 mcg IntraVENous Q4H PRN Mechelle Farnsworth APRN - CNP        sodium chloride flush 0.9 % injection 5-40 mL  5-40 mL IntraVENous 2 times per day Trice Benítez, DO   10 mL at 03/18/23 0912    sodium chloride flush 0.9 % injection 5-40 mL  5-40 mL IntraVENous PRN Ashu Benítez DO        0.9 % sodium chloride infusion   IntraVENous PRN Trice Benítez, DO        acetaminophen (TYLENOL) tablet 650 mg  650 mg Oral Q4H PRN Ashu Benítez DO        ondansetron (ZOFRAN-ODT) disintegrating tablet 4 mg  4 mg Oral Q8H PRN Trice Benítez, DO        Or    ondansetron (ZOFRAN) injection 4 mg  4 mg IntraVENous Q6H PRN Trice Benítez, DO        docusate sodium (COLACE) capsule 100 mg  100 mg Oral Daily Ashu Benítez, DO   100 mg at 03/18/23 0912    [Held by provider] celecoxib (CELEBREX) capsule 200 mg  200 mg Oral Daily Trice Benítez, DO   200 mg at 03/17/23 1212    polyvinyl alcohol (LIQUIFILM TEARS) 1.4 % ophthalmic solution 1 drop  1 drop Both Eyes PRN Carol Ann Balo, DO        erythromycin [facility]) ophthalmic ointment   Both Eyes Nightly Carol Ann Balo, DO   Given at 03/17/23 2040    [Held by provider] apixaban (ELIQUIS) tablet 5 mg  5 mg Oral BID Carol Ann Balo, DO        [Held by provider] lactated ringers IV soln infusion   IntraVENous Continuous Trice Benítez DO        allopurinol (ZYLOPRIM) tablet 100 mg  100 mg Oral Nightly Ashu Benítez DO   100 mg at 03/17/23 2040    [Held by provider] spironolactone (ALDACTONE) tablet 12.5 mg  12.5 mg Oral Daily Yasir PETERS Jara, DO   12.5 mg at 03/17/23 1018    [Held by provider] losartan (COZAAR) tablet 25 mg  25 mg Oral Daily Kayce Bile, DO   25 mg at 03/17/23 1017    perflutren lipid microspheres (DEFINITY) injection 1.5 mL  1.5 mL IntraVENous ONCE PRN Kayce Bile, DO        [Held by provider] furosemide (LASIX) injection 40 mg  40 mg IntraVENous BID Kayce Bile, DO   40 mg at 03/17/23 1018    atorvastatin (LIPITOR) tablet 40 mg  40 mg Oral Nightly Kayce Bile, DO   40 mg at 03/17/23 2040     Facility-Administered Medications Ordered in Other Encounters   Medication Dose Route Frequency Provider Last Rate Last Admin    0.9 % sodium chloride infusion   IntraVENous Continuous ADALI Greenfield        midazolam (VERSED) injection 1 mg  1 mg IntraVENous Q5 Min PRN Jessica Cove, DO   1 mg at 09/20/16 7557       Review of systems:   Heart: as above   Lungs: as above   Eyes: denies changes in vision or discharge. Ears: denies changes in hearing or pain. Nose: denies epistaxis or masses   Throat: denies sore throat or trouble swallowing. Neuro: denies numbness, tingling, tremors. Skin: denies rashes or itching. : denies hematuria, dysuria   GI: denies vomiting, diarrhea   Psych: denies mood changed, anxiety, depression. Physical Exam   BP (!) 129/99   Pulse 70   Temp 97.9 °F (36.6 °C) (Oral)   Resp 20   Ht 5' 7\" (1.702 m)   Wt 223 lb 1.6 oz (101.2 kg)   SpO2 95%   BMI 34.94 kg/m²   Constitutional: Oriented to person, place, and time. No distress. Well developed. Head: Normocephalic and atraumatic. Neck: Neck supple. No hepatojugular reflux. No JVD present. Carotid bruit is not present. No tracheal deviation present. No thyromegaly present. Cardiovascular: Normal rate, regular rhythm, normal heart sounds. intact distal pulses. No gallop and no friction rub. No murmur heard. Pulmonary: Breath sounds normal. No respiratory distress. No wheezes. No rales.    Chest: Effort normal. No tenderness. Abdominal: Soft. Bowel sounds are normal. No distension or mass. No tenderness, rebound or guarding. Musculoskeletal: . No tenderness. No clubbing or cyanosis. Extremitites: Intact distal pulses. No edema  Neurological: Alert and oriented to person, place, and time. Skin: Skin is warm and dry. No rash noted. Not diaphoretic. No erythema. Psychiatric: Normal mood and affect. Behavior is normal.   Lymphadenopathy: No cervical adenopathy. No groin adenopathy. CBC:   Lab Results   Component Value Date/Time    WBC 14.1 03/18/2023 08:21 AM    RBC 2.75 03/18/2023 08:21 AM    HGB 8.5 03/18/2023 08:21 AM    HCT 26.3 03/18/2023 08:21 AM    MCV 95.6 03/18/2023 08:21 AM    MCH 30.9 03/18/2023 08:21 AM    MCHC 32.3 03/18/2023 08:21 AM    RDW 13.3 03/18/2023 08:21 AM     03/18/2023 08:21 AM    MPV 9.7 03/18/2023 08:21 AM     BMP:   Lab Results   Component Value Date/Time     03/18/2023 08:21 AM    K 3.4 03/18/2023 08:21 AM     03/18/2023 08:21 AM    CO2 24 03/18/2023 08:21 AM    BUN 18 03/18/2023 08:21 AM    LABALBU 2.5 03/18/2023 08:21 AM    CREATININE 0.5 03/18/2023 08:21 AM    CALCIUM 9.0 03/18/2023 08:21 AM    GFRAA >60 11/28/2017 04:44 AM    LABGLOM >60 03/18/2023 08:21 AM     Magnesium:    Lab Results   Component Value Date/Time    MG 1.9 03/18/2023 08:21 AM     Cardiac Enzymes:   Lab Results   Component Value Date    TROPHS 16 (H) 03/16/2023    TROPHS 17 (H) 03/15/2023      PT/INR:    Lab Results   Component Value Date/Time    PROTIME 17.2 03/17/2023 06:45 AM    INR 1.5 03/17/2023 06:45 AM     TSH:    Lab Results   Component Value Date/Time    TSH 1.160 03/16/2023 06:52 AM     Rhythm Strip: atrial fibrillation. Cardiac catheterization ([facility], 2006)  Anatomic findings   Left main trunk: The left main trunk is a normal vessel. Left anterior descending:   There is a 50% stenosis in a medium (2.5-3.0mm) first diagonal.   Right coronary artery: The right coronary artery has mild non-obstructive stenosis and is a dominant vessel. Left circumflex: The left circumflex has mild non-obstructive stenosis and is a non-dominant vessel. The left heart catheterization reveals a left main trunk with no angiographic evidence of disease prior to its bifurcation into the LAD and circumflex arteries. The LAD gives rise to a bifurcating diagonal branch that has a 50-60% narrowing in the inferior division of the diagonal.  The circumflex artery has trivial atherosclerosis. The large dominant RCA has minimal luminal irregularities in the proximal segment. Tilt table test ([facility], 2006)  Conclusions:   During the tilt, patient complaints, SBP, DBP, HR, EKG changes are attached. SUPINE SYSTOLIC HYPERTENSION AND BRADYCARDIA (ON TOPROL XL Rx). THERE WAS INITIAL NORMAL SYSTOLIC BLOOD PRESSURE RESPONSE TO HUT FOLLOWED BY   MODERATE SYSTOLIC POH BEGINNING AT THE 19TH MIN INTO 70 DEGREE TILT. THERE   WAS A NORMAL DIASTOLIC BLOOD PRESSURE RESPONSE TO TILT. THERE WAS A GRADUAL   MODERATE INCREASE IN HEART RATE DURING HUT. PVC\"S DURING TEST AS IN DATA   PAGE. EKG MONITORING DURING TEST--LII    THROUGHOUT AND 12 L INTERMITTENTLY--   SHOWED    FLATTENED T IN aVL, FLAT T IN V1 AT BASELINE. THERE WAS LOW   POSITIVE T IN LIII AND MILD DECREASE OF T AMPLITUDE IN V4-6 DURING 70 DEGREE   TILT. TILT-RELATED T CHANGES RESOLVED DURING RECOVERY    Lexiscan MPS 9/2022  FINDINGS: The overall quality of the study was good. Left ventricular cavity size was noted to be normal.  Rotational analog analysis demonstrated no patient motion or abnormal extracardiac radioactivity and soft tissue breast attenuation. The gated SPECT rest and stress imaging in the short, vertical long, and horizontal long axis demonstrated mild decrease uptake of the radioactive tracer on rest and stress images with normal wall motion, most likely represents breast attenuation artifact.   Gated SPECT left ventricular ejection fraction was calculated to be 52%, with normal myocardial thickening and wall motion. Impression:    Electrocardiographically normal regadenoson infusion with a clinically non-ischemic response  Myocardial perfusion imaging was normal with attenuation artifact. Overall left ventricular systolic function was normal without regional wall motion abnormalities. 4. Low risk general pharmacologic stress test.    TTE (Dr. Julia Andujar, 7/2022)  Summary  Left ventricular internal dimensions were normal in diastole and systole. No regional wall motion abnormalities seen. Normal left ventricular ejection fraction. EF 65%. The left atrium is mildly dilated. Mild mitral annular calcification. Moderate centrally directed mitral regurgitation. The aortic valve appears mildly sclerotic. Mild tricuspid regurgitation. Echo Summary 3/17/2023:   Ejection fraction is visually estimated at 65%. No regional wall motion abnormalities seen. Mild left ventricular concentric hypertrophy noted. Dilated right ventricle with reduced function. Left atrial volume index of 51 ml per meters squared BSA. Physiologic and/or trace mitral regurgitation is present. Mild tricuspid regurgitation. No evidence for hemodynamically significant pericardial effusion. ASSESSMENT & PLAN:    Patient Active Problem List   Diagnosis    Moderate obesity    Overactive bladder    Essential hypertension    Lumbar stenosis with neurogenic claudication    Lumbar radiculopathy    Anterolisthesis    Chronic bilateral low back pain without sciatica    Facet arthropathy    Palpitations    Paroxysmal atrial fibrillation (HCC)    Osteoarthritis    Atrial fibrillation with RVR (HCC)      1. Acute on diastolic CHF/Edema:   Echo with normal LVEF and minimal VHD. Gently diurese with IV lasix. Continue BB/ARB/aldactone as blood pressure and kidney function allow    2. Persistent AFib:   On eliquis. On rate control with dig and beta-blocker  Monitor closely    3. CAD/Mildly elevated troponin:  Mild to moderate CAD by 2006 [facility] cath. Low risk stress 9/2022. Continue BB/statin. No ASA due to eliquis. 4. HTN:   Observe closely. 5. Lipids: Statin. Hold statin if liver functions worsen  6. Anemia:   Monitor closely     7. DM: Per primary service. 8. Infectious arthrocentesis/UTI  Per primary service. Apparently awaiting transfer to [facility] for possible explantation of knee hardware in the setting of infectious arthrocentesis      Cardiology will sign off. Please call with questions.       Paulo Gurrola MD  Cardiologist  Cardiology, [facility] ([facility]) Physicians

## 2023-03-19 LAB
BACTERIA FLD AEROBE CULT: ABNORMAL
BACTERIA SPEC ANAEROBE CULT: NORMAL
BACTERIA SPEC ANAEROBE CULT: NORMAL
GRAM STAIN ORDERABLE: NORMAL
GRAM STAIN ORDERABLE: NORMAL
GRAM STN SPEC: ABNORMAL
ORGANISM: ABNORMAL

## 2023-03-20 LAB
BACTERIA FLD AEROBE CULT: ABNORMAL
CRYSTALS, FLUID: NORMAL
GRAM STN SPEC: ABNORMAL
ORGANISM: ABNORMAL

## 2023-03-22 LAB
BACTERIA BLD CULT ORG #2: NORMAL
BACTERIA BLD CULT: NORMAL

## 2023-06-01 LAB
ANION GAP SERPL CALCULATED.3IONS-SCNC: 13 MMOL/L (ref 7–16)
BUN SERPL-MCNC: 34 MG/DL (ref 6–23)
CALCIUM SERPL-MCNC: 10.2 MG/DL (ref 8.6–10.2)
CHLORIDE SERPL-SCNC: 103 MMOL/L (ref 98–107)
CO2 SERPL-SCNC: 26 MMOL/L (ref 22–29)
CREAT SERPL-MCNC: 0.8 MG/DL (ref 0.5–1)
GLUCOSE SERPL-MCNC: 89 MG/DL (ref 74–99)
POTASSIUM SERPL-SCNC: 4.8 MMOL/L (ref 3.5–5)
SODIUM SERPL-SCNC: 142 MMOL/L (ref 132–146)

## 2023-07-18 ENCOUNTER — OFFICE VISIT (OUTPATIENT)
Dept: CARDIOLOGY CLINIC | Age: 73
End: 2023-07-18
Payer: MEDICARE

## 2023-07-18 VITALS
BODY MASS INDEX: 32.65 KG/M2 | RESPIRATION RATE: 16 BRPM | WEIGHT: 208 LBS | DIASTOLIC BLOOD PRESSURE: 78 MMHG | SYSTOLIC BLOOD PRESSURE: 124 MMHG | HEART RATE: 94 BPM | HEIGHT: 67 IN

## 2023-07-18 DIAGNOSIS — I10 ESSENTIAL HYPERTENSION: ICD-10-CM

## 2023-07-18 DIAGNOSIS — I48.19 PERSISTENT ATRIAL FIBRILLATION (HCC): Primary | ICD-10-CM

## 2023-07-18 DIAGNOSIS — I25.10 CORONARY ARTERY DISEASE INVOLVING NATIVE CORONARY ARTERY OF NATIVE HEART WITHOUT ANGINA PECTORIS: ICD-10-CM

## 2023-07-18 DIAGNOSIS — E66.8 MODERATE OBESITY: ICD-10-CM

## 2023-07-18 PROCEDURE — 3074F SYST BP LT 130 MM HG: CPT | Performed by: INTERNAL MEDICINE

## 2023-07-18 PROCEDURE — 3078F DIAST BP <80 MM HG: CPT | Performed by: INTERNAL MEDICINE

## 2023-07-18 PROCEDURE — 1123F ACP DISCUSS/DSCN MKR DOCD: CPT | Performed by: INTERNAL MEDICINE

## 2023-07-18 PROCEDURE — 93000 ELECTROCARDIOGRAM COMPLETE: CPT | Performed by: INTERNAL MEDICINE

## 2023-07-18 PROCEDURE — 99215 OFFICE O/P EST HI 40 MIN: CPT | Performed by: INTERNAL MEDICINE

## 2023-07-18 RX ORDER — HYDROCHLOROTHIAZIDE 25 MG/1
TABLET ORAL
COMMUNITY
Start: 2023-05-31

## 2023-07-18 RX ORDER — OXYBUTYNIN CHLORIDE 10 MG/1
TABLET, EXTENDED RELEASE ORAL
COMMUNITY
Start: 2023-05-31

## 2023-07-18 RX ORDER — DOXYCYCLINE HYCLATE 100 MG/1
100 CAPSULE ORAL 2 TIMES DAILY
COMMUNITY
Start: 2023-05-13

## 2023-07-18 RX ORDER — TORSEMIDE 10 MG/1
10 TABLET ORAL EVERY MORNING
COMMUNITY
Start: 2023-07-05

## 2023-07-18 RX ORDER — POTASSIUM CHLORIDE 750 MG/1
10 TABLET, FILM COATED, EXTENDED RELEASE ORAL DAILY
COMMUNITY
Start: 2023-07-05

## 2023-07-18 NOTE — PROGRESS NOTES
remain essential to reducing risk of future atherosclerotic development. Her present plan her clinical reassessment in approximately 6 months and would happily reevaluate her in the interim should additional cardiovascular difficulties or concerns arise. The patient's current medication list, allergies, problem list and results of all previously ordered testing were reviewed at today's visit. Follow-up office visit in 6 months      Note: This report was completed using computerized voice recognition software. Every effort has been made to ensure accuracy, however; inadvertent computerized transcription errors may be present. Baylee Salinas.  Madina Parham, [address] [facility]

## 2023-08-23 ENCOUNTER — EVALUATION (OUTPATIENT)
Dept: OCCUPATIONAL THERAPY | Age: 73
End: 2023-08-23

## 2023-08-23 DIAGNOSIS — M19.032 PRIMARY OSTEOARTHRITIS OF LEFT WRIST: ICD-10-CM

## 2023-08-23 DIAGNOSIS — S63.005A UNSPECIFIED DISLOCATION OF LEFT WRIST AND HAND, INITIAL ENCOUNTER: Primary | ICD-10-CM

## 2023-08-23 NOTE — PROGRESS NOTES
deficits   Clinical Decision Making- no additional modifications required/ co-morbidity gout arthritis    Rehab Potential:                                 [x] Good  [] Fair  [] Poor        Suggested Professional Referral:       [x] No  [] Yes:  Barriers to Goal Achievement[de-identified]          [x] No  [] Yes:  Domestic Concerns:                           [x] No  [] Yes:       Patient. Education:  [x] Plans/Goals, Risks/Benefits discussed  [] Home exercise program  Method of Education: [x] Verbal  [] Demo  [] Written  Comprehension of Education:  [] Verbalizes understanding. [] Demonstrates understanding. [] Needs Review. [] Demonstrates/verbalizes understanding of HEP/Ed previously given. Patient understands diagnosis/prognosis and consents to treatment, plan and goals:   [x] Yes    [] No         Time In: 0905            Time Out: 2355                      Timed Code Treatment Minutes: 50 minutes  CODE  Minutes  Units   00519 OT Eval Low 50 1   06750 OT Eval Medium     80768 OT Eval High     93680 Fluidotherapy     62613 Manual     39793 Therapeutic Ex     [address] Therapeutic Activity     75386 ADL/COMP Tech Train     [id number] Neuromuscular Re-Ed     [id number] OrthoManagementTraining     [id number] Paraffin     72468 Electrical Stim - Attended     [id number] Iontophoresis     73384 Ultrasound      Other                Electronically signed by: Baldemar Cole OT/TRISTAN  [id number]      [email or web address] Certification / Comments      Frequency/Duration as needed x 4 weeks. Certification period From: 8-23-23  To: 9-23-23     I have reviewed the Plan of Care established for skilled therapy services and certify that the services are required and that they will be provided while the patient is under my care.      Practitioner's Comments/Revisions:           Practitioner's Printed Name:  Dr Nupur Dale:                                                               Date:      Please review

## 2023-08-28 ENCOUNTER — TELEPHONE (OUTPATIENT)
Dept: CARDIOLOGY CLINIC | Age: 73
End: 2023-08-28

## 2023-08-28 NOTE — TELEPHONE ENCOUNTER
Juan R requesting CC for wrist surgery. You already saw and gave clearance as well as your recommendations except for how many days patient may hold Eliquis. They are requesting 5 days prior. Please advise.

## 2023-09-01 ENCOUNTER — EVALUATION (OUTPATIENT)
Dept: PHYSICAL THERAPY | Age: 73
End: 2023-09-01
Payer: MEDICARE

## 2023-09-01 DIAGNOSIS — M19.90 OSTEOARTHRITIS, UNSPECIFIED OSTEOARTHRITIS TYPE, UNSPECIFIED SITE: Primary | ICD-10-CM

## 2023-09-01 PROCEDURE — 97163 PT EVAL HIGH COMPLEX 45 MIN: CPT | Performed by: PHYSICAL THERAPIST

## 2023-09-01 NOTE — PROGRESS NOTES
Physical Therapy Daily Treatment Note    Date: 2023  Patient Name: Joni Peguero  : 1950   MRN: [id number]  DOInjury: 3/2023   DOSx: 3/2023  Referring Provider: Yumiko Awan DO  [facility] [address]     Medical Diagnosis:     R29.898 (ICD-10-CM) - Weakness of lower extremity        Handy Balderrama is well know to me as I have seen her for shoulder, back, knee issues in the past. She has a a very rough  having had multiple joint infection,sepsis, an extended stay at [facility] and SNF. She is scheduled to have L wrist fusion in 1 month and she is very concerned about her ability to arise from sitting without using the L hand. We will work on this extensively w/ strengthening exercises and strategies. X = TO BE PERFORMED NEXT VISIT  > = PROGRESS TO THIS    S: See eval  O: Discussed anatomy, physiology, body mechanics, principles of loading, and progressive loading/activity. Reviewed home exercise program extensively; written copy provided. Access Code: [email or web address]  URL: [email or web address]  Date: 2023  Prepared by: Veda Galaviz    Exercises  - Seated Long Arc Quad  - 3 x daily - 7 x weekly - 2 sets - 15 reps - 5 sec hold    Time 2183-7039     Visit 1 Repeat outcome measure at mid point and end. Pain Pain with activity 4/10     ROM      Modalities         MO   Manual            Stretch      Towel / strap DF, INV, EV   TE      TE   Exercise      Ball / can rolling   TE   Toe curls      Heel slides   TE   LAQ x  TE   SLR   TE   SAQ   TE   [] TG  [] Leg Press 2-leg   TE   [] TG  [] Leg Press 1-leg   TE   Hamstring Curl Machine   TE   Knee Extension Machine   TE   Step-ups - FWD >  TA   marching x  TA   Calf raises x  TA   1/4 squats x  TA   Sit to stand x W/ R hand push only TA      TA               A:  Tolerated well.     P: Continue with rehab plan  Veda Galaviz, PT    Treatment Charges: Mins Units   Initial Evaluation 45 1   Re-Evaluation     Ther Exercise         TE     Manual Therapy
will work on this extensively w/ strengthening exercises and strategies. Problems:   Balance decreased in both standing and walking   Limitations with walking, sit to stand      [x] There are no barriers affecting plan of care or recovery    [] Barriers to this patient's plan of care or recovery include:      Domestic Concerns:  [x] No  [] Yes:    Short Term goals (2 weeks)  30-Sec. Chair Stand Test: 2 reps  Demonstrate improved balance in standing, in walking  Able to perform / complete the following functions / tasks: progress assistive device    Long Term goals (4 weeks)  30-Sec. Chair Stand Test: 4 reps  Increase Strength to 4+-5-/5   Demonstrate improved balance in standing, in walking  Able to perform / complete the following functions / tasks:  sit to stand  Independent  Independent with Home Exercise Programs     Rehab Potential: [x] Good  [] Fair  [] Poor    PLAN     Time: 0901-0147    45 minutes  Treatment Plan:  instruction in home exercise program   therapeutic exercise   therapeutic activity   neuromuscular re-education   gait training     The following CPT codes are likely to be used in the care of this patient:   30901 PT Evaluation: High Complexity   35208 Therapeutic Exercise   57670 Neuromuscular Re-Education   17490 Therapeutic Activities   47846 Gait Training     Suggested Professional Referral: [x] No  [] Yes:     Patient Education:  [x] Plans / Goals, Risks / Benefits discussed  [x] Home exercise program  Method of Education: [x] Verbal  [x] Demo  [x] Written  Comprehension of Education:  [x] Verbalizes understanding. [x] Demonstrates understanding. [] Needs Review. [] Demonstrates / verbalizes understanding of HEP / Milka Epley previously given. Frequency:  1-2 days per week for 4 weeks    Patient understands diagnosis/prognosis and consents to treatment, plan and goals: [x] Yes    [] No     Thank you for the opportunity to work with your patient.   If you have questions or comments, please

## 2023-09-05 ENCOUNTER — TREATMENT (OUTPATIENT)
Dept: PHYSICAL THERAPY | Age: 73
End: 2023-09-05
Payer: MEDICARE

## 2023-09-05 DIAGNOSIS — M19.90 OSTEOARTHRITIS, UNSPECIFIED OSTEOARTHRITIS TYPE, UNSPECIFIED SITE: Primary | ICD-10-CM

## 2023-09-05 DIAGNOSIS — R29.898 WEAKNESS OF BOTH LOWER EXTREMITIES: ICD-10-CM

## 2023-09-05 PROCEDURE — 97530 THERAPEUTIC ACTIVITIES: CPT | Performed by: PHYSICAL THERAPIST

## 2023-09-05 NOTE — PROGRESS NOTES
10  In parallel bars superset TA   Calf raises 2 x 10  In parallel bars superset TA   1/4 squats  2 x 10  In parallel bars superset          A:  Tolerated well.     P: Continue with rehab plan  Mp Alaniz PT    Treatment Charges: Mins Units   Initial Evaluation     Re-Evaluation     Ther Exercise         TE     Manual Therapy     MT     Ther Activities        TA 45 3   Gait Training          GT     Neuro Re-education NR     Modalities     Non-Billable Service Time     Other     Total Time/Units 45 3

## 2023-09-12 ENCOUNTER — TREATMENT (OUTPATIENT)
Dept: PHYSICAL THERAPY | Age: 73
End: 2023-09-12
Payer: MEDICARE

## 2023-09-12 DIAGNOSIS — R29.898 WEAKNESS OF BOTH LOWER EXTREMITIES: ICD-10-CM

## 2023-09-12 DIAGNOSIS — M19.90 OSTEOARTHRITIS, UNSPECIFIED OSTEOARTHRITIS TYPE, UNSPECIFIED SITE: Primary | ICD-10-CM

## 2023-09-12 PROCEDURE — 97530 THERAPEUTIC ACTIVITIES: CPT | Performed by: PHYSICAL THERAPIST

## 2023-09-12 NOTE — PROGRESS NOTES
Physical Therapy Daily Treatment Note    Date: 2023  Patient Name: Leilani Jaime  : 1950   MRN: [id number]  DOInjury: 2023   DOSx: -  Referring Provider: Cristal Mejia DO  [address] [place] [address],  [address] [place] [address]     Medical Diagnosis:     Q08.104 (ICD-10-CM) - Weakness of lower extremity      Sruthi Alvarez is well know to me as I have seen her for shoulder, back, knee issues in the past. She has a a very rough  having had multiple joint infection,sepsis, an extended stay at [facility] and SNF. She is scheduled to have L wrist fusion in 1 month and she is very concerned about her ability to arise from sitting without using the L hand. We will work on this extensively w/ strengthening exercises and strategies. X = TO BE PERFORMED NEXT VISIT  > = PROGRESS TO THIS    S: reports very fatigued because she saw ophthalmologist this morning and had to walk and sit stand from some difficult chairs  O: Discussed anatomy, physiology, body mechanics, principles of loading, and progressive loading/activity. Reviewed home exercise program extensively; written copy provided. Access Code: [email or web address]  URL: [email or web address]  Date: 2023  Prepared by: Bryon Flores    Exercises  - Sit to Stand Without Arm Support  - 1 x daily - 7 x weekly - 2 sets - 3-5 reps  - Standing Marching  - 1 x daily - 7 x weekly - 2 sets - 10 reps  - Standing Heel Raise  - 1 x daily - 7 x weekly - 2 sets - 10 reps  - Mini Squat with Counter Support  - 1 x daily - 7 x weekly - 2 sets - 10 reps    Time 9629-1896     Visit 2 Repeat outcome measure at mid point and end.     Pain      ROM      Modalities         MO   Manual            Stretch      Towel / strap DF, INV, EV   TE      TE   Exercise      Ball / can rolling   TE   Toe curls      Heel slides   TE   QS   TE   SLR   TE   SAQ   TE   [] TG  [] Leg Press 2-leg   TE   [] TG  [] Leg Press 1-leg   TE   Hamstring Curl Machine   TE   Knee Extension Machine   TE   Step-ups - FWD   TA

## 2023-09-14 ENCOUNTER — TREATMENT (OUTPATIENT)
Dept: PHYSICAL THERAPY | Age: 73
End: 2023-09-14

## 2023-09-14 DIAGNOSIS — R29.898 WEAKNESS OF BOTH LOWER EXTREMITIES: Primary | ICD-10-CM

## 2023-09-14 NOTE — PROGRESS NOTES
Physical Therapy Daily Treatment Note    Date: 2023  Patient Name: Alex Feldman  : 1950   MRN: [id number]  DOInjury: 2023   DOSx: -  Referring Provider: Nnamdi Chowdary DO  [address] [place] [address] Drive,  [address] [place] [address]     Medical Diagnosis:     H10.079 (ICD-10-CM) - Weakness of lower extremity      Angela Blanc is well know to me as I have seen her for shoulder, back, knee issues in the past. She has a a very rough  having had multiple joint infection,sepsis, an extended stay at [facility] and SNF. She is scheduled to have L wrist fusion in 1 month and she is very concerned about her ability to arise from sitting without using the L hand. We will work on this extensively w/ strengthening exercises and strategies. X = TO BE PERFORMED NEXT VISIT  > = PROGRESS TO THIS    S: reports very fatigued because she saw ophthalmologist this morning and had to walk and sit stand from some difficult chairs  O: Discussed anatomy, physiology, body mechanics, principles of loading, and progressive loading/activity. Reviewed home exercise program extensively; written copy provided. Access Code: [email or web address]  URL: [email or web address]  Date: 2023  Prepared by: Jhonny Cuellar    Exercises  - Sit to Stand Without Arm Support  - 1 x daily - 7 x weekly - 2 sets - 3-5 reps  - Standing Marching  - 1 x daily - 7 x weekly - 2 sets - 10 reps  - Standing Heel Raise  - 1 x daily - 7 x weekly - 2 sets - 10 reps  - Mini Squat with Counter Support  - 1 x daily - 7 x weekly - 2 sets - 10 reps    Time 3883-1707     Visit 2 Repeat outcome measure at mid point and end.     Pain      ROM      Modalities         MO   Manual            Stretch      Towel / strap DF, INV, EV   TE      TE   Exercise      Ball / can rolling   TE   Toe curls      Heel slides   TE   QS   TE   SLR   TE   SAQ   TE   [] TG  [] Leg Press 2-leg   TE   [] TG  [] Leg Press 1-leg   TE   Hamstring Curl Machine   TE   Knee Extension Machine   TE   Step-ups - FWD   TA

## 2023-09-18 ENCOUNTER — TREATMENT (OUTPATIENT)
Dept: PHYSICAL THERAPY | Age: 73
End: 2023-09-18
Payer: MEDICARE

## 2023-09-18 DIAGNOSIS — R29.898 WEAKNESS OF BOTH LOWER EXTREMITIES: Primary | ICD-10-CM

## 2023-09-18 PROCEDURE — 97110 THERAPEUTIC EXERCISES: CPT | Performed by: PHYSICAL THERAPIST

## 2023-09-18 NOTE — PROGRESS NOTES
Physical Therapy Daily Treatment Note    Date: 2023  Patient Name: Sofya Springer  : 1950   MRN: [id number]  DOInjury: 2023   DOSx: -  Referring Provider: Bobo Palomo DO  [address] [place] [address] Drive,  [address] [place] [address]     Medical Diagnosis:     W41.803 (ICD-10-CM) - Weakness of lower extremity      Adelina Terrell is well know to me as I have seen her for shoulder, back, knee issues in the past. She has a a very rough  having had multiple joint infection,sepsis, an extended stay at [facility] and SNF. She is scheduled to have L wrist fusion in 1 month and she is very concerned about her ability to arise from sitting without using the L hand. We will work on this extensively w/ strengthening exercises and strategies. X = TO BE PERFORMED NEXT VISIT  > = PROGRESS TO THIS    S: reports very fatigued because she saw ophthalmologist this morning and had to walk and sit stand from some difficult chairs  O: Discussed anatomy, physiology, body mechanics, principles of loading, and progressive loading/activity. Reviewed home exercise program extensively; written copy provided. Access Code: [email or web address]  URL: [email or web address]  Date: 2023  Prepared by: Neeru Beatty    Exercises  - Sit to Stand Without Arm Support  - 1 x daily - 7 x weekly - 2 sets - 3-5 reps  - Standing Marching  - 1 x daily - 7 x weekly - 2 sets - 10 reps  - Standing Heel Raise  - 1 x daily - 7 x weekly - 2 sets - 10 reps  - Mini Squat with Counter Support  - 1 x daily - 7 x weekly - 2 sets - 10 reps    Time 1549-0606     Visit 2 Repeat outcome measure at mid point and end.     Pain      ROM      Modalities         MO   Manual            Stretch      Towel / strap DF, INV, EV   TE      TE   Exercise      Ball / can rolling   TE   Toe curls      Heel slides   TE   QS   TE   SLR   TE   SAQ   TE   [] TG  [] Leg Press 2-leg   TE   [] TG  [] Leg Press 1-leg   TE   Hamstring Curl Machine   TE   Knee Extension Machine   TE   Step-ups - FWD   TA

## 2023-09-21 ENCOUNTER — TREATMENT (OUTPATIENT)
Dept: PHYSICAL THERAPY | Age: 73
End: 2023-09-21

## 2023-09-21 DIAGNOSIS — M19.90 OSTEOARTHRITIS, UNSPECIFIED OSTEOARTHRITIS TYPE, UNSPECIFIED SITE: ICD-10-CM

## 2023-09-21 DIAGNOSIS — R29.898 WEAKNESS OF BOTH LOWER EXTREMITIES: Primary | ICD-10-CM

## 2023-09-21 NOTE — PROGRESS NOTES
Physical Therapy Daily Treatment Note    Date: 2023  Patient Name: Mabel Quintana  : 1950   MRN: [id number]  DOInjury: 2023   DOSx: -  Referring Provider: Kristen Hinkle DO  [address] [place] [address],  [address] [place] [address]     Medical Diagnosis:     K91.373 (ICD-10-CM) - Weakness of lower extremity      Lesli Ballard is well know to me as I have seen her for shoulder, back, knee issues in the past. She has a a very rough  having had multiple joint infection,sepsis, an extended stay at [facility] and SNF. She is scheduled to have L wrist fusion in 1 month and she is very concerned about her ability to arise from sitting without using the L hand. We will work on this extensively w/ strengthening exercises and strategies. X = TO BE PERFORMED NEXT VISIT  > = PROGRESS TO THIS    S: reports very fatigued because she saw ophthalmologist this morning and had to walk and sit stand from some difficult chairs  O: Discussed anatomy, physiology, body mechanics, principles of loading, and progressive loading/activity. Reviewed home exercise program extensively; written copy provided. Access Code: [email or web address]  URL: [email or web address]  Date: 2023  Prepared by: Hubert Lesches    Exercises  - Sit to Stand Without Arm Support  - 1 x daily - 7 x weekly - 2 sets - 3-5 reps  - Standing Marching  - 1 x daily - 7 x weekly - 2 sets - 10 reps  - Standing Heel Raise  - 1 x daily - 7 x weekly - 2 sets - 10 reps  - Mini Squat with Counter Support  - 1 x daily - 7 x weekly - 2 sets - 10 reps    Time 3787-8743     Visit 6 Repeat outcome measure at mid point and end.     Pain      ROM      Modalities         MO   Manual            Stretch      Towel / strap DF, INV, EV   TE      TE   Exercise      Ball / can rolling   TE   Toe curls      Heel slides   TE   QS   TE   SLR   TE   SAQ   TE   [] TG  [] Leg Press 2-leg   TE   [] TG  [] Leg Press 1-leg   TE   Hamstring Curl Machine   TE   Knee Extension Machine   TE   Step-ups - FWD   TA

## 2023-09-25 ENCOUNTER — TREATMENT (OUTPATIENT)
Dept: PHYSICAL THERAPY | Age: 73
End: 2023-09-25
Payer: MEDICARE

## 2023-09-25 DIAGNOSIS — M19.90 OSTEOARTHRITIS, UNSPECIFIED OSTEOARTHRITIS TYPE, UNSPECIFIED SITE: ICD-10-CM

## 2023-09-25 DIAGNOSIS — R29.898 WEAKNESS OF BOTH LOWER EXTREMITIES: Primary | ICD-10-CM

## 2023-09-25 PROCEDURE — 97530 THERAPEUTIC ACTIVITIES: CPT | Performed by: PHYSICAL THERAPIST

## 2023-09-25 NOTE — PROGRESS NOTES
Physical Therapy Daily Treatment Note    Date: 2023  Patient Name: Laurie Brooke  : 1950   MRN: [id number]  DOInjury: 2023   DOSx: -  Referring Provider: Shahriar Zapata DO  [address] [place] [address] Drive,  [address] [place] [address]     Medical Diagnosis:     A82.690 (ICD-10-CM) - Weakness of lower extremity      Rhianna Shane is well know to me as I have seen her for shoulder, back, knee issues in the past. She has a a very rough  having had multiple joint infection,sepsis, an extended stay at [facility] and SNF. She is scheduled to have L wrist fusion in 1 month and she is very concerned about her ability to arise from sitting without using the L hand. We will work on this extensively w/ strengthening exercises and strategies. X = TO BE PERFORMED NEXT VISIT  > = PROGRESS TO THIS    S: reports very fatigued because she saw ophthalmologist this morning and had to walk and sit stand from some difficult chairs  O: Discussed anatomy, physiology, body mechanics, principles of loading, and progressive loading/activity. Reviewed home exercise program extensively; written copy provided. Access Code: [email or web address]  URL: [email or web address]  Date: 2023  Prepared by: Mychal Womack    Exercises  - Sit to Stand Without Arm Support  - 1 x daily - 7 x weekly - 2 sets - 3-5 reps  - Standing Marching  - 1 x daily - 7 x weekly - 2 sets - 10 reps  - Standing Heel Raise  - 1 x daily - 7 x weekly - 2 sets - 10 reps  - Mini Squat with Counter Support  - 1 x daily - 7 x weekly - 2 sets - 10 reps    Time 9967-4755     Visit 6 Repeat outcome measure at mid point and end.     Pain      ROM      Modalities         MO   Manual            Stretch      Towel / strap DF, INV, EV   TE      TE   Exercise      Ball / can rolling   TE   Toe curls      Heel slides   TE   QS   TE   SLR   TE   SAQ   TE   [] TG  [] Leg Press 2-leg   TE   [] TG  [] Leg Press 1-leg   TE   Hamstring Curl Machine   TE   Knee Extension Machine   TE   Step-ups - FWD   TA

## 2023-09-28 ENCOUNTER — HOSPITAL ENCOUNTER (OUTPATIENT)
Age: 73
Discharge: HOME | End: 2023-09-30
Payer: MEDICARE

## 2023-09-28 LAB
ANION GAP SERPL CALCULATED.3IONS-SCNC: 12 MMOL/L (ref 7–16)
BUN SERPL-MCNC: 20 MG/DL (ref 6–23)
CALCIUM SERPL-MCNC: 9.6 MG/DL (ref 8.6–10.2)
CHLORIDE SERPL-SCNC: 103 MMOL/L (ref 98–107)
CO2 SERPL-SCNC: 25 MMOL/L (ref 22–29)
CREAT SERPL-MCNC: 0.6 MG/DL (ref 0.5–1)
ERYTHROCYTE [DISTWIDTH] IN BLOOD BY AUTOMATED COUNT: 14.6 % (ref 11.5–15)
GFR SERPL CREATININE-BSD FRML MDRD: >60 ML/MIN/1.73M2
GLUCOSE SERPL-MCNC: 96 MG/DL (ref 74–99)
HCT VFR BLD AUTO: 40.2 % (ref 34–48)
HGB BLD-MCNC: 12.8 G/DL (ref 11.5–15.5)
MCH RBC QN AUTO: 31.5 PG (ref 26–35)
MCHC RBC AUTO-ENTMCNC: 31.8 G/DL (ref 32–34.5)
MCV RBC AUTO: 99 FL (ref 80–99.9)
PLATELET # BLD AUTO: 199 K/UL (ref 130–450)
PMV BLD AUTO: 12.6 FL (ref 7–12)
POTASSIUM SERPL-SCNC: 3.8 MMOL/L (ref 3.5–5)
RBC # BLD AUTO: 4.06 M/UL (ref 3.5–5.5)
SODIUM SERPL-SCNC: 140 MMOL/L (ref 132–146)
WBC OTHER # BLD: 9.8 K/UL (ref 4.5–11.5)

## 2023-09-28 PROCEDURE — 80048 BASIC METABOLIC PNL TOTAL CA: CPT

## 2023-09-28 PROCEDURE — 85027 COMPLETE CBC AUTOMATED: CPT

## 2023-10-16 ENCOUNTER — EVALUATION (OUTPATIENT)
Dept: OCCUPATIONAL THERAPY | Age: 73
End: 2023-10-16
Payer: MEDICARE

## 2023-10-16 DIAGNOSIS — S63.005A UNSPECIFIED DISLOCATION OF LEFT WRIST AND HAND, INITIAL ENCOUNTER: ICD-10-CM

## 2023-10-16 DIAGNOSIS — M19.032 PRIMARY OSTEOARTHRITIS OF LEFT WRIST: Primary | ICD-10-CM

## 2023-10-16 PROCEDURE — 97760 ORTHOTIC MGMT&TRAING 1ST ENC: CPT | Performed by: OCCUPATIONAL THERAPIST

## 2023-10-16 NOTE — PROGRESS NOTES
splint care, and wear schedule in 1 session   Goal Met  3. Patient will demonstrate understand of stretching and/or exercise provided in 1 session. Goal Met    Please review Patient's OT evaluation and if you agree sign/date.     CODE   Minutes  Units   [id number] Manual       03600 Therapeutic Ex       67549 Therapeutic Activity     36454 OrthoManagementTraining [address] [facility], OT/L  [id number]  Monster Jameson, OTR/L [id number]

## 2023-10-20 ENCOUNTER — TREATMENT (OUTPATIENT)
Dept: OCCUPATIONAL THERAPY | Age: 73
End: 2023-10-20

## 2023-10-20 DIAGNOSIS — S63.005A UNSPECIFIED DISLOCATION OF LEFT WRIST AND HAND, INITIAL ENCOUNTER: ICD-10-CM

## 2023-10-20 DIAGNOSIS — M19.032 PRIMARY OSTEOARTHRITIS OF LEFT WRIST: Primary | ICD-10-CM

## 2023-10-20 NOTE — PROGRESS NOTES
[x] ADL/IADL re-training    [x] Therapeutic Activity       [x] Pain Management with/without modalities PRN                 [x] Manual Therapy                      [x] Splinting                      [x] Scar Management                   []Joint Protection/Training  []Ergonomics                             [x] Joint Mobilization        [] Adaptive Equipment Assessment/Training                             [x] Manual Edema Mobilization   [] Myofascial Release                 [] Energy Conservation/Work Simplification  [x] GM/FM Coordination       [] Safety retraining/education per  individual diagnosis/goals  [x] Desensitization       Patient Specific Goal: Being able to don stockinet, be as independent as possible. GOALS (Long term same as Short term):  1) Patient will demonstrate good understanding of home program (exercises/activities/diagnosis/prognosis/goals) with good accuracy. 2) Patient will demonstrate increased active flexion/extension and abduction their L thumb by atleast 15* for manipulating tools during quilting. 3) Patient will demonstrate improved active fist of L hand evidenced by no space between digits and distal culver crease to aid in meal prep and opening containers. 4) Patient will improve active extension of MCPs 2- 5 by at least 15* to assist with release of items during laundry activities  5) Patient will demonstrate  /pinch strength of at least 25 / 4 pinch pounds of their L hand to improve use during transfers. 6) Patient to report decreased pain in their affected L upper extremity from 3/10 to 2/10 or less with resistive functional use. 7) Increase in fine motor function as evidenced by decreased time to complete 9-hole peg test and/or MRMT test by at least 5-10 seconds with  L in order to complete meal prep and manipulation of fasteners during dressing tasks. Sugar Maddox    8) Patient to report 100% compliance with their splint wear, care, and

## 2023-10-23 ENCOUNTER — TREATMENT (OUTPATIENT)
Dept: OCCUPATIONAL THERAPY | Age: 73
End: 2023-10-23
Payer: MEDICARE

## 2023-10-23 DIAGNOSIS — S63.005A UNSPECIFIED DISLOCATION OF LEFT WRIST AND HAND, INITIAL ENCOUNTER: ICD-10-CM

## 2023-10-23 DIAGNOSIS — M19.032 PRIMARY OSTEOARTHRITIS OF LEFT WRIST: Primary | ICD-10-CM

## 2023-10-23 PROCEDURE — 97110 THERAPEUTIC EXERCISES: CPT | Performed by: OCCUPATIONAL THERAPIST

## 2023-10-23 PROCEDURE — 97530 THERAPEUTIC ACTIVITIES: CPT | Performed by: OCCUPATIONAL THERAPIST

## 2023-10-23 PROCEDURE — 97140 MANUAL THERAPY 1/> REGIONS: CPT | Performed by: OCCUPATIONAL THERAPIST

## 2023-10-23 NOTE — PROGRESS NOTES
OCCUPATIONAL THERAPY DAILY NOTE  [address] [facility] THERAPY   Shai King [facility] [address]  Dept: [phone]  Loc: [phone]   Jennings Hamman OT Fax: [phone]      Date:  10/23/2023  Initial Evaluation Date: 23    Evaluating Therapist: Villa Adkins OT    Patient Name:  Janes Magana    :  1950    Restrictions/Precautions: Follow wrist arthrodesis protocol, NWB L wrist, high fall risk  Diagnosis:  M19.032 Primary Osteoarthritis   L wrist S63.005A Unspecified dislocation of left hand and wrist, initial encounter                                                     Date of Surgery/Injury: 23 (2 weeks post op)     Insurance/Certification information:  t Medicare PPO  Plan of care signed (Y/N): N  Visit# / total visits: -      Referring Practitioner:  Dr. Pierre Hassan  Specific Practitioner Orders: PROM, ARROM, AROM, Stretching, Scar management, Strengthening, and Modalities as needed.          Assessment of current deficits   [x] Functional mobility             [x] ADLs           [x] Strength                  [] Cognition   [x] Functional transfers           [x] IADLs          [] Safety Awareness  [x] Endurance   [x] Fine Motor Coordination    [x] Balance      [] Vision/perception    [x] Sensation     [x] Gross Motor Coordination [x] ROM           [x] Pain                        [x] Edema          [x] Scar Adhesion/Skin Integrity      OT PLAN OF CARE   OT POC based on physician orders, patient diagnosis and results of clinical assessment     Frequency/Duration: 2-3x/week for 12- 18 visits     Specific OT Treatment to include:   [x] Instruction in HEP                   Modalities:  [x] Therapeutic Exercise                 [x] Ultrasound               [] Electrical Stimulation/Attended  [x] PROM/Stretching                    [x] Fluidotherapy          [x]  Paraffin                   [x] AAROM  [x] AROM                 [x]

## 2023-10-25 ENCOUNTER — TREATMENT (OUTPATIENT)
Dept: OCCUPATIONAL THERAPY | Age: 73
End: 2023-10-25

## 2023-10-25 DIAGNOSIS — S63.005A UNSPECIFIED DISLOCATION OF LEFT WRIST AND HAND, INITIAL ENCOUNTER: ICD-10-CM

## 2023-10-25 DIAGNOSIS — M19.032 PRIMARY OSTEOARTHRITIS OF LEFT WRIST: Primary | ICD-10-CM

## 2023-10-25 NOTE — PROGRESS NOTES
OCCUPATIONAL THERAPY DAILY NOTE  [address] [facility] THERAPY   [facility] [address]  Dept: [phone]  Loc: [phone]   Davis Daily OT Fax: [phone]      Date:  10/25/2023  Initial Evaluation Date: 23    Evaluating Therapist: Mike Blakely OT    Patient Name:  Andrea Todd    :  1950    Restrictions/Precautions: Follow wrist arthrodesis protocol, NWB L wrist, high fall risk  Diagnosis:  M19.032 Primary Osteoarthritis   L wrist S63.005A Unspecified dislocation of left hand and wrist, initial encounter                                                     Date of Surgery/Injury: 23 (2 weeks post op)     Insurance/Certification information:  [facility] Medicare PPO  Plan of care signed (Y/N): N  Visit# / total visits: 3/ 12-      Referring Practitioner:  Dr. Kristina Garcia  Specific Practitioner Orders: PROM, ARROM, AROM, Stretching, Scar management, Strengthening, and Modalities as needed.          Assessment of current deficits   [x] Functional mobility             [x] ADLs           [x] Strength                  [] Cognition   [x] Functional transfers           [x] IADLs          [] Safety Awareness  [x] Endurance   [x] Fine Motor Coordination    [x] Balance      [] Vision/perception    [x] Sensation     [x] Gross Motor Coordination [x] ROM           [x] Pain                        [x] Edema          [x] Scar Adhesion/Skin Integrity      OT PLAN OF CARE   OT POC based on physician orders, patient diagnosis and results of clinical assessment     Frequency/Duration: 2-3x/week for 12- 18 visits     Specific OT Treatment to include:   [x] Instruction in HEP                   Modalities:  [x] Therapeutic Exercise                 [x] Ultrasound               [] Electrical Stimulation/Attended  [x] PROM/Stretching                    [x] Fluidotherapy          [x]  Paraffin                   [x] AAROM  [x] AROM                 [x]

## 2023-10-27 RX ORDER — APIXABAN 5 MG/1
TABLET, FILM COATED ORAL
Qty: 180 TABLET | Refills: 3 | Status: SHIPPED | OUTPATIENT
Start: 2023-10-27

## 2023-11-01 ENCOUNTER — TREATMENT (OUTPATIENT)
Dept: OCCUPATIONAL THERAPY | Age: 73
End: 2023-11-01

## 2023-11-01 DIAGNOSIS — S63.005A UNSPECIFIED DISLOCATION OF LEFT WRIST AND HAND, INITIAL ENCOUNTER: ICD-10-CM

## 2023-11-01 DIAGNOSIS — M19.032 PRIMARY OSTEOARTHRITIS OF LEFT WRIST: Primary | ICD-10-CM

## 2023-11-01 NOTE — PROGRESS NOTES
Hand PROM X  x - focus on passive MCP extension  - focus on thumb mobility at [facility] and         Scar Mass/Edema Control:     Scar massage X  x - massage as tolerated dorsal and volar scars  - debridement of surgical glue and scabs     Edema control x - retrograde massage hand/ wrist   Strengthening:               Other:     HEP X  X  x - scar massage  - isolated MCP, fingertip curls, full fisting  - thumb AROM radial ext, palmar ABD, opposition   Hand'/ wrist hygiene x - pt encouraged to soak, wash and hydrate the affected wrist/ hand daily to decrease excess skin. Pt remain hesitent to remove brace and do hygiene. Importance discussed. Splint check x - no issues at this time     Assessment/Comments: Tx continued with ROM, stretches and functional hand activities. Thumb mobility and intolerance for pressure continues. Pt is strongly encouraged to work on thumb mobility at home and on hand hygiene. Guarding of the affected wrist/ hand is improving and outlook on recovery is getting better. Will continue.     -Rehab Potential: Good   -Patient Response to Treatment: Pt is pleased with her status so far. Patient. Education:  [x] Plans/Goals, Risks/Benefits discussed  [x] Home exercise program  Method of Education: [x] Verbal  [x] Demo  [] Written  Comprehension of Education:  [x] Verbalizes understanding. [x] Demonstrates understanding. [] Needs Review. [] Demonstrates/verbalizes understanding of HEP/Ed previously given.       Time In:1300            Time Out: 1400            CODE  Minutes  Units   85309 Fluidotherapy     85396 Paraffin     89780 Ultrasound     52762 Electrical Stim - Attended     14658 Iontophoresis     62489 Therapeutic Ex  30 2   64164 Therapeutic Activity  10 1   38629 Neuromuscular Re-Ed     28000 Manual Therapy  15 1   17918 ADL/COMP Tech Train     19483 Orthotic Management/Training      Other:   5 0               Total  60 4       Plan: OT 2-3x/week for up to 18 sessions    [x]

## 2023-11-02 ENCOUNTER — TREATMENT (OUTPATIENT)
Dept: OCCUPATIONAL THERAPY | Age: 73
End: 2023-11-02

## 2023-11-02 DIAGNOSIS — M19.032 PRIMARY OSTEOARTHRITIS OF LEFT WRIST: Primary | ICD-10-CM

## 2023-11-02 DIAGNOSIS — S63.005A UNSPECIFIED DISLOCATION OF LEFT WRIST AND HAND, INITIAL ENCOUNTER: ICD-10-CM

## 2023-11-02 NOTE — PROGRESS NOTES
OCCUPATIONAL THERAPY DAILY NOTE  [address] [facility] THERAPY   Fifi Montgomery [address]  Dept: [phone]  Loc: [phone]   Sommer Aguirre OT Fax: [phone]      Date:  2023  Initial Evaluation Date: 23    Evaluating Therapist: Breann Nichole OT    Patient Name:  Bam Garcia    :  1950    Restrictions/Precautions: Follow wrist arthrodesis protocol, NWB L wrist, high fall risk  Diagnosis:  M19.032 Primary Osteoarthritis   L wrist S63.005A Unspecified dislocation of left hand and wrist, initial encounter                                                     Date of Surgery/Injury:      Insurance/Certification information:  [employer] Medicare PPO  Plan of care signed (Y/N): Y (thru 24)  Visit# / total visits: -      Referring Practitioner:  Dr. Rosangela Patrick  Specific Practitioner Orders: PROM, ARROM, AROM, Stretching, Scar management, Strengthening, and Modalities as needed.    MD appt:       Assessment of current deficits   [x] Functional mobility             [x] ADLs           [x] Strength                  [] Cognition   [x] Functional transfers           [x] IADLs          [] Safety Awareness  [x] Endurance   [x] Fine Motor Coordination    [x] Balance      [] Vision/perception    [x] Sensation     [x] Gross Motor Coordination [x] ROM           [x] Pain                        [x] Edema          [x] Scar Adhesion/Skin Integrity      OT PLAN OF CARE   OT POC based on physician orders, patient diagnosis and results of clinical assessment     Frequency/Duration: 2-3x/week for 12- 18 visits     Specific OT Treatment to include:   [x] Instruction in HEP                   Modalities:  [x] Therapeutic Exercise                 [x] Ultrasound               [] Electrical Stimulation/Attended  [x] PROM/Stretching                    [x] Fluidotherapy          [x]  Paraffin                   [x] AAROM  [x] AROM

## 2023-11-06 ENCOUNTER — TREATMENT (OUTPATIENT)
Dept: OCCUPATIONAL THERAPY | Age: 73
End: 2023-11-06
Payer: MEDICARE

## 2023-11-06 DIAGNOSIS — S63.005A UNSPECIFIED DISLOCATION OF LEFT WRIST AND HAND, INITIAL ENCOUNTER: ICD-10-CM

## 2023-11-06 DIAGNOSIS — M19.032 PRIMARY OSTEOARTHRITIS OF LEFT WRIST: Primary | ICD-10-CM

## 2023-11-06 PROCEDURE — 97140 MANUAL THERAPY 1/> REGIONS: CPT | Performed by: OCCUPATIONAL THERAPIST

## 2023-11-06 PROCEDURE — 97530 THERAPEUTIC ACTIVITIES: CPT | Performed by: OCCUPATIONAL THERAPIST

## 2023-11-06 PROCEDURE — 97110 THERAPEUTIC EXERCISES: CPT | Performed by: OCCUPATIONAL THERAPIST

## 2023-11-06 NOTE — PROGRESS NOTES
bilateral hand use   - sit to stand activity x -Supervision   PROM/Stretching:     Hand PROM X  x - focus on passive MCP extension  - focus on thumb mobility at [facility] and MP   L shld PROM x - stiff at end ranges   Scar Mass/Edema Control:     Scar massage X  x - massage as tolerated dorsal and volar scars  - debridement of surgical glue and scabs     Edema control x - retrograde massage hand/ wrist   Strengthening:               Other:     HEP X  X  x - scar massage  - isolated MCP, fingertip curls, full fisting  - thumb AROM radial ext, palmar ABD, opposition   Hand'/ wrist hygiene x - pt encouraged to soak, wash and hydrate the affected wrist/ hand daily to decrease excess skin. Pt remain hesitent to remove brace and do hygiene. Importance discussed. Splint check x - no issues at this time     Assessment/Comments: Pt reports she saw the doctor and her wrist is healing well. It is anticipated she may start to start resistive activity in another 4 weeks. ROM , stretches and functional hand activities completed with thumb discomfort/ stiffness. Mobility in the thumb did improve with exercise and is better than in her last visit. PROM to the left shld added in today due to pt having upper arm discomfort with platform walker use. Good session.    -Rehab Potential: Good   -Patient Response to Treatment: Pt is pleased with her status so far. Patient. Education:  [x] Plans/Goals, Risks/Benefits discussed  [x] Home exercise program  Method of Education: [x] Verbal  [x] Demo  [] Written  Comprehension of Education:  [x] Verbalizes understanding. [x] Demonstrates understanding. [] Needs Review. [] Demonstrates/verbalizes understanding of HEP/Ed previously given.       Time In:1500            Time Out: 1600            CODE  Minutes  Units   24305 Fluidotherapy     90461 Paraffin     56240 Ultrasound     71274 Electrical Stim - Attended     49867 Iontophoresis     61631 Therapeutic Ex  25 2   19839 Therapeutic Activity

## 2023-11-08 ENCOUNTER — TREATMENT (OUTPATIENT)
Dept: OCCUPATIONAL THERAPY | Age: 73
End: 2023-11-08

## 2023-11-08 DIAGNOSIS — M19.032 PRIMARY OSTEOARTHRITIS OF LEFT WRIST: Primary | ICD-10-CM

## 2023-11-08 DIAGNOSIS — S63.005A UNSPECIFIED DISLOCATION OF LEFT WRIST AND HAND, INITIAL ENCOUNTER: ICD-10-CM

## 2023-11-08 NOTE — PROGRESS NOTES
25 2   53880 Therapeutic Activity  10 1   08383 Neuromuscular Re-Ed     11497 Manual Therapy  20 1   01887 ADL/COMP Tech Train     35955 Orthotic Management/Training      Other: MH  5 0               Total  60 4       Plan: OT 2-3x/week for up to 18 sessions    [x]  Continue Plan of care with focus on AROM, AAROM, scar mgmt, edema control, progressive functional hand use. Once cleared by the MD possibly at next office visit in Nov,  will advance into hand strengthening. : Treatment covered based on POC and graduated to patient's progress. Pt education continues at each visit to obtain maximum benefits from skilled OT intervention.   []  Alter Plan of care:   []  Discharge:      Xenia Romo OT /TRISTAN  [id number]

## 2023-11-15 ENCOUNTER — TREATMENT (OUTPATIENT)
Dept: OCCUPATIONAL THERAPY | Age: 73
End: 2023-11-15

## 2023-11-15 DIAGNOSIS — M19.032 PRIMARY OSTEOARTHRITIS OF LEFT WRIST: Primary | ICD-10-CM

## 2023-11-15 DIAGNOSIS — S63.005A UNSPECIFIED DISLOCATION OF LEFT WRIST AND HAND, INITIAL ENCOUNTER: ICD-10-CM

## 2023-11-15 NOTE — PROGRESS NOTES
attention to bilateral hand use   - sit to stand activity x -Supervision   PROM/Stretching:     Hand PROM X  x - focus on passive MCP extension  - focus on thumb mobility at [facility] and    Forearm PROM x - supination/ pronation   L shld PROM  - stiff at end ranges   Scar Mass/Edema Control:     Scar massage X   - massage as tolerated dorsal and volar scars  - debridement of surgical glue and scabs     Edema control x - retrograde massage hand/ wrist   Strengthening:     Hand strengthening X  x - light gripping with blue sponge  - pinching bunchems using 2#-4# clothespins        Other:     HEP X  X  x - scar massage  - isolated MCP, fingertip curls, full fisting  - thumb AROM radial ext, palmar ABD, opposition   Hand'/ wrist hygiene x - pt encouraged to soak, wash and hydrate the affected wrist/ hand daily to decrease excess skin. Pt remain hesitent to remove brace and do hygiene. Importance discussed. Splint check x - no issues at this time     Assessment/Comments: Tx continued with a focus on thumb mobility/ stretches, hand ROM/ strengthening and forearm ROM. Tolerance for movement and light use of the hand is much better. Will advance into hand strengthening as tolerated. -Rehab Potential: Good   -Patient Response to Treatment: Pt is happy with her progress. Patient. Education:  [x] Plans/Goals, Risks/Benefits discussed  [x] Home exercise program  Method of Education: [x] Verbal  [x] Demo  [] Written  Comprehension of Education:  [x] Verbalizes understanding. [x] Demonstrates understanding. [] Needs Review. [] Demonstrates/verbalizes understanding of HEP/Ed previously given.       Time In:1505           Time Out: 1600           CODE  Minutes  Units   44924 Fluidotherapy     68058 Paraffin     14488 Ultrasound     34863 Electrical Stim - Attended     77095 Iontophoresis     43496 Therapeutic Ex  30 2   71447 Therapeutic Activity  10 1   02098 Neuromuscular Re-Ed     40454 Manual Therapy  10 1   26326

## 2023-11-21 ENCOUNTER — TREATMENT (OUTPATIENT)
Dept: OCCUPATIONAL THERAPY | Age: 73
End: 2023-11-21

## 2023-11-21 DIAGNOSIS — M19.032 PRIMARY OSTEOARTHRITIS OF LEFT WRIST: Primary | ICD-10-CM

## 2023-11-21 DIAGNOSIS — S63.005A UNSPECIFIED DISLOCATION OF LEFT WRIST AND HAND, INITIAL ENCOUNTER: ICD-10-CM

## 2023-11-21 NOTE — PROGRESS NOTES
focus on thumb mobility/ stretches, hand ROM/ strengthening and forearm ROM. Tolerance for movement and light use of the hand is much better. Will advance into hand strengthening as tolerated with inclusion of HEP for full arm conditioning.     -Rehab Potential: Good   -Patient Response to Treatment: Pt is happy with her progress. Patient. Education:  [x] Plans/Goals, Risks/Benefits discussed  [x] Home exercise program  Method of Education: [x] Verbal  [x] Demo  [] Written  Comprehension of Education:  [x] Verbalizes understanding. [x] Demonstrates understanding. [] Needs Review. [] Demonstrates/verbalizes understanding of HEP/Ed previously given. Time In:1505           Time Out: 1600           CODE  Minutes  Units   68391 Fluidotherapy     38496 Paraffin     42609 Ultrasound     57313 Electrical Stim - Attended     26440 Iontophoresis     78517 Therapeutic Ex  30 2   64394 Therapeutic Activity  10 1   31021 Neuromuscular Re-Ed     14251 Manual Therapy  10 1   77216 ADL/COMP Tech Train     07564 Orthotic Management/Training      Other:   5 0               Total  55 4       Plan: OT 2-3x/week for up to 18 sessions    [x]  Continue Plan of care with focus on AROM, AAROM, scar mgmt, edema control, progressive functional hand use. Will advance distal extremity strengthening as tolerated. : Treatment covered based on POC and graduated to patient's progress. Pt education continues at each visit to obtain maximum benefits from skilled OT intervention.   []  Alter Plan of care:   []  Discharge:      Clarke Alvarez OT /TRISTAN  [id number]

## 2023-11-28 ENCOUNTER — TREATMENT (OUTPATIENT)
Dept: OCCUPATIONAL THERAPY | Age: 73
End: 2023-11-28
Payer: MEDICARE

## 2023-11-28 DIAGNOSIS — S63.005A UNSPECIFIED DISLOCATION OF LEFT WRIST AND HAND, INITIAL ENCOUNTER: ICD-10-CM

## 2023-11-28 DIAGNOSIS — M19.032 PRIMARY OSTEOARTHRITIS OF LEFT WRIST: Primary | ICD-10-CM

## 2023-11-28 PROCEDURE — 97110 THERAPEUTIC EXERCISES: CPT | Performed by: OCCUPATIONAL THERAPIST

## 2023-11-28 PROCEDURE — 97140 MANUAL THERAPY 1/> REGIONS: CPT | Performed by: OCCUPATIONAL THERAPIST

## 2023-11-28 PROCEDURE — 97530 THERAPEUTIC ACTIVITIES: CPT | Performed by: OCCUPATIONAL THERAPIST

## 2023-11-28 NOTE — PROGRESS NOTES
release of items during laundry activities (Goal met- Pt has full extension of the digits making release of items easy)  5) Patient will demonstrate  /pinch strength of at least 25 / 4 pinch pounds of their L hand to improve use during transfers. (TBA)  6) Patient to report decreased pain in their affected L upper extremity from 3/10 to 2/10 or less with resistive functional use. (Progress noted- pain has improved in general and is now more localized to the thumb due to OA)  7) Increase in fine motor function as evidenced by decreased time to complete 9-hole peg test and/or MRMT test by at least 5-10 seconds with  L in order to complete meal prep and manipulation of fasteners during dressing tasks. . (TBA)  8) Patient to report 100% compliance with their splint wear, care, and precautions if needed. (Goal met)  9) Patient to demonstrate decreased guarding of their affected extremity from 75% to 20% or less. (Progress noted- guarding has decreased to 45%)  10) Patient will be knowledgeable of edema control techniques as evident with decreases from moderate to mild/none. (Goal met- edema has decreased to mild in the wrist)  11) Patient will demonstrate a non-tender/non-adherent scar. (Progress noted- the volar wrist scar is good with issues. The dorsal wrist scar is deeper with some adherence/ no sensitivity)  12) Patient will report completing dressing, including completion of fasteners, with [place]. (Goal met)  13) Patient will demonstrate improved functional activity tolerance from Poor to Fair for ADL/IADL completion. (Progress noted)  14) Patient will decrease QuickDASH score to 20% or less for increased participation in daily functional activities. (TBA next update)     TODAY'S TREATMENT     Pain Level: No pain during session/     Subjective: Pt presents with no new complaints  Objective:    Updated POC to be completed by 11-20-23.     INTERVENTION: COMPLETED: SPECIFICS/COMMENTS:   Modality:     MELY

## 2023-12-05 ENCOUNTER — TREATMENT (OUTPATIENT)
Dept: OCCUPATIONAL THERAPY | Age: 73
End: 2023-12-05
Payer: MEDICARE

## 2023-12-05 DIAGNOSIS — M19.032 PRIMARY OSTEOARTHRITIS OF LEFT WRIST: Primary | ICD-10-CM

## 2023-12-05 DIAGNOSIS — S63.005A UNSPECIFIED DISLOCATION OF LEFT WRIST AND HAND, INITIAL ENCOUNTER: ICD-10-CM

## 2023-12-05 PROCEDURE — 97530 THERAPEUTIC ACTIVITIES: CPT | Performed by: OCCUPATIONAL THERAPIST

## 2023-12-05 PROCEDURE — 97140 MANUAL THERAPY 1/> REGIONS: CPT | Performed by: OCCUPATIONAL THERAPIST

## 2023-12-05 PROCEDURE — 97110 THERAPEUTIC EXERCISES: CPT | Performed by: OCCUPATIONAL THERAPIST

## 2023-12-05 NOTE — PROGRESS NOTES
Objective:    Updated POC to be completed by 12-20-23.     INTERVENTION: COMPLETED: SPECIFICS/COMMENTS:   Modality:     MH x wrist/ hand x - 5 min to decrease joint stiffness        AROM:          Hand AROM X    X  X  X  x     - isolated MCP flex/ ext 15x AROM/ AAROM for full MCP extension- 20x  - fingertip curls- 15x  - full fisting- 15x  - thumb radial ext and palmar ABD  10x  - Thumb radial extension place and hold 5x  - exercise balls CCLW  - isolated digital hyperextension at MCP 10x each finger   Forearm AROM X   - palm up and palm down ^15x as tolerated  - turning over cards using forearm rotation   Functional Activities     - opening containers  - 2 handing opening/ pouring from juice bottle   - fasteners  - tying- very good   - folding towels  - ^ attention to bilateral hand use   - in hand manipulation X    x - challenging for flipping med / 2 inch objects- gets better with reps  - in hand manipulation with buttons   - sit to stand activity x -Supervision   PROM/Stretching:     Hand PROM X  x - focus on passive MCP extension  - focus on thumb mobility at [facility] and MP   Forearm PROM x - supination/ pronation   L shld PROM  - stiff at end ranges   Scar Mass/Edema Control:     Scar massage X   - massage as tolerated dorsal and volar scars  - debridement of surgical glue and scabs     Edema control  - retrograde massage hand/ wrist   Strengthening:     Hand strengthening X  x    x - light gripping with blue sponge  - xsoft theraputty gripping, roll with alternating pinches  - pinching bunchems using 2#-4# clothespins  - resistive rubber bands for digital extension  - digiflex 1.5#  composite 10x and each digit 10x   Full arm conditioning X  X  x - shld flex isometric 10 sec - 5x  -elbow curls 1# 1-10  - forearm rotation 1# 1-20   Other:     HEP X  X  X  X  X  X   - scar massage  - isolated MCP, fingertip curls, full fisting  - thumb AROM radial ext, palmar ABD, opposition  - isometeric shld flex and hold   -

## 2023-12-12 ENCOUNTER — TREATMENT (OUTPATIENT)
Dept: OCCUPATIONAL THERAPY | Age: 73
End: 2023-12-12
Payer: MEDICARE

## 2023-12-12 DIAGNOSIS — S63.005A UNSPECIFIED DISLOCATION OF LEFT WRIST AND HAND, INITIAL ENCOUNTER: ICD-10-CM

## 2023-12-12 DIAGNOSIS — M19.032 PRIMARY OSTEOARTHRITIS OF LEFT WRIST: Primary | ICD-10-CM

## 2023-12-12 PROCEDURE — 97140 MANUAL THERAPY 1/> REGIONS: CPT | Performed by: OCCUPATIONAL THERAPIST

## 2023-12-12 PROCEDURE — 97530 THERAPEUTIC ACTIVITIES: CPT | Performed by: OCCUPATIONAL THERAPIST

## 2023-12-12 PROCEDURE — 97110 THERAPEUTIC EXERCISES: CPT | Performed by: OCCUPATIONAL THERAPIST

## 2023-12-12 NOTE — PROGRESS NOTES
OCCUPATIONAL THERAPY DAILY NOTE/ UPDATE  [address]  [facility] THERAPY   [place] Dumont [facility] [address]  Dept: [phone]  Loc: [phone]   Sean Grande OT Fax: [phone]      Date:  2023  Initial Evaluation Date: 23    Evaluating Therapist: Franny Martinez OT    Patient Name:  Angel Rubioggabby    :  1950    Restrictions/Precautions: Follow wrist arthrodesis protocol, Light hand/ UE strengthening permitted with 1#,  moderate fall risk  Diagnosis:  M19.032 Primary Osteoarthritis   L wrist S63.005A Unspecified dislocation of left hand and wrist, initial encounter                                                     Date of Surgery/Injury: 83     Insurance/Certification information:  tna Medicare PPO  Plan of care signed (Y/N): Y (thru 24)  Visit# / total visits: -      Referring Practitioner:  Dr. Lin Ye  Specific Practitioner Orders: PROM, ARROM, AROM, Stretching, Scar management, Strengthening, and Modalities as needed.       Assessment of current deficits   [x] Functional mobility             [x] ADLs           [x] Strength                  [] Cognition   [x] Functional transfers           [x] IADLs          [] Safety Awareness  [x] Endurance   [x] Fine Motor Coordination    [x] Balance      [] Vision/perception    [x] Sensation     [x] Gross Motor Coordination [x] ROM           [x] Pain                        [x] Edema          [x] Scar Adhesion/Skin Integrity      OT PLAN OF CARE   OT POC based on physician orders, patient diagnosis and results of clinical assessment     Frequency/Duration: 2-3x/week for 12- 18 visits     Specific OT Treatment to include:   [x] Instruction in HEP                   Modalities:  [x] Therapeutic Exercise                 [x] Ultrasound               [] Electrical Stimulation/Attended  [x] PROM/Stretching                    [x] Fluidotherapy          [x]  Paraffin

## 2023-12-26 ENCOUNTER — TREATMENT (OUTPATIENT)
Dept: OCCUPATIONAL THERAPY | Age: 73
End: 2023-12-26
Payer: MEDICARE

## 2023-12-26 DIAGNOSIS — S63.005A UNSPECIFIED DISLOCATION OF LEFT WRIST AND HAND, INITIAL ENCOUNTER: ICD-10-CM

## 2023-12-26 DIAGNOSIS — M19.032 PRIMARY OSTEOARTHRITIS OF LEFT WRIST: Primary | ICD-10-CM

## 2023-12-26 PROCEDURE — 97530 THERAPEUTIC ACTIVITIES: CPT | Performed by: OCCUPATIONAL THERAPIST

## 2023-12-26 PROCEDURE — 97110 THERAPEUTIC EXERCISES: CPT | Performed by: OCCUPATIONAL THERAPIST

## 2023-12-26 NOTE — PROGRESS NOTES
OCCUPATIONAL THERAPY DAILY NOTE/ UPDATE  [address] [facility] THERAPY  [address] [facility] [address]  Dept: [phone]  Loc: [phone]   Hazel Hess OT Fax: [phone]      Date:  2023  Initial Evaluation Date: 23    Evaluating Therapist: Jordan Martinez OT    Patient Name:  Dayron Quinn    :  1950    Restrictions/Precautions: Follow wrist arthrodesis protocol, Light hand/ UE strengthening permitted with 1#,  moderate fall risk  Diagnosis:  M19.032 Primary Osteoarthritis   L wrist S63.005A Unspecified dislocation of left hand and wrist, initial encounter                                                     Date of Surgery/Injury:      Insurance/Certification information:  tna Medicare PPO  Plan of care signed (Y/N): Y (thru 24)  Visit# / total visits: -      Referring Practitioner:  Dr. Marshal Hidalgo  Specific Practitioner Orders: PROM, ARROM, AROM, Stretching, Scar management, Strengthening, and Modalities as needed.       Assessment of current deficits   [x] Functional mobility             [x] ADLs           [x] Strength                  [] Cognition   [x] Functional transfers           [x] IADLs          [] Safety Awareness  [x] Endurance   [x] Fine Motor Coordination    [x] Balance      [] Vision/perception    [x] Sensation     [x] Gross Motor Coordination [x] ROM           [x] Pain                        [x] Edema          [x] Scar Adhesion/Skin Integrity      OT PLAN OF CARE   OT POC based on physician orders, patient diagnosis and results of clinical assessment     Frequency/Duration: 2-3x/week for 12- 18 visits     Specific OT Treatment to include:   [x] Instruction in HEP                   Modalities:  [x] Therapeutic Exercise                 [x] Ultrasound               [] Electrical Stimulation/Attended  [x] PROM/Stretching                    [x] Fluidotherapy          [x]  Paraffin

## 2024-01-02 ENCOUNTER — TREATMENT (OUTPATIENT)
Dept: OCCUPATIONAL THERAPY | Age: 74
End: 2024-01-02
Payer: MEDICARE

## 2024-01-02 DIAGNOSIS — S63.005A UNSPECIFIED DISLOCATION OF LEFT WRIST AND HAND, INITIAL ENCOUNTER: ICD-10-CM

## 2024-01-02 DIAGNOSIS — M19.032 PRIMARY OSTEOARTHRITIS OF LEFT WRIST: Primary | ICD-10-CM

## 2024-01-02 PROCEDURE — 97530 THERAPEUTIC ACTIVITIES: CPT | Performed by: OCCUPATIONAL THERAPIST

## 2024-01-02 PROCEDURE — 97110 THERAPEUTIC EXERCISES: CPT | Performed by: OCCUPATIONAL THERAPIST

## 2024-01-02 NOTE — PROGRESS NOTES
use.    Objective:    Updated POC to be completed by last visit.    INTERVENTION: COMPLETED: SPECIFICS/COMMENTS:   Modality:     MH x wrist/ hand x - 5 min to decrease joint stiffness        AROM:          Hand AROM       X  X    x     - isolated MCP flex/ ext 15x AROM/ AAROM for full MCP extension- 20x  - fingertip curls- 15x  - full fisting- 15x  - thumb radial ext and palmar ABD  10x  - Thumb radial extension place and hold 5x  - exercise balls CCLW and CLW 10x each  - isolated digital hyperextension at MCP 10x each finger     Forearm AROM X   - palm up and palm down ^15x as tolerated  - turning over cards using forearm rotation   Functional Activities     - opening containers  - 2 handing opening/ pouring from juice bottle   - fasteners  - tying- very good   - folding towels  - ^ attention to bilateral hand use   - in hand manipulation      - challenging for flipping med / 2 inch objects- gets better with reps  - in hand manipulation with buttons   - functional mobility X  x - platform walker- independent  - standard walker- CG to S due to LE weakness/ R leg- pt is able to grasp handles better using wrist brace L and good without the brace   - sit to stand activity x - ^ to independent   PROM/Stretching:     Hand PROM X  x - focus on passive MCP extension  - focus on thumb mobility at [facility] and MP   Forearm PROM x - supination/ pronation   L shld PROM  - stiff at end ranges   Scar Mass/Edema Control:     Scar massage    - massage as tolerated dorsal and volar scars  - debridement of surgical glue and scabs     Edema control  - retrograde massage hand/ wrist   Strengthening:     Hand strengthening   x            x - light gripping with blue sponge  - xsoft theraputty gripping, roll with alternating pinches, new- removal 4 coins  - pinching bunchems using 2#-4# clothespins  - resistive rubber bands for digital extension  - digiflex 1.5#  composite ^15x and each digit ^15x  - 2# resistive pins with pom poms  - lift

## 2024-01-03 PROBLEM — E78.00 PURE HYPERCHOLESTEROLEMIA: Status: ACTIVE | Noted: 2024-01-03

## 2024-01-09 ENCOUNTER — TREATMENT (OUTPATIENT)
Dept: OCCUPATIONAL THERAPY | Age: 74
End: 2024-01-09
Payer: MEDICARE

## 2024-01-09 ENCOUNTER — HOSPITAL ENCOUNTER (OUTPATIENT)
Dept: GENERAL RADIOLOGY | Age: 74
Discharge: HOME | End: 2024-01-11
Payer: MEDICARE

## 2024-01-09 DIAGNOSIS — S63.005A UNSPECIFIED DISLOCATION OF LEFT WRIST AND HAND, INITIAL ENCOUNTER: ICD-10-CM

## 2024-01-09 DIAGNOSIS — M19.032 PRIMARY OSTEOARTHRITIS OF LEFT WRIST: Primary | ICD-10-CM

## 2024-01-09 DIAGNOSIS — Z12.31 VISIT FOR SCREENING MAMMOGRAM: ICD-10-CM

## 2024-01-09 PROCEDURE — 97110 THERAPEUTIC EXERCISES: CPT | Performed by: OCCUPATIONAL THERAPIST

## 2024-01-09 PROCEDURE — 77063 BREAST TOMOSYNTHESIS BI: CPT

## 2024-01-09 PROCEDURE — 97140 MANUAL THERAPY 1/> REGIONS: CPT | Performed by: OCCUPATIONAL THERAPIST

## 2024-01-09 PROCEDURE — 97530 THERAPEUTIC ACTIVITIES: CPT | Performed by: OCCUPATIONAL THERAPIST

## 2024-01-09 NOTE — PROGRESS NOTES
OCCUPATIONAL THERAPY DAILY NOTE  [facility] PHYSICIANS [place] [facility] OCCUPATIONAL THERAPY   [address] [place] [address]  Dept: [phone]  Loc: [phone]   [facility] OT Fax: [phone]      Date:  2024  Initial Evaluation Date: 23    Evaluating Therapist: Marizol Noble OT    Patient Name:  Hilaria Castillo    :  1950    Restrictions/Precautions:  Follow wrist arthrodesis protocol, Light hand/ UE strengthening permitted with 1#,  moderate fall risk  Diagnosis:  M19.032 Primary Osteoarthritis   L wrist S63.005A Unspecified dislocation of left hand and wrist, initial encounter                                                     Date of Surgery/Injury: 23     Insurance/Certification information:  tna Medicare PPO  Plan of care signed (Y/N): Y (thru 24)  Visit# / total visits: 15/ 12-      Referring Practitioner:  Dr. Hernadez  Specific Practitioner Orders: PROM, ARROM, AROM, Stretching, Scar management, Strengthening, and Modalities as needed.      Assessment of current deficits   [x] Functional mobility             [x] ADLs           [x] Strength                  [] Cognition   [x] Functional transfers           [x] IADLs          [] Safety Awareness  [x] Endurance   [x] Fine Motor Coordination    [x] Balance      [] Vision/perception    [x] Sensation     [x] Gross Motor Coordination [x] ROM           [x] Pain                        [x] Edema          [x] Scar Adhesion/Skin Integrity      OT PLAN OF CARE   OT POC based on physician orders, patient diagnosis and results of clinical assessment     Frequency/Duration: 2-3x/week for 12- 18 visits     Specific OT Treatment to include:   [x] Instruction in HEP                   Modalities:  [x] Therapeutic Exercise                 [x] Ultrasound               [] Electrical Stimulation/Attended  [x] PROM/Stretching                    [x] Fluidotherapy          [x]  Paraffin                   [x] AAROM

## 2024-01-10 ENCOUNTER — OFFICE VISIT (OUTPATIENT)
Dept: CARDIOLOGY CLINIC | Age: 74
End: 2024-01-10
Payer: MEDICARE

## 2024-01-10 VITALS
WEIGHT: 220 LBS | DIASTOLIC BLOOD PRESSURE: 80 MMHG | RESPIRATION RATE: 16 BRPM | SYSTOLIC BLOOD PRESSURE: 122 MMHG | HEART RATE: 80 BPM | BODY MASS INDEX: 34.53 KG/M2 | HEIGHT: 67 IN

## 2024-01-10 DIAGNOSIS — I10 ESSENTIAL HYPERTENSION: ICD-10-CM

## 2024-01-10 DIAGNOSIS — E66.8 MODERATE OBESITY: ICD-10-CM

## 2024-01-10 DIAGNOSIS — I48.21 PERMANENT ATRIAL FIBRILLATION (HCC): ICD-10-CM

## 2024-01-10 DIAGNOSIS — I25.10 CORONARY ARTERY DISEASE INVOLVING NATIVE CORONARY ARTERY OF NATIVE HEART WITHOUT ANGINA PECTORIS: Primary | ICD-10-CM

## 2024-01-10 DIAGNOSIS — E78.00 PURE HYPERCHOLESTEROLEMIA: ICD-10-CM

## 2024-01-10 PROCEDURE — 1123F ACP DISCUSS/DSCN MKR DOCD: CPT | Performed by: INTERNAL MEDICINE

## 2024-01-10 PROCEDURE — 93000 ELECTROCARDIOGRAM COMPLETE: CPT | Performed by: INTERNAL MEDICINE

## 2024-01-10 PROCEDURE — 3074F SYST BP LT 130 MM HG: CPT | Performed by: INTERNAL MEDICINE

## 2024-01-10 PROCEDURE — 3079F DIAST BP 80-89 MM HG: CPT | Performed by: INTERNAL MEDICINE

## 2024-01-10 PROCEDURE — 99214 OFFICE O/P EST MOD 30 MIN: CPT | Performed by: INTERNAL MEDICINE

## 2024-01-10 RX ORDER — VENLAFAXINE HYDROCHLORIDE 75 MG/1
CAPSULE, EXTENDED RELEASE ORAL
COMMUNITY
End: 2024-01-10 | Stop reason: ALTCHOICE

## 2024-01-10 RX ORDER — METHOCARBAMOL 750 MG/1
TABLET, FILM COATED ORAL
COMMUNITY
End: 2024-01-10 | Stop reason: ALTCHOICE

## 2024-01-10 RX ORDER — TRAMADOL HYDROCHLORIDE 50 MG/1
TABLET ORAL
COMMUNITY
End: 2024-01-10 | Stop reason: ALTCHOICE

## 2024-01-10 RX ORDER — LIFITEGRAST 50 MG/ML
SOLUTION/ DROPS OPHTHALMIC
COMMUNITY

## 2024-01-10 RX ORDER — OXYCODONE HYDROCHLORIDE 5 MG/1
TABLET ORAL
COMMUNITY
End: 2024-01-10 | Stop reason: ALTCHOICE

## 2024-01-10 RX ORDER — IBUPROFEN 200 MG
1 CAPSULE ORAL DAILY
COMMUNITY

## 2024-01-10 RX ORDER — GABAPENTIN 100 MG/1
100 CAPSULE ORAL 2 TIMES DAILY
COMMUNITY

## 2024-01-10 RX ORDER — GLUCOSAMINE SULFATE 500 MG
CAPSULE ORAL
COMMUNITY

## 2024-01-10 RX ORDER — VITAMIN B COMPLEX
1 CAPSULE ORAL DAILY
COMMUNITY

## 2024-01-10 NOTE — PROGRESS NOTES
OUTPATIENT CARDIOLOGY FOLLOW-UP    Name: Hilaria Castillo    Age: 73 y.o.    Primary Care Physician: Abiodun Yuen MD    Date of Service: 1/10/2024    Chief Complaint: Coronary atherosclerosis, persistent/permanent atrial fibrillation, hypertension, moderate obesity    Interim History: As her most recent evaluation, the patient stated from a cardiovascular standpoint and underwent fusion of her left wrist in the face of chronic infection with ongoing suppressive antibiotics.  She remains otherwise compensated from a cardiovascular standpoint with no symptoms of anginal-like chest discomfort or other ischemic equivalents nor manifestations of decompensated left ventricular systolic dysfunction or volume overload.  She denies arrhythmia related symptoms and has noted no symptoms of a focal neurological origin no bleeding in the face of chronic anticoagulation.  At present based on the ambulatory limitations, her functional capabilities remain curtailed with without interim changes of her functional capabilities.  At the time of her evaluation she remains normotensive.    Review of Systems:   The remainder of a complete multisystem review including consitutional, central nervous, respiratory, circulatory, gastrointestinal, genitourinary, endocrinologic, hematologic, musculoskeletal and psychiatric are negative.    Past Medical History:  Past Medical History:   Diagnosis Date    A-fib ([facility])     Arthritis     Class 2 severe obesity due to excess calories with serious comorbidity and body mass index (BMI) of 37.0 to 37.9 in adult ([facility])     Dry eyes, bilateral     Hypertension     OAB (overactive bladder)     PONV (postoperative nausea and vomiting)        Past Surgical History:  Past Surgical History:   Procedure Laterality Date    ABDOMEN SURGERY      BLADDER SURGERY  01/01/2022    bladder stimulator    BREAST BIOPSY      BUNIONECTOMY      bilteral twice    CARDIAC SURGERY  [facility]    heart cath in [place]

## 2024-01-17 ENCOUNTER — TREATMENT (OUTPATIENT)
Dept: OCCUPATIONAL THERAPY | Age: 74
End: 2024-01-17

## 2024-01-17 ENCOUNTER — EVALUATION (OUTPATIENT)
Dept: PHYSICAL THERAPY | Age: 74
End: 2024-01-17

## 2024-01-17 DIAGNOSIS — M19.032 PRIMARY OSTEOARTHRITIS OF LEFT WRIST: Primary | ICD-10-CM

## 2024-01-17 DIAGNOSIS — S63.005A UNSPECIFIED DISLOCATION OF LEFT WRIST AND HAND, INITIAL ENCOUNTER: ICD-10-CM

## 2024-01-17 DIAGNOSIS — R29.898 WEAKNESS OF BOTH LOWER EXTREMITIES: Primary | ICD-10-CM

## 2024-01-17 NOTE — PROGRESS NOTES
back, knee issues in the past. She has had a very rough 2023 having had multiple joint infection,sepsis, an extended stay [facility]. She is scheduled to have L wrist fusion in 1 month and she is very concerned about her ability to walk with the folding walker she used prior to 3/2023    Problems:   Balance decreased in both standing and walking   Limitations with walking, sit to stand      [x] There are no barriers affecting plan of care or recovery    [] Barriers to this patient's plan of care or recovery include:      Domestic Concerns:  [x] No  [] Yes:    Short Term goals (2-3)  30-Sec. Chair Stand Test: 2 reps  Demonstrate improved balance in standing, in walking  Able to perform / complete the following functions / tasks: progress assistive device    Long Term goals (4-6)  30-Sec. Chair Stand Test: 4 reps  Increase Strength to 4+-5-/5   Demonstrate improved balance in standing, in walking  Able to perform / complete the following functions / tasks:  sit to stand  Independent  Independent with Home Exercise Programs     Rehab Potential: [x] Good  [] Fair  [] Poor    PLAN     Time: 1187-7927    45 minutes  Treatment Plan:  instruction in home exercise program   therapeutic exercise   therapeutic activity   neuromuscular re-education   gait training     The following CPT codes are likely to be used in the care of this patient:   73659 PT Evaluation: High Complexity   44540 Therapeutic Exercise   58602 Neuromuscular Re-Education   79947 Therapeutic Activities   96161 Gait Training     Suggested Professional Referral: [x] No  [] Yes:     Patient Education:  [x] Plans / Goals, Risks / Benefits discussed  [x] Home exercise program  Method of Education: [x] Verbal  [x] Demo  [x] Written  Comprehension of Education:  [x] Verbalizes understanding.  [x] Demonstrates understanding.  [] Needs Review.  [] Demonstrates / verbalizes understanding of HEP / Ed previously given.    Frequency:  1-2 days per week for 4

## 2024-01-17 NOTE — PROGRESS NOTES
OCCUPATIONAL THERAPY Update/ Discharge Note  [facility] [place] [facility] OCCUPATIONAL THERAPY   [address]  [place] [address]  Dept: [phone]  Loc: [phone]   [facility] OT Fax: [phone]      Date:  2024  Initial Evaluation Date: 23    Evaluating Therapist: Marizol Noble OT    Patient Name:  Hilaria Castillo    :  1950    Restrictions/Precautions:  Follow wrist arthrodesis protocol, Light hand/ UE strengthening permitted with 1#,  moderate fall risk  Diagnosis:  M19.032 Primary Osteoarthritis   L wrist S63.005A Unspecified dislocation of left hand and wrist, initial encounter                                                     Date of Surgery/Injury: 23     Insurance/Certification information:  [employer] Medicare PPO  Plan of care signed (Y/N): Y (thru 24)  Visit# / total visits: -      Referring Practitioner:  Dr. Hernadez  Specific Practitioner Orders: PROM, ARROM, AROM, Stretching, Scar management, Strengthening, and Modalities as needed.      Assessment of current deficits   [x] Functional mobility             [x] ADLs           [x] Strength                  [] Cognition   [x] Functional transfers           [x] IADLs          [] Safety Awareness  [x] Endurance   [x] Fine Motor Coordination    [x] Balance      [] Vision/perception    [x] Sensation     [x] Gross Motor Coordination [x] ROM           [x] Pain                        [x] Edema          [x] Scar Adhesion/Skin Integrity      OT PLAN OF CARE   OT POC based on physician orders, patient diagnosis and results of clinical assessment     Frequency/Duration: 2-3x/week for 12- 18 visits     Specific OT Treatment to include:   [x] Instruction in HEP                   Modalities:  [x] Therapeutic Exercise                 [x] Ultrasound               [] Electrical Stimulation/Attended  [x] PROM/Stretching                    [x] Fluidotherapy          [x]  Paraffin

## 2024-01-22 ENCOUNTER — TREATMENT (OUTPATIENT)
Dept: PHYSICAL THERAPY | Age: 74
End: 2024-01-22
Payer: MEDICARE

## 2024-01-22 DIAGNOSIS — R29.898 WEAKNESS OF BOTH LOWER EXTREMITIES: Primary | ICD-10-CM

## 2024-01-22 PROCEDURE — 97530 THERAPEUTIC ACTIVITIES: CPT | Performed by: PHYSICAL THERAPIST

## 2024-01-22 NOTE — PROGRESS NOTES
Physical Therapy Daily Treatment Note    Date: 2024  Patient Name: Hilaria Castillo  : 1950   MRN: [id number]  DOInjury: -   DOSx: -  Referring Provider: Oscar Hernadez DO  [address] [place] [address]     Medical Diagnosis:     S63.005A (ICD-10-CM) - Unspecified dislocation of left wrist and hand, initial encounter      Hilaria is well know to me as I have seen her for shoulder, back, knee issues in the past. She has had a very rough  having had multiple joint infection,sepsis, an extended stay at [facility] and SNF. She is scheduled to have L wrist fusion in 1 month and she is very concerned about her ability to walk with the folding walker she used prior to 3/2023     X = TO BE PERFORMED NEXT VISIT  > = PROGRESS TO THIS    S: See eval  O: Discussed anatomy, physiology, body mechanics, principles of loading, and progressive loading/activity.  Reviewed home exercise program extensively; written copy provided.   Time 7510-0336     Visit 2 Repeat outcome measure at mid point and end.    Pain Pain with activity 3/10     ROM      Modalities         MO   Manual            Stretch      Towel / strap DF, INV, EV   TE      TE   Exercise      Ball / can rolling   TE   Toe curls      Heel slides   TE      TE   marching 3 x 15 superset TE   Calf raises 3 x 10 superset TE   [] TG  [] Leg Press 2-leg   TE   [] TG  [] Leg Press 1-leg   TE   Hamstring Curl Machine   TE   Knee Extension Machine   TE   Step-ups - FWD X 5 on 4\"  TA   Step-ups - LAT   TA   Step-ups - BWD   TA   Calf Raises   TA   gait 1 x 145ft w/ wheeled walker no platform  TA      TA               A:  Tolerated well.    P: Continue with rehab plan  William Dean PT    Treatment Charges: Mins Units   Initial Evaluation     Re-Evaluation     Ther Exercise         TE     Manual Therapy     MT     Ther Activities        TA 45 3   Gait Training          GT     Neuro Re-education NR     Modalities     Non-Billable Service Time     Other     Total Time/Units

## 2024-01-25 ENCOUNTER — TREATMENT (OUTPATIENT)
Dept: PHYSICAL THERAPY | Age: 74
End: 2024-01-25

## 2024-01-25 DIAGNOSIS — R26.2 DIFFICULTY WALKING: ICD-10-CM

## 2024-01-25 DIAGNOSIS — R29.898 WEAKNESS OF BOTH LOWER EXTREMITIES: Primary | ICD-10-CM

## 2024-01-25 DIAGNOSIS — R26.9 ABNORMALITY OF GAIT: ICD-10-CM

## 2024-01-25 NOTE — PROGRESS NOTES
Physical Therapy Daily Treatment Note    Date: 2024  Patient Name: Hilaria Castillo  : 1950   MRN: [id number]  DOInjury: -   DOSx: -  Referring Provider: Oscar Hernadez DO  [address] [place] [address]     Medical Diagnosis:     S63.005A (ICD-10-CM) - Unspecified dislocation of left wrist and hand, initial encounter      Hilaria is well know to me as I have seen her for shoulder, back, knee issues in the past. She has had a very rough  having had multiple joint infection,sepsis, an extended stay at [facility] and SNF. She is scheduled to have L wrist fusion in 1 month and she is very concerned about her ability to walk with the folding walker she used prior to 3/2023     X = TO BE PERFORMED NEXT VISIT  > = PROGRESS TO THIS    S: frustrated with inability to walk  O: Discussed anatomy, physiology, body mechanics, principles of loading, and progressive loading/activity.  Reviewed home exercise program extensively; written copy provided.   Time 4702-7019     Visit 3 Repeat outcome measure at mid point and end.    Pain Pain with activity 3/10     ROM      Modalities         MO   Manual            Stretch      Towel / strap DF, INV, EV   TE      TE   Exercise      Ball / can rolling   TE   Toe curls      Heel slides   TE      TE   marching 3 x 20 superset TA   Calf raises 3 x 15 superset TA   [] TG  [] Leg Press 2-leg   TE   [] TG  [] Leg Press 1-leg   TE   Hamstring Curl Machine   TE   Knee Extension Machine   TE   Step-ups - FWD X 5 on 5\"  TA   Step-ups - LAT   TA   Step-ups - BWD   TA   Calf Raises   TA   gait 1 x 145ft w/ wheeled walker no platform  4 x 30ft  TA      TA               A:  Tolerated well.    P: Continue with rehab plan  William Dean PT    Treatment Charges: Mins Units   Initial Evaluation     Re-Evaluation     Ther Exercise         TE     Manual Therapy     MT     Ther Activities        TA 45 3   Gait Training          GT     Neuro Re-education NR     Modalities     Non-Billable Service

## 2024-01-30 ENCOUNTER — TREATMENT (OUTPATIENT)
Dept: PHYSICAL THERAPY | Age: 74
End: 2024-01-30
Payer: MEDICARE

## 2024-01-30 DIAGNOSIS — R29.898 WEAKNESS OF BOTH LOWER EXTREMITIES: Primary | ICD-10-CM

## 2024-01-30 PROCEDURE — 97530 THERAPEUTIC ACTIVITIES: CPT

## 2024-01-30 NOTE — PROGRESS NOTES
Physical Therapy Daily Treatment Note    Date: 2024  Patient Name: Hilaria Castillo  : 1950   MRN: [id number]  DOInjury: -   DOSx: -  Referring Provider: Oscar Hernadez DO  [address] [place] [address]     Medical Diagnosis:     S63.005A (ICD-10-CM) - Unspecified dislocation of left wrist and hand, initial encounter      Hilaria is well know to me as I have seen her for shoulder, back, knee issues in the past. She has had a very rough  having had multiple joint infection,sepsis, an extended stay at [facility] and SNF. She is scheduled to have L wrist fusion in 1 month and she is very concerned about her ability to walk with the folding walker she used prior to 3/2023     X = TO BE PERFORMED NEXT VISIT  > = PROGRESS TO THIS    S: trying to do more.  O: Discussed anatomy, physiology, body mechanics, principles of loading, and progressive loading/activity.  Reviewed home exercise program extensively; written copy provided.   Time 8668-1307     Visit 4 Repeat outcome measure at mid point and end.    Pain Pain with activity 3/10     ROM      Modalities         MO   Manual            Stretch      Towel / strap DF, INV, EV   TE      TE   Exercise      Ball / can rolling   TE   Toe curls      Heel slides   TE      TE   marching 2  x 20 superset TA   Calf raises 2  x 15 superset TA   [] TG  [] Leg Press 2-leg   TE   [] TG  [] Leg Press 1-leg   TE   Hamstring Curl Machine   TE   Knee Extension Machine   TE   Step-ups - FWD X 10 on 4\" B LE CGA/Min A TA   Step-ups - LAT   TA   Step-ups - BWD   TA   Calf Raises   TA   gait 1 x 145ft w/ wheeled walker  TA      TA               A:  Tolerated well.  Much encouragement needed. Very unsteady with step ups. Only performed 2 sets of marching and CR due to increased fatigue.   P: Continue with rehab plan  Sanjana Prado PTA    Treatment Charges: Mins Units   Initial Evaluation     Re-Evaluation     Ther Exercise         TE     Manual Therapy     MT     Ther Activities

## 2024-01-31 ENCOUNTER — OFFICE VISIT (OUTPATIENT)
Dept: BARIATRICS/WEIGHT MGMT | Age: 74
End: 2024-01-31
Payer: MEDICARE

## 2024-01-31 VITALS
HEART RATE: 56 BPM | HEIGHT: 67 IN | BODY MASS INDEX: 36.73 KG/M2 | TEMPERATURE: 97.7 F | DIASTOLIC BLOOD PRESSURE: 79 MMHG | WEIGHT: 234 LBS | SYSTOLIC BLOOD PRESSURE: 137 MMHG

## 2024-01-31 DIAGNOSIS — I10 ESSENTIAL HYPERTENSION: Primary | ICD-10-CM

## 2024-01-31 DIAGNOSIS — E66.01 CLASS 2 SEVERE OBESITY DUE TO EXCESS CALORIES WITH SERIOUS COMORBIDITY AND BODY MASS INDEX (BMI) OF 36.0 TO 36.9 IN ADULT (HCC): ICD-10-CM

## 2024-01-31 PROCEDURE — 99211 OFF/OP EST MAY X REQ PHY/QHP: CPT

## 2024-01-31 PROCEDURE — 99214 OFFICE O/P EST MOD 30 MIN: CPT | Performed by: INTERNAL MEDICINE

## 2024-01-31 PROCEDURE — 3078F DIAST BP <80 MM HG: CPT | Performed by: INTERNAL MEDICINE

## 2024-01-31 PROCEDURE — 3075F SYST BP GE 130 - 139MM HG: CPT | Performed by: INTERNAL MEDICINE

## 2024-01-31 PROCEDURE — 1123F ACP DISCUSS/DSCN MKR DOCD: CPT | Performed by: INTERNAL MEDICINE

## 2024-01-31 RX ORDER — TIRZEPATIDE 2.5 MG/.5ML
2.5 INJECTION, SOLUTION SUBCUTANEOUS WEEKLY
Qty: 2 ML | Refills: 0 | Status: SHIPPED | OUTPATIENT
Start: 2024-01-31 | End: 2024-02-27

## 2024-01-31 RX ORDER — TIRZEPATIDE 5 MG/.5ML
5 INJECTION, SOLUTION SUBCUTANEOUS WEEKLY
Qty: 2 ML | Refills: 1 | Status: SHIPPED | OUTPATIENT
Start: 2024-02-28

## 2024-01-31 NOTE — PROGRESS NOTES
CC -   Follow up of: HTN, Weight gain    Would like to be <200 lbs.    BACKGROUND -   Last visit: 1/24/23  First visit: 4/14/22    Obesity (all weight in lbs)  Began in childhood  Initial BMI 37.81, Wt 243.2, Ht 67.25\"  HS Grad wt ~200 lbs (down from 300)   Lowest   wt 175 lbs (on weight watchers)  Highest  wt 300 (at 14 years of age)  Pattern of wt gain: gradual  Wt change past yr: similar (235-245)  Most wt lost: 100 lbs (in high school)  Other diets attempted: WW, diet pills (multiple)     Desire to lose weight: 10/10 (does not want surgery)    Initial Diet:    Number of meals per day - 3    Number of snacks per day - 1    Meal volume - 12\" plate,  sometimes seconds    Fast food/convenience store - 3-4x/week (eg a breakfast sandwich)    Restaurants (not fast food) - 1-2x/week   Sweets - 0d/week   Chips - 1d/week   Crackers/pretzels - 1-2d/week   Nuts - 1d/week   Peanut Butter - 0d/week   Popcorn - 1d/week   Dried fruit - 0d/week   Whole fruit - 7d/week (berries, oranges, grapes, apples in season)   Breakfast cereal - 2d/week   Granola/Protein/Energy bar - 0d/week   Sugar sweetened beverages - none   Protein - No supplements   Fiber - No supplements     Initial Exercise:    Gym membership - silver sneakers    Walking - marching around the house with leg lifts etc    Running - no    Resistance - no    Aerobic class - no        Initial Sleep: Bedtime: 11pm-midnight, wake up time: 6:30-7:00 - usu rested, daytime naps: no    Weight scale at home: yes, takes weight: 1x/wk  Food scale: yes  ______________________    [facility] -  Past Medical History:   Diagnosis Date    A-fib ([facility])     Arthritis     Class 2 severe obesity due to excess calories with serious comorbidity and body mass index (BMI) of 37.0 to 37.9 in adult ([facility])     Dry eyes, bilateral     Hypertension     OAB (overactive bladder)     PONV (postoperative nausea and vomiting)    Atrial fibrillation - recently diagnosed, on Eliquis and Metoprolol now.    Past

## 2024-01-31 NOTE — PATIENT INSTRUCTIONS
Rules:  Count every calorie every day (you can use free sheldon such as 'baritastic' or 'nutritionix track')  Limit sweets to one day per month  Limit chips/crackers/pretzels/nuts/popcorn to 150 dioni/day    Requirements:  Make sure protein intake is at least 75 grams per day (do not count protein every day; instead spot check your intake every 2-3 weeks and make sure what you think you are getting is close to accurate; consider using a protein shake if needed; these are in the pharmacy section of the stores, not the grocery section; Premier, Pure Protein and Fairlife are relatively inexpensive and taste good to most patients; other options are Nectar, Boost Max, Ensure Max, BeneProtein and GNC lean (which is lactose-free);   Nectar fruit, Premier Protein Clear, IsoPure Protein Drink, and Protein 2 O are water-based options; Quest (or Cosco, which is cheaper and is ordered on Amazon) and the Chauffeur Prive protein bars can also be used, but have less protein in them ) (<200 Dioni, >25 g protein) - (chicken, fish, turkey, egg white)  (Disclaimer: Dietary supplements rarely have their listed ingredients and the amount of each verified by a third party other. Sometimes they give verification for their claims to be GMO and gluten free and to be organic. However, even such verifications as these may still be untrustworthy.)  Make sure that fiber intake is at least 25 grams per day. Do this in part or whole by taking 12 tablespoons of Fiber one original cereal or [place]'s All Bran Buds cereal, or 4 tablespoons of wheat dextrin powder (Benefiber or generic brand); for both of these, start with 1/8th - 1/4th the target amount and every week add another 1/8th - 1/4th until reaching the target).  Also, fiber gummies containing inulin (such as Nature Mad, Glynn, Benefiber) or Fiber Choice Pre-biotic tablets containing inulin are also an option.1 cup of beans or peas and [employer] Xtreme Wellness High Fiber (7grams in 30

## 2024-02-01 ENCOUNTER — TREATMENT (OUTPATIENT)
Dept: PHYSICAL THERAPY | Age: 74
End: 2024-02-01

## 2024-02-01 DIAGNOSIS — R29.898 WEAKNESS OF BOTH LOWER EXTREMITIES: Primary | ICD-10-CM

## 2024-02-01 NOTE — PROGRESS NOTES
Physical Therapy Daily Treatment Note    Date: 2024  Patient Name: Hilaria Castillo  : 1950   MRN: [id number]  DOInjury: -   DOSx: -  Referring Provider: Oscar Hernadez DO  [address] [place] [address]     Medical Diagnosis:     S63.005A (ICD-10-CM) - Unspecified dislocation of left wrist and hand, initial encounter      Hilaria is well know to me as I have seen her for shoulder, back, knee issues in the past. She has had a very rough  having had multiple joint infection,sepsis, an extended stay at [facility]. She is scheduled to have L wrist fusion in 1 month and she is very concerned about her ability to walk with the folding walker she used prior to 3/2023     X = TO BE PERFORMED NEXT VISIT  > = PROGRESS TO THIS    S:  pt reports still feeling very weak .  O: Discussed anatomy, physiology, body mechanics, principles of loading, and progressive loading/activity.  Reviewed home exercise program extensively; written copy provided.   Time 9357-3993     Visit 5 Repeat outcome measure at mid point and end.    Pain Pain with activity 3/10     ROM      Modalities         MO   Manual            Stretch      Towel / strap DF, INV, EV   TE      TE   Exercise      Ball / can rolling   TE   Toe curls      Heel slides   TE      TE   marching 2  x 20 superset TA   Calf raises 2  x 15 superset TA   [] TG  [] Leg Press 2-leg  Third  TE   [] TG  [] Leg Press 1-leg   TE   Hamstring Curl Machine   TE   Knee Extension Machine   TE   Step-ups - FWD X 10 on 4\" B LE   R up R down / L up L down  CGA/Min A  Second  TA   Step-ups - LAT   TA   Step-ups - BWD   TA   Calf Raises   TA   gait 1 x 145ft w/ wheeled walker First  TA      TA               A:  Tolerated well.  Much encouragement needed.  Performed step ups better this session .. P: Continue with rehab plan  Oskar Lu PTA    Treatment Charges: Mins Units   Initial Evaluation     Re-Evaluation     Ther Exercise         TE 10 1   Manual Therapy     MT

## 2024-02-02 PROBLEM — R26.2 DIFFICULTY WALKING: Status: ACTIVE | Noted: 2024-02-02

## 2024-02-02 PROBLEM — R26.9 ABNORMALITY OF GAIT: Status: ACTIVE | Noted: 2024-02-02

## 2024-02-05 ENCOUNTER — TREATMENT (OUTPATIENT)
Dept: PHYSICAL THERAPY | Age: 74
End: 2024-02-05
Payer: MEDICARE

## 2024-02-05 DIAGNOSIS — R26.9 ABNORMALITY OF GAIT: ICD-10-CM

## 2024-02-05 DIAGNOSIS — R29.898 WEAKNESS OF BOTH LOWER EXTREMITIES: Primary | ICD-10-CM

## 2024-02-05 DIAGNOSIS — R26.2 DIFFICULTY WALKING: ICD-10-CM

## 2024-02-05 PROCEDURE — 97110 THERAPEUTIC EXERCISES: CPT

## 2024-02-05 PROCEDURE — 97530 THERAPEUTIC ACTIVITIES: CPT

## 2024-02-05 NOTE — PROGRESS NOTES
Physical Therapy Daily Treatment Note    Date: 2024  Patient Name: Hilaria Castillo  : 1950   MRN: [id number]  DOInjury: -   DOSx: -  Referring Provider: Oscar Hernadez DO  [address] [place] [address]     Medical Diagnosis:     S63.005A (ICD-10-CM) - Unspecified dislocation of left wrist and hand, initial encounter      Hilaria is well know to me as I have seen her for shoulder, back, knee issues in the past. She has had a very rough  having had multiple joint infection,sepsis, an extended stay at [facility]. She is scheduled to have L wrist fusion in 1 month and she is very concerned about her ability to walk with the folding walker she used prior to 3/2023     X = TO BE PERFORMED NEXT VISIT  > = PROGRESS TO THIS    S:  pt reports still feeling very weak .  O: Discussed anatomy, physiology, body mechanics, principles of loading, and progressive loading/activity.  Reviewed home exercise program extensively; written copy provided.   Time 0526-4589     Visit 6 Repeat outcome measure at mid point and end.    Pain Pain with activity 3/10     ROM      Modalities         MO   Manual            Stretch      Towel / strap DF, INV, EV   TE      TE   Exercise      Ball / can rolling   TE   Toe curls      Heel slides   TE      TE   marching 2  x 20 superset TA   Calf raises 2  x 15 superset TA   [] TG  [] Leg Press 2-leg  Third  TE   [] TG  [] Leg Press 1-leg   TE   Hamstring Curl Machine   TE   Knee Extension Machine   TE   Step-ups - FWD X 5 on 4\" B LE   R up R down / L up L down  CGA/Min A  Second  TA   Step-ups - LAT   TA   Step-ups - BWD   TA   Calf Raises   TA   gait 1 x 145ft w/ wheeled walker First  TA      TA               A:  Tolerated well.  Several attempts required to stand up from chair.  Much encouragement needed.  Performed step ups better this session    P: Continue with rehab plan  Sanjana Prado PTA    Treatment Charges: Mins Units   Initial Evaluation     Re-Evaluation     Ther

## 2024-02-09 ENCOUNTER — TREATMENT (OUTPATIENT)
Dept: PHYSICAL THERAPY | Age: 74
End: 2024-02-09

## 2024-02-09 DIAGNOSIS — R29.898 WEAKNESS OF BOTH LOWER EXTREMITIES: Primary | ICD-10-CM

## 2024-02-09 NOTE — PROGRESS NOTES
Physical Therapy Daily Treatment Note    Date: 2024  Patient Name: Hilaria Castillo  : 1950   MRN: [id number]  DOInjury: -   DOSx: -  Referring Provider: Oscar Hernadez DO  [address] [place] [address]     Medical Diagnosis:     S63.005A (ICD-10-CM) - Unspecified dislocation of left wrist and hand, initial encounter      Hilaria is well know to me as I have seen her for shoulder, back, knee issues in the past. She has had a very rough  having had multiple joint infection,sepsis, an extended stay at [facility]. She is scheduled to have L wrist fusion in 1 month and she is very concerned about her ability to walk with the folding walker she used prior to 3/2023     X = TO BE PERFORMED NEXT VISIT  > = PROGRESS TO THIS    S:  pt reports still feeling very weak.  Don't understand why the legs are so weak.  O: Discussed anatomy, physiology, body mechanics, principles of loading, and progressive loading/activity.  Reviewed home exercise program extensively; written copy provided.   Time 8053-5595     Visit 7 Repeat outcome measure at mid point and end.    Pain Pain with activity 3/10     ROM      Modalities         MO   Manual            Stretch      Towel / strap DF, INV, EV   TE      TE   Exercise      Ball / can rolling   TE   Toe curls      Heel slides   TE      TE   marching  3 x 20 superset TA   Calf raises  3 x 15 superset TA   [] TG  [] Leg Press 2-leg   TE   [] TG  [] Leg Press 1-leg   TE   Hamstring Curl Machine   TE   Knee Extension Machine   TE   Step-ups - FWD X 5 on 4\" B LE   R up R down / L up L down  CGA/Min A   TA   Step-ups - LAT   TA   Step-ups - BWD   TA   Calf Raises   TA   gait 1 x 145ft w/ wheeled walker  TA   nustep L5/ 5 min  TA               A:  Tolerated well.  Took off platform and now using WW.  Several attempts required to stand up from chair.  Much encouragement needed.  Performed step ups better this session    P: Continue with rehab plan  Sanjana Prado,

## 2024-02-12 ENCOUNTER — TREATMENT (OUTPATIENT)
Dept: PHYSICAL THERAPY | Age: 74
End: 2024-02-12
Payer: MEDICARE

## 2024-02-12 DIAGNOSIS — R29.898 WEAKNESS OF BOTH LOWER EXTREMITIES: Primary | ICD-10-CM

## 2024-02-12 DIAGNOSIS — R26.9 ABNORMALITY OF GAIT: ICD-10-CM

## 2024-02-12 PROCEDURE — 97530 THERAPEUTIC ACTIVITIES: CPT

## 2024-02-12 PROCEDURE — 97110 THERAPEUTIC EXERCISES: CPT

## 2024-02-12 NOTE — PROGRESS NOTES
Physical Therapy Daily Treatment Note    Date: 2024  Patient Name: Hilaria Castillo  : 1950   MRN: [id number]  DOInjury: -   DOSx: -  Referring Provider: Oscar Hernadez DO  [address] [place] [address]     Medical Diagnosis:     S63.005A (ICD-10-CM) - Unspecified dislocation of left wrist and hand, initial encounter      Hilaria is well know to me as I have seen her for shoulder, back, knee issues in the past. She has had a very rough  having had multiple joint infection,sepsis, an extended stay at [facility]. She is scheduled to have L wrist fusion in 1 month and she is very concerned about her ability to walk with the folding walker she used prior to 3/2023     X = TO BE PERFORMED NEXT VISIT  > = PROGRESS TO THIS    S:  pt reports still feeling very weak.  Don't understand why the legs are so weak.  O: Discussed anatomy, physiology, body mechanics, principles of loading, and progressive loading/activity.  Reviewed home exercise program extensively; written copy provided.   Time 8822-9347     Visit 7 Repeat outcome measure at mid point and end.    Pain Pain with activity 3/10     ROM      Modalities         MO   Manual            Stretch      Towel / strap DF, INV, EV   TE      TE   Exercise      Ball / can rolling   TE   Toe curls      Heel slides   TE   squats 10x, 9x,10x  TE   marching  3 x 20 superset TA   Calf raises  3 x 15 superset TA   [] TG  [] Leg Press 2-leg   TE   [] TG  [] Leg Press 1-leg   TE   Hamstring Curl Machine   TE   Knee Extension Machine   TE   Step-ups - FWD X 10 on 4\" B LE   R up R down / L up L down  CGA/Min A   TA   Step-ups - LAT   TA   Step-ups - BWD   TA   Calf Raises   TA   gait 1 x 145ft w/ wheeled walker  TA   nustep L5/ 5 min  TA               A:  Tolerated well.  Raised WW up a notch due to pt leaning over. Was able to perform 10 step ups and 2 sets of exs before a rest break was taken.  Several attempts required to stand up from chair.  Much encouragement

## 2024-02-14 ENCOUNTER — TREATMENT (OUTPATIENT)
Dept: PHYSICAL THERAPY | Age: 74
End: 2024-02-14

## 2024-02-14 DIAGNOSIS — R26.2 DIFFICULTY WALKING: ICD-10-CM

## 2024-02-14 DIAGNOSIS — R29.898 WEAKNESS OF BOTH LOWER EXTREMITIES: Primary | ICD-10-CM

## 2024-02-14 DIAGNOSIS — R26.9 ABNORMALITY OF GAIT: ICD-10-CM

## 2024-02-14 NOTE — PROGRESS NOTES
Physical Therapy Daily Treatment Note    Date: 2024  Patient Name: Hilaria Castillo  : 1950   MRN: [id number]  DOInjury: -   DOSx: -  Referring Provider: Oscar Hernadez DO  [address] [place] [address]     Medical Diagnosis:     S63.005A (ICD-10-CM) - Unspecified dislocation of left wrist and hand, initial encounter      Hilaria is well know to me as I have seen her for shoulder, back, knee issues in the past. She has had a very rough  having had multiple joint infection,sepsis, an extended stay at [facility]. She is scheduled to have L wrist fusion in 1 month and she is very concerned about her ability to walk with the folding walker she used prior to 3/2023     X = TO BE PERFORMED NEXT VISIT  > = PROGRESS TO THIS    S:  pt reports still feeling very weak.  Don't understand why the legs are so weak.  O: Discussed anatomy, physiology, body mechanics, principles of loading, and progressive loading/activity.  Reviewed home exercise program extensively; written copy provided.   Time 2206-0473     Visit 8 Repeat outcome measure at mid point and end.    Pain Pain with activity 3/10     ROM      Modalities         MO   Manual            Stretch      Towel / strap DF, INV, EV   TE      TE   Exercise      Ball / can rolling   TE   Toe curls      Heel slides   TE   squats 10x, 10x,10x  TE   marching  3 x 20 superset TA   Calf raises  3 x 15 superset TA   [] TG  [] Leg Press 2-leg   TE   [] TG  [] Leg Press 1-leg   TE   Hamstring Curl Machine   TE   Knee Extension Machine   TE   Step-ups - FWD X 10 on 4\" B LE   R up R down / L up L down  CGA/Min A   TA   Step-ups - LAT   TA   Step-ups - BWD   TA   Calf Raises   TA   gait 1 x 145ft w/ wheeled walker  TA   nustep L5/ 2 min  TA         Leg press 0#~40x     A:  Tolerated well.  Pt agreed to the leg press but assist needed to get her on and off.  Was able to perform 10 step ups and 2 sets of exs before a rest break was taken.  Several attempts required to

## 2024-02-19 ENCOUNTER — TREATMENT (OUTPATIENT)
Dept: PHYSICAL THERAPY | Age: 74
End: 2024-02-19
Payer: MEDICARE

## 2024-02-19 DIAGNOSIS — R29.898 WEAKNESS OF BOTH LOWER EXTREMITIES: Primary | ICD-10-CM

## 2024-02-19 DIAGNOSIS — R26.2 DIFFICULTY WALKING: ICD-10-CM

## 2024-02-19 DIAGNOSIS — R26.9 ABNORMALITY OF GAIT: ICD-10-CM

## 2024-02-19 PROCEDURE — 97110 THERAPEUTIC EXERCISES: CPT

## 2024-02-19 PROCEDURE — 97530 THERAPEUTIC ACTIVITIES: CPT

## 2024-02-19 NOTE — PROGRESS NOTES
Physical Therapy Daily Treatment Note    Date: 2024  Patient Name: Hilaria Castillo  : 1950   MRN: [id number]  DOInjury: -   DOSx: -  Referring Provider: Oscar Hernadez DO  [address] [place] [address]     Medical Diagnosis:     R26.9 (ICD-10-CM) - Unspecified abnormalities of gait and mobility   R29.898 (ICD-10-CM) - Other symptoms and signs involving the musculoskeletal system   R26.2 (ICD-10-CM) - Difficulty in walking, not elsewhere classified         Hilaria is well know to me as I have seen her for shoulder, back, knee issues in the past. She has had a very rough  having had multiple joint infection,sepsis, an extended stay at [facility]. She is scheduled to have L wrist fusion in 1 month and she is very concerned about her ability to walk with the folding walker she used prior to 3/2023     X = TO BE PERFORMED NEXT VISIT  > = PROGRESS TO THIS    S:  pt reports went to a dinner Saturday night.  Did ok but still feels like legs are very weak.  O: Discussed anatomy, physiology, body mechanics, principles of loading, and progressive loading/activity.  Reviewed home exercise program extensively; written copy provided.   Time 3128-2700     Visit 8 Repeat outcome measure at mid point and end.    Pain Pain with activity 3/10     ROM      Modalities         MO   Manual            Stretch      Towel / strap DF, INV, EV   TE      TE   Exercise      Ball / can rolling   TE   Toe curls      Heel slides   TE   squats 10x, 10x,10x  TE   marching  3 x 20 superset TA   Calf raises  3 x 15 superset TA   [] TG  [] Leg Press 2-leg   TE   [] TG  [] Leg Press 1-leg   TE   Hamstring Curl Machine   TE   Knee Extension Machine   TE   Step-ups - FWD X 10 on 4\" B LE   R up R down / L up L down  CGA/Min A   TA   Step-ups - LAT   TA   Step-ups - BWD   TA   Calf Raises   TA   gait 1 x 145ft w/ wheeled walker (Pink walker) TA   nustep L5/ 8 min  TA         Leg press     A:  Tolerated well.   Pt wants to start using

## 2024-02-20 ENCOUNTER — TELEPHONE (OUTPATIENT)
Dept: BARIATRICS/WEIGHT MGMT | Age: 74
End: 2024-02-20

## 2024-02-27 ENCOUNTER — TREATMENT (OUTPATIENT)
Dept: PHYSICAL THERAPY | Age: 74
End: 2024-02-27
Payer: MEDICARE

## 2024-02-27 DIAGNOSIS — R29.898 WEAKNESS OF BOTH LOWER EXTREMITIES: Primary | ICD-10-CM

## 2024-02-27 DIAGNOSIS — R26.2 DIFFICULTY WALKING: ICD-10-CM

## 2024-02-27 DIAGNOSIS — R26.9 ABNORMALITY OF GAIT: ICD-10-CM

## 2024-02-27 PROCEDURE — 97530 THERAPEUTIC ACTIVITIES: CPT

## 2024-02-27 NOTE — PROGRESS NOTES
Physical Therapy Daily Treatment Note    Date: 2024  Patient Name: Hilaria Castillo  : 1950   MRN: [id number]  DOInjury: -   DOSx: -  Referring Provider: Oscar Hernadez DO  [address] [place] [address]     Medical Diagnosis:     R26.9 (ICD-10-CM) - Unspecified abnormalities of gait and mobility   R29.898 (ICD-10-CM) - Other symptoms and signs involving the musculoskeletal system   R26.2 (ICD-10-CM) - Difficulty in walking, not elsewhere classified         Hilaria is well know to me as I have seen her for shoulder, back, knee issues in the past. She has had a very rough  having had multiple joint infection,sepsis, an extended stay at [facility]. She is scheduled to have L wrist fusion in 1 month and she is very concerned about her ability to walk with the folding walker she used prior to 3/2023     X = TO BE PERFORMED NEXT VISIT  > = PROGRESS TO THIS    S:  pt reports gets discouraged at times but does realize getting stronger  O: Discussed anatomy, physiology, body mechanics, principles of loading, and progressive loading/activity.  Reviewed home exercise program extensively; written copy provided.   Time 1249-6892     Visit 9 Repeat outcome measure at mid point and end.    Pain Pain with activity 3/10     ROM      Modalities         MO   Manual            Stretch      Towel / strap DF, INV, EV   TE      TE   Exercise      LAQ 1# 27x ea BLE  TE         Heel slides   TE   squats 10x, 10x,10x  TE   marching superset TA   Calf raises superset TA   [] TG  [] Leg Press 2-leg   TE   [] TG  [] Leg Press 1-leg   TE   Hamstring Curl Machine   TE   Knee Extension Machine   TE   Step-ups - FWD X 10 on 4\" B LE   R up R down / L up L down  CGA/Min A  6\" next TA   Step-ups - LAT   TA   Step-ups - BWD   TA   Calf Raises   TA   gait 1 x 160ft w/ wheeled walker (Pink walker) TA   nustep L5/ 8 min  TA         Leg press     A:  Tolerated well.   Pt wants to start using pink light wt walker but has front wheels

## 2024-03-01 ENCOUNTER — TREATMENT (OUTPATIENT)
Dept: PHYSICAL THERAPY | Age: 74
End: 2024-03-01

## 2024-03-01 DIAGNOSIS — R26.9 ABNORMALITY OF GAIT: ICD-10-CM

## 2024-03-01 DIAGNOSIS — R26.2 DIFFICULTY WALKING: ICD-10-CM

## 2024-03-01 DIAGNOSIS — R29.898 WEAKNESS OF BOTH LOWER EXTREMITIES: Primary | ICD-10-CM

## 2024-03-01 NOTE — PROGRESS NOTES
Physical Therapy Daily Treatment Note    Date: 3/1/2024  Patient Name: Hilaria Castillo  : 1950   MRN: [id number]  DOInjury: -   DOSx: -  Referring Provider: Oscar Hernadez DO  [address] [place] [address]     Medical Diagnosis:     R26.9 (ICD-10-CM) - Unspecified abnormalities of gait and mobility   R29.898 (ICD-10-CM) - Other symptoms and signs involving the musculoskeletal system   R26.2 (ICD-10-CM) - Difficulty in walking, not elsewhere classified         Hilaria is well know to me as I have seen her for shoulder, back, knee issues in the past. She has had a very rough  having had multiple joint infection,sepsis, an extended stay at [facility]. She is scheduled to have L wrist fusion in 1 month and she is very concerned about her ability to walk with the folding walker she used prior to 3/2023     X = TO BE PERFORMED NEXT VISIT  > = PROGRESS TO THIS    S:  pt reports gets discouraged at times but does realize getting stronger  O: Discussed anatomy, physiology, body mechanics, principles of loading, and progressive loading/activity.  Reviewed home exercise program extensively; written copy provided.   Time 3974-9146     Visit 10 Repeat outcome measure at mid point and end.    Pain Pain with activity 3/10     ROM      Modalities         MO   Manual            Stretch      Towel / strap DF, INV, EV   TE      TE   Exercise      LAQ 1# 27x ea BLE  TE         Heel slides   TE   squats 10x, 10x,10x  TE   marching superset TA   Calf raises superset TA   [] TG  [] Leg Press 2-leg   TE   [] TG  [] Leg Press 1-leg   TE   Hamstring Curl Machine   TE   Knee Extension Machine   TE   Step-ups - FWD X 10 on 4\" B LE   R up R down / L up L down  CGA/Min A  6\" next TA   Step-ups - LAT   TA   Step-ups - BWD   TA   Calf Raises   TA   gait 1 x 160ft w/ wheeled walker (Pink walker) TA   nustep L5/ 8 min  TA         Leg press     A:  Tolerated well.   Pt wants to start using pink light wt walker but has front wheels

## 2024-03-04 ENCOUNTER — TREATMENT (OUTPATIENT)
Dept: PHYSICAL THERAPY | Age: 74
End: 2024-03-04
Payer: MEDICARE

## 2024-03-04 DIAGNOSIS — R29.898 WEAKNESS OF BOTH LOWER EXTREMITIES: Primary | ICD-10-CM

## 2024-03-04 DIAGNOSIS — R26.9 ABNORMALITY OF GAIT: ICD-10-CM

## 2024-03-04 DIAGNOSIS — R26.2 DIFFICULTY WALKING: ICD-10-CM

## 2024-03-04 PROCEDURE — 97530 THERAPEUTIC ACTIVITIES: CPT

## 2024-03-04 NOTE — PROGRESS NOTES
Physical Therapy Daily Treatment Note    Date: 3/4/2024  Patient Name: Hilaria Castillo  : 1950   MRN: [id number]  DOInjury: -   DOSx: -  Referring Provider: Oscar Hernadez DO  [address] [place] [address]     Medical Diagnosis:     R26.9 (ICD-10-CM) - Unspecified abnormalities of gait and mobility   R29.898 (ICD-10-CM) - Other symptoms and signs involving the musculoskeletal system   R26.2 (ICD-10-CM) - Difficulty in walking, not elsewhere classified         Hilaria is well know to me as I have seen her for shoulder, back, knee issues in the past. She has had a very rough  having had multiple joint infection,sepsis, an extended stay at [facility]. She is scheduled to have L wrist fusion in 1 month and she is very concerned about her ability to walk with the folding walker she used prior to 3/2023     X = TO BE PERFORMED NEXT VISIT  > = PROGRESS TO THIS    S:  pt reports gets discouraged at times but does realize getting stronger  O: Discussed anatomy, physiology, body mechanics, principles of loading, and progressive loading/activity.  Reviewed home exercise program extensively; written copy provided.   Time 4786-1362     Visit 10 Repeat outcome measure at mid point and end.    Pain Pain with activity 3/10     ROM      Modalities         MO   Manual            Stretch      Towel / strap DF, INV, EV   TE      TE   Exercise      LAQ 2#  2 x 15 ea BLE  TE         Heel slides   TE   squats 10x, 10x,10x  TE   marching superset TA   Calf raises superset TA   [] TG  [] Leg Press 2-leg   TE   [] TG  [] Leg Press 1-leg   TE   Hamstring Curl Machine   TE   Knee Extension Machine   TE   Step-ups - FWD X 12 on 4\" B LE   R up R down / L up L down  CGA6\" next? TA   Step-ups - LAT   TA   Step-ups - BWD   TA   Calf Raises   TA   gait 1 x 100 ft w/ wheeled walker (Pink walker) TA   nustep L5/ 8 min  TA         Leg press     A:  Tolerated well.   Less difficulty with step ups. Pt wants to start using pink light

## 2024-03-06 ENCOUNTER — TREATMENT (OUTPATIENT)
Dept: PHYSICAL THERAPY | Age: 74
End: 2024-03-06
Payer: MEDICARE

## 2024-03-06 DIAGNOSIS — R29.898 WEAKNESS OF BOTH LOWER EXTREMITIES: Primary | ICD-10-CM

## 2024-03-06 DIAGNOSIS — R26.2 DIFFICULTY WALKING: ICD-10-CM

## 2024-03-06 DIAGNOSIS — R26.9 ABNORMALITY OF GAIT: ICD-10-CM

## 2024-03-06 PROCEDURE — 97530 THERAPEUTIC ACTIVITIES: CPT

## 2024-03-06 NOTE — PROGRESS NOTES
Physical Therapy Daily Treatment Note    Date: 3/6/2024  Patient Name: Hilaria Castillo  : 1950   MRN: [id number]  DOInjury: -   DOSx: -  Referring Provider: Oscar Hernadez DO  [address] [place] [address]     Medical Diagnosis:     R26.9 (ICD-10-CM) - Unspecified abnormalities of gait and mobility   R29.898 (ICD-10-CM) - Other symptoms and signs involving the musculoskeletal system   R26.2 (ICD-10-CM) - Difficulty in walking, not elsewhere classified         Hilaria is well know to me as I have seen her for shoulder, back, knee issues in the past. She has had a very rough  having had multiple joint infection,sepsis, an extended stay at [facility]. She is scheduled to have L wrist fusion in 1 month and she is very concerned about her ability to walk with the folding walker she used prior to 3/2023     X = TO BE PERFORMED NEXT VISIT  > = PROGRESS TO THIS    S:  pt reports trying to get stronger working on exs at home.   O: Discussed anatomy, physiology, body mechanics, principles of loading, and progressive loading/activity.  Reviewed home exercise program extensively; written copy provided.   Time 1283-8468     Visit 10 Repeat outcome measure at mid point and end.    Pain Pain with activity 3/10     ROM      Modalities         MO   Manual            Stretch      Towel / strap DF, INV, EV   TE      TE   Exercise      LAQ 2#  2 x 15 ea BLE  TE         Heel slides   TE   squats 10x, 10x,10x  TE   marching superset TA   Calf raises superset TA   [] TG  [] Leg Press 2-leg   TE   [] TG  [] Leg Press 1-leg   TE   Hamstring Curl Machine   TE   Knee Extension Machine   TE   Step-ups - FWD X 12 on 6\" B LE   R up R down / L up L down  CGA6\" next? TA   Step-ups - LAT   TA   Step-ups - BWD   TA   Calf Raises   TA   gait 1 x 150 ft w/ wheeled walker (Pink walker) TA   nustep L5/ 8 min  TA         Leg press     A:  Tolerated well.   Less difficulty with step ups. Pt wants to start using pink light wt walker but

## 2024-03-11 ENCOUNTER — TREATMENT (OUTPATIENT)
Dept: PHYSICAL THERAPY | Age: 74
End: 2024-03-11
Payer: MEDICARE

## 2024-03-11 DIAGNOSIS — R26.9 ABNORMALITY OF GAIT: ICD-10-CM

## 2024-03-11 DIAGNOSIS — R29.898 WEAKNESS OF BOTH LOWER EXTREMITIES: Primary | ICD-10-CM

## 2024-03-11 DIAGNOSIS — R26.2 DIFFICULTY WALKING: ICD-10-CM

## 2024-03-11 PROCEDURE — 97530 THERAPEUTIC ACTIVITIES: CPT

## 2024-03-11 NOTE — PROGRESS NOTES
but has front wheels that turn on the front and does not feel safe.  Worked with pt using it CGA.Was able to perform 10 step ups and 2 sets of exs before a rest break was taken.  Several attempts required to stand up from chair.  Much encouragement needed.  Performed step ups better this session    P: Continue with rehab plan  Sanjana Prado PTA    Treatment Charges: Mins Units   Initial Evaluation     Re-Evaluation     Ther Exercise         TE     Manual Therapy     MT     Ther Activities        TA 30 2   Gait Training          GT     Neuro Re-education NR     Modalities     Non-Billable Service Time 10 0   Other     Total Time/Units 40 2

## 2024-03-13 ENCOUNTER — OFFICE VISIT (OUTPATIENT)
Dept: BARIATRICS/WEIGHT MGMT | Age: 74
End: 2024-03-13
Payer: MEDICARE

## 2024-03-13 VITALS
WEIGHT: 221.4 LBS | TEMPERATURE: 97.9 F | SYSTOLIC BLOOD PRESSURE: 143 MMHG | BODY MASS INDEX: 34.75 KG/M2 | HEART RATE: 66 BPM | HEIGHT: 67 IN | DIASTOLIC BLOOD PRESSURE: 80 MMHG

## 2024-03-13 DIAGNOSIS — E66.01 CLASS 2 SEVERE OBESITY DUE TO EXCESS CALORIES WITH SERIOUS COMORBIDITY AND BODY MASS INDEX (BMI) OF 36.0 TO 36.9 IN ADULT (HCC): ICD-10-CM

## 2024-03-13 DIAGNOSIS — I10 ESSENTIAL HYPERTENSION: Primary | ICD-10-CM

## 2024-03-13 PROCEDURE — 3077F SYST BP >= 140 MM HG: CPT | Performed by: INTERNAL MEDICINE

## 2024-03-13 PROCEDURE — 99211 OFF/OP EST MAY X REQ PHY/QHP: CPT

## 2024-03-13 PROCEDURE — 99214 OFFICE O/P EST MOD 30 MIN: CPT | Performed by: INTERNAL MEDICINE

## 2024-03-13 PROCEDURE — 3079F DIAST BP 80-89 MM HG: CPT | Performed by: INTERNAL MEDICINE

## 2024-03-13 PROCEDURE — 1123F ACP DISCUSS/DSCN MKR DOCD: CPT | Performed by: INTERNAL MEDICINE

## 2024-03-13 RX ORDER — TIRZEPATIDE 7.5 MG/.5ML
7.5 INJECTION, SOLUTION SUBCUTANEOUS WEEKLY
Qty: 2 ML | Refills: 2 | Status: SHIPPED | OUTPATIENT
Start: 2024-03-13

## 2024-03-13 RX ORDER — METOPROLOL SUCCINATE 100 MG/1
100 TABLET, EXTENDED RELEASE ORAL DAILY
COMMUNITY
Start: 2024-01-24

## 2024-03-13 NOTE — PROGRESS NOTES
CC -   Follow up of: HTN, Weight gain    Would like to be <200 lbs.    BACKGROUND -   Last visit: 1/31/24  First visit: 4/14/22    Obesity (all weight in lbs)  Began in childhood  Initial BMI 37.81, Wt 243.2, Ht 67.25\"  HS Grad wt ~200 lbs (down from 300)   Lowest   wt 175 lbs (on weight watchers)  Highest  wt 300 (at 14 years of age)  Pattern of wt gain: gradual  Wt change past yr: similar (235-245)  Most wt lost: 100 lbs (in high school)  Other diets attempted: WW, diet pills (multiple)     Desire to lose weight: 10/10 (does not want surgery)    Initial Diet:    Number of meals per day - 3    Number of snacks per day - 1    Meal volume - 12\" plate,  sometimes seconds    Fast food/convenience store - 3-4x/week (eg a breakfast sandwich)    Restaurants (not fast food) - 1-2x/week   Sweets - 0d/week   Chips - 1d/week   Crackers/pretzels - 1-2d/week   Nuts - 1d/week   Peanut Butter - 0d/week   Popcorn - 1d/week   Dried fruit - 0d/week   Whole fruit - 7d/week (berries, oranges, grapes, apples in season)   Breakfast cereal - 2d/week   Granola/Protein/Energy bar - 0d/week   Sugar sweetened beverages - none   Protein - No supplements   Fiber - No supplements     Initial Exercise:    Gym membership - silver sneakers    Walking - marching around the house with leg lifts etc    Running - no    Resistance - no    Aerobic class - no        Initial Sleep: Bedtime: 11pm-midnight, wake up time: 6:30-7:00 - usu rested, daytime naps: no    Weight scale at home: yes, takes weight: 1x/wk  Food scale: yes  ______________________    Critical access hospital -  Past Medical History:   Diagnosis Date    A-fib ([facility])     Arthritis     Class 2 severe obesity due to excess calories with serious comorbidity and body mass index (BMI) of 37.0 to 37.9 in adult ([facility])     Dry eyes, bilateral     Hypertension     OAB (overactive bladder)     PONV (postoperative nausea and vomiting)    Atrial fibrillation - recently diagnosed, on Eliquis and Metoprolol now.    Past

## 2024-03-13 NOTE — PATIENT INSTRUCTIONS
Rules:  Count every calorie every day  Limit sweets to one day per month  Limit chips/crackers/pretzels/nuts/popcorn to 150 gonzalez/day    Requirements:  Make sure protein intake is at least 75 grams per day  Make sure that fiber intake is at least 25 grams per day. Do this in part or whole by taking 12 tablespoons of Fiber one original cereal or [employer]'s All Bran Buds cereal, or 4 tablespoons of wheat dextrin powder (Benefiber or generic brand); for both of these, start with 1/8th - 1/4th the target amount and every week add another 1/8th - 1/4th until reaching the target).  Also, fiber gummies containing inulin (such as Nature Capo, Glynn, Benefiber) or Fiber Choice Pre-biotic tablets containing inulin are also an option.1 cup of beans or peas and [employer] Xtreme Wellness High Fiber (7grams in 30 calories) Carb Lean tortillas are excellent choicesare excellent choices, as well.  These fiber supplements are for the health of the colon. Their purpose is not to prevent or treat constipation.  Take one multivitamin every day    Targets:  Limit calorie intake to 1250 calories/day  Limit net carbohydrate to 90 grams/day  Chair exercises as able  Avoid eating 2 hours within bedtime.   Limit restaurants (including fast food and food from a convenience store) to one time every two weeks while in town    Tips:  Do not eat outside of the dining room or the kitchen  Do not eat while watching TV, videos, working on the computer or using a smart phone  Do not eat food out of a multi-serving bag or container.    Tirzepatide (Zepbound):  Increase Zepbound to 7.5 mg weekly.    Follow up in about 2 months.

## 2024-03-15 ENCOUNTER — TREATMENT (OUTPATIENT)
Dept: PHYSICAL THERAPY | Age: 74
End: 2024-03-15

## 2024-03-15 DIAGNOSIS — R29.898 WEAKNESS OF BOTH LOWER EXTREMITIES: Primary | ICD-10-CM

## 2024-03-15 DIAGNOSIS — R26.2 DIFFICULTY WALKING: ICD-10-CM

## 2024-03-15 DIAGNOSIS — R26.9 ABNORMALITY OF GAIT: ICD-10-CM

## 2024-03-15 NOTE — PROGRESS NOTES
Physical Therapy Daily Treatment Note    Date: 3/15/2024  Patient Name: Hilaria Castillo  : 1950   MRN: [id number]  DOInjury: -   DOSx: -  Referring Provider: Oscar Hernadez DO  [address] [place] [address]     Medical Diagnosis:     R26.9 (ICD-10-CM) - Unspecified abnormalities of gait and mobility   R29.898 (ICD-10-CM) - Other symptoms and signs involving the musculoskeletal system   R26.2 (ICD-10-CM) - Difficulty in walking, not elsewhere classified         Hilaria is well know to me as I have seen her for shoulder, back, knee issues in the past. She has had a very rough  having had multiple joint infection,sepsis, an extended stay at [facility]. She is scheduled to have L wrist fusion in 1 month and she is very concerned about her ability to walk with the folding walker she used prior to 3/2023     X = TO BE PERFORMED NEXT VISIT  > = PROGRESS TO THIS    S:  pt reports trying to get stronger working on exs at home.   O: Discussed anatomy, physiology, body mechanics, principles of loading, and progressive loading/activity.  Reviewed home exercise program extensively; written copy provided.   Time 2367-0628     Visit 11 Repeat outcome measure at mid point and end.    Pain Pain with activity 3/10     ROM      Modalities         MO   Manual            Stretch      Towel / strap DF, INV, EV   TE      TE   Exercise      LAQ 2#  2 x 15 ea BLE  TE         Heel slides   TE   squats 10x, 10x,10x  TE   marching superset TA   Calf raises superset TA   [] TG  [] Leg Press 2-leg   TE   [] TG  [] Leg Press 1-leg   TE   Hamstring Curl Machine   TE   Knee Extension Machine   TE   Step-ups - FWD X 12 on 5\"    R up R down / L up L down  CGA TA   Step-ups - LAT   TA   Step-ups - BWD   TA   Calf Raises   TA   gait 1 x 150 ft w/ wheeled walker (Pink walker) TA   nustep L6/ 10 min  TA         Leg press     A:  Tolerated well.   Less difficulty with step ups. Pt wants to start using pink light wt walker but has front

## 2024-03-18 ENCOUNTER — TREATMENT (OUTPATIENT)
Dept: PHYSICAL THERAPY | Age: 74
End: 2024-03-18
Payer: MEDICARE

## 2024-03-18 DIAGNOSIS — R29.898 WEAKNESS OF BOTH LOWER EXTREMITIES: Primary | ICD-10-CM

## 2024-03-18 DIAGNOSIS — R26.9 ABNORMALITY OF GAIT: ICD-10-CM

## 2024-03-18 DIAGNOSIS — R26.2 DIFFICULTY WALKING: ICD-10-CM

## 2024-03-18 PROCEDURE — 97530 THERAPEUTIC ACTIVITIES: CPT

## 2024-03-18 NOTE — PROGRESS NOTES
Physical Therapy Daily Treatment Note    Date: 3/18/2024  Patient Name: Hilaria Castillo  : 1950   MRN: [id number]  DOInjury: -   DOSx: -  Referring Provider: Oscar Hernadez DO  [address] [place] [address]     Medical Diagnosis:     R26.9 (ICD-10-CM) - Unspecified abnormalities of gait and mobility   R29.898 (ICD-10-CM) - Other symptoms and signs involving the musculoskeletal system   R26.2 (ICD-10-CM) - Difficulty in walking, not elsewhere classified         Hilaria is well know to me as I have seen her for shoulder, back, knee issues in the past. She has had a very rough  having had multiple joint infection,sepsis, an extended stay at [facility]. She is scheduled to have L wrist fusion in 1 month and she is very concerned about her ability to walk with the folding walker she used prior to 3/2023     X = TO BE PERFORMED NEXT VISIT  > = PROGRESS TO THIS    S:  pt reports was very nervous about going down the ramp due to the snow.   O: Discussed anatomy, physiology, body mechanics, principles of loading, and progressive loading/activity.  Reviewed home exercise program extensively; written copy provided.   Time 0752-4216     Visit 11 Repeat outcome measure at mid point and end.    Pain Pain with activity 3/10     ROM      Modalities         MO   Manual            Stretch      Towel / strap DF, INV, EV   TE      TE   Exercise      LAQ 2#  2 x 15 ea BLE  TE         Heel slides   TE   squats 10x, 10x,10x  TE   marching superset TA   Calf raises superset TA   [] TG  [] Leg Press 2-leg   TE   [] TG  [] Leg Press 1-leg   TE   Hamstring Curl Machine   TE   Knee Extension Machine   TE   Step-ups - FWD X 12 on 5\"    R up R down / L up L down  CGA TA   Step-ups - LAT   TA   Step-ups - BWD   TA   Calf Raises   TA   gait 1 x 150 ft w/ wheeled walker (Pink walker) TA   nustep L6/ 10 min  TA         Leg press     A:  Tolerated well.   Less difficulty with step ups. Pt wants to start using pink light wt walker

## 2024-03-20 ENCOUNTER — TREATMENT (OUTPATIENT)
Dept: PHYSICAL THERAPY | Age: 74
End: 2024-03-20

## 2024-03-20 DIAGNOSIS — R26.2 DIFFICULTY WALKING: ICD-10-CM

## 2024-03-20 DIAGNOSIS — R26.9 ABNORMALITY OF GAIT: ICD-10-CM

## 2024-03-20 DIAGNOSIS — R29.898 WEAKNESS OF BOTH LOWER EXTREMITIES: Primary | ICD-10-CM

## 2024-03-20 NOTE — PROGRESS NOTES
needed.  Added hip abd again limited range and very anxious about performing.  Limited range for Hip ABD.Pt wants to start using pink light wt walker but has front wheels that turn on the front and does not feel safe.  Worked with pt using it CGA.  Several attempts required to stand up from chair.  Much encouragement needed.  Performed step ups better this session    P: Continue with rehab plan  Sanjana Prado, KEISHA    Treatment Charges: Mins Units   Initial Evaluation     Re-Evaluation     Ther Exercise         TE     Manual Therapy     MT     Ther Activities        TA 40 3   Gait Training          GT     Neuro Re-education NR     Modalities     Non-Billable Service Time     Other     Total Time/Units 40 3

## 2024-03-26 ENCOUNTER — TREATMENT (OUTPATIENT)
Dept: PHYSICAL THERAPY | Age: 74
End: 2024-03-26
Payer: MEDICARE

## 2024-03-26 DIAGNOSIS — R26.9 ABNORMALITY OF GAIT: ICD-10-CM

## 2024-03-26 DIAGNOSIS — R26.2 DIFFICULTY WALKING: ICD-10-CM

## 2024-03-26 DIAGNOSIS — R29.898 WEAKNESS OF BOTH LOWER EXTREMITIES: Primary | ICD-10-CM

## 2024-03-26 PROCEDURE — 97530 THERAPEUTIC ACTIVITIES: CPT

## 2024-03-26 NOTE — PROGRESS NOTES
Physical Therapy Daily Treatment Note    Date: 3/26/2024  Patient Name: Hilaria Castillo  : 1950   MRN: [id number]  DOInjury: -   DOSx: -  Referring Provider: Oscar Hernadez DO  [address] [place] [address]     Medical Diagnosis:     R26.9 (ICD-10-CM) - Unspecified abnormalities of gait and mobility   R29.898 (ICD-10-CM) - Other symptoms and signs involving the musculoskeletal system   R26.2 (ICD-10-CM) - Difficulty in walking, not elsewhere classified         Hilaria is well know to me as I have seen her for shoulder, back, knee issues in the past. She has had a very rough  having had multiple joint infection,sepsis, an extended stay at [facility]. She is scheduled to have L wrist fusion in 1 month and she is very concerned about her ability to walk with the folding walker she used prior to 3/2023     X = TO BE PERFORMED NEXT VISIT  > = PROGRESS TO THIS    S:  pt reports feeling ok.   O: Discussed anatomy, physiology, body mechanics, principles of loading, and progressive loading/activity.  Reviewed home exercise program extensively; written copy provided.   Time 7547-4382     Visit 13 Repeat outcome measure at mid point and end.    Pain Pain with activity 3/10     ROM      Modalities         MO   Manual            Stretch      Towel / strap DF, INV, EV   TE      TE   Exercise      LAQ 2#  2 x 15 ea BLE  TE         Heel slides   TE   squats 3 x 12  TE   marching superset TA   Calf raises superset TA   [] TG  [] Leg Press 2-leg   TE   [] TG  [] Leg Press 1-leg   TE   Hamstring Curl Machine   TE   Knee Extension Machine   TE   Step-ups - FWD 8x, 6\"    R up R down / L up L down  CGA TA   Step-ups - LAT   TA   Step-ups - BWD   TA   Calf Raises   TA   gait 1 x 150 ft w/ wheeled walker (Pink walker) TA   nustep L5/ 10 min  TA         Leg press     A:  Tolerated well.   Pt performed 1 6\" step up.  Very nervous and anxious.  Encouragement needed.  Added hip abd again limited range and very anxious about

## 2024-03-27 ENCOUNTER — TREATMENT (OUTPATIENT)
Dept: PHYSICAL THERAPY | Age: 74
End: 2024-03-27
Payer: MEDICARE

## 2024-03-27 DIAGNOSIS — R29.898 WEAKNESS OF BOTH LOWER EXTREMITIES: Primary | ICD-10-CM

## 2024-03-27 DIAGNOSIS — R26.9 ABNORMALITY OF GAIT: ICD-10-CM

## 2024-03-27 DIAGNOSIS — R26.2 DIFFICULTY WALKING: ICD-10-CM

## 2024-03-27 PROCEDURE — 97530 THERAPEUTIC ACTIVITIES: CPT

## 2024-03-27 NOTE — PROGRESS NOTES
Physical Therapy Daily Treatment Note    Date: 3/27/2024  Patient Name: Hilaria Castillo  : 1950   MRN: [id number]  DOInjury: -   DOSx: -  Referring Provider: Oscar Hernadez DO  [address] [place] [address]     Medical Diagnosis:     R26.9 (ICD-10-CM) - Unspecified abnormalities of gait and mobility   R29.898 (ICD-10-CM) - Other symptoms and signs involving the musculoskeletal system   R26.2 (ICD-10-CM) - Difficulty in walking, not elsewhere classified         Hilaria is well know to me as I have seen her for shoulder, back, knee issues in the past. She has had a very rough  having had multiple joint infection,sepsis, an extended stay at [facility]. She is scheduled to have L wrist fusion in 1 month and she is very concerned about her ability to walk with the folding walker she used prior to 3/2023     X = TO BE PERFORMED NEXT VISIT  > = PROGRESS TO THIS    S:  pt reports feeling ok.   O: Discussed anatomy, physiology, body mechanics, principles of loading, and progressive loading/activity.  Reviewed home exercise program extensively; written copy provided.   Time 3979-8729     Visit 14 Repeat outcome measure at mid point and end.    Pain Pain with activity 3/10     ROM      Modalities         MO   Manual            Stretch      Towel / strap DF, INV, EV   TE      TE   Exercise      LAQ 2#  2 x 15 ea BLE  TE         Heel slides   TE   squats 3 x 12  TE   marching superset TA   Calf raises superset TA   Hip abd 10x difficult    [] TG  [] Leg Press 2-leg   TE   [] TG  [] Leg Press 1-leg   TE   Hamstring Curl Machine   TE   Knee Extension Machine   TE   Step-ups - FWD 10x, 6\"    R up R down / L up L down  CGA TA   Step-ups - LAT   TA   Step-ups - BWD   TA   Calf Raises   TA   gait 1 x 150 ft w/ wheeled walker (Pink walker) TA   nustep L5/ 10 min  TA         Leg press     A:  Tolerated well.   Pt performed 1 6\" step up.  Very nervous and anxious.  Encouragement needed.  Added hip abd again limited

## 2024-04-01 ENCOUNTER — TREATMENT (OUTPATIENT)
Dept: PHYSICAL THERAPY | Age: 74
End: 2024-04-01
Payer: MEDICARE

## 2024-04-01 DIAGNOSIS — R26.2 DIFFICULTY WALKING: ICD-10-CM

## 2024-04-01 DIAGNOSIS — R26.9 ABNORMALITY OF GAIT: ICD-10-CM

## 2024-04-01 DIAGNOSIS — R29.898 WEAKNESS OF BOTH LOWER EXTREMITIES: Primary | ICD-10-CM

## 2024-04-01 PROCEDURE — 97530 THERAPEUTIC ACTIVITIES: CPT

## 2024-04-01 NOTE — PROGRESS NOTES
Physical Therapy Daily Treatment Note    Date: 2024  Patient Name: Hilaria Castillo  : 1950   MRN: [id number]  DOInjury: -   DOSx: -  Referring Provider: Oscar Hernadez DO  [address] [place] [address]     Medical Diagnosis:     R26.9 (ICD-10-CM) - Unspecified abnormalities of gait and mobility   R29.898 (ICD-10-CM) - Other symptoms and signs involving the musculoskeletal system   R26.2 (ICD-10-CM) - Difficulty in walking, not elsewhere classified         Hilaria is well know to me as I have seen her for shoulder, back, knee issues in the past. She has had a very rough  having had multiple joint infection,sepsis, an extended stay at [facility]. She is scheduled to have L wrist fusion in 1 month and she is very concerned about her ability to walk with the folding walker she used prior to 3/2023     X = TO BE PERFORMED NEXT VISIT  > = PROGRESS TO THIS    S:  pt reports feeling ok.   O: Discussed anatomy, physiology, body mechanics, principles of loading, and progressive loading/activity.  Reviewed home exercise program extensively; written copy provided.   Time 6561-6262     Visit 15 Repeat outcome measure at mid point and end.    Pain Pain with activity 3/10     ROM      Modalities         MO   Manual            Stretch      Towel / strap DF, INV, EV   TE      TE   Exercise      LAQ 2#  2 x 15 ea BLE  TE         Heel slides   TE   squats 3 x 12  TE   marching superset TA   Calf raises superset TA   Hip abd 5x difficult    [] TG  [] Leg Press 2-leg   TE   [] TG  [] Leg Press 1-leg   TE   Hamstring Curl Machine   TE   Knee Extension Machine   TE   Step-ups - FWD 10x, 6\"    R up R down / L up L down  CGAHolding onto leg press machine   TA   Step-ups - LAT   TA   Step-ups - BWD   TA   Calf Raises   TA   gait 1 x 150 ft w/ wheeled walker (Pink walker) TA   nustep L5/ 10 min  TA         Leg press     A:  Tolerated well.     Very nervous and anxious.  Encouragement needed.  Added hip abd again

## 2024-04-03 ENCOUNTER — TREATMENT (OUTPATIENT)
Dept: PHYSICAL THERAPY | Age: 74
End: 2024-04-03
Payer: MEDICARE

## 2024-04-03 DIAGNOSIS — R26.9 ABNORMALITY OF GAIT: ICD-10-CM

## 2024-04-03 DIAGNOSIS — R26.2 DIFFICULTY WALKING: ICD-10-CM

## 2024-04-03 DIAGNOSIS — R29.898 WEAKNESS OF BOTH LOWER EXTREMITIES: Primary | ICD-10-CM

## 2024-04-03 PROCEDURE — 97530 THERAPEUTIC ACTIVITIES: CPT

## 2024-04-03 NOTE — PROGRESS NOTES
Physical Therapy Daily Treatment Note    Date: 4/3/2024  Patient Name: Hilaria Castillo  : 1950   MRN: [id number]  DOInjury: -   DOSx: -  Referring Provider: Oscar Hernadez DO  [address] [place] [address]     Medical Diagnosis:     R26.9 (ICD-10-CM) - Unspecified abnormalities of gait and mobility   R29.898 (ICD-10-CM) - Other symptoms and signs involving the musculoskeletal system   R26.2 (ICD-10-CM) - Difficulty in walking, not elsewhere classified         Hilaria is well know to me as I have seen her for shoulder, back, knee issues in the past. She has had a very rough  having had multiple joint infection,sepsis, an extended stay at [facility]. She is scheduled to have L wrist fusion in 1 month and she is very concerned about her ability to walk with the folding walker she used prior to 3/2023     X = TO BE PERFORMED NEXT VISIT  > = PROGRESS TO THIS    S:  pt reports went out to each using the pink walker. Did well.  Can go up the ramp with pink walker but cannot come down.  Uses the heavier one.   O: Discussed anatomy, physiology, body mechanics, principles of loading, and progressive loading/activity.  Reviewed home exercise program extensively; written copy provided.   Time 0564-8500     Visit 15 Repeat outcome measure at mid point and end.    Pain Pain with activity 3/10     ROM      Modalities         MO   Manual            Stretch      Towel / strap DF, INV, EV   TE      TE   Exercise      LAQ 2#  2 x 15 ea BLE  TE         Heel slides   TE   squats 3 x 12  TE   marching superset TA   Calf raises superset TA   Hip abd 2 x 5  difficult    [] TG  [] Leg Press 2-leg   TE   [] TG  [] Leg Press 1-leg   TE   Hamstring Curl Machine   TE   Knee Extension Machine   TE   Step-ups - FWD 12x, 6\"    R up R down / L up L down  CGA/Min A  In // bar   TA   Step-ups - LAT   TA   Step-ups - BWD   TA   Calf Raises   TA   gait 1 x 150 ft w/ wheeled walker (Pink walker) TA   nustep L5/ 10 min  TA         Leg

## 2024-04-09 ENCOUNTER — TREATMENT (OUTPATIENT)
Dept: PHYSICAL THERAPY | Age: 74
End: 2024-04-09
Payer: MEDICARE

## 2024-04-09 DIAGNOSIS — R29.898 WEAKNESS OF BOTH LOWER EXTREMITIES: Primary | ICD-10-CM

## 2024-04-09 DIAGNOSIS — R26.9 ABNORMALITY OF GAIT: ICD-10-CM

## 2024-04-09 DIAGNOSIS — R26.2 DIFFICULTY WALKING: ICD-10-CM

## 2024-04-09 PROCEDURE — 97530 THERAPEUTIC ACTIVITIES: CPT

## 2024-04-09 NOTE — PROGRESS NOTES
Calf Raises   TA   gait 1 x 150 ft w/ wheeled walker (Pink walker) TA   nustep L5/ 10 min  TA         Leg press     A:  Tolerated well. Limited all treatment due to increased pain in lower left side of back.  Instructed pt to use ice pack and put a non slid grib under portable step ups.  .Pt wants to start using pink light wt walker but has front wheels that turn on the front and does not feel safe.  Worked with pt using it CGA.  Several attempts required to stand up from chair.  Much encouragement needed.     P: Continue with rehab plan  Sanjana Prado PTA    Treatment Charges: Mins Units   Initial Evaluation     Re-Evaluation     Ther Exercise         TE     Manual Therapy     MT     Ther Activities        TA 40 3   Gait Training          GT     Neuro Re-education NR     Modalities     Non-Billable Service Time     Other     Total Time/Units 40 3

## 2024-04-15 ENCOUNTER — TREATMENT (OUTPATIENT)
Dept: PHYSICAL THERAPY | Age: 74
End: 2024-04-15
Payer: MEDICARE

## 2024-04-15 DIAGNOSIS — R26.9 ABNORMALITY OF GAIT: ICD-10-CM

## 2024-04-15 DIAGNOSIS — R29.898 WEAKNESS OF BOTH LOWER EXTREMITIES: Primary | ICD-10-CM

## 2024-04-15 DIAGNOSIS — R26.2 DIFFICULTY WALKING: ICD-10-CM

## 2024-04-15 PROCEDURE — 97530 THERAPEUTIC ACTIVITIES: CPT

## 2024-04-15 NOTE — PROGRESS NOTES
Physical Therapy Daily Treatment Note    Date: 4/15/2024  Patient Name: Hilaria Castillo  : 1950   MRN: [id number]  DOInjury: -   DOSx: -  Referring Provider: Oscar Hernadez DO  [address] [place] [address]     Medical Diagnosis:     R26.9 (ICD-10-CM) - Unspecified abnormalities of gait and mobility   R29.898 (ICD-10-CM) - Other symptoms and signs involving the musculoskeletal system   R26.2 (ICD-10-CM) - Difficulty in walking, not elsewhere classified         Hilaria is well know to me as I have seen her for shoulder, back, knee issues in the past. She has had a very rough  having had multiple joint infection,sepsis, an extended stay at [facility]. She is scheduled to have L wrist fusion in 1 month and she is very concerned about her ability to walk with the folding walker she used prior to 3/2023     X = TO BE PERFORMED NEXT VISIT  > = PROGRESS TO THIS    S:  pt reports tired today  O: entered  clinic with heavy duty WWDiscussed anatomy, physiology, body mechanics, principles of loading, and progressive loading/activity.  Reviewed home exercise program extensively; written copy provided.   Time 3082-7983     Visit 17 Repeat outcome measure at mid point and end.    Pain Pain with activity 3/10     ROM      Modalities         MO   Manual            Stretch      Towel / strap DF, INV, EV   TE      TE   Exercise      LAQ 2#  2 x 15 ea BLE  TE         Heel slides   TE   squats     10x  TE   marching  10x superset TA   Calf raises   10x superset TA   Hip abd difficult    [] TG  [] Leg Press 2-leg   TE   [] TG  [] Leg Press 1-leg   TE   Hamstring Curl Machine   TE   Knee Extension Machine   TE   Step-ups - FWD 10x, 4\"       4X6\"       R up R down / L up L down  CGA/Min A  In // bar   TA   Step-ups - LAT   TA   Step-ups - BWD   TA   Calf Raises   TA   gait 1 x 150 ft w/ wheeled walker (Pink walker) TA   nustep L5/ 10 min  TA         Leg press     A:  Tolerated well. Limited all treatment due to

## 2024-04-17 ENCOUNTER — TREATMENT (OUTPATIENT)
Dept: PHYSICAL THERAPY | Age: 74
End: 2024-04-17
Payer: MEDICARE

## 2024-04-17 DIAGNOSIS — R29.898 WEAKNESS OF BOTH LOWER EXTREMITIES: Primary | ICD-10-CM

## 2024-04-17 DIAGNOSIS — R26.9 ABNORMALITY OF GAIT: ICD-10-CM

## 2024-04-17 DIAGNOSIS — R26.2 DIFFICULTY WALKING: ICD-10-CM

## 2024-04-17 PROCEDURE — 97530 THERAPEUTIC ACTIVITIES: CPT

## 2024-04-17 NOTE — PROGRESS NOTES
Physical Therapy Daily Treatment Note    Date: 2024  Patient Name: Hilaria Castillo  : 1950   MRN: [id number]  DOInjury: -   DOSx: -  Referring Provider: Oscar Hernadez DO  [address] [place] [address]     Medical Diagnosis:     R26.9 (ICD-10-CM) - Unspecified abnormalities of gait and mobility   R29.898 (ICD-10-CM) - Other symptoms and signs involving the musculoskeletal system   R26.2 (ICD-10-CM) - Difficulty in walking, not elsewhere classified         Hilaria is well know to me as I have seen her for shoulder, back, knee issues in the past. She has had a very rough  having had multiple joint infection,sepsis, an extended stay at [facility]. She is scheduled to have L wrist fusion in 1 month and she is very concerned about her ability to walk with the folding walker she used prior to 3/2023     X = TO BE PERFORMED NEXT VISIT  > = PROGRESS TO THIS    S:  pt reports tired today  O: entered  clinic with heavy duty WWDiscussed anatomy, physiology, body mechanics, principles of loading, and progressive loading/activity.  Reviewed home exercise program extensively; written copy provided.   Time 5827-0435     Visit 18 Repeat outcome measure at mid point and end.    Pain Pain with activity 3/10     ROM      Modalities         MO   Manual            Stretch      Towel / strap DF, INV, EV   TE      TE   Exercise      LAQ 2#  2 x 15 ea BLE  TE         Heel slides   TE   squats 12x  TE   marching  3 x 20  10x superset TA   Calf raises  2 x 15 superset TA   Hip abd difficult    [] TG  [] Leg Press 2-leg   TE   [] TG  [] Leg Press 1-leg   TE   Hamstring Curl Machine   TE   Knee Extension Machine   TE   Step-ups - FWD 10x, 5\"       1 X6\"       R up R down / L up L down  CGA/Min A  In // bar   TA   Step-ups - LAT   TA   Step-ups - BWD   TA   Calf Raises   TA   gait 1 x 150 ft w/ wheeled walker (Pink walker) TA   nustep L5/ 10 min  TA         Leg press     A:  Tolerated well. Limited all treatment due to

## 2024-04-23 ENCOUNTER — TREATMENT (OUTPATIENT)
Dept: PHYSICAL THERAPY | Age: 74
End: 2024-04-23
Payer: MEDICARE

## 2024-04-23 DIAGNOSIS — R26.9 ABNORMALITY OF GAIT: ICD-10-CM

## 2024-04-23 DIAGNOSIS — R29.898 WEAKNESS OF BOTH LOWER EXTREMITIES: Primary | ICD-10-CM

## 2024-04-23 DIAGNOSIS — R26.2 DIFFICULTY WALKING: ICD-10-CM

## 2024-04-23 PROCEDURE — 97530 THERAPEUTIC ACTIVITIES: CPT

## 2024-04-23 NOTE — PROGRESS NOTES
Physical Therapy Daily Treatment Note    Date: 2024  Patient Name: Hilaria Castillo  : 1950   MRN: [id number]  DOInjury: -   DOSx: -  Referring Provider: Oscar Hernadez DO  [address] [place] [address]     Medical Diagnosis:     R26.9 (ICD-10-CM) - Unspecified abnormalities of gait and mobility   R29.898 (ICD-10-CM) - Other symptoms and signs involving the musculoskeletal system   R26.2 (ICD-10-CM) - Difficulty in walking, not elsewhere classified         Hilaria is well know to me as I have seen her for shoulder, back, knee issues in the past. She has had a very rough  having had multiple joint infection,sepsis, an extended stay at [facility]. She is scheduled to have L wrist fusion in 1 month and she is very concerned about her ability to walk with the folding walker she used prior to 3/2023     X = TO BE PERFORMED NEXT VISIT  > = PROGRESS TO THIS    S:  pt reports tired today and trying not to be depressed. Getting new lower steps in the garage now will be able to get in the car without being expressed to bad weather.    O: entered  clinic with pink WW  Discussed anatomy, physiology, body mechanics, principles of loading, and progressive loading/activity.  Reviewed home exercise program extensively; written copy provided.   Time 0358-8906     Visit 18 Repeat outcome measure at mid point and end.    Pain Pain with activity 3/10     ROM      Modalities         MO   Manual            Stretch      Towel / strap DF, INV, EV   TE      TE   Exercise      LAQ 2#  2 x 15 ea BLE  TE         Heel slides   TE   squats 12x  TE   marching  3 x 20  10x superset TA   Calf raises  2 x 15 superset TA   Hip abd difficult    [] TG  [] Leg Press 2-leg   TE   [] TG  [] Leg Press 1-leg   TE   Hamstring Curl Machine   TE   Knee Extension Machine   TE   Step-ups - FWD       10 X6\"         CGA/Min A  In // bar   TA   Step-ups - LAT   TA   Sit to stand 3x Using 1 hand TA   Calf Raises   TA   gait 1 x 150 ft w/

## 2024-04-25 ENCOUNTER — TREATMENT (OUTPATIENT)
Dept: PHYSICAL THERAPY | Age: 74
End: 2024-04-25

## 2024-04-25 DIAGNOSIS — R26.9 ABNORMALITY OF GAIT: ICD-10-CM

## 2024-04-25 DIAGNOSIS — R26.2 DIFFICULTY WALKING: ICD-10-CM

## 2024-04-25 DIAGNOSIS — R29.898 WEAKNESS OF BOTH LOWER EXTREMITIES: Primary | ICD-10-CM

## 2024-04-25 NOTE — PROGRESS NOTES
Physical Therapy Daily Treatment Note    Date: 2024  Patient Name: Hilaria Castillo  : 1950   MRN: [id number]  DOInjury: -   DOSx: -  Referring Provider: Oscar Hernadez DO  [address] [place] [address]     Medical Diagnosis:     R26.9 (ICD-10-CM) - Unspecified abnormalities of gait and mobility   R29.898 (ICD-10-CM) - Other symptoms and signs involving the musculoskeletal system   R26.2 (ICD-10-CM) - Difficulty in walking, not elsewhere classified         Hilaria is well know to me as I have seen her for shoulder, back, knee issues in the past. She has had a very rough  having had multiple joint infection,sepsis, an extended stay at [facility]. She is scheduled to have L wrist fusion in 1 month and she is very concerned about her ability to walk with the folding walker she used prior to 3/2023     X = TO BE PERFORMED NEXT VISIT  > = PROGRESS TO THIS    S:  pt reports feeling pretty good today. Getting new lower steps in the garage now will be able to get in the car without being expressed to bad weather.    O: entered  clinic with pink WW  Discussed anatomy, physiology, body mechanics, principles of loading, and progressive loading/activity.  Reviewed home exercise program extensively; written copy provided.   Time 9632-1757     Visit 19 Repeat outcome measure at mid point and end.    Pain Pain with activity 3/10     ROM      Modalities         MO   Manual            Stretch      Towel / strap DF, INV, EV   TE      TE   Exercise      LAQ 2#  2 x 15 ea BLE  TE         Heel slides   TE   squats 12x  TE   marching  3 x 20  10x superset TA   Calf raises  2 x 15 superset TA   Hip abd difficult    [] TG  [] Leg Press 2-leg   TE   [] TG  [] Leg Press 1-leg   TE   Hamstring Curl Machine   TE   Knee Extension Machine   TE   Step-ups - FWD       6X6\"         CGA/Min A  In // bar   TA   Step-ups - LAT   TA   Sit to stand 3x Using 1 hand TA   Calf Raises   TA   gait 1 x 150 ft w/ wheeled walker (Pink

## 2024-04-29 ENCOUNTER — TREATMENT (OUTPATIENT)
Dept: PHYSICAL THERAPY | Age: 74
End: 2024-04-29
Payer: MEDICARE

## 2024-04-29 DIAGNOSIS — R29.898 WEAKNESS OF BOTH LOWER EXTREMITIES: Primary | ICD-10-CM

## 2024-04-29 DIAGNOSIS — M19.90 OSTEOARTHRITIS, UNSPECIFIED OSTEOARTHRITIS TYPE, UNSPECIFIED SITE: ICD-10-CM

## 2024-04-29 DIAGNOSIS — R26.2 DIFFICULTY WALKING: ICD-10-CM

## 2024-04-29 DIAGNOSIS — R26.9 ABNORMALITY OF GAIT: ICD-10-CM

## 2024-04-29 PROCEDURE — 97530 THERAPEUTIC ACTIVITIES: CPT

## 2024-04-29 NOTE — PROGRESS NOTES
wheeled walker (Pink walker) TA   nustep L5/ 10 min  TA         Step over \"thresh\" strip 10x in // bars     A:  Tolerated well.  Instructed to  to returned to doing them.  .Pt wants to start using pink light wt walker but has front wheels that turn on the front and does not feel safe.  Worked with pt using it CGA.  Several attempts required to stand up from chair.  Much encouragement needed.     P: Continue with rehab plan  Sanjana Prado PTA    Treatment Charges: Mins Units   Initial Evaluation     Re-Evaluation     Ther Exercise         TE     Manual Therapy     MT     Ther Activities        TA 30 2   Gait Training          GT     Neuro Re-education NR     Modalities     Non-Billable Service Time 10 0   Other     Total Time/Units 40 2

## 2024-05-02 ENCOUNTER — TREATMENT (OUTPATIENT)
Dept: PHYSICAL THERAPY | Age: 74
End: 2024-05-02
Payer: MEDICARE

## 2024-05-02 DIAGNOSIS — R26.9 ABNORMALITY OF GAIT: ICD-10-CM

## 2024-05-02 DIAGNOSIS — R29.898 WEAKNESS OF BOTH LOWER EXTREMITIES: Primary | ICD-10-CM

## 2024-05-02 DIAGNOSIS — R26.2 DIFFICULTY WALKING: ICD-10-CM

## 2024-05-02 PROCEDURE — 97530 THERAPEUTIC ACTIVITIES: CPT

## 2024-05-02 NOTE — PROGRESS NOTES
Physical Therapy Daily Treatment Note    Date: 2024  Patient Name: Hilaria Castillo  : 1950   MRN: [id number]  DOInjury: -   DOSx: -  Referring Provider: Oscar Hernadez DO  [address] [place] [address]     Medical Diagnosis:     R26.9 (ICD-10-CM) - Unspecified abnormalities of gait and mobility   R29.898 (ICD-10-CM) - Other symptoms and signs involving the musculoskeletal system   R26.2 (ICD-10-CM) - Difficulty in walking, not elsewhere classified         Hilaria is well know to me as I have seen her for shoulder, back, knee issues in the past. She has had a very rough  having had multiple joint infection,sepsis, an extended stay at [facility]. She is scheduled to have L wrist fusion in 1 month and she is very concerned about her ability to walk with the folding walker she used prior to 3/2023     X = TO BE PERFORMED NEXT VISIT  > = PROGRESS TO THIS    S:  pt reports feeling pretty good today. Getting new lower steps in the garage now will be able to get in the car without being expressed to bad weather.    O: entered  clinic with pink WW  Discussed anatomy, physiology, body mechanics, principles of loading, and progressive loading/activity.  Reviewed home exercise program extensively; written copy provided.   Time 0740-8920     Visit 21 Repeat outcome measure at mid point and end.    Pain Pain with activity 3/10     ROM      Modalities         MO   Manual            Stretch      Towel / strap DF, INV, EV   TE      TE   Exercise      LAQ 2#  2 x 15 ea BLE  TE         Heel slides   TE   squats 12x  TE   marching  3 x 20  10x superset TA   Calf raises  2 x 15 superset TA   Hip abd difficult    [] TG  [x] Leg Press 2-leg 25# 20X, 22X  TE   [] TG  [] Leg Press 1-leg   TE   Hamstring Curl Machine   TE   Knee Extension Machine   TE   Step-ups - FWD      10    X6\"         CGA/Min A  In // bar   TA   Step-ups - LAT   TA   Sit to stand 3x Using 1 hand TA   Calf Raises   TA   gait 1 x 150 ft w/ wheeled  Dupixent Pregnancy And Lactation Text: This medication likely crosses the placenta but the risk for the fetus is uncertain. This medication is excreted in breast milk.

## 2024-05-06 ENCOUNTER — TREATMENT (OUTPATIENT)
Dept: PHYSICAL THERAPY | Age: 74
End: 2024-05-06
Payer: MEDICARE

## 2024-05-06 DIAGNOSIS — R29.898 WEAKNESS OF BOTH LOWER EXTREMITIES: Primary | ICD-10-CM

## 2024-05-06 DIAGNOSIS — R26.2 DIFFICULTY WALKING: ICD-10-CM

## 2024-05-06 DIAGNOSIS — R26.9 ABNORMALITY OF GAIT: ICD-10-CM

## 2024-05-06 PROCEDURE — 97530 THERAPEUTIC ACTIVITIES: CPT

## 2024-05-06 NOTE — PROGRESS NOTES
Physical Therapy Daily Treatment Note    Date: 2024  Patient Name: Hilaria Castillo  : 1950   MRN: [id number]  DOInjury: -   DOSx: -  Referring Provider: Oscar Hernadez DO  [address] [place] [address]     Medical Diagnosis:     R26.9 (ICD-10-CM) - Unspecified abnormalities of gait and mobility   R29.898 (ICD-10-CM) - Other symptoms and signs involving the musculoskeletal system   R26.2 (ICD-10-CM) - Difficulty in walking, not elsewhere classified         Hilaria is well know to me as I have seen her for shoulder, back, knee issues in the past. She has had a very rough  having had multiple joint infection,sepsis, an extended stay at [facility]. She is scheduled to have L wrist fusion in 1 month and she is very concerned about her ability to walk with the folding walker she used prior to 3/2023     X = TO BE PERFORMED NEXT VISIT  > = PROGRESS TO THIS    S:  pt reports feeling pretty good today. Getting new lower steps in the garage now will be able to get in the car without being expressed to bad weather.    O: entered  clinic with pink WW  Discussed anatomy, physiology, body mechanics, principles of loading, and progressive loading/activity.  Reviewed home exercise program extensively; written copy provided.   Time 5933-7677     Visit 21 Repeat outcome measure at mid point and end.    Pain Pain with activity 3/10     ROM      Modalities         MO   Manual            Stretch      Towel / strap DF, INV, EV   TE      TE   Exercise      LAQ 2#  2 x 15 ea BLE  TE         Heel slides   TE   squats 12x  TE   marching  3 x 20  10x superset TA   Calf raises  2 x 15 superset TA   Hip abd difficult    [] TG  [x] Leg Press 2-leg 25# 20X, 22X  TE   [] TG  [] Leg Press 1-leg   TE   Hamstring Curl Machine   TE   Knee Extension Machine   TE   Step-ups - FWD      10~12x   X6\"         CGA/Min A  In // bar   TA   Step-ups - LAT   TA   Sit to stand 3x Using 1 hand TA   Calf Raises   TA   gait 1 x 150 ft w/

## 2024-05-08 ENCOUNTER — TREATMENT (OUTPATIENT)
Dept: PHYSICAL THERAPY | Age: 74
End: 2024-05-08
Payer: MEDICARE

## 2024-05-08 DIAGNOSIS — R26.9 ABNORMALITY OF GAIT: ICD-10-CM

## 2024-05-08 DIAGNOSIS — R26.2 DIFFICULTY WALKING: ICD-10-CM

## 2024-05-08 DIAGNOSIS — R29.898 WEAKNESS OF BOTH LOWER EXTREMITIES: Primary | ICD-10-CM

## 2024-05-08 PROCEDURE — 97110 THERAPEUTIC EXERCISES: CPT

## 2024-05-08 PROCEDURE — 97530 THERAPEUTIC ACTIVITIES: CPT

## 2024-05-08 NOTE — PROGRESS NOTES
walker (Pink walker) TA   nustep L5/ 10 min  TA         Step over \"thresh\" strip 10x in // bars     A:  Tolerated well.  Goal is to get  stronger and not have fear of falling.   .  Pt using pink walker completely in the home.      P: Continue with rehab plan  Sanjana Prado, KEISHA    Treatment Charges: Mins Units   Initial Evaluation     Re-Evaluation     Ther Exercise         TE 10 1   Manual Therapy     MT     Ther Activities        TA 30 2   Gait Training          GT     Neuro Re-education NR     Modalities     Non-Billable Service Time     Other     Total Time/Units 40 3

## 2024-05-13 ENCOUNTER — TREATMENT (OUTPATIENT)
Dept: PHYSICAL THERAPY | Age: 74
End: 2024-05-13
Payer: MEDICARE

## 2024-05-13 DIAGNOSIS — R26.9 ABNORMALITY OF GAIT: ICD-10-CM

## 2024-05-13 DIAGNOSIS — R29.898 WEAKNESS OF BOTH LOWER EXTREMITIES: Primary | ICD-10-CM

## 2024-05-13 DIAGNOSIS — R26.2 DIFFICULTY WALKING: ICD-10-CM

## 2024-05-13 PROCEDURE — 97530 THERAPEUTIC ACTIVITIES: CPT

## 2024-05-13 PROCEDURE — 97112 NEUROMUSCULAR REEDUCATION: CPT

## 2024-05-13 NOTE — PROGRESS NOTES
Physical Therapy Daily Treatment Note    Date: 2024  Patient Name: Hilaria Castillo  : 1950   MRN: [id number]  DOInjury: -   DOSx: -  Referring Provider: Oscar Hernadez DO  [address] [place] [address]     Medical Diagnosis:     R26.9 (ICD-10-CM) - Unspecified abnormalities of gait and mobility   R29.898 (ICD-10-CM) - Other symptoms and signs involving the musculoskeletal system   R26.2 (ICD-10-CM) - Difficulty in walking, not elsewhere classified         Hilaria is well know to me as I have seen her for shoulder, back, knee issues in the past. She has had a very rough  having had multiple joint infection,sepsis, an extended stay at [facility]. She is scheduled to have L wrist fusion in 1 month and she is very concerned about her ability to walk with the folding walker she used prior to 3/2023     X = TO BE PERFORMED NEXT VISIT  > = PROGRESS TO THIS    S:  pt reports feeling pretty good today. Getting new lower steps in the garage now will be able to get in the car without being expressed to bad weather.    O: entered  clinic with pink WW  Discussed anatomy, physiology, body mechanics, principles of loading, and progressive loading/activity.  Reviewed home exercise program extensively; written copy provided.   Time 0872-1841     Visit 23 Repeat outcome measure at mid point and end.    Pain Pain with activity 3/10     ROM      Modalities         MO   Manual            Stretch      Towel / strap DF, INV, EV   TE      TE   Exercise      LAQ 2#  2 x 15 ea BLE  TE         Heel slides   TE   squats 12x  TE   marching  3 x 20   superset TA   Calf raises  2 x 15 superset TA   Hip abd difficult    [] TG  [x] Leg Press 2-leg 25# 20X,   TE   [] TG  [] Leg Press 1-leg   TE   Hamstring Curl Machine   TE   Knee Extension Machine   TE   Step-ups - FWD      12x   X6\"         CGA/Min A  In // bar   TA   Step-ups - LAT   TA   Sit to stand 3x Using 1 hand TA   Calf Raises 10x on 4\" step CGA+2 TA   gait 1 x 150

## 2024-05-15 ENCOUNTER — TREATMENT (OUTPATIENT)
Dept: PHYSICAL THERAPY | Age: 74
End: 2024-05-15
Payer: MEDICARE

## 2024-05-15 DIAGNOSIS — R29.898 WEAKNESS OF BOTH LOWER EXTREMITIES: Primary | ICD-10-CM

## 2024-05-15 DIAGNOSIS — R26.2 DIFFICULTY WALKING: ICD-10-CM

## 2024-05-15 DIAGNOSIS — M19.90 OSTEOARTHRITIS, UNSPECIFIED OSTEOARTHRITIS TYPE, UNSPECIFIED SITE: ICD-10-CM

## 2024-05-15 DIAGNOSIS — R26.9 ABNORMALITY OF GAIT: ICD-10-CM

## 2024-05-15 PROCEDURE — 97112 NEUROMUSCULAR REEDUCATION: CPT

## 2024-05-15 PROCEDURE — 97530 THERAPEUTIC ACTIVITIES: CPT

## 2024-05-15 NOTE — PROGRESS NOTES
Physical Therapy Daily Treatment Note    Date: 5/15/2024  Patient Name: Hilaria Castillo  : 1950   MRN: [id number]  DOInjury: -   DOSx: -  Referring Provider: Oscar Hernadez DO  [address] [place] [address]     Medical Diagnosis:     R26.9 (ICD-10-CM) - Unspecified abnormalities of gait and mobility   R29.898 (ICD-10-CM) - Other symptoms and signs involving the musculoskeletal system   R26.2 (ICD-10-CM) - Difficulty in walking, not elsewhere classified         Hilaria is well know to me as I have seen her for shoulder, back, knee issues in the past. She has had a very rough  having had multiple joint infection,sepsis, an extended stay at [facility]. She is scheduled to have L wrist fusion in 1 month and she is very concerned about her ability to walk with the folding walker she used prior to 3/2023     X = TO BE PERFORMED NEXT VISIT  > = PROGRESS TO THIS    S:  pt reports feeling pretty good today. Getting new lower steps in the garage now will be able to get in the car without being expressed to bad weather. They are almost complete   O: entered  clinic with pink WW  Discussed anatomy, physiology, body mechanics, principles of loading, and progressive loading/activity.  Reviewed home exercise program extensively; written copy provided.   Time 9675-2413     Visit 24 Repeat outcome measure at mid point and end.    Pain Pain with activity 3/10     ROM      Modalities         MO   Manual            Stretch      Towel / strap DF, INV, EV   TE      TE   Exercise      LAQ 1# on left, 2 on right  TE         Heel slides   TE   squats 12x  TE   marching  3 x 20   superset TA   Calf raises  2 x 15 superset TA   Hip abd difficult    [] TG  [x] Leg Press 2-leg 25# 20X,   TE   [] TG  [] Leg Press 1-leg   TE   Hamstring Curl Machine   TE   Knee Extension Machine   TE   Step-ups - FWD      12x   X6\"         CGA/Min A  In // bar   TA   Step-ups - LAT   TA   Recip steps 4\" steps up and over 3x     Sit to stand 3x

## 2024-05-22 ENCOUNTER — TREATMENT (OUTPATIENT)
Dept: PHYSICAL THERAPY | Age: 74
End: 2024-05-22
Payer: MEDICARE

## 2024-05-22 DIAGNOSIS — R26.9 ABNORMALITY OF GAIT: ICD-10-CM

## 2024-05-22 DIAGNOSIS — R29.898 WEAKNESS OF BOTH LOWER EXTREMITIES: Primary | ICD-10-CM

## 2024-05-22 DIAGNOSIS — R26.2 DIFFICULTY WALKING: ICD-10-CM

## 2024-05-22 PROCEDURE — 97530 THERAPEUTIC ACTIVITIES: CPT

## 2024-05-22 PROCEDURE — 97112 NEUROMUSCULAR REEDUCATION: CPT

## 2024-05-22 NOTE — PROGRESS NOTES
Physical Therapy Daily Treatment Note    Date: 2024  Patient Name: Hilaria Castillo  : 1950   MRN: [id number]  DOInjury: -   DOSx: -  Referring Provider: Oscar Hernadez DO  [address] [place] [address]     Medical Diagnosis:     R26.9 (ICD-10-CM) - Unspecified abnormalities of gait and mobility   R29.898 (ICD-10-CM) - Other symptoms and signs involving the musculoskeletal system   R26.2 (ICD-10-CM) - Difficulty in walking, not elsewhere classified         Hilaria is well know to me as I have seen her for shoulder, back, knee issues in the past. She has had a very rough  having had multiple joint infection,sepsis, an extended stay at [facility]. She is scheduled to have L wrist fusion in 1 month and she is very concerned about her ability to walk with the folding walker she used prior to 3/2023     X = TO BE PERFORMED NEXT VISIT  > = PROGRESS TO THIS    S:  pt reports feeling pretty good today.  New steps with rails were installed.  But once getting to the bottom afraid how to turn and get device due to no rail.  O: entered  clinic with pink WW  Discussed anatomy, physiology, body mechanics, principles of loading, and progressive loading/activity.  Reviewed home exercise program extensively; written copy provided.   Time 9363-5287     Visit 25 Repeat outcome measure at mid point and end.    Pain Pain with activity 3/10     ROM      Modalities         MO   Manual            Stretch      Towel / strap DF, INV, EV   TE      TE   Exercise      LAQ 1# on left, 2 on right  TE         Heel slides   TE   squats  TE   marching superset TA   Calf raises superset TA   Hip abd difficult    [] TG  [x] Leg Press 2-leg 25# 20X,   TE   [] TG  [] Leg Press 1-leg   TE   Hamstring Curl Machine   TE   Knee Extension Machine   TE   Step-ups - FWD        6\" 8x        CGA/Min A  In // bar   TA   Step-ups - LAT   TA   Recip steps 4\" steps up and over 5x  6x same thing but reaching for a walker when stepping down

## 2024-05-23 ENCOUNTER — OFFICE VISIT (OUTPATIENT)
Dept: BARIATRICS/WEIGHT MGMT | Age: 74
End: 2024-05-23
Payer: MEDICARE

## 2024-05-23 VITALS
HEART RATE: 87 BPM | SYSTOLIC BLOOD PRESSURE: 123 MMHG | DIASTOLIC BLOOD PRESSURE: 77 MMHG | WEIGHT: 214 LBS | BODY MASS INDEX: 33.52 KG/M2

## 2024-05-23 DIAGNOSIS — I10 ESSENTIAL HYPERTENSION: Primary | ICD-10-CM

## 2024-05-23 DIAGNOSIS — E66.09 CLASS 1 OBESITY DUE TO EXCESS CALORIES WITH SERIOUS COMORBIDITY AND BODY MASS INDEX (BMI) OF 33.0 TO 33.9 IN ADULT: ICD-10-CM

## 2024-05-23 PROCEDURE — 99211 OFF/OP EST MAY X REQ PHY/QHP: CPT

## 2024-05-23 PROCEDURE — 3074F SYST BP LT 130 MM HG: CPT | Performed by: INTERNAL MEDICINE

## 2024-05-23 PROCEDURE — 3078F DIAST BP <80 MM HG: CPT | Performed by: INTERNAL MEDICINE

## 2024-05-23 PROCEDURE — 1123F ACP DISCUSS/DSCN MKR DOCD: CPT | Performed by: INTERNAL MEDICINE

## 2024-05-23 PROCEDURE — 99214 OFFICE O/P EST MOD 30 MIN: CPT | Performed by: INTERNAL MEDICINE

## 2024-05-23 RX ORDER — VIBEGRON 75 MG/1
75 TABLET, FILM COATED ORAL DAILY
COMMUNITY

## 2024-05-23 NOTE — PATIENT INSTRUCTIONS
Rules:  Count every calorie every day  Limit sweets to one day per month  Limit chips/crackers/pretzels/nuts/popcorn to 150 gonzalez/day    Requirements:  Make sure protein intake is at least 75 grams per day  Make sure that fiber intake is at least 25 grams per day. Do this in part or whole by taking 12 tablespoons of Fiber one original cereal or [place]'s All Bran Buds cereal, or 4 tablespoons of wheat dextrin powder (Benefiber or generic brand); for both of these, start with 1/8th - 1/4th the target amount and every week add another 1/8th - 1/4th until reaching the target).  Also, fiber gummies containing inulin (such as Nature Capo, Glynn, Benefiber) or Fiber Choice Pre-biotic tablets containing inulin are also an option.1 cup of beans or peas and [employer] Xtreme Wellness High Fiber (7grams in 30 calories) Carb Lean tortillas are excellent choicesare excellent choices, as well.  These fiber supplements are for the health of the colon. Their purpose is not to prevent or treat constipation.  Take one multivitamin every day    Targets:  Limit calorie intake to 1250 calories/day  Limit net carbohydrate to 90 grams/day  Chair exercises as able  Avoid eating 2 hours within bedtime.   Limit restaurants (including fast food and food from a convenience store) to one time every two weeks while in town    Tips:  Do not eat outside of the dining room or the kitchen  Do not eat while watching TV, videos, working on the computer or using a smart phone  Do not eat food out of a multi-serving bag or container.    Saxenda:  Resume Saxenda at 1.8 mg daily from May 29th 2024, and increase to 3 mg daily as planned. If Zepbound is available, you can resume Zepbound the day after last dose of Saxenda.    Follow up in 2-3 months.

## 2024-05-23 NOTE — PROGRESS NOTES
low calorie diet. Zepbound seems to be helping, denies s/e, but now unable to get due to shortage. Has prior Saxenda (not  and in fridge), which she would like to start, and continue till Zepbound or alternate becomes available.     Plan   -   Rules:  Count every calorie every day  Limit sweets to one day per month  Limit chips/crackers/pretzels/nuts/popcorn to 150 gonzalez/day    Requirements:  Make sure protein intake is at least 75 grams per day  Make sure that fiber intake is at least 25 grams per day. Do this in part or whole by taking 12 tablespoons of Fiber one original cereal or [place]'s All Bran Buds cereal, or 4 tablespoons of wheat dextrin powder (Benefiber or generic brand); for both of these, start with 1/8th - 1/4th the target amount and every week add another 1/8th - 1/4th until reaching the target).  Also, fiber gummies containing inulin (such as Nature Capo, Glynn, Benefiber) or Fiber Choice Pre-biotic tablets containing inulin are also an option.1 cup of beans or peas and [employer] Xtreme Wellness High Fiber (7grams in 30 calories) Carb Lean tortillas are excellent choicesare excellent choices, as well.  These fiber supplements are for the health of the colon. Their purpose is not to prevent or treat constipation.  Take one multivitamin every day    Targets:  Limit calorie intake to 1250 calories/day  Limit net carbohydrate to 90 grams/day  Chair exercises as able  Avoid eating 2 hours within bedtime.   Limit restaurants (including fast food and food from a convenience store) to one time every two weeks while in town    Tips:  Do not eat outside of the dining room or the kitchen  Do not eat while watching TV, videos, working on the computer or using a smart phone  Do not eat food out of a multi-serving bag or container.    Saxenda:  Resume Saxenda at 1.8 mg daily from May 29th 2024, and increase to 3 mg daily as planned. If Zepbound is available, you can resume Zepbound the day after

## 2024-05-24 ENCOUNTER — TREATMENT (OUTPATIENT)
Dept: PHYSICAL THERAPY | Age: 74
End: 2024-05-24

## 2024-05-24 DIAGNOSIS — M19.90 OSTEOARTHRITIS, UNSPECIFIED OSTEOARTHRITIS TYPE, UNSPECIFIED SITE: Primary | ICD-10-CM

## 2024-05-24 DIAGNOSIS — R29.898 WEAKNESS OF BOTH LOWER EXTREMITIES: ICD-10-CM

## 2024-05-24 DIAGNOSIS — R26.9 ABNORMALITY OF GAIT: ICD-10-CM

## 2024-05-24 DIAGNOSIS — R26.2 DIFFICULTY WALKING: ICD-10-CM

## 2024-05-24 NOTE — PROGRESS NOTES
CGA+2 TA   gait 1 x 150 ft w/ wheeled walker (Pink walker) TA   nustep L5/ 10 min  TA   Car transfer with  present  SBA. In and out with Rollator      Step over \"thresh\" strip     A:  Tolerated well.  Goal is to get  stronger and not have fear of falling. Wants to possibly work on descending steps to the basement  .  Pt using pink walker completely in the home Pt. Is showing improvement due to an increased ability to get in and out of her vehicle with her Rollator, but the time it takes to do this is much longer than normal.  Worked with spouse on safety with Hilaria.     P: Continue with rehab plan   Sanjana Prado PTA and FAVIAN HarrellA     Treatment Charges: Mins Units   Initial Evaluation     Re-Evaluation     Ther Exercise         TE 15 1   Manual Therapy     MT     Ther Activities        TA 45 3   Gait Training          GT     Neuro Re-education NR     Modalities     Non-Billable Service Time     Other     Total Time/Units 60 4

## 2024-05-29 ENCOUNTER — TREATMENT (OUTPATIENT)
Dept: PHYSICAL THERAPY | Age: 74
End: 2024-05-29
Payer: MEDICARE

## 2024-05-29 DIAGNOSIS — M19.90 OSTEOARTHRITIS, UNSPECIFIED OSTEOARTHRITIS TYPE, UNSPECIFIED SITE: Primary | ICD-10-CM

## 2024-05-29 DIAGNOSIS — R26.2 DIFFICULTY WALKING: ICD-10-CM

## 2024-05-29 DIAGNOSIS — R26.9 ABNORMALITY OF GAIT: ICD-10-CM

## 2024-05-29 PROCEDURE — 97530 THERAPEUTIC ACTIVITIES: CPT

## 2024-05-29 NOTE — PROGRESS NOTES
Machine   TE   Step-ups - FWD        6\" x 2 x 6     CGA/Min A  In // bar   TA   Step-ups - LAT   TA   Recip steps next    Sit to stand Using 1 hand TA   Calf Raises CGA+2 TA   gait 1 x 150 ft w/ wheeled walker (Pink walker) TA   nustep L5/ 10 min  TA   Car transfer with  present      Step over \"thresh\" strip     A:  Tolerated well.  Had OT look at hand placement  and felt placement for the hands were good.  Goal is to get  stronger and not have fear of falling. Wants to possibly work on descending steps to the basement  .  Pt using pink walker completely in the home Pt. Is showing improvement due to an increased ability to get in and out of her vehicle with her Rollator, but the time it takes to do this is much longer than normal.  Worked with spouse on safety with Hilaria.     P: Continue with rehab plan   Sanjana Prado, PTA and Arturo Juarez SPTA     Treatment Charges: Mins Units   Initial Evaluation     Re-Evaluation     Ther Exercise         TE     Manual Therapy     MT     Ther Activities        TA 30 2   Gait Training          GT     Neuro Re-education NR     Modalities     Non-Billable Service Time 10 0   Other     Total Time/Units 40 2

## 2024-06-03 ENCOUNTER — TREATMENT (OUTPATIENT)
Dept: PHYSICAL THERAPY | Age: 74
End: 2024-06-03
Payer: MEDICARE

## 2024-06-03 DIAGNOSIS — R26.2 DIFFICULTY WALKING: ICD-10-CM

## 2024-06-03 DIAGNOSIS — M19.90 OSTEOARTHRITIS, UNSPECIFIED OSTEOARTHRITIS TYPE, UNSPECIFIED SITE: Primary | ICD-10-CM

## 2024-06-03 DIAGNOSIS — R26.9 ABNORMALITY OF GAIT: ICD-10-CM

## 2024-06-03 DIAGNOSIS — R29.898 WEAKNESS OF BOTH LOWER EXTREMITIES: ICD-10-CM

## 2024-06-03 PROCEDURE — 97530 THERAPEUTIC ACTIVITIES: CPT

## 2024-06-03 NOTE — PROGRESS NOTES
Physical Therapy Daily Treatment Note    Date: 6/3/2024  Patient Name: Hilaria Castillo  : 1950   MRN: [id number]  DOInjury: -   DOSx: -  Referring Provider: Oscar Hernadez DO  [address] [place] [address]     Medical Diagnosis:     R26.9 (ICD-10-CM) - Unspecified abnormalities of gait and mobility   R29.898 (ICD-10-CM) - Other symptoms and signs involving the musculoskeletal system   R26.2 (ICD-10-CM) - Difficulty in walking, not elsewhere classified         Hilaria is well know to me as I have seen her for shoulder, back, knee issues in the past. She has had a very rough  having had multiple joint infection,sepsis, an extended stay at [facility]. She is scheduled to have L wrist fusion in 1 month and she is very concerned about her ability to walk with the folding walker she used prior to 3/2023     X = TO BE PERFORMED NEXT VISIT  > = PROGRESS TO THIS    S:  pt reports that she has not been driving much. But has a luncheon tomorrow and wants to drive  O: entered  clinic with pink WW and a borrowed Rolator. Discussed anatomy, physiology, body mechanics, principles of loading, and progressive loading/activity.  Reviewed home exercise program extensively; written copy provided.   Time 1100-     Visit 28 Repeat outcome measure at mid point and end.    Pain Pain with activity 3/10     ROM      Modalities         MO   Manual            Stretch      Towel / strap DF, INV, EV   TE      TE   Exercise      LAQ 1# on left, 2# on right, 10 x bilaterally    TE         Heel slides   TE   squats  TE   marching superset TA   Calf raises superset TA   Hip abd difficult    [] TG  [x] Leg Press 2-leg 45# 20X   TE   [] TG  [] Leg Press 1-leg   TE   Hamstring Curl Machine   TE   Knee Extension Machine   TE   Step-ups - FWD        6\" x 2 x 6     CGA/Min A  In // bar   TA   Step-ups - LAT   TA   Recip steps next    Sit to stand Using 1 hand TA   Calf Raises CGA+2 TA   gait 1 x 150 ft w/ wheeled walker (Pink walker)

## 2024-06-06 ENCOUNTER — TREATMENT (OUTPATIENT)
Dept: PHYSICAL THERAPY | Age: 74
End: 2024-06-06

## 2024-06-06 DIAGNOSIS — M19.90 OSTEOARTHRITIS, UNSPECIFIED OSTEOARTHRITIS TYPE, UNSPECIFIED SITE: Primary | ICD-10-CM

## 2024-06-06 DIAGNOSIS — R26.9 ABNORMALITY OF GAIT: ICD-10-CM

## 2024-06-06 DIAGNOSIS — R26.2 DIFFICULTY WALKING: ICD-10-CM

## 2024-06-06 NOTE — PROGRESS NOTES
wheeled walker (Pink walker) TA   nustep L5/ 10 min  TA   Car transfer with  present  No VC's needed    Step over \"thresh\" strip     A:  Tolerated well.  Pt continues to have difficulty with getting up out of chair, legs weak.  Goal is to get  stronger and not have fear of falling. Wants to possibly work on descending steps to the basement  .  Pt using pink walker completely in the home      P: Continue with rehab plan   Sanjana Prado, PTA and Arturo Juarez SPTA     Treatment Charges: Mins Units   Initial Evaluation     Re-Evaluation     Ther Exercise         TE 10 1   Manual Therapy     MT     Ther Activities        TA 23 2   Gait Training          GT     Neuro Re-education NR     Modalities     Non-Billable Service Time 7 0   Other     Total Time/Units 40 3

## 2024-06-10 ENCOUNTER — TREATMENT (OUTPATIENT)
Dept: PHYSICAL THERAPY | Age: 74
End: 2024-06-10
Payer: MEDICARE

## 2024-06-10 DIAGNOSIS — M19.90 OSTEOARTHRITIS, UNSPECIFIED OSTEOARTHRITIS TYPE, UNSPECIFIED SITE: Primary | ICD-10-CM

## 2024-06-10 DIAGNOSIS — R26.2 DIFFICULTY WALKING: ICD-10-CM

## 2024-06-10 DIAGNOSIS — R26.9 ABNORMALITY OF GAIT: ICD-10-CM

## 2024-06-10 PROCEDURE — 97110 THERAPEUTIC EXERCISES: CPT

## 2024-06-10 PROCEDURE — 97530 THERAPEUTIC ACTIVITIES: CPT

## 2024-06-10 NOTE — PROGRESS NOTES
Physical Therapy Daily Treatment Note    Date: 6/10/2024  Patient Name: Hilaria Castillo  : 1950   MRN: [id number]  DOInjury: -   DOSx: -  Referring Provider: Oscar Hernadez DO  [address] [place] [address]     Medical Diagnosis:     R26.9 (ICD-10-CM) - Unspecified abnormalities of gait and mobility   R29.898 (ICD-10-CM) - Other symptoms and signs involving the musculoskeletal system   R26.2 (ICD-10-CM) - Difficulty in walking, not elsewhere classified         Hilaria is well know to me as I have seen her for shoulder, back, knee issues in the past. She has had a very rough  having had multiple joint infection,sepsis, an extended stay at [facility]. She is scheduled to have L wrist fusion in 1 month and she is very concerned about her ability to walk with the folding walker she used prior to 3/2023     X = TO BE PERFORMED NEXT VISIT  > = PROGRESS TO THIS    S: Pt. Reports that her new device allows her to ambulate better on rough surfaces when compared to her pink wheeled walker. Pt. Reports having new pain on her right medial-lateral forearm that started a few days ago.   O: entered  clinic with red Rolator.  Discussed anatomy, physiology, body mechanics, principles of loading, and progressive loading/activity.  Reviewed home exercise program extensively; written copy provided.   Time 2098-5272     Visit 30 Repeat outcome measure at mid point and end.    Pain Pain with activity 3/10     ROM      Modalities         MO   Manual            Stretch      Towel / strap DF, INV, EV   TE      TE   Exercise      LAQ 1# on left, 2# on right, 2 x 15 bilaterally    TE         Heel slides   TE   squats  TE   marching superset TA   Calf raises superset TA   Hip abd difficult    [] TG  [x] Leg Press 2-leg 45# 10 x and 25# 3 x 15 Patellar discomfort  TE   [] TG  [] Leg Press 1-leg   TE   Hamstring Curl Machine   TE   Knee Extension Machine   TE   Step-ups - FWD          CGA/Min A  In // bar   TA   Step-ups -

## 2024-06-12 ENCOUNTER — TREATMENT (OUTPATIENT)
Dept: PHYSICAL THERAPY | Age: 74
End: 2024-06-12
Payer: MEDICARE

## 2024-06-12 DIAGNOSIS — R26.9 ABNORMALITY OF GAIT: ICD-10-CM

## 2024-06-12 DIAGNOSIS — R26.2 DIFFICULTY WALKING: ICD-10-CM

## 2024-06-12 DIAGNOSIS — M19.90 OSTEOARTHRITIS, UNSPECIFIED OSTEOARTHRITIS TYPE, UNSPECIFIED SITE: Primary | ICD-10-CM

## 2024-06-12 PROCEDURE — 97530 THERAPEUTIC ACTIVITIES: CPT

## 2024-06-12 PROCEDURE — 97110 THERAPEUTIC EXERCISES: CPT

## 2024-06-12 NOTE — PROGRESS NOTES
Physical Therapy Daily Treatment Note    Date: 2024  Patient Name: Hilaria Castillo  : 1950   MRN: [id number]  DOInjury: -   DOSx: -  Referring Provider: Oscar Hernadez DO  [address] [place] [address]     Medical Diagnosis:     R26.9 (ICD-10-CM) - Unspecified abnormalities of gait and mobility   R29.898 (ICD-10-CM) - Other symptoms and signs involving the musculoskeletal system   R26.2 (ICD-10-CM) - Difficulty in walking, not elsewhere classified         Hilaria is well know to me as I have seen her for shoulder, back, knee issues in the past. She has had a very rough  having had multiple joint infection,sepsis, an extended stay at [facility]. She is scheduled to have L wrist fusion in 1 month and she is very concerned about her ability to walk with the folding walker she used prior to 3/2023     X = TO BE PERFORMED NEXT VISIT  > = PROGRESS TO THIS    S: Pt. Reports that her new device a few springs fell out. Had to return it since only having it for 3 days.  Used old Rolator.  Right and is hurting. .  O: entered  clinic with red Rolator.  Discussed anatomy, physiology, body mechanics, principles of loading, and progressive loading/activity.  Reviewed home exercise program extensively; written copy provided.   Time 5588-2346     Visit 31 Repeat outcome measure at mid point and end.    Pain Pain with activity 3/10     ROM      Modalities         MO   Manual            Stretch      Towel / strap DF, INV, EV   TE      TE   Exercise      LAQ  TE         Heel slides   TE   squats  TE   marching superset TA   Calf raises superset TA   Hip abd difficult    [] TG  [x] Leg Press 2-leg 45# 20 x 2  Patellar discomfort  TE   [] TG  [] Leg Press 1-leg   TE   Hamstring Curl Machine   TE   Knee Extension Machine   TE   Step-ups - FWD        6\" x 2 x 5     CGA/Min A  In // bar   TA   Step-ups - LAT   TA   Recip steps next    Sit to stand Using 1 hand TA   Calf Raises CGA+2 TA   gait  (Pink walker) TA

## 2024-06-17 ENCOUNTER — TREATMENT (OUTPATIENT)
Dept: PHYSICAL THERAPY | Age: 74
End: 2024-06-17
Payer: MEDICARE

## 2024-06-17 DIAGNOSIS — M19.90 OSTEOARTHRITIS, UNSPECIFIED OSTEOARTHRITIS TYPE, UNSPECIFIED SITE: Primary | ICD-10-CM

## 2024-06-17 DIAGNOSIS — R26.2 DIFFICULTY WALKING: ICD-10-CM

## 2024-06-17 DIAGNOSIS — R29.898 WEAKNESS OF BOTH LOWER EXTREMITIES: ICD-10-CM

## 2024-06-17 DIAGNOSIS — R26.9 ABNORMALITY OF GAIT: ICD-10-CM

## 2024-06-17 PROCEDURE — 97530 THERAPEUTIC ACTIVITIES: CPT

## 2024-06-17 PROCEDURE — 97110 THERAPEUTIC EXERCISES: CPT

## 2024-06-17 NOTE — PROGRESS NOTES
(Pink walker) TA   nustep L5/ 10 min  TA   Car transfer with  present  No VC's needed    Step over \"thresh\" strip     A:  Tolerated well.  Pt continues to be frustrated that she can't do more. Pt continues to have difficulty with getting up out of chair, legs weak.  Goal is to get  stronger and not have fear of falling. Wants to possibly work on descending steps to the basement  .     Pt. Can not ambulate without assistive device due to continued weakness in the lower extremities. Pt. Appears to be developing more endurance in her quadriceps bilaterally due to her ability to perform more sets and reps this treatment.    P: Continue with rehab plan   Sanjana Prado PTA and Arturo Juarez SPTA     Treatment Charges: Mins Units   Initial Evaluation     Re-Evaluation     Ther Exercise         TE 8 1   Manual Therapy     MT     Ther Activities        TA 22 1   Gait Training          GT     Neuro Re-education NR     Modalities     Non-Billable Service Time 10 0   Other     Total Time/Units 40 2

## 2024-06-19 ENCOUNTER — TREATMENT (OUTPATIENT)
Dept: PHYSICAL THERAPY | Age: 74
End: 2024-06-19
Payer: MEDICARE

## 2024-06-19 ENCOUNTER — OFFICE VISIT (OUTPATIENT)
Dept: FAMILY MEDICINE CLINIC | Age: 74
End: 2024-06-19
Payer: MEDICARE

## 2024-06-19 VITALS
TEMPERATURE: 97.4 F | OXYGEN SATURATION: 98 % | BODY MASS INDEX: 33.59 KG/M2 | HEART RATE: 61 BPM | HEIGHT: 67 IN | SYSTOLIC BLOOD PRESSURE: 152 MMHG | RESPIRATION RATE: 18 BRPM | WEIGHT: 214 LBS | DIASTOLIC BLOOD PRESSURE: 96 MMHG

## 2024-06-19 DIAGNOSIS — R21 RASH/SKIN ERUPTION: Primary | ICD-10-CM

## 2024-06-19 DIAGNOSIS — M19.90 OSTEOARTHRITIS, UNSPECIFIED OSTEOARTHRITIS TYPE, UNSPECIFIED SITE: Primary | ICD-10-CM

## 2024-06-19 DIAGNOSIS — R26.9 ABNORMALITY OF GAIT: ICD-10-CM

## 2024-06-19 DIAGNOSIS — R26.2 DIFFICULTY WALKING: ICD-10-CM

## 2024-06-19 PROCEDURE — 99213 OFFICE O/P EST LOW 20 MIN: CPT

## 2024-06-19 PROCEDURE — 1123F ACP DISCUSS/DSCN MKR DOCD: CPT

## 2024-06-19 PROCEDURE — 97530 THERAPEUTIC ACTIVITIES: CPT

## 2024-06-19 PROCEDURE — 97110 THERAPEUTIC EXERCISES: CPT

## 2024-06-19 PROCEDURE — 3080F DIAST BP >= 90 MM HG: CPT

## 2024-06-19 PROCEDURE — 3077F SYST BP >= 140 MM HG: CPT

## 2024-06-19 SDOH — ECONOMIC STABILITY: FOOD INSECURITY: WITHIN THE PAST 12 MONTHS, YOU WORRIED THAT YOUR FOOD WOULD RUN OUT BEFORE YOU GOT MONEY TO BUY MORE.: NEVER TRUE

## 2024-06-19 SDOH — ECONOMIC STABILITY: INCOME INSECURITY: HOW HARD IS IT FOR YOU TO PAY FOR THE VERY BASICS LIKE FOOD, HOUSING, MEDICAL CARE, AND HEATING?: NOT VERY HARD

## 2024-06-19 SDOH — ECONOMIC STABILITY: FOOD INSECURITY: WITHIN THE PAST 12 MONTHS, THE FOOD YOU BOUGHT JUST DIDN'T LAST AND YOU DIDN'T HAVE MONEY TO GET MORE.: NEVER TRUE

## 2024-06-19 ASSESSMENT — PATIENT HEALTH QUESTIONNAIRE - PHQ9
SUM OF ALL RESPONSES TO PHQ QUESTIONS 1-9: 0
2. FEELING DOWN, DEPRESSED OR HOPELESS: NOT AT ALL
SUM OF ALL RESPONSES TO PHQ QUESTIONS 1-9: 0
SUM OF ALL RESPONSES TO PHQ9 QUESTIONS 1 & 2: 0
DEPRESSION UNABLE TO ASSESS: FUNCTIONAL CAPACITY MOTIVATION LIMITS ACCURACY
SUM OF ALL RESPONSES TO PHQ QUESTIONS 1-9: 0
SUM OF ALL RESPONSES TO PHQ QUESTIONS 1-9: 0
1. LITTLE INTEREST OR PLEASURE IN DOING THINGS: NOT AT ALL

## 2024-06-19 NOTE — PROGRESS NOTES
[] Leg Press 1-leg   TE   Hamstring Curl Machine   TE   Knee Extension Machine   TE   Step-ups - FWD        6\"  4 x with left leg first and 0 x with right leg first      CGA  In // bar  Intense pain in right hand  TA   Step-ups - LAT   TA   Recip steps next    Sit to stand Using 1 hand TA   Calf Raises CGA+2 TA   gait 1 x 150 ft w/ wheeled walker no platform  4 x 30ft (New walker) TA   nustep L5/ 10 min  TA   Car transfer with  present  No VC's needed    Step over \"thresh\" strip     A:  Tolerated well.  Pt continues to be frustrated that she can't do more. Pt continues to have difficulty with getting up out of chair, legs weak.  Goal is to get  stronger and not have fear of falling. Wants to possibly work on descending steps to the basement  .     Pt. Can not ambulate without assistive device due to continued weakness in the lower extremities. Pt. Appears to be developing more endurance in her quadriceps bilaterally due to her ability to perform more sets of LAQ this treatment. On left inside slight above ankle looks like a rash is going on. Suggested pt go to the walk in clinic.     P: Continue with rehab plan   Sanjana Prado PTA and ROBERTA Harrell     Treatment Charges: Mins Units   Initial Evaluation     Re-Evaluation     Ther Exercise         TE 25 2   Manual Therapy     MT     Ther Activities        TA 16 1   Gait Training          GT     Neuro Re-education NR     Modalities     Non-Billable Service Time     Other     Total Time/Units 41 3

## 2024-06-19 NOTE — PROGRESS NOTES
24  Hilaria Castillo : 1950 Sex: female  Age 73 y.o.    Subjective:  Chief Complaint   Patient presents with    Rash     Has on bottom of left leg blisters and redness       HPI:   Hilaria Castillo , 73 y.o. female presents to the clinic for evaluation of blister and erythematous spot on left leg x 7 days. The patient reports associated drainage. The patient has taken Doxycycline for symptoms. The patient reports some improvement in symptoms over time. Denies any known cause for the rash including any new soaps, detergents, lotions, foods, or medications. Denies recent travel, recent illness, and ill exposure. Denies any bleeding, lymphangitic streaking, arthralgia, myalgia,or lethargy.The patient also denies headache, fever, chest pain, abdominal pain, shortness of breath, and nausea / vomiting / diarrhea.    ROS:   Unless otherwise stated in this report the patient's positive and negative responses for review of systems for constitutional, eyes, ENT, cardiovascular, respiratory, gastrointestinal, neurological, , musculoskeletal, and integument systems and related systems to the presenting problem are either stated in the history of present illness or were not pertinent or were negative for the symptoms and/or complaints related to the presenting medical problem.  Positives and pertinent negatives as per HPI.  All others reviewed and are negative.      PMH:     Past Medical History:   Diagnosis Date    A-fib ([facility])     Arthritis     Class 2 severe obesity due to excess calories with serious comorbidity and body mass index (BMI) of 37.0 to 37.9 in adult ([facility])     Dry eyes, bilateral     Hypertension     OAB (overactive bladder)     PONV (postoperative nausea and vomiting)        Past Surgical History:   Procedure Laterality Date    ABDOMEN SURGERY      BLADDER SURGERY  2022    bladder stimulator    BREAST BIOPSY      BUNIONECTOMY      bilteral twice    CARDIAC SURGERY  2006    heart cath in

## 2024-07-01 ENCOUNTER — TELEPHONE (OUTPATIENT)
Dept: BARIATRICS/WEIGHT MGMT | Age: 74
End: 2024-07-01

## 2024-07-02 DIAGNOSIS — E66.01 CLASS 2 SEVERE OBESITY DUE TO EXCESS CALORIES WITH SERIOUS COMORBIDITY AND BODY MASS INDEX (BMI) OF 36.0 TO 36.9 IN ADULT (HCC): ICD-10-CM

## 2024-07-02 RX ORDER — TIRZEPATIDE 10 MG/.5ML
10 INJECTION, SOLUTION SUBCUTANEOUS WEEKLY
Qty: 2 ML | Refills: 2 | Status: SHIPPED | OUTPATIENT
Start: 2024-07-02

## 2024-07-23 ENCOUNTER — OFFICE VISIT (OUTPATIENT)
Dept: BARIATRICS/WEIGHT MGMT | Age: 74
End: 2024-07-23
Payer: MEDICARE

## 2024-07-23 VITALS
DIASTOLIC BLOOD PRESSURE: 70 MMHG | HEART RATE: 78 BPM | BODY MASS INDEX: 31.58 KG/M2 | HEIGHT: 67 IN | WEIGHT: 201.2 LBS | TEMPERATURE: 97 F | SYSTOLIC BLOOD PRESSURE: 129 MMHG

## 2024-07-23 DIAGNOSIS — E66.09 CLASS 1 OBESITY DUE TO EXCESS CALORIES WITH SERIOUS COMORBIDITY AND BODY MASS INDEX (BMI) OF 31.0 TO 31.9 IN ADULT: ICD-10-CM

## 2024-07-23 DIAGNOSIS — I10 ESSENTIAL HYPERTENSION: Primary | ICD-10-CM

## 2024-07-23 PROCEDURE — 3074F SYST BP LT 130 MM HG: CPT | Performed by: INTERNAL MEDICINE

## 2024-07-23 PROCEDURE — 1123F ACP DISCUSS/DSCN MKR DOCD: CPT | Performed by: INTERNAL MEDICINE

## 2024-07-23 PROCEDURE — 99211 OFF/OP EST MAY X REQ PHY/QHP: CPT

## 2024-07-23 PROCEDURE — 99214 OFFICE O/P EST MOD 30 MIN: CPT | Performed by: INTERNAL MEDICINE

## 2024-07-23 PROCEDURE — 3078F DIAST BP <80 MM HG: CPT | Performed by: INTERNAL MEDICINE

## 2024-07-23 NOTE — PATIENT INSTRUCTIONS
Rules:  Count every calorie every day  Limit sweets to one day per month  Limit chips/crackers/pretzels/nuts/popcorn to 150 gonzalez/day    Requirements:  Make sure protein intake is at least 75 grams per day  Make sure that fiber intake is at least 25 grams per day  Take one multivitamin every day    Targets:  Limit calorie intake to 1250 calories/day  Chair exercises as able  Avoid eating 2 hours within bedtime.   Limit restaurants (including fast food and food from a convenience store) to one time every two weeks while in town    Tips:  Do not eat outside of the dining room or the kitchen  Do not eat while watching TV, videos, working on the computer or using a smart phone  Do not eat food out of a multi-serving bag or container.    Tirzepatide (Zepbound):  Start Zepbound 10 mg if available, after completing 7.5 mg weekly.    Follow up in 3 months.

## 2024-07-23 NOTE — PROGRESS NOTES
WD, NAD  Lung: Nml resp effort, CTA B/L  Heart:  RRR w/o MGR, no LE pitting edema  Psych: Normal mood   Full affect  Neuro: Moves all ext well  ______________________    HISTORY & ASSESSMENT/PLAN -     Problem 1 - Hypertension   HPI   - /70 today, compliant with medication, patient asymptomatic.  Assessment  - Controlled  Plan   - Continue medication. Weight reduction can help de-escalate dose of anti-hypertensive. Weight reduction per plan below    Problem 2  - Obesity   HPI   - See above Background for description    Weight  Date    243.2  4/14/22   Ozempic - did not get covered    227.8  6/17/22   Topiramate    215.8  8/17/22   Topiramate    214.0  10/17/22  Taper topiramate - retry Ozempic (IR)    218.4  12/13/22 Saxenda    211.0  1/24/23    234.0  1/31/24    Zepbound 2.5 -> 5 mg    221.4  3/13/24    Zepbound 5->7.5    214.0  5/23/24    Saxenda -> Zepbound    201.2  7/23/24    Zepbound 10 mg    Total weight change to date: -42.0 lbs.  Average daily energy variance:  4/14/2022 - 6/17/2022: -13.8 lbs (17228 Dioni)/63 d = -767 Dioni/d deficit.  6/17/2022 - 8/17/2022: -12 lbs (46000 Dioni)/61 d = -689 Dioni/d deficit.  8/17/2022 - 10/17/2022: -1.8 lbs (6300 Dioni)/61 d = -103 Dioni/d deficit.  10/17/2022 - 12/13/2022: +4.4 lbs (37312 Dioni)/57 d = +270 Dioni/d excess.  12/13/2022 - 1/24/2023: -7.4 lbs (13113 Dioni)/42 d = -617 Dioni/d deficit.  1/31/2024 - 3/13/2024: -12.6 lbs (21175 Dioni)/42 d = -1051 Dioni/d deficit.  3/13/2024 - 5/23/2024: -7.4 lbs (53310 Dioni)/71 d = -365 Dioni/d deficit.   5/23/2024 - 7/23/2024: -12.8 lbs (43708 Dioni)/61 d = -734 Dioni/d deficit.      DEN (est.)= 1926 Dioni/d = 03998 Dioni/wk    Update:  Right radius fracture when pulling herself up in restroom about 5 weeks ago - healing  Watching portion size; stops when she feels full.  Protein: premier protein shakes - most days, gets >75 g/day  Fiber: Fiber one cereal and Benefiber - feels she needs to increase  Sweets: has cut down  SSB: none (splenda)  Restaurant

## 2024-07-24 ENCOUNTER — TELEPHONE (OUTPATIENT)
Dept: BARIATRICS/WEIGHT MGMT | Age: 74
End: 2024-07-24

## 2024-07-24 RX ORDER — TIRZEPATIDE 7.5 MG/.5ML
7.5 INJECTION, SOLUTION SUBCUTANEOUS WEEKLY
Qty: 2 ML | Refills: 2 | Status: SHIPPED | OUTPATIENT
Start: 2024-07-24

## 2024-09-06 ENCOUNTER — EVALUATION (OUTPATIENT)
Dept: PHYSICAL THERAPY | Age: 74
End: 2024-09-06
Payer: MEDICARE

## 2024-09-06 DIAGNOSIS — R29.898 WEAKNESS OF BOTH LOWER EXTREMITIES: Primary | ICD-10-CM

## 2024-09-06 PROCEDURE — 97163 PT EVAL HIGH COMPLEX 45 MIN: CPT | Performed by: PHYSICAL THERAPIST

## 2024-09-10 ENCOUNTER — TREATMENT (OUTPATIENT)
Dept: PHYSICAL THERAPY | Age: 74
End: 2024-09-10
Payer: MEDICARE

## 2024-09-10 DIAGNOSIS — R29.898 WEAKNESS OF BOTH LOWER EXTREMITIES: Primary | ICD-10-CM

## 2024-09-10 PROCEDURE — 97110 THERAPEUTIC EXERCISES: CPT

## 2024-09-10 PROCEDURE — 97530 THERAPEUTIC ACTIVITIES: CPT

## 2024-09-13 ENCOUNTER — TREATMENT (OUTPATIENT)
Dept: PHYSICAL THERAPY | Age: 74
End: 2024-09-13

## 2024-09-13 DIAGNOSIS — R29.898 WEAKNESS OF BOTH LOWER EXTREMITIES: Primary | ICD-10-CM

## 2024-09-17 ENCOUNTER — TREATMENT (OUTPATIENT)
Dept: PHYSICAL THERAPY | Age: 74
End: 2024-09-17
Payer: MEDICARE

## 2024-09-17 DIAGNOSIS — R29.898 WEAKNESS OF BOTH LOWER EXTREMITIES: Primary | ICD-10-CM

## 2024-09-17 PROCEDURE — 97110 THERAPEUTIC EXERCISES: CPT

## 2024-09-20 ENCOUNTER — TREATMENT (OUTPATIENT)
Dept: PHYSICAL THERAPY | Age: 74
End: 2024-09-20
Payer: MEDICARE

## 2024-09-20 DIAGNOSIS — R29.898 WEAKNESS OF BOTH LOWER EXTREMITIES: Primary | ICD-10-CM

## 2024-09-20 PROCEDURE — 97110 THERAPEUTIC EXERCISES: CPT

## 2024-09-23 ENCOUNTER — TREATMENT (OUTPATIENT)
Dept: PHYSICAL THERAPY | Age: 74
End: 2024-09-23
Payer: MEDICARE

## 2024-09-23 DIAGNOSIS — R29.898 WEAKNESS OF BOTH LOWER EXTREMITIES: Primary | ICD-10-CM

## 2024-09-23 PROCEDURE — 97110 THERAPEUTIC EXERCISES: CPT

## 2024-09-23 PROCEDURE — 97530 THERAPEUTIC ACTIVITIES: CPT

## 2024-09-25 ENCOUNTER — TREATMENT (OUTPATIENT)
Dept: PHYSICAL THERAPY | Age: 74
End: 2024-09-25
Payer: MEDICARE

## 2024-09-25 DIAGNOSIS — R29.898 WEAKNESS OF BOTH LOWER EXTREMITIES: Primary | ICD-10-CM

## 2024-09-25 PROCEDURE — 97530 THERAPEUTIC ACTIVITIES: CPT

## 2024-09-25 PROCEDURE — 97110 THERAPEUTIC EXERCISES: CPT

## 2024-09-30 ENCOUNTER — TREATMENT (OUTPATIENT)
Dept: PHYSICAL THERAPY | Age: 74
End: 2024-09-30
Payer: MEDICARE

## 2024-09-30 DIAGNOSIS — R29.898 WEAKNESS OF BOTH LOWER EXTREMITIES: Primary | ICD-10-CM

## 2024-09-30 PROCEDURE — 97110 THERAPEUTIC EXERCISES: CPT

## 2024-09-30 PROCEDURE — 97530 THERAPEUTIC ACTIVITIES: CPT

## 2024-09-30 NOTE — PROGRESS NOTES
Physical Therapy Daily Treatment Note    Date: 2024  Patient Name: Hilaria Castillo  : 1950   MRN: [id number]  DOInjury: -  DOSx: -  Referring Provider: Abiodun Yuen MD  [facility] [place] [address]     Medical Diagnosis:   1. Weakness of both lower extremities          X = TO BE PERFORMED NEXT VISIT  > = PROGRESS TO THIS    S: parked across the street and was able to use Rolator to cross the street and use a cut down curb in order to enter the walkway for [employer].   Wants to work on stepping up/down a curb  O: Discussed anatomy, physiology, body mechanics, principles of loading, and progressive loading/activity.   Time 5777-0169     Visit 5 Repeat outcome measure at mid point and end.    Pain      ROM      Modalities            Exercise      Nustep   L5/ 10 min  TE         Squats 15x  TA   Calf Raises 15x  TE   Toe Raises   TA   Marches 15x, 10x  TE   Alt. Sidekicks   TA   Leg press 20# 3 x 15  TE   Sit/Stands   TA   LAQ 3# on R LE, no wt on left   TE   Gait training   GT         Marching Gait   NR   Sidestepping   NR   steps 4\"  2 x 12 B LE After one set had a sitting RB          Car transfer           Curb training 6\" 2x min-mod A,  4\" 2x CGA-Min. A  TA   A:  Tolerated fair. Due to right wrist pain/fx limited with wt bearing activities.    Safety and technique is a concern. Pt has new bladder issues and will be going up to [place] oct 4th   P: Continue with rehab plan  Sanjana Prado PTA    Treatment Charges: Mins Units   Initial Evaluation     Re-Evaluation     Ther Exercise         TE 15 1   Manual Therapy     MT     Ther Activities        TA 15 1   Gait Training          GT     Neuro Re-education NR     Modalities     Non-Billable Service Time 10 0   Other     Total Time/Units 40 2

## 2024-10-04 ENCOUNTER — TREATMENT (OUTPATIENT)
Dept: PHYSICAL THERAPY | Age: 74
End: 2024-10-04

## 2024-10-04 DIAGNOSIS — R29.898 WEAKNESS OF BOTH LOWER EXTREMITIES: Primary | ICD-10-CM

## 2024-10-04 NOTE — PROGRESS NOTES
Physical Therapy Daily Treatment Note    Date: 10/4/2024  Patient Name: Hilaria Castillo  : 1950   MRN: [id number]  DOInjury: -  DOSx: -  Referring Provider: Abiodun Yuen MD  [facility] [place] [address]     Medical Diagnosis:   1. Weakness of both lower extremities          X = TO BE PERFORMED NEXT VISIT  > = PROGRESS TO THIS    S: overall things going ok  Wants to work on stepping up/down a curb  O: Discussed anatomy, physiology, body mechanics, principles of loading, and progressive loading/activity.   Time 5378-3845     Visit 6 Repeat outcome measure at mid point and end.    Pain      ROM      Modalities            Exercise      Nustep   L5/ 10 min  TE         Squats 15x  TA   Calf Raises 15x  TE   Toe Raises   TA   Marches 15x, 10x  TE   Alt. Sidekicks   TA   Leg press 20# 2 x 15  TE   Sit/Stands   TA   LAQ 3# on R LE, no wt on left   TE   Gait training   GT         Marching Gait   NR   Sidestepping   NR   steps 4\"  2 x 12 B LE After one set had a sitting RB          Car transfer           Curb training 6\" 2x min-mod A,  4\" 2x CGA-Min. A  TA   A:  Tolerated fair. Due to right wrist pain/fx limited with wt bearing activities.    Safety and technique is a concern. Pt has new bladder issues and will be going up to [place] oct 4th   P: Continue with rehab plan  Sanjana Prado PTA    Treatment Charges: Mins Units   Initial Evaluation     Re-Evaluation     Ther Exercise         TE 15 1   Manual Therapy     MT     Ther Activities        TA 15 1   Gait Training          GT     Neuro Re-education NR     Modalities     Non-Billable Service Time 10 0   Other     Total Time/Units 40 2

## 2024-10-09 ENCOUNTER — TREATMENT (OUTPATIENT)
Dept: PHYSICAL THERAPY | Age: 74
End: 2024-10-09
Payer: MEDICARE

## 2024-10-09 DIAGNOSIS — R29.898 WEAKNESS OF BOTH LOWER EXTREMITIES: Primary | ICD-10-CM

## 2024-10-09 PROCEDURE — 97530 THERAPEUTIC ACTIVITIES: CPT

## 2024-10-09 PROCEDURE — 97110 THERAPEUTIC EXERCISES: CPT

## 2024-10-09 NOTE — PROGRESS NOTES
Physical Therapy Daily Treatment Note    Date: 10/9/2024  Patient Name: Hilaria Castillo  : 1950   MRN: [id number]  DOInjury: -  DOSx: -  Referring Provider: Abiodun Yuen MD  [facility] [place] [address]     Medical Diagnosis:   1. Weakness of both lower extremities          X = TO BE PERFORMED NEXT VISIT  > = PROGRESS TO THIS    S: overall things going ok  Wants to work on stepping up/down a curb  O: Discussed anatomy, physiology, body mechanics, principles of loading, and progressive loading/activity.   Time 3294-7615     Visit 6 Repeat outcome measure at mid point and end.    Pain      ROM      Modalities            Exercise      Nustep   L5/ 10 min  TE         Squats 15x  TA   Calf Raises 15x  TE   Toe Raises   TA   Marches 15x, 10x  TE   Alt. Sidekicks   TA   Leg press 40# 9x, 10x  TE   Sit/Stands   TA   LAQ 3# on R LE, no wt on left   TE   Gait training   GT         Marching Gait   NR   Sidestepping   NR   steps 4\"  2 x 12 B LE After one set had a sitting RB          Car transfer           Curb training 6\" 2x min A,  4\" 2x CGA  TA   A:  Tolerated fair. Due to right wrist pain/fx limited with wt bearing activities.    Safety and technique is a concern. Pt has new bladder issues and will be going up to [place] oct 4th   P: Continue with rehab plan  Sanjana Prado PTA    Treatment Charges: Mins Units   Initial Evaluation     Re-Evaluation     Ther Exercise         TE 15 1   Manual Therapy     MT     Ther Activities        TA 15 1   Gait Training          GT     Neuro Re-education NR     Modalities     Non-Billable Service Time 10 0   Other     Total Time/Units 40 2

## 2024-10-14 ENCOUNTER — TELEPHONE (OUTPATIENT)
Dept: CARDIOLOGY CLINIC | Age: 74
End: 2024-10-14

## 2024-10-14 ENCOUNTER — TREATMENT (OUTPATIENT)
Dept: PHYSICAL THERAPY | Age: 74
End: 2024-10-14
Payer: MEDICARE

## 2024-10-14 DIAGNOSIS — R29.898 WEAKNESS OF BOTH LOWER EXTREMITIES: Primary | ICD-10-CM

## 2024-10-14 PROCEDURE — 97110 THERAPEUTIC EXERCISES: CPT

## 2024-10-14 PROCEDURE — 97530 THERAPEUTIC ACTIVITIES: CPT

## 2024-10-14 NOTE — TELEPHONE ENCOUNTER
Patient is having EXCISION CYST / MASS VAGINAL-REMOVAL VAGINAL POLYPS with Dr. Godinez.     That office is requesting Anticoagulant instruction.       Please advise

## 2024-10-14 NOTE — PROGRESS NOTES
Physical Therapy Daily Treatment Note    Date: 10/14/2024  Patient Name: Hilaria Castillo  : 1950   MRN: [id number]  DOInjury: -  DOSx: -  Referring Provider: Abiodun Yuen MD  [facility] [place] [address]     Medical Diagnosis:   1. Weakness of both lower extremities          X = TO BE PERFORMED NEXT VISIT  > = PROGRESS TO THIS    S: overall things going ok  Wants to work on stepping up/down a curb  O: Discussed anatomy, physiology, body mechanics, principles of loading, and progressive loading/activity.   Time 7992-1096     Visit 7 Repeat outcome measure at mid point and end.    Pain      ROM      Modalities            Exercise      Nustep   L5/ 10 min  TE         Squats 15x  TA   Calf Raises 15x  TE   Toe Raises   TA   Marches 15x, 10x  TE   Alt. Sidekicks   TA   Leg press 40# 2 x 20  TE   Sit/Stands   TA   LAQ 3# on R LE, no wt on left   TE   Gait training   GT         Marching Gait   NR   Sidestepping   NR   steps 4\"  2 x 12 B LE After one set had a sitting RB          Car transfer           Curb training 6\" 2x min A,  4\" 2x CGA  TA   A:  Tolerated fair. Due to right wrist pain/fx limited with wt bearing activities.    Safety and technique is a concern. Pt has new bladder issues going to get surgery on 10/16  P: Continue with rehab plan when able to return to therapy.  Sanjana Prado PTA    Treatment Charges: Mins Units   Initial Evaluation     Re-Evaluation     Ther Exercise         TE 15 1   Manual Therapy     MT     Ther Activities        TA 15 1   Gait Training          GT     Neuro Re-education NR     Modalities     Non-Billable Service Time 10 0   Other     Total Time/Units 40 2

## 2024-10-28 ENCOUNTER — OFFICE VISIT (OUTPATIENT)
Dept: BARIATRICS/WEIGHT MGMT | Age: 74
End: 2024-10-28
Payer: MEDICARE

## 2024-10-28 VITALS
TEMPERATURE: 97 F | WEIGHT: 195.2 LBS | SYSTOLIC BLOOD PRESSURE: 111 MMHG | HEART RATE: 65 BPM | BODY MASS INDEX: 30.64 KG/M2 | HEIGHT: 67 IN | DIASTOLIC BLOOD PRESSURE: 71 MMHG

## 2024-10-28 DIAGNOSIS — E66.811 CLASS 1 OBESITY DUE TO EXCESS CALORIES WITH SERIOUS COMORBIDITY AND BODY MASS INDEX (BMI) OF 30.0 TO 30.9 IN ADULT: ICD-10-CM

## 2024-10-28 DIAGNOSIS — E66.09 CLASS 1 OBESITY DUE TO EXCESS CALORIES WITH SERIOUS COMORBIDITY AND BODY MASS INDEX (BMI) OF 30.0 TO 30.9 IN ADULT: ICD-10-CM

## 2024-10-28 DIAGNOSIS — E88.819 INSULIN RESISTANCE: ICD-10-CM

## 2024-10-28 DIAGNOSIS — E66.811 CLASS 1 OBESITY DUE TO EXCESS CALORIES WITH SERIOUS COMORBIDITY AND BODY MASS INDEX (BMI) OF 31.0 TO 31.9 IN ADULT: ICD-10-CM

## 2024-10-28 DIAGNOSIS — I10 ESSENTIAL HYPERTENSION: Primary | ICD-10-CM

## 2024-10-28 DIAGNOSIS — E66.09 CLASS 1 OBESITY DUE TO EXCESS CALORIES WITH SERIOUS COMORBIDITY AND BODY MASS INDEX (BMI) OF 31.0 TO 31.9 IN ADULT: ICD-10-CM

## 2024-10-28 PROCEDURE — 3078F DIAST BP <80 MM HG: CPT | Performed by: INTERNAL MEDICINE

## 2024-10-28 PROCEDURE — 3074F SYST BP LT 130 MM HG: CPT | Performed by: INTERNAL MEDICINE

## 2024-10-28 PROCEDURE — 99214 OFFICE O/P EST MOD 30 MIN: CPT | Performed by: INTERNAL MEDICINE

## 2024-10-28 PROCEDURE — 1123F ACP DISCUSS/DSCN MKR DOCD: CPT | Performed by: INTERNAL MEDICINE

## 2024-10-28 PROCEDURE — 99211 OFF/OP EST MAY X REQ PHY/QHP: CPT

## 2024-10-28 RX ORDER — TIRZEPATIDE 10 MG/.5ML
10 INJECTION, SOLUTION SUBCUTANEOUS WEEKLY
Qty: 2 ML | Refills: 3 | Status: SHIPPED | OUTPATIENT
Start: 2024-10-28

## 2024-10-28 NOTE — PATIENT INSTRUCTIONS
Rules:  Count every calorie every day  Limit sweets to one day per month  Limit chips/crackers/pretzels/nuts/popcorn to 150 gonzalez/day    Requirements:  Make sure protein intake is at least 75 grams per day  Make sure that fiber intake is at least 25 grams per day  Take one multivitamin every day    Targets:  Limit calorie intake to 1250 calories/day  Chair exercises as able  Avoid eating 2 hours within bedtime.   Limit restaurants (including fast food and food from a convenience store) to one time every two weeks while in town    Tips:  Do not eat outside of the dining room or the kitchen  Do not eat while watching TV, videos, working on the computer or using a smart phone  Do not eat food out of a multi-serving bag or container.    Tirzepatide (Zepbound):  Continue Zepbound 10 mg weekly.    Follow up in 3 months.

## 2024-10-28 NOTE — PROGRESS NOTES
CC -   HTN, Weight gain    BACKGROUND -   Last visit: 7/23/2024  First visit: 4/14/22    Obesity (all weight in lbs)  Began in childhood  Initial BMI 37.81, Wt 243.2, Ht 67.25\"  HS Grad wt ~200 lbs (down from 300)   Lowest   wt 175 lbs (on weight watchers)  Highest  wt 300 (at 14 years of age)  Pattern of wt gain: gradual  Wt change past yr: similar (235-245)  Most wt lost: 100 lbs (in high school)  Other diets attempted: WW, diet pills (multiple)     Desire to lose weight: 10/10 (does not want surgery; patient's own goal: <200 lbs)    Initial Diet:    Number of meals per day - 3    Number of snacks per day - 1    Meal volume - 12\" plate,  sometimes seconds    Fast food/convenience store - 3-4x/week (eg a breakfast sandwich)    Restaurants (not fast food) - 1-2x/week   Sweets - 0d/week   Chips - 1d/week   Crackers/pretzels - 1-2d/week   Nuts - 1d/week   Peanut Butter - 0d/week   Popcorn - 1d/week   Dried fruit - 0d/week   Whole fruit - 7d/week (berries, oranges, grapes, apples in season)   Breakfast cereal - 2d/week   Granola/Protein/Energy bar - 0d/week   Sugar sweetened beverages - none   Protein - No supplements   Fiber - No supplements     Initial Exercise:    Gym membership - silver sneakers    Walking - marching around the house with leg lifts etc    Running - no    Resistance - no    Aerobic class - no        Initial Sleep: Bedtime: 11pm-midnight, wake up time: 6:30-7:00 - usu rested, daytime naps: no    Weight scale at home: yes, takes weight: 1x/wk  Food scale: yes  ______________________    PFSH -  Past Medical History:   Diagnosis Date    A-fib ([facility])     Arthritis     Class 2 severe obesity due to excess calories with serious comorbidity and body mass index (BMI) of 37.0 to 37.9 in adult     Dry eyes, bilateral     Hypertension     OAB (overactive bladder)     PONV (postoperative nausea and vomiting)    Atrial fibrillation - recently diagnosed, on Eliquis and Metoprolol now.    Past Surgical History:

## 2024-10-31 ENCOUNTER — TELEPHONE (OUTPATIENT)
Dept: BARIATRICS/WEIGHT MGMT | Age: 74
End: 2024-10-31

## 2024-10-31 NOTE — TELEPHONE ENCOUNTER
Zepbound 10mg orginally denied. Got approval faxed over. Zepbound approved from 1/1/24 through 10/29/25

## 2024-12-04 RX ORDER — APIXABAN 5 MG/1
5 TABLET, FILM COATED ORAL 2 TIMES DAILY
Qty: 180 TABLET | Refills: 1 | Status: SHIPPED | OUTPATIENT
Start: 2024-12-04

## 2024-12-11 ENCOUNTER — TREATMENT (OUTPATIENT)
Dept: PHYSICAL THERAPY | Age: 74
End: 2024-12-11

## 2024-12-11 DIAGNOSIS — R29.898 WEAKNESS OF BOTH LOWER EXTREMITIES: Primary | ICD-10-CM

## 2024-12-17 ENCOUNTER — TREATMENT (OUTPATIENT)
Dept: PHYSICAL THERAPY | Age: 74
End: 2024-12-17
Payer: MEDICARE

## 2024-12-17 DIAGNOSIS — R29.898 WEAKNESS OF BOTH LOWER EXTREMITIES: Primary | ICD-10-CM

## 2024-12-17 PROCEDURE — 97110 THERAPEUTIC EXERCISES: CPT

## 2024-12-17 PROCEDURE — 97530 THERAPEUTIC ACTIVITIES: CPT

## 2024-12-17 NOTE — PROGRESS NOTES
Physical Therapy Daily Treatment Note    Date: 2024  Patient Name: Hilaria Castillo  : 1950   MRN: [id number]  DOInjury: -  DOSx: -  Referring Provider:  Abiodun Yuen MD     Medical Diagnosis:   1. Weakness of both lower extremities          X = TO BE PERFORMED NEXT VISIT  > = PROGRESS TO THIS    S: pt reports no changes since the other day.   O: Discussed anatomy, physiology, body mechanics, principles of loading, and progressive loading/activity.   Time 1019-7320     Visit 2/ Repeat outcome measure at mid point and end.    Pain      ROM      Modalities            Exercise      Nustep    TE         Squats 15x  TA   Calf Raises 15x  TE   Toe Raises   TA   Marches 15x, 10x  TE   Alt. Sidekicks   TA   Leg press  TE   Sit/Stands Unable   TA   LAQ 3#15x  on R LE, 2# 10x  See below TE   Gait training   GT   Sitting rows Green 2 x 20 See below    Marching Gait   NR   Sidestepping   NR   steps 4\"  2 x 12 B LE           Car transfer           Curb training 6\" 2x min A,  4\" 2x CGA  TA   A:  Tolerated fair. Gave pt green band and inst pt and spouse for correct tech. Due to weakness in the shoulders/arms inst pt using band Gave pt exercise chart for daily program.   Instructed pt on the chart need to do daily exs. Giving pt this chart is making her accountable.  Safety and technique is a concern.   P: Continue with rehab plan and bring chart in.   Sanjana Prado PTA    Treatment Charges: Mins Units   Initial Evaluation     Re-Evaluation     Ther Exercise         TE 15 1   Manual Therapy     MT     Ther Activities        TA 15 1   Gait Training          GT     Neuro Re-education NR     Modalities     Non-Billable Service Time 10 0   Other     Total Time/Units 40 2

## 2024-12-26 ENCOUNTER — TREATMENT (OUTPATIENT)
Dept: PHYSICAL THERAPY | Age: 74
End: 2024-12-26

## 2024-12-26 DIAGNOSIS — R29.898 WEAKNESS OF BOTH LOWER EXTREMITIES: Primary | ICD-10-CM

## 2024-12-26 NOTE — PROGRESS NOTES
Physical Therapy Daily Treatment Note    Date: 2024  Patient Name: Hilaria Castillo  : 1950   MRN: [id number]  DOInjury: -  DOSx: -  Referring Provider:  Abiodun Yuen MD     Medical Diagnosis:   1. Weakness of both lower extremities          X = TO BE PERFORMED NEXT VISIT  > = PROGRESS TO THIS    S: pt reports no changes since the other day.   O: Discussed anatomy, physiology, body mechanics, principles of loading, and progressive loading/activity.   Time 0160-2252     Visit 2/ Repeat outcome measure at mid point and end.    Pain      ROM      Modalities            Exercise      Nustep    TE         Squats 2 x 10 attempted to go deeper She had marked difficulty arising from lowest point TA   Calf Raises 2 x 10  TE   Toe Raises   TA   Marches Holding bar and raising only ipsilateral leg 2 x 10 on R an L  TE   Alt. Sidekicks   TA   Leg press  TE   Sit/Stands Unable   TA   LAQ 2 x 10 bilateral w/ 5 sec hold See below TE   Gait training   GT   Sitting rows See below    Marching Gait   NR   Sidestepping   NR   steps           Car transfer           Curb training  TA   A:  Tolerated fair.   P: Continue with rehab plan and bring chart in.   William Dean, PT    Treatment Charges: Mins Units   Initial Evaluation     Re-Evaluation     Ther Exercise         TE 30 2   Manual Therapy     MT     Ther Activities        TA 8 1   Gait Training          GT     Neuro Re-education NR     Modalities     Non-Billable Service Time     Other     Total Time/Units 38 3

## 2024-12-30 ENCOUNTER — TREATMENT (OUTPATIENT)
Dept: PHYSICAL THERAPY | Age: 74
End: 2024-12-30
Payer: MEDICARE

## 2024-12-30 DIAGNOSIS — R29.898 WEAKNESS OF BOTH LOWER EXTREMITIES: Primary | ICD-10-CM

## 2024-12-30 PROCEDURE — 97110 THERAPEUTIC EXERCISES: CPT

## 2024-12-30 PROCEDURE — 97530 THERAPEUTIC ACTIVITIES: CPT

## 2024-12-30 NOTE — PROGRESS NOTES
Physical Therapy Daily Treatment Note    Date: 2024  Patient Name: Hilaria Castillo  : 1950   MRN: [id number]  DOInjury: -  DOSx: -  Referring Provider:  Abiodun Yuen MD     Medical Diagnosis:   1. Weakness of both lower extremities          X = TO BE PERFORMED NEXT VISIT  > = PROGRESS TO THIS    S: pt reports no changes since the other day.   O: Discussed anatomy, physiology, body mechanics, principles of loading, and progressive loading/activity.   Time 8352-6337     Visit 4/ Repeat outcome measure at mid point and end.    Pain      ROM      Modalities            Exercise      Nustep   L6/ 5 min End of tx. TE         Squats  2 sets to fatigue attempted to go deeper She had marked difficulty arising from lowest point TA   Calf Raises 2 sets 10~12 fatigue Holding for 5 sec TE   Toe Raises   TA   Marches Holding bar and raising only ipsilateral leg 3x 10 on R an L  TE   Alt. Sidekicks   TA   Leg press  TE   Sit/Stands Unable   TA   LAQ 2 x 10 bilateral w/ 5 sec hold  TE   Gait training   GT   Sitting rows See below    Marching Gait   NR   Sidestepping   NR   steps           Car transfer           Curb training  TA   A:  Tolerated well.   P: Continue with rehab plan and bring chart in.   Sanjana Prado PTA    Treatment Charges: Mins Units   Initial Evaluation     Re-Evaluation     Ther Exercise         TE 30 2   Manual Therapy     MT     Ther Activities        TA 30 2   Gait Training          GT     Neuro Re-education NR     Modalities     Non-Billable Service Time     Other     Total Time/Units 60 4

## 2025-01-06 ENCOUNTER — TREATMENT (OUTPATIENT)
Dept: PHYSICAL THERAPY | Age: 75
End: 2025-01-06
Payer: MEDICARE

## 2025-01-06 DIAGNOSIS — R29.898 WEAKNESS OF BOTH LOWER EXTREMITIES: Primary | ICD-10-CM

## 2025-01-06 DIAGNOSIS — M19.90 OSTEOARTHRITIS, UNSPECIFIED OSTEOARTHRITIS TYPE, UNSPECIFIED SITE: ICD-10-CM

## 2025-01-06 DIAGNOSIS — R26.2 DIFFICULTY WALKING: ICD-10-CM

## 2025-01-06 DIAGNOSIS — R26.9 ABNORMALITY OF GAIT: ICD-10-CM

## 2025-01-06 PROCEDURE — 97110 THERAPEUTIC EXERCISES: CPT

## 2025-01-06 PROCEDURE — 97530 THERAPEUTIC ACTIVITIES: CPT

## 2025-01-06 NOTE — PROGRESS NOTES
Physical Therapy Daily Treatment Note    Date: 2025  Patient Name: Hilaria Castillo  : 1950   MRN: [id number]  DOInjury: -  DOSx: -  Referring Provider:  Abiodun Yuen MD     Medical Diagnosis:   1. Weakness of both lower extremities          X = TO BE PERFORMED NEXT VISIT  > = PROGRESS TO THIS    S: pt reports right elbow still sore.  O: Discussed anatomy, physiology, body mechanics, principles of loading, and progressive loading/activity.   Time 8274-4662     Visit 5/ Repeat outcome measure at mid point and end.    Pain      ROM      Modalities            Exercise      Nustep   L6/ 5 min End of tx. TE         Squats  2 sets to fatigue attempted to go deeper She had marked difficulty arising from lowest point TA   Calf Raises 2 sets 10~12 fatigue Holding for 5 sec TE   Toe Raises   TA   Marches Holding bar and raising only ipsilateral leg 12x, 10,  on R an L  TE   Alt. Sidekicks   TA   Leg press  TE   Sit/Stands Unable   TA   LAQ 2 x 10 bilateral w/ 5 sec hold  TE   Gait training   GT   Sitting rows See below    Marching Gait   NR   Sidestepping   NR   steps           Car transfer           Curb training  TA   A:  Tolerated well.   P: Continue with rehab plan and bring chart in.   Sanjana Prado PTA    Treatment Charges: Mins Units   Initial Evaluation     Re-Evaluation     Ther Exercise         TE 15 1   Manual Therapy     MT     Ther Activities        TA 30 2   Gait Training          GT     Neuro Re-education NR     Modalities     Non-Billable Service Time     Other     Total Time/Units 45 4

## 2025-01-09 ENCOUNTER — TREATMENT (OUTPATIENT)
Dept: PHYSICAL THERAPY | Age: 75
End: 2025-01-09
Payer: MEDICARE

## 2025-01-09 DIAGNOSIS — R29.898 WEAKNESS OF BOTH LOWER EXTREMITIES: Primary | ICD-10-CM

## 2025-01-09 PROCEDURE — 97530 THERAPEUTIC ACTIVITIES: CPT

## 2025-01-09 PROCEDURE — 97110 THERAPEUTIC EXERCISES: CPT

## 2025-01-09 NOTE — PROGRESS NOTES
Physical Therapy Daily Treatment Note    Date: 2025  Patient Name: Hilaria Castillo  : 1950   MRN: [id number]  DOInjury: -  DOSx: -  Referring Provider:  Abiodun Yuen MD     Medical Diagnosis:   1. Weakness of both lower extremities          X = TO BE PERFORMED NEXT VISIT  > = PROGRESS TO THIS    S: pt reports right elbow still sore.  O: Discussed anatomy, physiology, body mechanics, principles of loading, and progressive loading/activity.   Time 9095-0900     Visit 6/ Repeat outcome measure at mid point and end.    Pain      ROM      Modalities            Exercise      Nustep   L6/ 5 min End of tx. TE         Squats  2 sets to fatigue attempted to go deeper  12x,12x, 12x (with 1 RB after 2nd set) She had marked difficulty arising from lowest point TA   Calf Raises 3 sets 15~ fatigue Holding for 5 sec TE   Toe Raises   TA   Marches Holding bar and raising only ipsilateral leg   on R an L  3 x 15 with no RB TE   Alt. Sidekicks   TA   Leg press 40# 15x, 10x, 15x, 6x  TE   Sit/Stands Unable   TA   LAQ 2 x 10 bilateral w/ 5 sec hold  TE   Gait training   GT   Sitting rows See below    Marching Gait   NR   Sidestepping   NR   steps           Car transfer           Curb training  TA   A:  Tolerated well.   P: Continue with rehab plan and bring chart in.   Sanjana Prado PTA    Treatment Charges: Mins Units   Initial Evaluation     Re-Evaluation     Ther Exercise         TE 15 1   Manual Therapy     MT     Ther Activities        TA 30 2   Gait Training          GT     Neuro Re-education NR     Modalities     Non-Billable Service Time     Other     Total Time/Units 45 3

## 2025-01-14 ENCOUNTER — TREATMENT (OUTPATIENT)
Dept: PHYSICAL THERAPY | Age: 75
End: 2025-01-14
Payer: MEDICARE

## 2025-01-14 DIAGNOSIS — R29.898 WEAKNESS OF BOTH LOWER EXTREMITIES: Primary | ICD-10-CM

## 2025-01-14 PROCEDURE — 97530 THERAPEUTIC ACTIVITIES: CPT

## 2025-01-14 PROCEDURE — 97110 THERAPEUTIC EXERCISES: CPT

## 2025-01-14 NOTE — PROGRESS NOTES
Physical Therapy Daily Treatment Note    Date: 2025  Patient Name: Hilaria Castillo  : 1950   MRN: [id number]  DOInjury: -  DOSx: -  Referring Provider:  Abiodun Yuen MD     Medical Diagnosis:   1. Weakness of both lower extremities          X = TO BE PERFORMED NEXT VISIT  > = PROGRESS TO THIS    S: pt reports right elbow still sore.  O: Discussed anatomy, physiology, body mechanics, principles of loading, and progressive loading/activity.   Time 0808-6026     Visit 7/ Repeat outcome measure at mid point and end.    Pain      ROM      Modalities            Exercise      Nustep   L6/ 5 min End of tx. TE         Squats  2 sets to fatigue attempted to go deeper  15,15x, 15x (with 1 RB after 2nd set) She had marked difficulty arising from lowest point TA   Calf Raises 3 sets 15~ fatigue Holding for 5 sec TE   Toe Raises   TA   Marches Holding bar and raising only ipsilateral leg   on R an L  3 x 15 with no RB TE   Alt. Sidekicks   TA   Leg press 40# 15x, 10x, 15x, 6x  TE   Sit/Stands Unable   TA   LAQ 2d HEP TE   Gait training   GT   Sitting rows See below    Marching Gait   NR   Sidestepping   NR   steps           Car transfer           Curb training  TA   A:  Tolerated well. Unable to perform 6\" step due to weakness, tried 4\" was able to perform.   P: Continue with rehab plan and bring chart in.   Sanjana Prado PTA    Treatment Charges: Mins Units   Initial Evaluation     Re-Evaluation     Ther Exercise         TE 15 1   Manual Therapy     MT     Ther Activities        TA 30 2   Gait Training          GT     Neuro Re-education NR     Modalities     Non-Billable Service Time     Other     Total Time/Units 45 3

## 2025-01-16 ENCOUNTER — TREATMENT (OUTPATIENT)
Dept: PHYSICAL THERAPY | Age: 75
End: 2025-01-16
Payer: MEDICARE

## 2025-01-16 DIAGNOSIS — R29.898 WEAKNESS OF BOTH LOWER EXTREMITIES: Primary | ICD-10-CM

## 2025-01-16 PROCEDURE — 97110 THERAPEUTIC EXERCISES: CPT

## 2025-01-16 PROCEDURE — 97530 THERAPEUTIC ACTIVITIES: CPT

## 2025-01-16 NOTE — PROGRESS NOTES
Physical Therapy Daily Treatment Note    Date: 2025  Patient Name: Hilaria Castillo  : 1950   MRN: [id number]  DOInjury: -  DOSx: -  Referring Provider:  Abiodun Yuen MD     Medical Diagnosis:   1. Weakness of both lower extremities          X = TO BE PERFORMED NEXT VISIT  > = PROGRESS TO THIS    S: pt reports right elbow still sore.  O: Discussed anatomy, physiology, body mechanics, principles of loading, and progressive loading/activity.   Time 0551-1140     Visit 8/ Repeat outcome measure at mid point and end.    Pain      ROM      Modalities            Exercise      Nustep   L6/ 5 min End of tx. TE         Squats 2 sets to fatigue attempted to go deeper  15,15x,  She had marked difficulty arising from lowest point TA   Calf Raises 3 sets 15~ fatigue Holding for 5 sec TE   Toe Raises   TA   Marches Holding bar and raising only ipsilateral leg   on R an L  3 x 20 with no RB TE   Alt. Sidekicks   TA   Leg press 40# 20x, 20x,   TE   Sit/Stands Unable   TA   LAQ 2d HEP TE   Gait training   GT   Sitting rows See below    Marching Gait   NR   Sidestepping next  NR   steps 4\"  2 x 12 B LE           Car transfer           Curb training  TA   A:  Tolerated well. Unable to perform 6\" step due to weakness, tried 4\" was able to perform.   P: Continue with rehab plan work on side stepping in order for pt to put walker in the back of suv and walker around to the front of car sidestepping  Sanjana Prado PTA    Treatment Charges: Mins Units   Initial Evaluation     Re-Evaluation     Ther Exercise         TE 30 2   Manual Therapy     MT     Ther Activities        TA 30 2   Gait Training          GT     Neuro Re-education NR     Modalities     Non-Billable Service Time     Other     Total Time/Units 60 4

## 2025-01-22 ENCOUNTER — TREATMENT (OUTPATIENT)
Dept: PHYSICAL THERAPY | Age: 75
End: 2025-01-22
Payer: MEDICARE

## 2025-01-22 DIAGNOSIS — R29.898 WEAKNESS OF BOTH LOWER EXTREMITIES: Primary | ICD-10-CM

## 2025-01-22 PROCEDURE — 97530 THERAPEUTIC ACTIVITIES: CPT

## 2025-01-22 PROCEDURE — 97110 THERAPEUTIC EXERCISES: CPT

## 2025-01-22 NOTE — PROGRESS NOTES
Physical Therapy Daily Treatment Note    Date: 2025  Patient Name: Hilaria Castillo  : 1950   MRN: [id number]  DOInjury: -  DOSx: -  Referring Provider:  Abiodun Yuen MD     Medical Diagnosis:   1. Weakness of both lower extremities          X = TO BE PERFORMED NEXT VISIT  > = PROGRESS TO THIS    S: pt reports right elbow still sore.  O: Discussed anatomy, physiology, body mechanics, principles of loading, and progressive loading/activity.   Time 0601-8286     Visit 8/ Repeat outcome measure at mid point and end.    Pain      ROM      Modalities            Exercise      Nustep   L6/ 5 min End of tx. TE         Squats 2 sets to fatigue attempted to go deeper  15,15x,  She had marked difficulty arising from lowest point TA   Calf Raises 2 sets 15~ fatigue holding with B hands, 15x finger tip holding on. Holding for 5 sec TE   Toe Raises   TA   Marches Holding bar and raising only ipsilateral leg   on R an L  3 x 20 with no RB TE   Alt. Sidekicks   TA   Leg press 40# 20x, 20x, 20x  TE   Sit/Stands Unable   TA   LAQ 2d HEP TE   Gait training   GT   Sitting rows See below    Marching Gait   NR   Sidestepping Holding bar and raising only ipsilateral leg   on R an L 4x in // bar NR   steps 6\" 3x, 4\" 10x           Car transfer           Curb training  TA   A:  Tolerated well.   P: Continue with rehab plan work on side stepping in order for pt to put walker in the back of suv and walker around to the front of car sidestepping  Sanjana Prado PTA    Treatment Charges: Mins Units   Initial Evaluation     Re-Evaluation     Ther Exercise         TE 30 2   Manual Therapy     MT     Ther Activities        TA 30 2   Gait Training          GT     Neuro Re-education NR     Modalities     Non-Billable Service Time     Other     Total Time/Units 60 4

## 2025-01-24 ENCOUNTER — OFFICE VISIT (OUTPATIENT)
Dept: CARDIOLOGY CLINIC | Age: 75
End: 2025-01-24
Payer: MEDICARE

## 2025-01-24 VITALS
HEIGHT: 67 IN | HEART RATE: 94 BPM | WEIGHT: 196 LBS | RESPIRATION RATE: 16 BRPM | DIASTOLIC BLOOD PRESSURE: 76 MMHG | SYSTOLIC BLOOD PRESSURE: 130 MMHG | BODY MASS INDEX: 30.76 KG/M2

## 2025-01-24 DIAGNOSIS — I25.10 CORONARY ARTERY DISEASE INVOLVING NATIVE CORONARY ARTERY OF NATIVE HEART WITHOUT ANGINA PECTORIS: Primary | ICD-10-CM

## 2025-01-24 DIAGNOSIS — I48.19 PERSISTENT ATRIAL FIBRILLATION (HCC): ICD-10-CM

## 2025-01-24 PROCEDURE — 3078F DIAST BP <80 MM HG: CPT | Performed by: CLINICAL NURSE SPECIALIST

## 2025-01-24 PROCEDURE — 1123F ACP DISCUSS/DSCN MKR DOCD: CPT | Performed by: CLINICAL NURSE SPECIALIST

## 2025-01-24 PROCEDURE — 1160F RVW MEDS BY RX/DR IN RCRD: CPT | Performed by: CLINICAL NURSE SPECIALIST

## 2025-01-24 PROCEDURE — 1159F MED LIST DOCD IN RCRD: CPT | Performed by: CLINICAL NURSE SPECIALIST

## 2025-01-24 PROCEDURE — 3075F SYST BP GE 130 - 139MM HG: CPT | Performed by: CLINICAL NURSE SPECIALIST

## 2025-01-24 PROCEDURE — 99212 OFFICE O/P EST SF 10 MIN: CPT | Performed by: CLINICAL NURSE SPECIALIST

## 2025-01-24 RX ORDER — APIXABAN 5 MG/1
5 TABLET, FILM COATED ORAL 2 TIMES DAILY
Qty: 180 TABLET | Refills: 5 | Status: SHIPPED | OUTPATIENT
Start: 2025-01-24

## 2025-01-24 NOTE — PROGRESS NOTES
OUTPATIENT CARDIOLOGY FOLLOW-UP    Name: Hilaria Castillo    Age: 74 y.o.    Primary Care Physician: Abiodun Yuen MD    Date of Service: 1/24/2025    Chief Complaint: Coronary artery disease, hypertension, longstanding persistent/permanent atrial fibrillation     Interim History:    Ms. Castillo is a pleasant 74 year old lady who presents today in annual follow up, accompanied by her . She was last seen here by Dr. Nelson in January 2024. She was since hospitalized at [facility] in October 2024 and underwent surgical repair of pelvic organ prolapse. She denies perioperative complications and states she is feeling \"great\".   Her functional capacity is limited due to multiple orthopedic issues arising from septic arthritis in 2023.  She ambulates with a walker at all times, and continues to attend physical therapy and remains on suppressive therapy with doxycycline.  At this reduced capacity she denies exertional chest discomfort or dyspnea, although her legs become tired easily.  She has occasional rapid heartbeats which only last a few minutes at a time are not bothersome.  She denies orthopnea, PND, dizziness, or syncope.  During and after her hospitalizations in September 2023 in October 2020 for she had significant lower extremity edema, which is since resolved and she continues to wear compression socks. She denies pathologic bleeding on Eliquis. She is taking Zepbound and has lost 24 lbs since her last appointment here.     Review of Systems:   Cardiac: Palpitations and edema as above.  Denies chest discomfort.  General: No fever, chills or unusual fatigue.  Pulmonary: No dyspnea, orthopnea, PND.  HEENT: No headaches, epistaxis, bleeding gums.  GI: No nausea, vomiting, abdominal pain or dark bloody stools.  : No dysuria, hematuria.  Positive for nocturia.  Endocrine: No temperature intolerance or excessive thirst.  Musculoskeletal: Gait difficulty as above.  She is regaining range of motion in her

## 2025-01-24 NOTE — PATIENT INSTRUCTIONS
Continue the same medications     Please call us with any questions or concerns before your next appointment

## 2025-01-27 ENCOUNTER — TREATMENT (OUTPATIENT)
Dept: PHYSICAL THERAPY | Age: 75
End: 2025-01-27
Payer: MEDICARE

## 2025-01-27 DIAGNOSIS — R29.898 WEAKNESS OF BOTH LOWER EXTREMITIES: Primary | ICD-10-CM

## 2025-01-27 PROCEDURE — 97110 THERAPEUTIC EXERCISES: CPT

## 2025-01-27 PROCEDURE — 97530 THERAPEUTIC ACTIVITIES: CPT

## 2025-01-27 NOTE — PROGRESS NOTES
Physical Therapy Daily Treatment Note    Date: 2025  Patient Name: Hilaria Castillo  : 1950   MRN: [id number]  DOInjury: -  DOSx: -  Referring Provider:  Abiodun Yuen MD     Medical Diagnosis:   1. Weakness of both lower extremities          X = TO BE PERFORMED NEXT VISIT  > = PROGRESS TO THIS    S: pt reports was able to go to [employer] on sat.  No issues.   O: Discussed anatomy, physiology, body mechanics, principles of loading, and progressive loading/activity.   Time 3214-6616     Visit 8/ Repeat outcome measure at mid point and end.    Pain      ROM      Modalities            Exercise      Nustep   L6/ 5 min End of tx. TE         Squats 3 sets to fatigue attempted to go deeper  15,15x,  She had marked difficulty arising from lowest point TA   Calf Raises 3 sets 15~ fatigue holding with B hands, 15x finger tip holding on. Holding for 5 sec TE   Toe Raises   TA   Marches Holding bar and raising only ipsilateral leg   on R an L  3 x 20 with no RB TE   Alt. Sidekicks   TA   Leg press 40# 20x, 20x, 25x  TE   Sit/Stands Unable   TA   LAQ 2d HEP TE   Gait training   GT   Sitting rows See below    Marching Gait   NR   Sidestepping Holding bar and raising only ipsilateral leg   on R an L 4x in // bar TA   steps 6\" 3x, 4\" 10x           Car transfer           Curb training  TA   A:  Tolerated well.   P: Continue with rehab plan work on side stepping in order for pt to put walker in the back of suv and walker around to the front of car sidestepping  Sanjana Prado PTA    Treatment Charges: Mins Units   Initial Evaluation     Re-Evaluation     Ther Exercise         TE 30 2   Manual Therapy     MT     Ther Activities        TA 30 2   Gait Training          GT     Neuro Re-education NR     Modalities     Non-Billable Service Time     Other     Total Time/Units 60 4

## 2025-01-28 ENCOUNTER — OFFICE VISIT (OUTPATIENT)
Dept: BARIATRICS/WEIGHT MGMT | Age: 75
End: 2025-01-28
Payer: MEDICARE

## 2025-01-28 VITALS
BODY MASS INDEX: 30.39 KG/M2 | HEIGHT: 67 IN | DIASTOLIC BLOOD PRESSURE: 79 MMHG | HEART RATE: 77 BPM | SYSTOLIC BLOOD PRESSURE: 149 MMHG | TEMPERATURE: 97.2 F | WEIGHT: 193.6 LBS

## 2025-01-28 DIAGNOSIS — E66.09 CLASS 1 OBESITY DUE TO EXCESS CALORIES WITH SERIOUS COMORBIDITY AND BODY MASS INDEX (BMI) OF 30.0 TO 30.9 IN ADULT: ICD-10-CM

## 2025-01-28 DIAGNOSIS — E88.819 INSULIN RESISTANCE: ICD-10-CM

## 2025-01-28 DIAGNOSIS — I10 ESSENTIAL HYPERTENSION: Primary | ICD-10-CM

## 2025-01-28 DIAGNOSIS — E66.811 CLASS 1 OBESITY DUE TO EXCESS CALORIES WITH SERIOUS COMORBIDITY AND BODY MASS INDEX (BMI) OF 30.0 TO 30.9 IN ADULT: ICD-10-CM

## 2025-01-28 PROCEDURE — 99211 OFF/OP EST MAY X REQ PHY/QHP: CPT | Performed by: INTERNAL MEDICINE

## 2025-01-28 PROCEDURE — 3078F DIAST BP <80 MM HG: CPT | Performed by: INTERNAL MEDICINE

## 2025-01-28 PROCEDURE — 1123F ACP DISCUSS/DSCN MKR DOCD: CPT | Performed by: INTERNAL MEDICINE

## 2025-01-28 PROCEDURE — 3077F SYST BP >= 140 MM HG: CPT | Performed by: INTERNAL MEDICINE

## 2025-01-28 PROCEDURE — 99214 OFFICE O/P EST MOD 30 MIN: CPT | Performed by: INTERNAL MEDICINE

## 2025-01-28 NOTE — PATIENT INSTRUCTIONS
Rules:  Count every calorie every day  Limit sweets to one day per month  Limit chips/crackers/pretzels/nuts/popcorn to 150 gonzaelz/day    Requirements:  Make sure protein intake is at least 75 grams per day  Make sure that fiber intake is at least 25 grams per day  Take one multivitamin every day    Targets:  Limit calorie intake to 1250 calories/day  Chair exercises as able  Avoid eating 2 hours within bedtime.   Limit restaurants (including fast food and food from a convenience store) to one time every two weeks while in town    Tips:  Do not eat outside of the dining room or the kitchen  Do not eat while watching TV, videos, working on the computer or using a smart phone  Do not eat food out of a multi-serving bag or container.    Tirzepatide (Zepbound):  Increase Zepbound to 12.5 mg weekly.    Follow up in 3 months.

## 2025-01-28 NOTE — PROGRESS NOTES
ASSESSMENT/PLAN -     Problem 1 - Hypertension   HPI   - BP (!) 149/79 today, compliant with medication, patient asymptomatic.  Assessment  - High today, usu controlled  Plan   - Continue medication. Weight reduction can help de-escalate dose of anti-hypertensive. Weight reduction per plan below    Problem 2  - Obesity   HPI   - See above Background for description    Weight  Date    243.2  4/14/22    Ozempic - did not get covered    227.8  6/17/22    Topiramate    215.8  8/17/22    Topiramate    214.0  10/17/22   Taper topiramate - retry Ozempic (IR)    218.4  12/13/22  Saxenda    211.0  1/24/23    234.0  1/31/24     Zepbound 2.5 -> 5 mg    221.4  3/13/24     Zepbound 5->7.5    214.0  5/23/24     Saxenda -> Zepbound    201.2  7/23/24     Zepbound 10 mg    195.2  10/28/24 Zepbound 10 mg continued     193.6  1/28/25 Zepbound 12.5    Total weight change to date: -49.6 lbs.  Average daily energy variance:  4/14/2022 - 6/17/2022: -13.8 lbs (02534 Dioni)/63 d = -767 Dioni/d deficit.  6/17/2022 - 8/17/2022: -12 lbs (29314 Dioni)/61 d = -689 Dioni/d deficit.  8/17/2022 - 10/17/2022: -1.8 lbs (6300 Dioni)/61 d = -103 Dioni/d deficit.  10/17/2022 - 12/13/2022: +4.4 lbs (06493 Dioni)/57 d = +270 Dioni/d excess.  12/13/2022 - 1/24/2023: -7.4 lbs (19758 Dioni)/42 d = -617 Dioni/d deficit.  1/31/2024 - 3/13/2024: -12.6 lbs (67560 Dioni)/42 d = -1051 Dioni/d deficit.  3/13/2024 - 5/23/2024: -7.4 lbs (90674 Dioni)/71 d = -365 Dioni/d deficit.   5/23/2024 - 7/23/2024: -12.8 lbs (06861 Dioni)/61 d = -734 Dioni/d deficit.   7/23/2024 - 10/28/2024: -6.0 lbs (30312 Dioni)/97 d = -216 Dioni/d deficit.   10/28/2024 - 1/28/2025: -1.6 lbs (5600 Dioni)/92 d = -61 Dioni/d deficit.      DEN (est.)= 1926 Dioni/d = 80870 Dioni/wk    Update:  Right wrist fracture - healing. Had bladder prolapse re-surgery - helping  Watching portion size; stops when she feels full.  Protein: premier protein shakes - most days, gets >75 g/day (meat/chicken)  Fiber: Fiber one cereal, high fiber breads,

## 2025-01-30 ENCOUNTER — TREATMENT (OUTPATIENT)
Dept: PHYSICAL THERAPY | Age: 75
End: 2025-01-30

## 2025-01-30 DIAGNOSIS — R29.898 WEAKNESS OF BOTH LOWER EXTREMITIES: Primary | ICD-10-CM

## 2025-01-30 NOTE — PROGRESS NOTES
Physical Therapy Daily Treatment Note    Date: 2025  Patient Name: Hilaria Castillo  : 1950   MRN: [id number]  DOInjury: -  DOSx: -  Referring Provider:  Abiodun Yuen MD     Medical Diagnosis:   1. Weakness of both lower extremities          X = TO BE PERFORMED NEXT VISIT  > = PROGRESS TO THIS    S: pt reports was able to go to [employer] on sat.  No issues.   O: Discussed anatomy, physiology, body mechanics, principles of loading, and progressive loading/activity.   Time 7467-2779     Visit 8/ Repeat outcome measure at mid point and end.    Pain      ROM      Modalities            Exercise      Nustep   L6/ 5 min End of tx. TE         Squats 3 sets to fatigue attempted to go deeper  15,15x,  She had marked difficulty arising from lowest point TA   Calf Raises 3 sets 15~ fatigue holding with B hands, 15x finger tip holding on. Holding for 5 sec TE   Toe Raises   TA   Marches Holding bar and raising only ipsilateral leg   on R an L  3 x 20 with no RB TE   Alt. Sidekicks   TA   Leg press 40# 20x 17x,   TE   Sit/Stands Unable   TA   LAQ 2d HEP TE   Gait training   GT   Sitting rows See below    Marching Gait   NR   Sidestepping Holding bar and raising only ipsilateral leg   on R an L 4x in // bar TA   steps 6\" RLE 4x,Left LE 5x           Car transfer           Curb training  TA   A:  Tolerated well. Pt was slighty fatigue today.  Difficulty with stepping up on step  P: Continue with rehab plan work on side stepping in order for pt to put walker in the back of suv and walker around to the front of car sidestepping  Sanjana Prado PTA    Treatment Charges: Mins Units   Initial Evaluation     Re-Evaluation     Ther Exercise         TE 30 2   Manual Therapy     MT     Ther Activities        TA 30 2   Gait Training          GT     Neuro Re-education NR     Modalities     Non-Billable Service Time     Other     Total Time/Units 60 4

## 2025-02-03 ENCOUNTER — TREATMENT (OUTPATIENT)
Dept: PHYSICAL THERAPY | Age: 75
End: 2025-02-03
Payer: MEDICARE

## 2025-02-03 DIAGNOSIS — R29.898 WEAKNESS OF BOTH LOWER EXTREMITIES: Primary | ICD-10-CM

## 2025-02-03 PROCEDURE — 97530 THERAPEUTIC ACTIVITIES: CPT

## 2025-02-03 PROCEDURE — 97110 THERAPEUTIC EXERCISES: CPT

## 2025-02-06 ENCOUNTER — TREATMENT (OUTPATIENT)
Dept: PHYSICAL THERAPY | Age: 75
End: 2025-02-06

## 2025-02-06 DIAGNOSIS — R29.898 WEAKNESS OF BOTH LOWER EXTREMITIES: Primary | ICD-10-CM

## 2025-02-06 NOTE — PROGRESS NOTES
Physical Therapy Daily Treatment Note    Date: 2025  Patient Name: Hilaria Castillo  : 1950   MRN: [id number]  DOInjury: -  DOSx: -  Referring Provider:  Abiodun Yuen MD     Medical Diagnosis:   1. Weakness of both lower extremities          X = TO BE PERFORMED NEXT VISIT  > = PROGRESS TO THIS    S: pt reports no new issues.  O: Discussed anatomy, physiology, body mechanics, principles of loading, and progressive loading/activity.   Time 6762-6658     Visit 9/ Repeat outcome measure at mid point and end.    Pain      ROM      Modalities            Exercise      Nustep   End of tx. TE         Squats 3 sets to fatigue attempted to go deeper  15, 15,15x,  She had marked difficulty arising from lowest point TA   Calf Raises  15x,15x, 15x~ fatigue holding with B hands, 15x finger tip holding on. Holding for 5 sec TE   Toe Raises   TA   Marches Holding bar and raising only ipsilateral leg   on R an L  3 x 20 with no RB TE   Alt. Sidekicks   TA   Leg press 40# 3 x 20    TE   Sit/Stands Unable   TA   LAQ 2d HEP TE   Gait training   GT   Sitting rows See below    Marching Gait   NR   Sidestepping Holding bar and raising only ipsilateral leg   on R an L  TA   steps 6\" 2x ea R and L.  4\" RLE 10x,Left LE 10x           Car transfer           Curb training  TA   A:  Tolerated well. Pt was slighty fatigue today.  Difficulty with stepping up on step. Unable to step up on 6\" lizzie[/   P: Continue with rehab plan work on side stepping in order for pt to put walker in the back of suv and walker around to the front of car sidestepping  Sanjana Prado PTA    Treatment Charges: Mins Units   Initial Evaluation     Re-Evaluation     Ther Exercise         TE 30 2   Manual Therapy     MT     Ther Activities        TA 30 2   Gait Training          GT     Neuro Re-education NR     Modalities     Non-Billable Service Time     Other     Total Time/Units 60 4

## 2025-02-10 ENCOUNTER — APPOINTMENT (OUTPATIENT)
Dept: CT IMAGING | Age: 75
DRG: 312 | End: 2025-02-10
Payer: MEDICARE

## 2025-02-10 ENCOUNTER — APPOINTMENT (OUTPATIENT)
Dept: GENERAL RADIOLOGY | Age: 75
DRG: 312 | End: 2025-02-10
Payer: MEDICARE

## 2025-02-10 ENCOUNTER — HOSPITAL ENCOUNTER (INPATIENT)
Age: 75
LOS: 1 days | Discharge: HOME | DRG: 312 | End: 2025-02-12
Attending: EMERGENCY MEDICINE | Admitting: INTERNAL MEDICINE
Payer: MEDICARE

## 2025-02-10 DIAGNOSIS — S01.01XA LACERATION OF SCALP, INITIAL ENCOUNTER: ICD-10-CM

## 2025-02-10 DIAGNOSIS — I48.91 ATRIAL FIBRILLATION WITH RAPID VENTRICULAR RESPONSE (HCC): ICD-10-CM

## 2025-02-10 DIAGNOSIS — R55 SYNCOPE AND COLLAPSE: Primary | ICD-10-CM

## 2025-02-10 DIAGNOSIS — I48.91 ATRIAL FIBRILLATION, UNSPECIFIED TYPE (HCC): ICD-10-CM

## 2025-02-10 LAB
ALBUMIN SERPL-MCNC: 4.3 G/DL (ref 3.5–5.2)
ALP SERPL-CCNC: 77 U/L (ref 35–104)
ALT SERPL-CCNC: 13 U/L (ref 0–32)
ANION GAP SERPL CALCULATED.3IONS-SCNC: 12 MMOL/L (ref 7–16)
AST SERPL-CCNC: 20 U/L (ref 0–31)
BASOPHILS # BLD: 0.04 K/UL (ref 0–0.2)
BASOPHILS NFR BLD: 1 % (ref 0–2)
BILIRUB SERPL-MCNC: 0.5 MG/DL (ref 0–1.2)
BUN SERPL-MCNC: 26 MG/DL (ref 6–23)
CALCIUM SERPL-MCNC: 10.3 MG/DL (ref 8.6–10.2)
CHLORIDE SERPL-SCNC: 99 MMOL/L (ref 98–107)
CO2 SERPL-SCNC: 26 MMOL/L (ref 22–29)
CREAT SERPL-MCNC: 0.7 MG/DL (ref 0.5–1)
EOSINOPHIL # BLD: 0.3 K/UL (ref 0.05–0.5)
EOSINOPHILS RELATIVE PERCENT: 4 % (ref 0–6)
ERYTHROCYTE [DISTWIDTH] IN BLOOD BY AUTOMATED COUNT: 13.1 % (ref 11.5–15)
GFR, ESTIMATED: 88 ML/MIN/1.73M2
GLUCOSE SERPL-MCNC: 104 MG/DL (ref 74–99)
HCT VFR BLD AUTO: 43.7 % (ref 34–48)
HGB BLD-MCNC: 14.9 G/DL (ref 11.5–15.5)
IMM GRANULOCYTES # BLD AUTO: 0.03 K/UL (ref 0–0.58)
IMM GRANULOCYTES NFR BLD: 0 % (ref 0–5)
LYMPHOCYTES NFR BLD: 3.07 K/UL (ref 1.5–4)
LYMPHOCYTES RELATIVE PERCENT: 36 % (ref 20–42)
MCH RBC QN AUTO: 32.3 PG (ref 26–35)
MCHC RBC AUTO-ENTMCNC: 34.1 G/DL (ref 32–34.5)
MCV RBC AUTO: 94.6 FL (ref 80–99.9)
MONOCYTES NFR BLD: 0.61 K/UL (ref 0.1–0.95)
MONOCYTES NFR BLD: 7 % (ref 2–12)
NEUTROPHILS NFR BLD: 53 % (ref 43–80)
NEUTS SEG NFR BLD: 4.59 K/UL (ref 1.8–7.3)
PLATELET # BLD AUTO: 206 K/UL (ref 130–450)
PMV BLD AUTO: 11.5 FL (ref 7–12)
POTASSIUM SERPL-SCNC: 3.5 MMOL/L (ref 3.5–5)
PROT SERPL-MCNC: 7 G/DL (ref 6.4–8.3)
RBC # BLD AUTO: 4.62 M/UL (ref 3.5–5.5)
SODIUM SERPL-SCNC: 137 MMOL/L (ref 132–146)
TROPONIN I SERPL HS-MCNC: 25 NG/L (ref 0–9)
WBC OTHER # BLD: 8.6 K/UL (ref 4.5–11.5)

## 2025-02-10 PROCEDURE — 72170 X-RAY EXAM OF PELVIS: CPT

## 2025-02-10 PROCEDURE — 99285 EMERGENCY DEPT VISIT HI MDM: CPT

## 2025-02-10 PROCEDURE — 80053 COMPREHEN METABOLIC PANEL: CPT

## 2025-02-10 PROCEDURE — 84484 ASSAY OF TROPONIN QUANT: CPT

## 2025-02-10 PROCEDURE — 0HQ0XZZ REPAIR SCALP SKIN, EXTERNAL APPROACH: ICD-10-PCS | Performed by: INTERNAL MEDICINE

## 2025-02-10 PROCEDURE — 93005 ELECTROCARDIOGRAM TRACING: CPT | Performed by: EMERGENCY MEDICINE

## 2025-02-10 PROCEDURE — 85025 COMPLETE CBC W/AUTO DIFF WBC: CPT

## 2025-02-10 PROCEDURE — 71045 X-RAY EXAM CHEST 1 VIEW: CPT

## 2025-02-10 PROCEDURE — 72125 CT NECK SPINE W/O DYE: CPT

## 2025-02-10 PROCEDURE — 70450 CT HEAD/BRAIN W/O DYE: CPT

## 2025-02-10 ASSESSMENT — PAIN - FUNCTIONAL ASSESSMENT: PAIN_FUNCTIONAL_ASSESSMENT: 0-10

## 2025-02-10 ASSESSMENT — ENCOUNTER SYMPTOMS
COUGH: 0
EYE PAIN: 0
SORE THROAT: 0
VOMITING: 0
SHORTNESS OF BREATH: 0
NAUSEA: 0
ABDOMINAL DISTENTION: 0
BACK PAIN: 0
WHEEZING: 0
EYE REDNESS: 0
EYE DISCHARGE: 0
SINUS PRESSURE: 0
DIARRHEA: 0

## 2025-02-10 ASSESSMENT — PAIN SCALES - GENERAL: PAINLEVEL_OUTOF10: 6

## 2025-02-10 ASSESSMENT — PAIN DESCRIPTION - FREQUENCY: FREQUENCY: CONTINUOUS

## 2025-02-10 ASSESSMENT — PAIN DESCRIPTION - PAIN TYPE: TYPE: ACUTE PAIN

## 2025-02-10 ASSESSMENT — PAIN DESCRIPTION - ORIENTATION: ORIENTATION: LEFT;UPPER

## 2025-02-10 ASSESSMENT — PAIN DESCRIPTION - ONSET: ONSET: SUDDEN

## 2025-02-10 ASSESSMENT — LIFESTYLE VARIABLES
HOW MANY STANDARD DRINKS CONTAINING ALCOHOL DO YOU HAVE ON A TYPICAL DAY: PATIENT DOES NOT DRINK
HOW OFTEN DO YOU HAVE A DRINK CONTAINING ALCOHOL: NEVER

## 2025-02-10 ASSESSMENT — PAIN DESCRIPTION - DESCRIPTORS: DESCRIPTORS: ACHING

## 2025-02-11 ENCOUNTER — APPOINTMENT (OUTPATIENT)
Age: 75
DRG: 312 | End: 2025-02-11
Payer: MEDICARE

## 2025-02-11 PROBLEM — R55 SYNCOPE AND COLLAPSE: Status: ACTIVE | Noted: 2025-02-11

## 2025-02-11 LAB
B PARAP IS1001 DNA NPH QL NAA+NON-PROBE: NOT DETECTED
B PERT DNA SPEC QL NAA+PROBE: NOT DETECTED
C PNEUM DNA NPH QL NAA+NON-PROBE: NOT DETECTED
ECHO AO ASC DIAM: 3 CM
ECHO AO ASCENDING AORTA INDEX: 1.51 CM/M2
ECHO AV AREA PEAK VELOCITY: 1.4 CM2
ECHO AV AREA VTI: 1.7 CM2
ECHO AV AREA/BSA PEAK VELOCITY: 0.7 CM2/M2
ECHO AV AREA/BSA VTI: 0.9 CM2/M2
ECHO AV CUSP MM: 1.7 CM
ECHO AV MEAN GRADIENT: 7 MMHG
ECHO AV MEAN VELOCITY: 1.3 M/S
ECHO AV PEAK GRADIENT: 12 MMHG
ECHO AV PEAK VELOCITY: 1.7 M/S
ECHO AV VTI: 31.8 CM
ECHO BSA: 2.03 M2
ECHO EST RA PRESSURE: 3 MMHG
ECHO LA DIAMETER INDEX: 1.86 CM/M2
ECHO LA DIAMETER: 3.7 CM
ECHO LA VOL A-L A2C: 96 ML (ref 22–52)
ECHO LA VOL A-L A4C: 82 ML (ref 22–52)
ECHO LA VOL BP: 90 ML (ref 22–52)
ECHO LA VOL MOD A2C: 90 ML (ref 22–52)
ECHO LA VOL MOD A4C: 77 ML (ref 22–52)
ECHO LA VOL/BSA BIPLANE: 45 ML/M2 (ref 16–34)
ECHO LA VOLUME AREA LENGTH: 96 ML
ECHO LA VOLUME INDEX A-L A2C: 48 ML/M2 (ref 16–34)
ECHO LA VOLUME INDEX A-L A4C: 41 ML/M2 (ref 16–34)
ECHO LA VOLUME INDEX AREA LENGTH: 48 ML/M2 (ref 16–34)
ECHO LA VOLUME INDEX MOD A2C: 45 ML/M2 (ref 16–34)
ECHO LA VOLUME INDEX MOD A4C: 39 ML/M2 (ref 16–34)
ECHO LV EF PHYSICIAN: 60 %
ECHO LV FRACTIONAL SHORTENING: 35 % (ref 28–44)
ECHO LV INTERNAL DIMENSION DIASTOLE INDEX: 2.61 CM/M2
ECHO LV INTERNAL DIMENSION DIASTOLIC: 5.2 CM (ref 3.9–5.3)
ECHO LV INTERNAL DIMENSION SYSTOLIC INDEX: 1.71 CM/M2
ECHO LV INTERNAL DIMENSION SYSTOLIC: 3.4 CM
ECHO LV IVSD: 1.2 CM (ref 0.6–0.9)
ECHO LV IVSS: 1.5 CM
ECHO LV MASS 2D: 248.8 G (ref 67–162)
ECHO LV MASS INDEX 2D: 125 G/M2 (ref 43–95)
ECHO LV POSTERIOR WALL DIASTOLIC: 1.2 CM (ref 0.6–0.9)
ECHO LV POSTERIOR WALL SYSTOLIC: 1.5 CM
ECHO LV RELATIVE WALL THICKNESS RATIO: 0.46
ECHO LVOT AREA: 3.1 CM2
ECHO LVOT AV VTI INDEX: 0.54
ECHO LVOT DIAM: 2 CM
ECHO LVOT MEAN GRADIENT: 1 MMHG
ECHO LVOT PEAK GRADIENT: 2 MMHG
ECHO LVOT PEAK GRADIENT: 2 MMHG
ECHO LVOT PEAK VELOCITY: 0.7 M/S
ECHO LVOT PEAK VELOCITY: 0.8 M/S
ECHO LVOT STROKE VOLUME INDEX: 27 ML/M2
ECHO LVOT SV: 53.7 ML
ECHO LVOT VTI: 17.1 CM
ECHO MV AREA PHT: 5.2 CM2
ECHO MV AREA VTI: 2.1 CM2
ECHO MV LVOT VTI INDEX: 1.49
ECHO MV MAX VELOCITY: 1.2 M/S
ECHO MV MEAN GRADIENT: 3 MMHG
ECHO MV MEAN VELOCITY: 0.8 M/S
ECHO MV PEAK GRADIENT: 6 MMHG
ECHO MV PRESSURE HALF TIME (PHT): 42.5 MS
ECHO MV VTI: 25.5 CM
ECHO PULMONARY ARTERY END DIASTOLIC PRESSURE: 3 MMHG
ECHO PV MAX VELOCITY: 1.1 M/S
ECHO PV MEAN GRADIENT: 3 MMHG
ECHO PV MEAN VELOCITY: 0.8 M/S
ECHO PV PEAK GRADIENT: 5 MMHG
ECHO PV REGURGITANT MAX VELOCITY: 0.8 M/S
ECHO PV VTI: 20.1 CM
ECHO PVEIN A DURATION: 331.1 MS
ECHO PVEIN A VELOCITY: 0.3 M/S
ECHO PVEIN PEAK D VELOCITY: 0.5 M/S
ECHO PVEIN PEAK S VELOCITY: 0.3 M/S
ECHO PVEIN S/D RATIO: 0.6
ECHO RIGHT VENTRICULAR SYSTOLIC PRESSURE (RVSP): 27 MMHG
ECHO RV INTERNAL DIMENSION: 2.8 CM
ECHO RV LONGITUDINAL DIMENSION: 5.8 CM
ECHO RV MID DIMENSION: 3.1 CM
ECHO RV TAPSE: 1.7 CM (ref 1.7–?)
ECHO RVOT MEAN GRADIENT: 1 MMHG
ECHO RVOT PEAK GRADIENT: 1 MMHG
ECHO RVOT PEAK GRADIENT: 2 MMHG
ECHO RVOT PEAK VELOCITY: 0.6 M/S
ECHO RVOT PEAK VELOCITY: 0.6 M/S
ECHO RVOT VTI: 10.2 CM
ECHO TV REGURGITANT MAX VELOCITY: 2.43 M/S
ECHO TV REGURGITANT PEAK GRADIENT: 24 MMHG
EKG ATRIAL RATE: 197 BPM
EKG Q-T INTERVAL: 346 MS
EKG QRS DURATION: 78 MS
EKG QTC CALCULATION (BAZETT): 434 MS
EKG R AXIS: 10 DEGREES
EKG T AXIS: 44 DEGREES
EKG VENTRICULAR RATE: 95 BPM
FLUAV RNA NPH QL NAA+NON-PROBE: NOT DETECTED
FLUBV RNA NPH QL NAA+NON-PROBE: NOT DETECTED
HADV DNA NPH QL NAA+NON-PROBE: NOT DETECTED
HCOV 229E RNA NPH QL NAA+NON-PROBE: NOT DETECTED
HCOV HKU1 RNA NPH QL NAA+NON-PROBE: NOT DETECTED
HCOV NL63 RNA NPH QL NAA+NON-PROBE: NOT DETECTED
HCOV OC43 RNA NPH QL NAA+NON-PROBE: NOT DETECTED
HMPV RNA NPH QL NAA+NON-PROBE: NOT DETECTED
HPIV1 RNA NPH QL NAA+NON-PROBE: NOT DETECTED
HPIV2 RNA NPH QL NAA+NON-PROBE: NOT DETECTED
HPIV3 RNA NPH QL NAA+NON-PROBE: NOT DETECTED
HPIV4 RNA NPH QL NAA+NON-PROBE: NOT DETECTED
M PNEUMO DNA NPH QL NAA+NON-PROBE: NOT DETECTED
RSV RNA NPH QL NAA+NON-PROBE: NOT DETECTED
RV+EV RNA NPH QL NAA+NON-PROBE: NOT DETECTED
SARS-COV-2 RNA NPH QL NAA+NON-PROBE: NOT DETECTED
SPECIMEN DESCRIPTION: NORMAL
TROPONIN I SERPL HS-MCNC: 21 NG/L (ref 0–9)
TROPONIN I SERPL HS-MCNC: 22 NG/L (ref 0–9)
TROPONIN I SERPL HS-MCNC: 24 NG/L (ref 0–9)
TROPONIN I SERPL HS-MCNC: 25 NG/L (ref 0–9)

## 2025-02-11 PROCEDURE — 2500000003 HC RX 250 WO HCPCS: Performed by: EMERGENCY MEDICINE

## 2025-02-11 PROCEDURE — G0378 HOSPITAL OBSERVATION PER HR: HCPCS

## 2025-02-11 PROCEDURE — 96374 THER/PROPH/DIAG INJ IV PUSH: CPT

## 2025-02-11 PROCEDURE — 6370000000 HC RX 637 (ALT 250 FOR IP): Performed by: NURSE PRACTITIONER

## 2025-02-11 PROCEDURE — 36415 COLL VENOUS BLD VENIPUNCTURE: CPT

## 2025-02-11 PROCEDURE — 93010 ELECTROCARDIOGRAM REPORT: CPT | Performed by: INTERNAL MEDICINE

## 2025-02-11 PROCEDURE — 93306 TTE W/DOPPLER COMPLETE: CPT | Performed by: INTERNAL MEDICINE

## 2025-02-11 PROCEDURE — 96375 TX/PRO/DX INJ NEW DRUG ADDON: CPT

## 2025-02-11 PROCEDURE — 6360000002 HC RX W HCPCS: Performed by: EMERGENCY MEDICINE

## 2025-02-11 PROCEDURE — 6360000002 HC RX W HCPCS: Performed by: NURSE PRACTITIONER

## 2025-02-11 PROCEDURE — 84484 ASSAY OF TROPONIN QUANT: CPT

## 2025-02-11 PROCEDURE — 1200000000 HC SEMI PRIVATE

## 2025-02-11 PROCEDURE — 93306 TTE W/DOPPLER COMPLETE: CPT

## 2025-02-11 PROCEDURE — 96376 TX/PRO/DX INJ SAME DRUG ADON: CPT

## 2025-02-11 PROCEDURE — 0202U NFCT DS 22 TRGT SARS-COV-2: CPT

## 2025-02-11 RX ORDER — METOPROLOL TARTRATE 1 MG/ML
5 INJECTION, SOLUTION INTRAVENOUS ONCE
Status: COMPLETED | OUTPATIENT
Start: 2025-02-11 | End: 2025-02-11

## 2025-02-11 RX ORDER — SODIUM CHLORIDE 9 MG/ML
INJECTION, SOLUTION INTRAVENOUS PRN
Status: DISCONTINUED | OUTPATIENT
Start: 2025-02-11 | End: 2025-02-12 | Stop reason: HOSPADM

## 2025-02-11 RX ORDER — ACETAMINOPHEN 325 MG/1
650 TABLET ORAL EVERY 6 HOURS PRN
Status: DISCONTINUED | OUTPATIENT
Start: 2025-02-11 | End: 2025-02-12 | Stop reason: HOSPADM

## 2025-02-11 RX ORDER — METOPROLOL SUCCINATE 100 MG/1
100 TABLET, EXTENDED RELEASE ORAL DAILY
Status: DISCONTINUED | OUTPATIENT
Start: 2025-02-11 | End: 2025-02-12 | Stop reason: HOSPADM

## 2025-02-11 RX ORDER — POLYETHYLENE GLYCOL 3350 17 G/17G
17 POWDER, FOR SOLUTION ORAL DAILY PRN
Status: DISCONTINUED | OUTPATIENT
Start: 2025-02-11 | End: 2025-02-12 | Stop reason: HOSPADM

## 2025-02-11 RX ORDER — SODIUM CHLORIDE 0.9 % (FLUSH) 0.9 %
5-40 SYRINGE (ML) INJECTION PRN
Status: DISCONTINUED | OUTPATIENT
Start: 2025-02-11 | End: 2025-02-12 | Stop reason: HOSPADM

## 2025-02-11 RX ORDER — MUPIROCIN 20 MG/G
OINTMENT TOPICAL 3 TIMES DAILY
Status: DISCONTINUED | OUTPATIENT
Start: 2025-02-11 | End: 2025-02-12 | Stop reason: HOSPADM

## 2025-02-11 RX ORDER — HYDROCHLOROTHIAZIDE 25 MG/1
25 TABLET ORAL DAILY
Status: DISCONTINUED | OUTPATIENT
Start: 2025-02-11 | End: 2025-02-12 | Stop reason: HOSPADM

## 2025-02-11 RX ORDER — TROSPIUM CHLORIDE 20 MG/1
20 TABLET, FILM COATED ORAL
Status: DISCONTINUED | OUTPATIENT
Start: 2025-02-11 | End: 2025-02-12 | Stop reason: HOSPADM

## 2025-02-11 RX ORDER — ONDANSETRON 4 MG/1
4 TABLET, ORALLY DISINTEGRATING ORAL EVERY 8 HOURS PRN
Status: DISCONTINUED | OUTPATIENT
Start: 2025-02-11 | End: 2025-02-12 | Stop reason: HOSPADM

## 2025-02-11 RX ORDER — ACETAMINOPHEN 650 MG/1
650 SUPPOSITORY RECTAL EVERY 6 HOURS PRN
Status: DISCONTINUED | OUTPATIENT
Start: 2025-02-11 | End: 2025-02-12 | Stop reason: HOSPADM

## 2025-02-11 RX ORDER — SODIUM CHLORIDE AND POTASSIUM CHLORIDE 300; 900 MG/100ML; MG/100ML
INJECTION, SOLUTION INTRAVENOUS CONTINUOUS
Status: DISCONTINUED | OUTPATIENT
Start: 2025-02-11 | End: 2025-02-12 | Stop reason: HOSPADM

## 2025-02-11 RX ORDER — SODIUM CHLORIDE 0.9 % (FLUSH) 0.9 %
5-40 SYRINGE (ML) INJECTION EVERY 12 HOURS SCHEDULED
Status: DISCONTINUED | OUTPATIENT
Start: 2025-02-11 | End: 2025-02-12 | Stop reason: HOSPADM

## 2025-02-11 RX ORDER — DOCUSATE SODIUM 100 MG/1
100 CAPSULE, LIQUID FILLED ORAL 2 TIMES DAILY
Status: DISCONTINUED | OUTPATIENT
Start: 2025-02-11 | End: 2025-02-12 | Stop reason: HOSPADM

## 2025-02-11 RX ORDER — ONDANSETRON 2 MG/ML
4 INJECTION INTRAMUSCULAR; INTRAVENOUS EVERY 6 HOURS PRN
Status: DISCONTINUED | OUTPATIENT
Start: 2025-02-11 | End: 2025-02-12 | Stop reason: HOSPADM

## 2025-02-11 RX ORDER — POTASSIUM CHLORIDE 1500 MG/1
40 TABLET, EXTENDED RELEASE ORAL PRN
Status: DISCONTINUED | OUTPATIENT
Start: 2025-02-11 | End: 2025-02-12 | Stop reason: HOSPADM

## 2025-02-11 RX ORDER — LIDOCAINE HYDROCHLORIDE AND EPINEPHRINE 10; 10 MG/ML; UG/ML
20 INJECTION, SOLUTION INFILTRATION; PERINEURAL ONCE
Status: COMPLETED | OUTPATIENT
Start: 2025-02-11 | End: 2025-02-11

## 2025-02-11 RX ORDER — MAGNESIUM SULFATE IN WATER 40 MG/ML
2000 INJECTION, SOLUTION INTRAVENOUS PRN
Status: DISCONTINUED | OUTPATIENT
Start: 2025-02-11 | End: 2025-02-12 | Stop reason: HOSPADM

## 2025-02-11 RX ORDER — DOXYCYCLINE 100 MG/1
100 CAPSULE ORAL 2 TIMES DAILY
Status: DISCONTINUED | OUTPATIENT
Start: 2025-02-11 | End: 2025-02-12 | Stop reason: HOSPADM

## 2025-02-11 RX ORDER — POTASSIUM CHLORIDE 7.45 MG/ML
10 INJECTION INTRAVENOUS PRN
Status: DISCONTINUED | OUTPATIENT
Start: 2025-02-11 | End: 2025-02-12 | Stop reason: HOSPADM

## 2025-02-11 RX ADMIN — DOXYCYCLINE HYCLATE 100 MG: 100 CAPSULE ORAL at 22:36

## 2025-02-11 RX ADMIN — METOPROLOL TARTRATE 5 MG: 5 INJECTION INTRAVENOUS at 01:33

## 2025-02-11 RX ADMIN — DOCUSATE SODIUM 100 MG: 100 CAPSULE, LIQUID FILLED ORAL at 22:37

## 2025-02-11 RX ADMIN — POTASSIUM CHLORIDE AND SODIUM CHLORIDE: 900; 300 INJECTION, SOLUTION INTRAVENOUS at 23:18

## 2025-02-11 RX ADMIN — METOPROLOL SUCCINATE 100 MG: 100 TABLET, EXTENDED RELEASE ORAL at 08:35

## 2025-02-11 RX ADMIN — LIDOCAINE HYDROCHLORIDE AND EPINEPHRINE 20 ML: 10; 10 INJECTION, SOLUTION INFILTRATION; PERINEURAL at 03:31

## 2025-02-11 RX ADMIN — APIXABAN 5 MG: 5 TABLET, FILM COATED ORAL at 08:35

## 2025-02-11 RX ADMIN — ONDANSETRON 4 MG: 4 TABLET, ORALLY DISINTEGRATING ORAL at 10:22

## 2025-02-11 RX ADMIN — TROSPIUM CHLORIDE 20 MG: 20 TABLET, FILM COATED ORAL at 08:35

## 2025-02-11 RX ADMIN — APIXABAN 5 MG: 5 TABLET, FILM COATED ORAL at 22:36

## 2025-02-11 RX ADMIN — DOXYCYCLINE HYCLATE 100 MG: 100 CAPSULE ORAL at 08:35

## 2025-02-11 RX ADMIN — POTASSIUM CHLORIDE AND SODIUM CHLORIDE: 900; 300 INJECTION, SOLUTION INTRAVENOUS at 07:48

## 2025-02-11 RX ADMIN — TROSPIUM CHLORIDE 20 MG: 20 TABLET, FILM COATED ORAL at 18:24

## 2025-02-11 RX ADMIN — DOCUSATE SODIUM 100 MG: 100 CAPSULE, LIQUID FILLED ORAL at 08:35

## 2025-02-11 NOTE — H&P
Department of Internal Medicine  History and Physical Examination     Primary Care Physician: Abiodun Yuen MD   Admitting Physician:  Eulogio Chambers DO  Admission date and time: 2/10/2025 10:09 PM    Room:  10/10  Admitting diagnosis: Syncope and collapse [R55]    Patient Name: Hilaria Castillo  MRN: [id number]    Date of Service: 2/11/2025     Chief Complaint: Syncope    HISTORY OF PRESENT ILLNESS:    Hilaria Castillo is a 74-year-old female patient presents to [facility] for evaluation after syncopal event.  Patient states that she was at home, got up to ambulate and then awoke on the ground.  She struck the back of her head and caused a laceration with bleeding.  On arrival she was hypertensive but otherwise had stable vital signs and was afebrile.  CBC, metabolic panel unremarkable.  Troponin x 2 assessed and mildly elevated but with flat trajectory at 25 and 25.  CT head and cervical spine showed some degenerative changes in the left posterior scalp hematoma but no acute traumatic injury.  Chest x-ray and x-ray pelvis unremarkable as well.  EKG revealed atrial fibrillation with no acute to suggest acute infarction or acute ischemia.  She had posterior scalp laceration stapled while in the emergency department with some ongoing seeping of sanguinous drainage.  A turban dressing was placed.  Case was discussed with ER physician with plan for admission, further care and management of the service of Dr. Chambers.    Patient was seen and examined at bedside.  She confirms the above given history.  Admits to some discomfort around the area of the laceration but is otherwise feeling normal.  She denies any neurologic complaints aside from that of mild generalized weakness.  No fevers or chills, known sick contacts or exposures.  No chest pain, palpitations or fluttering presently.  She does take Eliquis chronically for her atrial fibrillation and took her last dose last evening.  No shortness of breath, cough or URI  for preservation of ejection fraction.  Regional wall motion abnormality will be excluded via echocardiogram as well.  She will work with the therapy teams, be evaluated by wound care and social work as we determine appropriateness of discharge planning. Underlying co-morbidites will be addressed during hospitalization as well. Labs and vital signs will be monitored closely and addressed accordingly. See additional orders for details.     Due to high hospital inpatient census and bed limitations, along with staff shortages, we have had a phu discussion with the patient/family regarding the likelihood of prolonged ER boarding status and potential delays/disruptions in care related to this.  They understand and agree to maintain hospitalization at this facility while accepting these risks.  We have made every attempt to coordinate care with the ER nursing staff as well to avoid any disruptions in care.      CHANDRA Murillo CNP  6:05 AM  2/11/2025    Electronically signed by CHANDRA Murillo CNP on 2/11/25 at 6:05 AM EST

## 2025-02-11 NOTE — ED PROVIDER NOTES
Patient is a 75 y/o female who presents to the ED via EMS. Patient states that she went to her kitchen to take her nighttime medications and the next thing she knew she was on the floor. She believes that she passed out. She did sustain a laceration of her posterior scalp. She denies any other pain or injury. She denies any chest pain, palpitations or shortness of breath. She is on Eliquis for a history of atrial fibrillation. Tetanus was updated in 2023.         Review of Systems   Constitutional:  Negative for chills and fever.   HENT:  Negative for ear pain, sinus pressure and sore throat.    Eyes:  Negative for pain, discharge and redness.   Respiratory:  Negative for cough, shortness of breath and wheezing.    Cardiovascular:  Negative for chest pain.   Gastrointestinal:  Negative for abdominal distention, diarrhea, nausea and vomiting.   Genitourinary:  Negative for dysuria and frequency.   Musculoskeletal:  Negative for arthralgias and back pain.   Skin:  Positive for wound. Negative for rash.   Neurological:  Positive for syncope. Negative for weakness and headaches.   Hematological:  Negative for adenopathy.   All other systems reviewed and are negative.       Physical Exam  Vitals and nursing note reviewed.   Constitutional:       General: She is not in acute distress.  HENT:      Head:      Comments: 4 cm linear laceration posterior scalp. No active bleeding.     Right Ear: External ear normal.      Left Ear: External ear normal.      Nose: Nose normal.      Mouth/Throat:      Mouth: Mucous membranes are moist.   Eyes:      Conjunctiva/sclera: Conjunctivae normal.      Pupils: Pupils are equal, round, and reactive to light.   Neck:      Comments: No midline cervical spine tenderness to palpation.  Cardiovascular:      Rate and Rhythm: Normal rate and regular rhythm.      Heart sounds: No murmur heard.  Pulmonary:      Effort: Pulmonary effort is normal. No respiratory distress.      Breath sounds:    Troponin   Result Value Ref Range    Troponin, High Sensitivity 25 (H) 0 - 9 ng/L   Troponin   Result Value Ref Range    Troponin, High Sensitivity 25 (H) 0 - 9 ng/L   EKG 12 Lead   Result Value Ref Range    Ventricular Rate 95 BPM    Atrial Rate 197 BPM    QRS Duration 78 ms    Q-T Interval 346 ms    QTc Calculation (Bazett) 434 ms    R Axis 10 degrees    T Axis 44 degrees       RADIOLOGY:  CT HEAD WO CONTRAST   Final Result   1. No acute intracranial abnormality.   2. Left posterior parietal scalp hematoma.   3. Sphenoid sinus mucosal thickening.         CT CERVICAL SPINE WO CONTRAST   Final Result   1. No acute fracture or traumatic malalignment of the cervical spine.   2. Significant multilevel degenerative changes.         XR CHEST 1 VIEW   Final Result   No acute process.         XR PELVIS (1-2 VIEWS)   Final Result   No acute abnormality of the pelvis.             EKG:  This EKG is signed and interpreted by me.    Rate: 95  Rhythm: Atrial fibrillation  Interpretation: non-specific EKG and atrial fibrillation (chronic)  Comparison: no previous EKG available      ------------------------- NURSING NOTES AND VITALS REVIEWED ---------------------------  Date / Time Roomed:  2/10/2025 10:09 PM  ED Bed Assignment:  01/01    The nursing notes within the ED encounter and vital signs as below have been reviewed.     Patient Vitals for the past 24 hrs:   BP Temp Temp src Pulse Resp SpO2 Height Weight   02/11/25 0422 -- -- -- 81 -- 97 % -- --   02/11/25 0332 134/89 -- -- 88 -- 98 % -- --   02/11/25 0226 -- -- -- 87 -- 97 % -- --   02/11/25 0157 (!) 138/90 -- -- -- -- -- -- --   02/11/25 0155 -- -- -- 89 -- 98 % -- --   02/11/25 0132 (!) 151/108 -- -- 85 -- 99 % -- --   02/11/25 0032 (!) 164/99 -- -- -- -- -- -- --   02/11/25 0029 -- -- -- 100 -- 99 % -- --   02/10/25 2358 (!) 162/119 -- -- 75 -- 99 % -- --   02/10/25 2312 (!) 166/126 -- -- 93 11 -- -- --   02/10/25 2208 (!) 182/152 97.9 °F (36.6 °C) Oral 72 18 99 %

## 2025-02-12 ENCOUNTER — TELEPHONE (OUTPATIENT)
Dept: BARIATRICS/WEIGHT MGMT | Age: 75
End: 2025-02-12

## 2025-02-12 VITALS
DIASTOLIC BLOOD PRESSURE: 72 MMHG | RESPIRATION RATE: 18 BRPM | OXYGEN SATURATION: 98 % | SYSTOLIC BLOOD PRESSURE: 101 MMHG | HEART RATE: 106 BPM | WEIGHT: 193 LBS | BODY MASS INDEX: 30.29 KG/M2 | HEIGHT: 67 IN | TEMPERATURE: 97.9 F

## 2025-02-12 DIAGNOSIS — E66.09 CLASS 1 OBESITY DUE TO EXCESS CALORIES WITH SERIOUS COMORBIDITY AND BODY MASS INDEX (BMI) OF 30.0 TO 30.9 IN ADULT: Primary | ICD-10-CM

## 2025-02-12 DIAGNOSIS — E66.811 CLASS 1 OBESITY DUE TO EXCESS CALORIES WITH SERIOUS COMORBIDITY AND BODY MASS INDEX (BMI) OF 30.0 TO 30.9 IN ADULT: Primary | ICD-10-CM

## 2025-02-12 LAB
ALBUMIN SERPL-MCNC: 3.6 G/DL (ref 3.5–5.2)
ALP SERPL-CCNC: 73 U/L (ref 35–104)
ALT SERPL-CCNC: 10 U/L (ref 0–32)
ANION GAP SERPL CALCULATED.3IONS-SCNC: 10 MMOL/L (ref 7–16)
AST SERPL-CCNC: 20 U/L (ref 0–31)
BASOPHILS # BLD: 0.03 K/UL (ref 0–0.2)
BASOPHILS NFR BLD: 0 % (ref 0–2)
BILIRUB DIRECT SERPL-MCNC: <0.2 MG/DL (ref 0–0.3)
BILIRUB INDIRECT SERPL-MCNC: ABNORMAL MG/DL (ref 0–1)
BILIRUB SERPL-MCNC: 0.9 MG/DL (ref 0–1.2)
BUN SERPL-MCNC: 19 MG/DL (ref 6–23)
CALCIUM SERPL-MCNC: 9.3 MG/DL (ref 8.6–10.2)
CHLORIDE SERPL-SCNC: 104 MMOL/L (ref 98–107)
CHOLEST SERPL-MCNC: 150 MG/DL
CO2 SERPL-SCNC: 23 MMOL/L (ref 22–29)
CREAT SERPL-MCNC: 0.6 MG/DL (ref 0.5–1)
EOSINOPHIL # BLD: 0.21 K/UL (ref 0.05–0.5)
EOSINOPHILS RELATIVE PERCENT: 2 % (ref 0–6)
ERYTHROCYTE [DISTWIDTH] IN BLOOD BY AUTOMATED COUNT: 13.4 % (ref 11.5–15)
GFR, ESTIMATED: >90 ML/MIN/1.73M2
GLUCOSE SERPL-MCNC: 86 MG/DL (ref 74–99)
HBA1C MFR BLD: 4.7 % (ref 4–5.6)
HCT VFR BLD AUTO: 41.1 % (ref 34–48)
HDLC SERPL-MCNC: 50 MG/DL
HGB BLD-MCNC: 13.9 G/DL (ref 11.5–15.5)
IMM GRANULOCYTES # BLD AUTO: 0.03 K/UL (ref 0–0.58)
IMM GRANULOCYTES NFR BLD: 0 % (ref 0–5)
LDLC SERPL CALC-MCNC: 81 MG/DL
LYMPHOCYTES NFR BLD: 2.06 K/UL (ref 1.5–4)
LYMPHOCYTES RELATIVE PERCENT: 21 % (ref 20–42)
MAGNESIUM SERPL-MCNC: 1.9 MG/DL (ref 1.6–2.6)
MCH RBC QN AUTO: 32.2 PG (ref 26–35)
MCHC RBC AUTO-ENTMCNC: 33.8 G/DL (ref 32–34.5)
MCV RBC AUTO: 95.1 FL (ref 80–99.9)
MONOCYTES NFR BLD: 0.76 K/UL (ref 0.1–0.95)
MONOCYTES NFR BLD: 8 % (ref 2–12)
NEUTROPHILS NFR BLD: 68 % (ref 43–80)
NEUTS SEG NFR BLD: 6.63 K/UL (ref 1.8–7.3)
PHOSPHATE SERPL-MCNC: 3.5 MG/DL (ref 2.5–4.5)
PLATELET # BLD AUTO: 184 K/UL (ref 130–450)
PMV BLD AUTO: 12.3 FL (ref 7–12)
POTASSIUM SERPL-SCNC: 4.4 MMOL/L (ref 3.5–5)
PROT SERPL-MCNC: 6.2 G/DL (ref 6.4–8.3)
RBC # BLD AUTO: 4.32 M/UL (ref 3.5–5.5)
SODIUM SERPL-SCNC: 137 MMOL/L (ref 132–146)
T4 FREE SERPL-MCNC: 1.1 NG/DL (ref 0.9–1.7)
TRIGL SERPL-MCNC: 95 MG/DL
TSH SERPL DL<=0.05 MIU/L-ACNC: 2.06 UIU/ML (ref 0.27–4.2)
VLDLC SERPL CALC-MCNC: 19 MG/DL
WBC OTHER # BLD: 9.7 K/UL (ref 4.5–11.5)

## 2025-02-12 PROCEDURE — 85025 COMPLETE CBC W/AUTO DIFF WBC: CPT

## 2025-02-12 PROCEDURE — 82248 BILIRUBIN DIRECT: CPT

## 2025-02-12 PROCEDURE — 80053 COMPREHEN METABOLIC PANEL: CPT

## 2025-02-12 PROCEDURE — 83735 ASSAY OF MAGNESIUM: CPT

## 2025-02-12 PROCEDURE — 84100 ASSAY OF PHOSPHORUS: CPT

## 2025-02-12 PROCEDURE — 84439 ASSAY OF FREE THYROXINE: CPT

## 2025-02-12 PROCEDURE — 84443 ASSAY THYROID STIM HORMONE: CPT

## 2025-02-12 PROCEDURE — G0378 HOSPITAL OBSERVATION PER HR: HCPCS

## 2025-02-12 PROCEDURE — 36415 COLL VENOUS BLD VENIPUNCTURE: CPT

## 2025-02-12 PROCEDURE — 80061 LIPID PANEL: CPT

## 2025-02-12 PROCEDURE — 83036 HEMOGLOBIN GLYCOSYLATED A1C: CPT

## 2025-02-12 PROCEDURE — 6370000000 HC RX 637 (ALT 250 FOR IP): Performed by: NURSE PRACTITIONER

## 2025-02-12 RX ADMIN — APIXABAN 5 MG: 5 TABLET, FILM COATED ORAL at 09:16

## 2025-02-12 RX ADMIN — TROSPIUM CHLORIDE 20 MG: 20 TABLET, FILM COATED ORAL at 05:41

## 2025-02-12 RX ADMIN — METOPROLOL SUCCINATE 100 MG: 100 TABLET, EXTENDED RELEASE ORAL at 09:16

## 2025-02-12 RX ADMIN — DOCUSATE SODIUM 100 MG: 100 CAPSULE, LIQUID FILLED ORAL at 09:17

## 2025-02-12 RX ADMIN — DOXYCYCLINE HYCLATE 100 MG: 100 CAPSULE ORAL at 09:17

## 2025-02-12 NOTE — CARE COORDINATION
Case Management Assessment  Initial Evaluation    Date/Time of Evaluation: 2/12/2025 1:13 PM  Assessment Completed by: Jennifer Ellis RN    If patient is discharged prior to next notation, then this note serves as note for discharge by case management.    Patient Name: Hilaria Castillo                   YOB: 1950  Diagnosis: Syncope and collapse [R55]  Atrial fibrillation with rapid ventricular response (HCC) [I48.91]  Laceration of scalp, initial encounter [S01.01XA]  Atrial fibrillation, unspecified type (HCC) [I48.91]                   Date / Time: 2/10/2025 10:09 PM    Patient Admission Status: Inpatient   Readmission Risk (Low < 19, Mod (19-27), High > 27): Readmission Risk Score: 7.7    Current PCP: Abiodun Yuen MD  PCP verified by ? Yes    Chart Reviewed: Yes      History Provided by: Patient  Patient Orientation: Alert and Oriented    Patient Cognition: Alert    Hospitalization in the last 30 days (Readmission):  No    If yes, Readmission Assessment in  Navigator will be completed.    Advance Directives:      Code Status: Full Code   Patient's Primary Decision Maker is: Legal Next of Kin    Primary Decision Maker: AnnaVelDeny - Spouse - [phone]    Secondary Decision Maker: mónica castillo    Secondary Decision Maker: aldo castillo    Discharge Planning:    Patient lives with: Spouse/Significant Other Type of Home: House  Primary Care Giver: Self  Patient Support Systems include: Spouse/Significant Other   C  ADLS  Prior functional level: Assistance with the following:, Mobility  Current functional level: Assistance with the following:, Mobility      Family can provide assistance at DC: Yes  Would you like Case Management to discuss the discharge plan with any other family members/significant others, and if so, who? No  Plans to Return to Present Housing: Yes  Other Identified Issues/Barriers to RETURNING to current housing: none       Financial    Payor:

## 2025-02-12 NOTE — TELEPHONE ENCOUNTER
Talked to patient over the phone, she recently had a fall and scalp laceration probably due to orthostatic hypotension, and afib with RVR. Her BP during hospital stay was WNL to high. Was recommended to stop Zepbound. Zepbound may cause tachycardia which we discussed. She does not want to stop the medicine as it has helped her. She had no issues when she was taking 10 mg weekly. We planned to go down on dose to 10 mg weekly, starting the Saturday after coming Saturday. She will watch her vitals closely. Changed Zepbound order to 10 mg.    Yani Wilson MD  Internal Medicine/Obesity Medicine  2/12/2025.

## 2025-02-12 NOTE — ACP (ADVANCE CARE PLANNING)
Advance Care Planning   Healthcare Decision Maker:    Primary Decision Maker: Deny Castillo - Spouse - [phone]    Secondary Decision Maker: mónica castillo - Child    Secondary Decision Maker: aldo castillo - Child      Today we documented Decision Maker(s) consistent with Legal Next of Kin hierarchy.

## 2025-02-12 NOTE — DISCHARGE INSTRUCTIONS
Your information:  Name: Hilaria Castillo  : 1950    Your instructions: PLEASE HAVE YOUR STAPLES REMOVED IN 1 WEEK     You are being discharged home. Please follow up with your PCP and take your medications as prescribed.     IF YOU EXPERIENCE ANY OF THE FOLLOWING SYMPTOMS, CHEST PAIN, SHORTNESS OF BREATH, COUGHING UP BLOOD OR BLOODY SPUTUM, STOMACH PAIN OR CRAMPING, DARK, TARRY STOOLS, LOSS OF APPETITE, GENERAL NOT FEELING WELL, SIGNS AND SYMPTOMS OF INFECTION LIKE FEVER AND OR CHILLS, PLEASE CALL DR. YOUNGER OR RETURN TO THE EMERGENCY ROOM.     What to do after you leave the hospital:    Recommended diet: regular diet and low sodium, 2000 mL    Recommended activity: activity as tolerated        The following personal items were collected during your admission and were returned to you:    Belongings  Dental Appliances: None  Vision - Corrective Lenses: None  Hearing Aid: None  Clothing: Undergarments, Footwear  Jewelry: None  Body Piercings Removed: N/A  Electronic Devices: Cell Phone  Weapons (Notify Protective Services/Security): None  Other Valuables: Walker  Home Medications: None  Valuables Given To: Patient  Provide Name(s) of Who Valuable(s) Were Given To: Hilaria  Patient approves for provider to speak to responsible person post operatively: No    Information obtained by:  By signing below, I understand that if any problems occur once I leave the hospital I am to contact PCP.  I understand and acknowledge receipt of the instructions indicated above.

## 2025-02-12 NOTE — DISCHARGE SUMMARY
Internal Medicine Progress Note     [facility]     Ashu Benítez D.O., JEFE Chambers D.O., OG Gray, MSN, APRN, NP-C  Ryder Trinidad, MSN, APRN-CNP  Kee Ibrahim, MSN, APRN, NP-C  Sandy Frey, MSN, APRN-CNP  Gina Moreno, MSN, APRN, NP-C       Internal Medicine  Discharge Summary    NAME: Hilaria Castillo  :  1950  MRN:  [id number]  PCP:Abiodnu Yuen MD  ADMITTED: 2/10/2025      DISCHARGED: 25    ADMITTING PHYSICIAN: Eulogio Chambers DO    CONSULTANT(S):   IP CONSULT TO SOCIAL WORK     ADMITTING DIAGNOSIS:   Syncope and collapse [R55]  Atrial fibrillation with rapid ventricular response (HCC) [I48.91]  Laceration of scalp, initial encounter [S01.01XA]  Atrial fibrillation, unspecified type (HCC) [I48.91]     DISCHARGE DIAGNOSES:   Syncope, multifactorial, and likely orthostatic hypotension with discontinuation of diuretic therapy  Closed head injury with posterior parietal scalp laceration status post staple closure in the emergency department 2020  Atrial fibrillation with variable ventricular sponsor chronic Eliquis   Acute on chronic troponin elevation likely secondary to demand ischemia   History of septic arthritis on chronic doxycycline suppression  Recurrent UTI history with neurogenic bladder and bladder stimulator in situ  Essential hypertension    BRIEF HISTORY OF PRESENT ILLNESS:   Hilaria Castillo is a 74-year-old female patient presents to [facility] for evaluation after syncopal event.  Patient states that she was at home, got up to ambulate and then awoke on the ground.  She struck the back of her head and caused a laceration with bleeding.  On arrival she was hypertensive but otherwise had stable vital signs and was afebrile.  CBC, metabolic panel unremarkable.  Troponin x 2 assessed and mildly elevated but with flat trajectory at 25 and 25.  CT head and  THE PELVIS 2/10/2025 11:26 pm COMPARISON: None. HISTORY: ORDERING SYSTEM PROVIDED HISTORY: fall TECHNOLOGIST PROVIDED HISTORY: Reason for exam:->fall FINDINGS: No evidence of pelvic fracture.  Bilateral hips demonstrate normal alignment. No focal osseous lesion.  SI joints are symmetric.     No acute abnormality of the pelvis.         HOSPITAL COURSE:   Hilaria presented to [facility] emergency department after suffering a syncopal episode that appears compatible with orthostatic hypotension.  She received fluid resuscitation and diuretic therapy has been discontinued.  Her orthostasis improved.  She had suffered a closed head injury and underwent stapling in the emergency department.  The bleeding is subsided at this point and staples will need removed in approximately 1 week.  She has been resumed on anticoagulation therapy.  She has become ambulatory and is feeling dramatically better today.  She is acceptable for discharge home with close outpatient follow-up.  Patient is medically stable and acceptable for discharge today.    BRIEF PHYSICAL EXAMINATION AND LABORATORIES ON DAY OF DISCHARGE:  VITALS:  BP 96/79   Pulse 97   Temp 98.6 °F (37 °C) (Temporal)   Resp 18   Ht 1.702 m (5' 7\")   Wt 87.5 kg (193 lb)   SpO2 98%   BMI 30.23 kg/m²     HEENT:  PERRLA.  EOMI.  Sclera clear.  Buccal mucosa moist.  Dressings in place to the head with staples in place.  No current bleeding.    Neck:  Supple. Trachea midline. No thyromegaly. No JVD. No bruits.    Heart:  Rhythm regular, rate controlled.  Systolic murmur.    Lungs:  Symmetrical. Clear to auscultation bilaterally.  No wheezes. No rhonchi. No rales.    Abdomen: Soft. Non-tender. Non-distended. Bowel sounds positive. No organomegaly or masses.  No pain on palpation    Extremities:  Peripheral pulses present.  No peripheral edema.  No ulcers.    Neurologic:  Alert x 3.  No focal deficit.  Cranial nerves grossly intact.    Skin:  No petechia. No hemorrhage. No

## 2025-02-12 NOTE — PROGRESS NOTES
Spiritual Health History and Assessment/Progress Note  [facility]     Encounter, Rituals, Rites and Sacraments,  ,  ,      Name: Hilaria Castillo MRN: [id number]    Age: 74 y.o.     Sex: female   Language: English   Mandaen: Yarsanism   Syncope and collapse     Date: 2/12/2025                           Spiritual Assessment began in [facility] MED SURG TELE        Referral/Consult From: Rounding   Encounter Overview/Reason:  Encounter, Rituals, Rites and Sacraments  Service Provided For: Patient and family together (One family member also received Holy Communion.)    Genesis, Belief, Meaning:   Patient is connected with a genesis tradition or spiritual practice  Family/Friends are connected with a genesis tradition or spiritual practice      Importance and Influence:  Patient has no beliefs influential to healthcare decision-making identified during this visit  Family/Friends have no beliefs influential to healthcare decision-making identified during this visit    Community:  Patient feels well-supported. Support system includes: Spouse/Partner  Family/Friends feel well-supported. Support system includes: Children    Assessment and Plan of Care:     Patient Interventions include: Affirmed coping skills/support systems and Provided sacramental/Congregation ritual  Family/Friends Interventions include: Affirmed coping skills/support systems and Provided sacramental/Congregation ritual    Patient Plan of Care: Spiritual Care available upon further referral  Family/Friends Plan of Care: Spiritual Care available upon further referral    Electronically signed by Chaplain Cody on 2/12/2025 at 4:19 PM

## 2025-02-13 ENCOUNTER — CARE COORDINATION (OUTPATIENT)
Dept: CARE COORDINATION | Age: 75
End: 2025-02-13

## 2025-02-13 RX ORDER — ACETAMINOPHEN 500 MG
1000 TABLET ORAL 3 TIMES DAILY
COMMUNITY

## 2025-02-13 NOTE — CARE COORDINATION
Non MH PCP Care Transitions Note    Initial Call - Call within 2 business days of discharge: Yes    Patient Current Location:  Home: [address] [place] [id number]    Care Transition Nurse contacted the patient by telephone to perform post hospital discharge assessment. Provided introduction to self, and explanation of the Care Transition Nurse role.     Patient: Hilaria Castillo    Patient : 1950   MRN: [id number]    Reason for Admission: Syncope  Discharge Date: 25  RURS: Readmission Risk Score: 7.7      Last Discharge Facility       Date Complaint Diagnosis Description Type Department Provider    2/10/25 Fall; Head Laceration Syncope and collapse ... ED to Hosp-Admission (Discharged) (ADMITTED) [facility] 4 TELE Eulogio Chambers, DO; Mac Wagner,...            Was this an external facility discharge? No    Additional needs identified to be addressed with provider   No needs identified             Method of communication with provider: none.    Patients top risk factors for readmission: functional physical ability, falls, medical condition-syncope, A-fib, Htn, scalp laceration, chronic troponin elevation, multiple health system providers, and polypharmacy    Interventions to address risk factors:   Attend HFU appt with pcp on 25 as scheduled~Reviewed and pt aware of f/u  OP PT @ [facility]~Resumes on 25  Encouraged BP monitoring and update pcp with any abnormal readings to pcp.    Care Summary Note: CTN called and spoke to the patient for a initial care transition call. Pt admitted on 2/10 with syncope and fall at home. Per chart review, syncope multifactorial secondary to orthostatic hypotension. Scale laceration (L post scalp) with staples.    Pt states that she is doing \"fine\" and offered no complaints today. Pt denies any recurrent syncope or falls. She denies any lightheadedness or dizziness. She is using her walker when ambulating. She states that she does not routinely monitor her BP so

## 2025-02-18 ENCOUNTER — TREATMENT (OUTPATIENT)
Dept: PHYSICAL THERAPY | Age: 75
End: 2025-02-18

## 2025-02-18 DIAGNOSIS — R29.898 WEAKNESS OF BOTH LOWER EXTREMITIES: Primary | ICD-10-CM

## 2025-02-18 NOTE — PROGRESS NOTES
Physical Therapy Daily Treatment Note    Date: 2025  Patient Name: Hilaria Castillo  : 1950   MRN: [id number]  DOInjury: -  DOSx: -  Referring Provider:  Abiodun Yuen MD     Medical Diagnosis:   1. Weakness of both lower extremities          X = TO BE PERFORMED NEXT VISIT  > = PROGRESS TO THIS    S: pt reports  fell backward in kitchen split head open. Pt was standing at the oven and took pills. Don't remember the floor.   In the hospital for 2 days. Got 5 staples. Still very sore overall.  O: Discussed anatomy, physiology, body mechanics, principles of loading, and progressive loading/activity.   Time 5989-1292     Visit 10/ Repeat outcome measure at mid point and end.    Pain      ROM      Modalities            Exercise      Nustep   End of tx. TE         Squats 3 sets to fatigue attempted to go deeper  15, 15,15x,  She had marked difficulty arising from lowest point TA   Calf Raises  15x,15x, 15x~ fatigue holding with B hands, 15x finger tip holding on. Holding for 5 sec TE   Toe Raises   TA   Marches  3 x 20 with no RB TE   Alt. Sidekicks   TA   Leg press 40# 3 x 20    TE   Sit/Stands Unable   TA   LAQ 2d HEP TE   Gait training   GT   Sitting rows See below    Marching Gait   NR   Sidestepping Holding bar and raising only ipsilateral leg   on R an L  TA   steps           Car transfer           Curb training  TA   A:  Tolerated fair.  No marching today due to weakness and overall pain due to the head injury.    No stepping and marches today.   P: Continue with rehab plan work on side stepping in order for pt to put walker in the back of suv and walker around to the front of car sidestepping  Sanjana Prado PTA    Treatment Charges: Mins Units   Initial Evaluation     Re-Evaluation     Ther Exercise         TE 30 2   Manual Therapy     MT     Ther Activities        TA 30 2   Gait Training          GT     Neuro Re-education NR     Modalities     Non-Billable Service Time     Other     Total

## 2025-02-19 NOTE — PROGRESS NOTES
Physician Progress Note      PATIENT:               NE HERNANDEZ  [facility] #:                  [id number]  :                       1950  ADMIT DATE:       2/10/2025 10:09 PM  DISCH DATE:        2025 11:56 AM  RESPONDING  PROVIDER #:        Eulogio Chambers DO          QUERY TEXT:    Patient admitted with orthostatic hypotension, noted to have atrial   fibrillation and is maintained on Eliquis. If possible, please document in   progress notes and discharge summary if you are evaluating and/or treating any   of the following:?  ?  The medical record reflects the following:  Risk Factors: F74Yrs, Essential hypertension  Clinical Indicators: DS  by IM-Atrial fibrillation with variable   ventricular sponsor chronic Eliquis  She has been resumed on anticoagulation therapy.  H&P note -scalp wound was treated with staples and turban dressing in the   emergency department.  Staples should be removed in approximately 1   week/2025.  Bleeding is presently controlled and Eliquis will be   continued due to the patient's underlying atrial fibrillation.  General appearance: Irregular rhythm.  Rate controlled.  S1 and S2.  Systolic   murmur.  Treatment: On Eliquis.      ThanksNaomi [facility]  Options provided:  -- Secondary hypercoagulable state in a patient with atrial fibrillation  -- Other - I will add my own diagnosis  -- Disagree - Not applicable / Not valid  -- Disagree - Clinically unable to determine / Unknown  -- Refer to Clinical Documentation Reviewer    PROVIDER RESPONSE TEXT:    This patient has secondary hypercoagulable state in a patient with atrial   fibrillation.    Query created by: Naomi Baum on 2025 4:27 AM      Electronically signed by:  Eulogio Chambers DO 2025 4:10 PM

## 2025-02-20 ENCOUNTER — TREATMENT (OUTPATIENT)
Dept: PHYSICAL THERAPY | Age: 75
End: 2025-02-20

## 2025-02-20 DIAGNOSIS — R29.898 WEAKNESS OF BOTH LOWER EXTREMITIES: Primary | ICD-10-CM

## 2025-02-20 NOTE — PROGRESS NOTES
Physical Therapy Daily Treatment Note    Date: 2025  Patient Name: Hilaria Castillo  : 1950   MRN: [id number]  DOInjury: -  DOSx: -  Referring Provider:  Abiodun Yuen MD     Medical Diagnosis:   1. Weakness of both lower extremities          X = TO BE PERFORMED NEXT VISIT  > = PROGRESS TO THIS    S: pt reports  fell backward in kitchen split head open. Pt was standing at the oven and took pills. Don't remember the floor.   In the hospital for 2 days. Got 5 staples. Still very sore overall.  O: Discussed anatomy, physiology, body mechanics, principles of loading, and progressive loading/activity.   Time 9247-3605     Visit 10/ Repeat outcome measure at mid point and end.    Pain      ROM      Modalities            Exercise      Nustep   End of tx. TE         Squats 3 sets to fatigue attempted to go deeper  15, 15,15x,  She had marked difficulty arising from lowest point TA   Calf Raises  15x,15x, 15x~ fatigue holding with B hands, 15x finger tip holding on. Holding for 5 sec TE   Toe Raises   TA   Marches Holding bar and raising only ipsilateral leg   on R an L  3 x  20 with no RB TE   Alt. Sidekicks   TA   Leg press 40# 3 x 20    TE   Sit/Stands Unable   TA   LAQ 2d HEP TE   Gait training   GT   Sitting rows See below    Marching Gait   NR   Sidestepping Holding bar and raising only ipsilateral leg   on R an L  TA   steps           Car transfer           Curb training  TA   A:  Tolerated fair.  No marching today due to weakness and overall pain due to the head injury.    No stepping and marches today.   P: Continue with rehab plan work on side stepping in order for pt to put walker in the back of suv and walker around to the front of car sidestepping  Sanjana Prado PTA    Treatment Charges: Mins Units   Initial Evaluation     Re-Evaluation     Ther Exercise         TE 30 2   Manual Therapy     MT     Ther Activities        TA 30 2   Gait Training          GT     Neuro Re-education NR

## 2025-02-24 NOTE — PROGRESS NOTES
Physician Progress Note      PATIENT:               NE HERNANDEZ  [facility] #:                  [id number]  :                       1950  ADMIT DATE:       2/10/2025 10:09 PM  DISCH DATE:        2025 11:56 AM  RESPONDING  PROVIDER #:        Eulogio Chambers DO          QUERY TEXT:    Patient admitted with Syncope. noted to have orthostatic hypotension in DS by   IM -. If possible, please document in progress notes and discharge   summary after study the etiology of the orthostatic hypotension:    The medical record reflects the following:  Risk Factors:  74Yrs, Atrial fibrillation, fall, HTN  Clinical Indicators: DS by IM - Syncope, multifactorial, and likely   orthostatic hypotension with discontinuation of diuretic therapy  suspect a contributor of orthostasis.  Diuretics are being held and gentle   fluids are being administered.  Orthostatic vital signs are being assessed.  Vitals - 182/152,166/126,162/116 - 2/10  She denies any neurologic complaints aside from that of mild generalized   weakness  H&P note -Patient states that she was at home, got up to ambulate and   then awoke on the ground.  She struck the back of her head and caused a   laceration with bleeding.  On arrival she was hypertensive but otherwise had   stable vital signs and was afebrile.  CBC, metabolic panel unremarkable.    Troponin x 2 assessed and mildly elevated but with flat trajectory at 25 and   25  CT head 02/10-No acute intracranial abnormality.  Treatment: IVfs, CT head, Monitor lab series.    Thanks,  Naomi Baum, [facility].  Options provided:  -- Syncope due to a Atrial fibrillation  -- Syncope due to orthostatic hypotension secondary to Drug induce  -- Other - I will add my own diagnosis  -- Disagree - Not applicable / Not valid  -- Disagree - Clinically unable to determine / Unknown  -- Refer to Clinical Documentation Reviewer    PROVIDER RESPONSE TEXT:    The syncope is due to a Atrial fibrillation    Query

## 2025-02-25 ENCOUNTER — TREATMENT (OUTPATIENT)
Dept: PHYSICAL THERAPY | Age: 75
End: 2025-02-25
Payer: MEDICARE

## 2025-02-25 DIAGNOSIS — R29.898 WEAKNESS OF BOTH LOWER EXTREMITIES: Primary | ICD-10-CM

## 2025-02-25 PROCEDURE — 97110 THERAPEUTIC EXERCISES: CPT

## 2025-02-25 PROCEDURE — 97530 THERAPEUTIC ACTIVITIES: CPT

## 2025-02-25 NOTE — PROGRESS NOTES
Continue current home medications  Rest, ice and wear sling for 3-5 days  Follow up with pmd/orthopedic in 5-7 days if symptoms not improving  Return to er for increased pain, decreased range of motion, or any new or worsening symptoms   Physical Therapy Daily Treatment Note    Date: 2025  Patient Name: Hilaria Castillo  : 1950   MRN: [id number]  DOInjury: -  DOSx: -  Referring Provider:  Abiodun Yuen MD     Medical Diagnosis:   1. Weakness of both lower extremities          X = TO BE PERFORMED NEXT VISIT  > = PROGRESS TO THIS    S: pt reports feeling so-so..  goal is to be able to go down garage steps grab walker and put in back seat and side step to the drivers door.  Than able to drive stop get the walker out to use it.    O: Discussed anatomy, physiology, body mechanics, principles of loading, and progressive loading/activity.   Time 8635-2023     Visit 11/ Repeat outcome measure at mid point and end.    Pain      ROM      Modalities            Exercise      Nustep   End of tx. TE         Squats 2 sets to fatigue attempted to go deeper  20x, 20x She had marked difficulty arising from lowest point TA   Calf Raises  2 sets 20x, 20x~ fatigue holding with B hands, 15x finger tip holding on. Holding for 5 sec TE   Toe Raises   TA   Marches Holding bar and raising only ipsilateral leg   on R an L  3 x  20 with no RB TE   Alt. Sidekicks   TA   Leg press 40# 3 x 20    TE   Sit/Stands Unable   TA   LAQ 2d HEP TE   Gait training   GT   Sitting rows See below    Marching Gait   NR   Sidestepping Holding bar and raising only ipsilateral leg   on R an L  TA   steps 5\" 5x ea R .  4\" RLE 12x,Left LE 12x  In // bars 5\" 8x on left using the handle on leg press          Car transfer           Curb training  TA   A:  Tolerated fair.  Pt hasn't been down to her basement for 3 years.  She wants to be able to get down there. Also wants to be independent  to perform car transfers.   P: Continue with rehab plan work on side stepping in order for pt to put walker in the back of suv and walker around to the front of car sidestepping  Sanjana Prado PTA    Treatment Charges: Mins Units   Initial Evaluation     Re-Evaluation     Ther Exercise         TE 30

## 2025-02-27 ENCOUNTER — TREATMENT (OUTPATIENT)
Dept: PHYSICAL THERAPY | Age: 75
End: 2025-02-27

## 2025-02-27 DIAGNOSIS — R29.898 WEAKNESS OF BOTH LOWER EXTREMITIES: Primary | ICD-10-CM

## 2025-02-27 NOTE — PROGRESS NOTES
Physical Therapy Daily Treatment Note    Date: 2025  Patient Name: Hilaria Castillo  : 1950   MRN: [id number]  DOInjury: -  DOSx: -  Referring Provider:  Abiodun Yuen MD     Medical Diagnosis:   1. Weakness of both lower extremities          X = TO BE PERFORMED NEXT VISIT  > = PROGRESS TO THIS    S: pt reports feeling ok today.   goal is to be able to go down garage steps grab walker and put in back seat and side step to the drivers door.  Than able to drive stop get the walker out to use it.    O: Discussed anatomy, physiology, body mechanics, principles of loading, and progressive loading/activity.   Time 2960-3539     Visit 12/ Repeat outcome measure at mid point and end.    Pain      ROM      Modalities            Exercise      Nustep   End of tx. If time TE         Squats 2 sets to fatigue attempted to go deeper  20x, 20x She had marked difficulty arising from lowest point TA   Calf Raises  2 sets 20x, 20x~ fatigue holding with B hands, 15x finger tip holding on. Holding for 5 sec TE   Toe Raises   TA   Marches Holding bar and raising only ipsilateral leg   on R an L  3 x  20 with no RB TE   Alt. Sidekicks   TA   Leg press 40# 3 x 20    TE   Sit/Stands See below  Only if time. TA   LAQ 2d HEP TE   Gait training   GT   Sitting rows See below    Marching Gait   NR   Sidestepping Holding bar and raising only ipsilateral leg   on R an L  TA   steps 5\" 5x ea R .  4\" RLE 12x,Left LE 12x  In // bars 5\" 8x on left using the handle on leg press          Car transfer           Curb training  TA   A:  Tolerated fair. Sit to stand from a mat at  27\" 1x,  26\" 1x, 25\" only using left hand.  1x, 24\"  6x only using left hand, 4x only using right hand.  Pt hasn't been down to her basement for 3 years.  She wants to be able to get down there. Basement steps are almost 9\" deep and 40\" width for a railing.  Also wants to be independent  to perform car transfers.   P: Continue with rehab plan work on side stepping

## 2025-03-05 ENCOUNTER — TREATMENT (OUTPATIENT)
Dept: PHYSICAL THERAPY | Age: 75
End: 2025-03-05

## 2025-03-05 DIAGNOSIS — R29.898 WEAKNESS OF BOTH LOWER EXTREMITIES: Primary | ICD-10-CM

## 2025-03-05 NOTE — PROGRESS NOTES
melanie Lu, KEISHA    Treatment Charges: Mins Units   Initial Evaluation     Re-Evaluation     Ther Exercise         TE 30 2   Manual Therapy     MT     Ther Activities        TA 30 2   Gait Training          GT     Neuro Re-education NR     Modalities     Non-Billable Service Time     Other     Total Time/Units 60 4

## 2025-03-10 ENCOUNTER — TREATMENT (OUTPATIENT)
Dept: PHYSICAL THERAPY | Age: 75
End: 2025-03-10
Payer: MEDICARE

## 2025-03-10 DIAGNOSIS — R29.898 WEAKNESS OF BOTH LOWER EXTREMITIES: Primary | ICD-10-CM

## 2025-03-10 PROCEDURE — 97110 THERAPEUTIC EXERCISES: CPT

## 2025-03-10 PROCEDURE — 97530 THERAPEUTIC ACTIVITIES: CPT

## 2025-03-10 NOTE — PROGRESS NOTES
Physical Therapy Daily Treatment Note    Date: 3/10/2025  Patient Name: Hilaria Castillo  : 1950   MRN: [id number]  DOInjury: -  DOSx: -  Referring Provider:  Abiodun Yuen MD     Medical Diagnosis:   1. Weakness of both lower extremities          X = TO BE PERFORMED NEXT VISIT  > = PROGRESS TO THIS    S: pt reports feeling tired  today.  (  Pt goals are to perform steps grab walker and put in back seat and side step to the drivers door.  Than able to drive stop get the walker out to use it ).    O: Discussed anatomy, physiology, body mechanics, principles of loading, and progressive loading/activity.   Time 9709-1302 pm     Visit 14/ Repeat outcome measure at mid point and end.    Pain      ROM      Modalities            Exercise      Nustep   End of tx. If time TE         Squats 2 sets to fatigue attempted to go deeper  20x, 20x She had marked difficulty arising from lowest point TA   Calf Raises  2 sets 20x, 20x~ fatigue holding with B hands, 15x finger tip holding on. Holding for 5 sec TE   Toe Raises   TA   Marches Holding bar and raising only ipsilateral leg   on R an L  3 x  20 with no RB TE   Alt. Sidekicks   TA   Leg press 40# 3 x 20    TE   Sit/Stands See below  Only if time. TA   LAQ 2d HEP TE   Gait training   GT   Sitting rows See below    Marching Gait   NR   Sidestepping Holding bar and raising only ipsilateral leg   on R an L  TA   steps 4\" 10x  SBA/CGA 5\" 8x on left using the handle on leg press TE         Car transfer           Curb training  TA   A:  Tolerated fair.  No new significant changes since last visit .   ( Pt hasn't been down to her basement for 3 years.  She wants to be able to get down there. Basement steps are almost 9\" deep and 40\" width for a railing  ) .  Also wants to be independent  to perform car transfers.   P: Continue with rehab plan work on side stepping in order for pt to put walker in the back of suv and walker around to the front of car sidestepping  Sanjana

## 2025-03-12 ENCOUNTER — TREATMENT (OUTPATIENT)
Dept: PHYSICAL THERAPY | Age: 75
End: 2025-03-12

## 2025-03-12 DIAGNOSIS — R29.898 WEAKNESS OF BOTH LOWER EXTREMITIES: Primary | ICD-10-CM

## 2025-03-12 NOTE — PROGRESS NOTES
Physical Therapy Daily Treatment Note    Date: 3/12/2025  Patient Name: Hilaria Castillo  : 1950   MRN: [id number]  DOInjury: -  DOSx: -  Referring Provider:  Abiodun Yuen MD     Medical Diagnosis:   1. Weakness of both lower extremities          X = TO BE PERFORMED NEXT VISIT  > = PROGRESS TO THIS    S: pt reports feeling  a little better  today.  (  Pt goals are to perform steps grab walker and put in back seat and side step to the drivers door.  Than able to drive stop get the walker out to use it ).    O: Discussed anatomy, physiology, body mechanics, principles of loading, and progressive loading/activity.   Time 1100- 1155  am     Visit 14/ Repeat outcome measure at mid point and end.    Pain      ROM      Modalities            Exercise      Nustep   End of tx. If time TE         Squats 2 sets to fatigue attempted to go deeper  20x, 20x She had marked difficulty arising from lowest point TA   Calf Raises  2 sets 20x, 20x~ fatigue holding with B hands, 15x finger tip holding on. Holding for 5 sec TE   Toe Raises   TA   Marches Holding bar and raising only ipsilateral leg   on R an L  3 x  20 with no RB TE   Alt. Sidekicks   TA   Leg press 40# 4 x 20    TE   Sit/Stands See below  Only if time. TA   LAQ 2d HEP TE   Gait training   GT   Sitting rows See below    Marching Gait   NR   Sidestepping Holding bar and raising only ipsilateral leg   on R an L  TA   steps 4\" 10x  SBA/CGA 5\" 8x on left using the handle on leg press TE         Car transfer           Curb training  TA   A:  Tolerated better .   Pt able to progress with additional reps while performing squats on leg press.      ( Pt hasn't been down to her basement for 3 years.  She wants to be able to get down there. Basement steps are almost 9\" deep and 40\" width for a railing  ) .  Also wants to be independent  to perform car transfers.   P: Continue with rehab plan work on side stepping in order for pt to put walker in the back of suv and

## 2025-03-18 ENCOUNTER — TREATMENT (OUTPATIENT)
Dept: PHYSICAL THERAPY | Age: 75
End: 2025-03-18

## 2025-03-18 DIAGNOSIS — R29.898 WEAKNESS OF BOTH LOWER EXTREMITIES: Primary | ICD-10-CM

## 2025-03-18 NOTE — PROGRESS NOTES
the front of car sidestepping  Sanjana Prado, PTA    Treatment Charges: Mins Units   Initial Evaluation     Re-Evaluation     Ther Exercise         TE 30 2   Manual Therapy     MT     Ther Activities        TA 25 2   Gait Training          GT     Neuro Re-education NR     Modalities     Non-Billable Service Time     Other     Total Time/Units 55 4

## 2025-03-20 ENCOUNTER — TREATMENT (OUTPATIENT)
Dept: PHYSICAL THERAPY | Age: 75
End: 2025-03-20

## 2025-03-20 DIAGNOSIS — R29.898 WEAKNESS OF BOTH LOWER EXTREMITIES: Primary | ICD-10-CM

## 2025-03-20 NOTE — PROGRESS NOTES
Physical Therapy Daily Treatment Note    Date: 3/20/2025  Patient Name: Hilaria Castillo  : 1950   MRN: [id number]  DOInjury: -  DOSx: -  Referring Provider:  Abiodun Yuen MD     Medical Diagnosis:   1. Weakness of both lower extremities          X = TO BE PERFORMED NEXT VISIT  > = PROGRESS TO THIS    S: pt reports left elbow is sore the last few days.  Increased pain in left elbow.   (  Pt goals are to perform steps grab walker and put in back seat and side step to the drivers door.  Than able to drive stop get the walker out to use it ).    O: Discussed anatomy, physiology, body mechanics, principles of loading, and progressive loading/activity.   Time 1100- 1200     Visit 16 Repeat outcome measure at mid point and end.    Pain      ROM      Modalities            Exercise      Nustep   End of tx. If time TE         Squats 2 sets to fatigue attempted to go deeper  20x, 20x She had marked difficulty arising from lowest point TA   Calf Raises  2 sets 20x, 20x~ fatigue holding with B hands,  finger tip holding on. Holding for 5 sec TE   Toe Raises   TA   Marches Holding bar and raising only ipsilateral leg   on R an L  2 x  20 with no RB.  Limited to 1 set due to painful elbow. TE   Alt. Sidekicks   TA   Leg press 40#  4 x 20    TE   CR Leg press 40# 25     Sit/Stands See below  Only if time. TA   LAQ 2d HEP TE   Gait training   GT   Sitting rows See below    Marching Gait   NR   Sidestepping Holding bar and raising only ipsilateral leg   on R an L  TA   steps 5\" 10x, 5x  SBA/CGA  TE         Car transfer           Curb training  TA   A:  Tolerated better .   Pt able to progress with additional reps while performing squats on leg press.      ( Pt hasn't been down to her basement for 3 years.  She wants to be able to get down there. Basement steps are almost 9\" deep and 40\" width for a railing  ) .  Also wants to be independent  to perform car transfers.   P: Continue with rehab plan work on side stepping in

## 2025-03-25 ENCOUNTER — TREATMENT (OUTPATIENT)
Dept: PHYSICAL THERAPY | Age: 75
End: 2025-03-25
Payer: MEDICARE

## 2025-03-25 DIAGNOSIS — R29.898 WEAKNESS OF BOTH LOWER EXTREMITIES: Primary | ICD-10-CM

## 2025-03-25 PROCEDURE — 97530 THERAPEUTIC ACTIVITIES: CPT

## 2025-03-25 PROCEDURE — 97110 THERAPEUTIC EXERCISES: CPT

## 2025-03-25 NOTE — PROGRESS NOTES
Physical Therapy Daily Treatment Note    Date: 3/25/2025  Patient Name: Hilaria Castillo  : 1950   MRN: [id number]  DOInjury: -  DOSx: -  Referring Provider:  Abiodun Yuen MD     Medical Diagnosis:   1. Weakness of both lower extremities          X = TO BE PERFORMED NEXT VISIT  > = PROGRESS TO THIS    S: pt reports continues with left elbow is sore the last few days.  Increased pain in left elbow.   (  Pt goals are to perform steps grab walker and put in back seat and side step to the drivers door.  Than able to drive stop get the walker out to use it ).    O: Discussed anatomy, physiology, body mechanics, principles of loading, and progressive loading/activity.   Time 1100- 1200     Visit 17 Repeat outcome measure at mid point and end.    Pain      ROM      Modalities            Exercise      Nustep   End of tx. If time TE         Squats 2 sets to fatigue attempted to go deeper  20x, 20x She had marked difficulty arising from lowest point TA   Calf Raises  2 sets 20x, 20x~ fatigue holding with B hands,  finger tip holding on. Holding for 5 sec TE   Toe Raises   TA   Marches Holding bar and raising only ipsilateral leg   on R an L  2 x  20 with no RB.  Limited to 1 set due to painful elbow. TE   Alt. Sidekicks   TA   Leg press 40#  4 x 20    TE   CR Leg press 40# 25     Sit/Stands See below  Only if time. TA   LAQ 2d HEP TE   Gait training   GT   Sitting rows See below    Marching Gait   NR   Sidestepping Holding // bar  on R an L  TA   steps 5\" 10x,   SBA/CGA  TE         Car transfer           Curb training  TA   A:  Tolerated better.   Attempted to do exs only using Right hand and unable, (possibly due to fear).  ( Pt hasn't been down to her basement for 3 years.  She wants to be able to get down there. Basement steps are almost 9\" deep and 40\" width for a railing  ) .  Also wants to be independent  to perform car transfers.   P: Continue with rehab plan work on side stepping in order for pt to put

## 2025-03-27 ENCOUNTER — TREATMENT (OUTPATIENT)
Dept: PHYSICAL THERAPY | Age: 75
End: 2025-03-27
Payer: MEDICARE

## 2025-03-27 DIAGNOSIS — R29.898 WEAKNESS OF BOTH LOWER EXTREMITIES: Primary | ICD-10-CM

## 2025-03-27 PROCEDURE — 97110 THERAPEUTIC EXERCISES: CPT

## 2025-03-27 PROCEDURE — 97530 THERAPEUTIC ACTIVITIES: CPT

## 2025-03-27 NOTE — PROGRESS NOTES
Physical Therapy Daily Treatment Note    Date: 3/27/2025  Patient Name: Hilaria Castillo  : 1950   MRN: [id number]  DOInjury: -  DOSx: -  Referring Provider:  Abiodun Yuen MD     Medical Diagnosis:   1. Weakness of both lower extremities          X = TO BE PERFORMED NEXT VISIT  > = PROGRESS TO THIS    S: pt reports continues with left elbow is sore the last few days.  Increased pain in left elbow.   (  Pt goals are to perform steps grab walker and put in back seat and side step to the drivers door.  Than able to drive stop get the walker out to use it ).    O: Discussed anatomy, physiology, body mechanics, principles of loading, and progressive loading/activity.   Time 1100- 1200     Visit 17 Repeat outcome measure at mid point and end.    Pain      ROM      Modalities            Exercise      Nustep   End of tx. If time TE         Squats 2 sets to fatigue attempted to go deeper  20x, 20x She had marked difficulty arising from lowest point TA   Calf Raises  2 sets 20x, 20x~ fatigue holding with B hands,  finger tip holding on. Holding for 5 sec TE   Toe Raises   TA   Marches Holding bar and raising only ipsilateral leg   on R an L  2 x  20 with no RB.  Limited to 1 set due to painful elbow. TE   Alt. Sidekicks   TA   Leg press 40#  4 x 20    TE   CR Leg press 40# 25     Sit/Stands See below  Only if time. TA   LAQ 2d HEP TE   Gait training   GT   Sitting rows See below    Marching Gait   NR   Sidestepping Holding // bar  on R an L  TA   steps 6\" 10x,   SBA/CGA  TE         Car transfer           Curb training  TA   A:  Tolerated better. Was   Attempted to do exs only using Right hand and unable, (possibly due to fear).  ( Pt hasn't been down to her basement for 3 years.  She wants to be able to get down there. Basement steps are almost 9\" deep and 40\" width for a railing  ) .  Also wants to be independent  to perform car transfers.   P: Continue with rehab plan work on side stepping in order for pt to put

## 2025-03-31 ENCOUNTER — TREATMENT (OUTPATIENT)
Dept: PHYSICAL THERAPY | Age: 75
End: 2025-03-31
Payer: MEDICARE

## 2025-03-31 DIAGNOSIS — R29.898 WEAKNESS OF BOTH LOWER EXTREMITIES: Primary | ICD-10-CM

## 2025-03-31 PROCEDURE — 97530 THERAPEUTIC ACTIVITIES: CPT

## 2025-03-31 PROCEDURE — 97110 THERAPEUTIC EXERCISES: CPT

## 2025-03-31 NOTE — PROGRESS NOTES
Physical Therapy Daily Treatment Note    Date: 3/31/2025  Patient Name: Hilaria Castillo  : 1950   MRN: [id number]  DOInjury: -  DOSx: -  Referring Provider:  Abiodun Yuen MD     Medical Diagnosis:   1. Weakness of both lower extremities          X = TO BE PERFORMED NEXT VISIT  > = PROGRESS TO THIS    S: pt reports continues with left elbow is sore the last few days.  Increased pain in left elbow.   (  Pt goals are to perform steps grab walker and put in back seat and side step to the drivers door.  Than able to drive stop get the walker out to use it ).    O: Discussed anatomy, physiology, body mechanics, principles of loading, and progressive loading/activity.   Time 1645-9254     Visit 18 Repeat outcome measure at mid point and end.    Pain      ROM      Modalities            Exercise      Nustep   End of tx. If time TE         Squats 2 sets to fatigue attempted to go deeper  20x, 20x She had marked difficulty arising from lowest point TA   Calf Raises  2 sets 20x, 20x~ fatigue holding with B hands,  finger tip holding on. Holding for 5 sec TE   Toe Raises   TA   Marches Holding bar and raising only ipsilateral leg   on R an L  2 x  30 with no RB.   TE   Alt. Sidekicks   TA   Leg press 40#  4 x 20    TE   CR Leg press 40# 25     Sit/Stands See below  Only if time. TA   LAQ 2d HEP TE   Gait training   GT   Sitting rows See below    Marching Gait   NR   Sidestepping Holding // bar  on R an L  TA   steps 6\" 10x,   SBA/CGA  TE         Car transfer           Curb training  TA   A:  Tolerated better. Was   Attempted to do exs only using Right hand and unable, (possibly due to fear).  ( Pt hasn't been down to her basement for 3 years.  She wants to be able to get down there. Basement steps are almost 9\" deep and 40\" width for a railing  ) .  Also wants to be independent  to perform car transfers.   P: Continue with rehab plan work on side stepping in order for pt to put walker in the back of suv and walker

## 2025-04-04 ENCOUNTER — TREATMENT (OUTPATIENT)
Dept: PHYSICAL THERAPY | Age: 75
End: 2025-04-04

## 2025-04-04 DIAGNOSIS — R26.2 DIFFICULTY WALKING: ICD-10-CM

## 2025-04-04 DIAGNOSIS — R29.898 WEAKNESS OF BOTH LOWER EXTREMITIES: Primary | ICD-10-CM

## 2025-04-04 DIAGNOSIS — M19.90 OSTEOARTHRITIS, UNSPECIFIED OSTEOARTHRITIS TYPE, UNSPECIFIED SITE: ICD-10-CM

## 2025-04-04 DIAGNOSIS — R26.9 ABNORMALITY OF GAIT: ICD-10-CM

## 2025-04-04 NOTE — PROGRESS NOTES
Physical Therapy Daily Treatment Note    Date: 2025  Patient Name: Hilaria Castillo  : 1950   MRN: [id number]  DOInjury: -  DOSx: -  Referring Provider:  Abiodun Yuen MD     Medical Diagnosis:   1. Weakness of both lower extremities          X = TO BE PERFORMED NEXT VISIT  > = PROGRESS TO THIS    S: pt reports went up to [place] for urology dr. Had to do a lot of walking.  Spouse noticed right leg continues with left elbow is sore the last few days.  Increased pain in left elbow.   (  Pt goals are to perform steps grab walker and put in back seat and side step to the drivers door.  Than able to drive stop get the walker out to use it ).    O: Discussed anatomy, physiology, body mechanics, principles of loading, and progressive loading/activity.   Time 5878-7293     Visit 19 Repeat outcome measure at mid point and end.    Pain      ROM      Modalities            Exercise      Nustep   End of tx. If time TE         Squats She had marked difficulty arising from lowest point TA   Calf Raises Holding for 5 sec TE   Toe Raises   TA   Marches Holding bar and raising only ipsilateral leg   on R an L  2 x  30 with no RB.   TE   Alt. Sidekicks   TA   Leg press 40#  3 x 20    TE   CR Leg press 40# 25     Sit/Stands See below  Only if time. TA   LAQ 2d HEP TE   Gait training   GT   Sitting rows See below    Marching Gait 5 min  NR   Sidestepping Holding // bar  on R an L  TA   steps 6\" 12x-15x,   SBA/CGA  TE         Car transfer           Curb training  TA   A:  Tolerated better. Per pt on leg press legs got tired. But also worked on gait training first.  Pt does not move her right foot as far as left. Only takes a half step.  When pt    Attempted to do exs only using Right hand and unable, (possibly due to fear).  ( Pt hasn't been down to her basement for 3 years.  She wants to be able to get down there. Basement steps are almost 9\" deep and 40\" width for a railing  ) .  Also wants to be independent  to

## 2025-04-08 ENCOUNTER — TREATMENT (OUTPATIENT)
Dept: PHYSICAL THERAPY | Age: 75
End: 2025-04-08
Payer: MEDICARE

## 2025-04-08 DIAGNOSIS — R29.898 WEAKNESS OF BOTH LOWER EXTREMITIES: Primary | ICD-10-CM

## 2025-04-08 PROCEDURE — 97110 THERAPEUTIC EXERCISES: CPT

## 2025-04-08 PROCEDURE — 97530 THERAPEUTIC ACTIVITIES: CPT

## 2025-04-08 NOTE — PROGRESS NOTES
of suv and walker around to the front of car sidestepping  Sanjana Prado, PTA    Treatment Charges: Mins Units   Initial Evaluation     Re-Evaluation     Ther Exercise         TE 30 2   Manual Therapy     MT     Ther Activities        TA 25 2   Gait Training          GT     Neuro Re-education NR     Modalities     Non-Billable Service Time     Other     Total Time/Units 55 4

## 2025-04-10 ENCOUNTER — TELEPHONE (OUTPATIENT)
Dept: ADMINISTRATIVE | Age: 75
End: 2025-04-10

## 2025-04-10 ENCOUNTER — TREATMENT (OUTPATIENT)
Dept: PHYSICAL THERAPY | Age: 75
End: 2025-04-10
Payer: MEDICARE

## 2025-04-10 DIAGNOSIS — R29.898 WEAKNESS OF BOTH LOWER EXTREMITIES: Primary | ICD-10-CM

## 2025-04-10 PROCEDURE — 97530 THERAPEUTIC ACTIVITIES: CPT

## 2025-04-10 PROCEDURE — 97110 THERAPEUTIC EXERCISES: CPT

## 2025-04-10 NOTE — TELEPHONE ENCOUNTER
Patient is a former patient of Dr. Nelson. With him retiring, she is wanting to get in with Dr. Jara. Would she be able to transfer her care to him?

## 2025-04-10 NOTE — PROGRESS NOTES
plan work on side stepping in order for pt to put walker in the back of suv and walker around to the front of car sidestepping  Sanjana Prado PTA    Treatment Charges: Mins Units   Initial Evaluation     Re-Evaluation     Ther Exercise         TE 30 2   Manual Therapy     MT     Ther Activities        TA 25 2   Gait Training          GT     Neuro Re-education NR     Modalities     Non-Billable Service Time     Other     Total Time/Units 55 4

## 2025-04-10 NOTE — TELEPHONE ENCOUNTER
Our office is not taking 's patients, a letter was mailed out to all patients with instructions, thanks.

## 2025-04-17 ENCOUNTER — TREATMENT (OUTPATIENT)
Dept: PHYSICAL THERAPY | Age: 75
End: 2025-04-17

## 2025-04-17 DIAGNOSIS — R29.898 WEAKNESS OF BOTH LOWER EXTREMITIES: Primary | ICD-10-CM

## 2025-04-17 NOTE — PROGRESS NOTES
Physical Therapy Daily Treatment Note    Date: 2025  Patient Name: Hilaria Castillo  : 1950   MRN: [id number]  DOInjury: -  DOSx: -  Referring Provider:  Abiodun Yuen MD     Medical Diagnosis:   1. Weakness of both lower extremities          X = TO BE PERFORMED NEXT VISIT  > = PROGRESS TO THIS    S: pt reports overall feels like things are improving.  But still gets frustrated that things are limited.   Increased pain in left elbow.   (  Pt goals are to perform steps grab walker and put in back seat and side step to the drivers door.  Than able to drive stop get the walker out to use it ).    O: Discussed anatomy, physiology, body mechanics, principles of loading, and progressive loading/activity.   Time 3462-5165     Visit 22 Repeat outcome measure at mid point and end.    Pain      ROM      Modalities            Exercise      Nustep   End of tx. If time TE         Squats She had marked difficulty arising from lowest point TA   Calf Raises Holding for 5 sec TE   Toe Raises   TA   Marches Holding bar and raising only ipsilateral leg   on R an L  2 x  30 with no RB.   TE   Alt. Sidekicks   TA   Leg press 40#  4 x 20   Seat 7 TE   CR Leg press 40# 25     Sit/Stands See below  Only if time. TA   LAQ 2d HEP TE   Gait training   GT   Sitting rows See below    Marching Gait  NR   Sidestepping Holding // bar  6x Step over 2\" block TA   steps 6\" 12x,   SBA/CGA  TE         Car transfer           Curb training  TA   A:  Tolerated better. Per pt on leg press legs got tired. But also worked on gait training first.  Pt does not move her right foot as far as left. Only takes a half step.  When pt is doing side steps in // bars had pt lift feet and no drag.   Attempted to do exs only using Right hand and unable, (possibly due to fear).  ( Pt hasn't been down to her basement for 3 years.  She wants to be able to get down there. Basement steps are almost 9\" deep and 40\" width for a railing  ) .  Also wants to be

## 2025-04-22 ENCOUNTER — TREATMENT (OUTPATIENT)
Dept: PHYSICAL THERAPY | Age: 75
End: 2025-04-22
Payer: MEDICARE

## 2025-04-22 DIAGNOSIS — R29.898 WEAKNESS OF BOTH LOWER EXTREMITIES: Primary | ICD-10-CM

## 2025-04-22 PROCEDURE — 97530 THERAPEUTIC ACTIVITIES: CPT

## 2025-04-22 PROCEDURE — 97110 THERAPEUTIC EXERCISES: CPT

## 2025-04-22 NOTE — PROGRESS NOTES
Physical Therapy Daily Treatment Note    Date: 2025  Patient Name: Hilaria Castillo  : 1950   MRN: [id number]  DOInjury: -  DOSx: -  Referring Provider:  Abiodun Yeun MD     Medical Diagnosis:   1. Weakness of both lower extremities          X = TO BE PERFORMED NEXT VISIT  > = PROGRESS TO THIS    S: pt reports overall feels like things are improving.  But still gets frustrated that things are limited.   Increased pain in left elbow.   (  Pt goals are to perform steps grab walker and put in back seat and side step to the drivers door.  Than able to drive stop get the walker out to use it ).    O: Discussed anatomy, physiology, body mechanics, principles of loading, and progressive loading/activity.   Time 5367-0602     Visit 22 Repeat outcome measure at mid point and end.    Pain      ROM      Modalities            Exercise      Nustep   End of tx. If time TE         Squats She had marked difficulty arising from lowest point TA   Calf Raises Holding for 5 sec TE   Toe Raises   TA   Marches Holding bar and raising only ipsilateral leg   on R an L  2 x  30 with no RB.   TE   Alt. Sidekicks   TA   Leg press 40#  4 x 20   Seat 7 TE   CR Leg press 40#  2 x 20     Sit/Stands See below  Only if time. TA   LAQ 2d HEP TE   Gait training   GT   Sitting rows See below    Marching Gait  NR   Sidestepping,  forward stepping in // bars over 7\" items Holding // bar  step over 7\" kettle bell and 7\" wt. In the // bars 4x.  Then did the same side stepping.   TA   steps 6\" 12x,   SBA/CGA  TE         Car transfer           Curb training  TA   A:  Tolerated better. Per pt on leg press legs got tired. But also worked on gait training first.  Pt does not move her right foot as far as left. Only takes a half step.  When pt is doing side steps in // bars had pt lift feet and no drag.   Attempted to do exs only using Right hand and unable, (possibly due to fear).  ( Pt hasn't been down to her basement for 3 years.  She wants

## 2025-04-24 ENCOUNTER — TREATMENT (OUTPATIENT)
Dept: PHYSICAL THERAPY | Age: 75
End: 2025-04-24
Payer: MEDICARE

## 2025-04-24 DIAGNOSIS — R26.2 DIFFICULTY WALKING: ICD-10-CM

## 2025-04-24 DIAGNOSIS — R29.898 WEAKNESS OF BOTH LOWER EXTREMITIES: Primary | ICD-10-CM

## 2025-04-24 PROCEDURE — 97530 THERAPEUTIC ACTIVITIES: CPT | Performed by: PHYSICAL THERAPIST

## 2025-04-24 PROCEDURE — 97110 THERAPEUTIC EXERCISES: CPT | Performed by: PHYSICAL THERAPIST

## 2025-04-24 NOTE — PROGRESS NOTES
Physical Therapy Daily Treatment Note    Date: 2025  Patient Name: Hilaria Castillo  : 1950   MRN: [id number]  DOInjury: -  DOSx: -  Referring Provider:  Abiodun Yuen MD     Medical Diagnosis:   1. Weakness of both lower extremities          X = TO BE PERFORMED NEXT VISIT  > = PROGRESS TO THIS    S: pt reports overall feels like things are improving.  But still gets frustrated that things are limited.   Increased pain in left elbow.   (  Pt goals are to perform steps grab walker and put in back seat and side step to the drivers door.  Than able to drive stop get the walker out to use it ).    O: Discussed anatomy, physiology, body mechanics, principles of loading, and progressive loading/activity.   Time 2123-0577     Visit 23 Repeat outcome measure at mid point and end.    Pain      ROM      Modalities            Exercise      Nustep   End of tx. If time TE         Squats She had marked difficulty arising from lowest point TA   Calf Raises Holding for 5 sec TE   Toe Raises   TA   Marches Holding bar and raising only ipsilateral leg   on R an L  2 x  30 with no RB.   TE   Alt. Sidekicks   TA   Leg press 40#  3 x 20   Seat 7 TE   CR Leg press 40#  3 x 20     Sit/Stands See below  Only if time. TA   LAQ 2d HEP TE   Gait training   GT   Sitting rows See below    Marching Gait  NR   Sidestepping,  forward stepping in // bars over 7\" items Holding // bar  step over 7\" kettle bell and 7\" wt. In the // bars 4x.  Then did the same side stepping.   TA   steps 6\" 12x,   SBA/CGA  TE         Car transfer           Curb training  TA   A:  Tolerated better. Per pt on leg press legs got tired. But also worked on gait training first.  Pt does not move her right foot as far as left. Only takes a half step.  When pt is doing side steps in // bars had pt lift feet and no drag.   Attempted to do exs only using Right hand and unable, (possibly due to fear).  ( Pt hasn't been down to her basement for 3 years.  She wants

## 2025-04-28 ENCOUNTER — TREATMENT (OUTPATIENT)
Dept: PHYSICAL THERAPY | Age: 75
End: 2025-04-28
Payer: MEDICARE

## 2025-04-28 DIAGNOSIS — R26.2 DIFFICULTY WALKING: ICD-10-CM

## 2025-04-28 DIAGNOSIS — R29.898 WEAKNESS OF BOTH LOWER EXTREMITIES: Primary | ICD-10-CM

## 2025-04-28 PROCEDURE — 97530 THERAPEUTIC ACTIVITIES: CPT | Performed by: PHYSICAL THERAPIST

## 2025-04-28 PROCEDURE — 97110 THERAPEUTIC EXERCISES: CPT | Performed by: PHYSICAL THERAPIST

## 2025-04-28 NOTE — PROGRESS NOTES
Physical Therapy Daily Treatment Note    Date: 2025  Patient Name: Hilaria Castillo  : 1950   MRN: [id number]  DOInjury: -  DOSx: -  Referring Provider:  Abiodun Yuen MD     Medical Diagnosis:   1. Weakness of both lower extremities          X = TO BE PERFORMED NEXT VISIT  > = PROGRESS TO THIS    S: pt reports overall feels like things are improving.  But still gets frustrated that things are limited.   Increased pain in left elbow.   (  Pt goals are to perform steps grab walker and put in back seat and side step to the drivers door.  Than able to drive stop get the walker out to use it ).    O: Discussed anatomy, physiology, body mechanics, principles of loading, and progressive loading/activity.   Time 2672-1687     Visit 23 Repeat outcome measure at mid point and end.    Pain      ROM      Modalities            Exercise      Nustep   End of tx. If time TE         Squats She had marked difficulty arising from lowest point TA   Calf Raises Holding for 5 sec TE   Toe Raises   TA   Marches Holding bar and raising only ipsilateral leg   on R an L  2 x  30 with no RB.   TE   Alt. Sidekicks   TA   Leg press 40#  3 x 20   Seat 7 TE   CR Leg press 40#  3 x 20     Sit/Stands See below  Only if time. TA   LAQ 2d HEP TE   Gait training   GT   Sitting rows See below    Marching Gait  NR   Sidestepping,  forward stepping in // bars over 7\" items Holding // bar  step over 7\" kettle bell and 7\" wt. In the // bars 4x.  Then did the same side stepping.   TA   steps 6\" 12x,   SBA/CGA  TE         Car transfer           Curb training  TA   A:  Tolerated better. Per pt on leg press legs got tired. But also worked on gait training first.  Pt does not move her right foot as far as left. Only takes a half step.  When pt is doing side steps in // bars had pt lift feet and no drag.   Attempted to do exs only using Right hand and unable, (possibly due to fear).  ( Pt hasn't been down to her basement for 3 years.  She wants

## 2025-05-01 ENCOUNTER — OFFICE VISIT (OUTPATIENT)
Dept: BARIATRICS/WEIGHT MGMT | Age: 75
End: 2025-05-01
Payer: MEDICARE

## 2025-05-01 VITALS
HEART RATE: 91 BPM | TEMPERATURE: 97.6 F | WEIGHT: 188.2 LBS | SYSTOLIC BLOOD PRESSURE: 123 MMHG | HEIGHT: 67 IN | BODY MASS INDEX: 29.54 KG/M2 | DIASTOLIC BLOOD PRESSURE: 87 MMHG

## 2025-05-01 DIAGNOSIS — E66.811 CLASS 1 OBESITY DUE TO EXCESS CALORIES WITH SERIOUS COMORBIDITY AND BODY MASS INDEX (BMI) OF 30.0 TO 30.9 IN ADULT: ICD-10-CM

## 2025-05-01 DIAGNOSIS — I10 ESSENTIAL HYPERTENSION: Primary | ICD-10-CM

## 2025-05-01 DIAGNOSIS — E66.09 CLASS 1 OBESITY DUE TO EXCESS CALORIES WITH SERIOUS COMORBIDITY AND BODY MASS INDEX (BMI) OF 30.0 TO 30.9 IN ADULT: ICD-10-CM

## 2025-05-01 PROCEDURE — 3079F DIAST BP 80-89 MM HG: CPT | Performed by: INTERNAL MEDICINE

## 2025-05-01 PROCEDURE — 3074F SYST BP LT 130 MM HG: CPT | Performed by: INTERNAL MEDICINE

## 2025-05-01 PROCEDURE — 99211 OFF/OP EST MAY X REQ PHY/QHP: CPT | Performed by: INTERNAL MEDICINE

## 2025-05-01 PROCEDURE — 99214 OFFICE O/P EST MOD 30 MIN: CPT | Performed by: INTERNAL MEDICINE

## 2025-05-01 PROCEDURE — 1123F ACP DISCUSS/DSCN MKR DOCD: CPT | Performed by: INTERNAL MEDICINE

## 2025-05-01 NOTE — PATIENT INSTRUCTIONS
Rules:  Count every calorie every day  Limit sweets to one day per month  Limit chips/crackers/pretzels/nuts/popcorn to 150 gonzalez/day    Requirements:  Make sure protein intake is at least 75 grams per day  Make sure that fiber intake is at least 25 grams per day  Take one multivitamin every day    Targets:  Limit calorie intake to 1250 calories/day  Chair exercises as able  Avoid eating 2 hours within bedtime.   Limit restaurants (including fast food and food from a convenience store) to one time every two weeks while in town    Tips:  Do not eat outside of the dining room or the kitchen  Do not eat while watching TV, videos, working on the computer or using a smart phone  Do not eat food out of a multi-serving bag or container.    Tirzepatide (Zepbound):  Continue Zepbound 10 mg weekly.    Follow up in about 3 months.

## 2025-05-01 NOTE — PROGRESS NOTES
complex vitamins capsule Take 1 capsule by mouth daily    Antonio Franklin MD   Glucosamine 500 MG CAPS Take 1 capsule by mouth daily    Antonio Franklin MD   calcium citrate (CALCITRATE) 950 (200 Ca) MG tablet Take 1 tablet by mouth daily    Antonio Franklin MD   Lifitegrast (XIIDRA) 5 % SOLN Place 1 drop into both eyes in the morning and at bedtime    Antonio Franklin MD   doxycycline hyclate (VIBRAMYCIN) 100 MG capsule Take 1 capsule by mouth 2 times daily Maintenance 5/13/23   Antonio Franklin MD   potassium chloride (KLOR-CON) 10 MEQ extended release tablet Take 1 tablet by mouth daily 7/5/23   Antonio Franklin MD   docusate sodium (COLACE) 100 MG capsule Take 2 capsules by mouth daily    Antonio Franklin MD   Multiple Vitamins-Minerals (MULTIVITAL PO) Take 1 tablet by mouth daily    Antonio Franklin MD   xiidra eye drops bid.    12/13/22: medications were adjusted (see above list)    1/24/23: Saxenda got approved, added colace for constipation (3/d), plus prunes.    Family history: DM: brother, Heart disease: half brothers (4) all had heart ds.    Allergies:   Allergies   Allergen Reactions    Amoxicillin-Pot Clavulanate Hives and Shortness Of Breath     Tolerated zosyn 3/19/2023    Pcn [Penicillins] Hives, Itching and Swelling    Morphine Nausea Only       Social history: smoking: quit 44 years ago ; Alcohol: couple of times week (a glass of wine or other drink).    ROS -  Card - no CP, but difficulty with mobility which she feels is due to weight.  GI - no N/V, has Constipation colace helps.    PE -  Gen : /87 (BP Site: Right Upper Arm, Patient Position: Sitting, BP Cuff Size: Large Adult)   Pulse 91   Temp 97.6 °F (36.4 °C) (Temporal)   Ht 1.702 m (5' 7\")   Wt 85.4 kg (188 lb 3.2 oz)   BMI 29.48 kg/m²    WN, WD, NAD  Lung: Nml resp effort, CTA B/L  Heart:  irregular but rate controlled (afib) w/o MGR, + LE pitting edema L>R  Psych: Normal mood   Full

## 2025-05-02 ENCOUNTER — TREATMENT (OUTPATIENT)
Dept: PHYSICAL THERAPY | Age: 75
End: 2025-05-02

## 2025-05-02 DIAGNOSIS — R29.898 WEAKNESS OF BOTH LOWER EXTREMITIES: Primary | ICD-10-CM

## 2025-05-02 NOTE — PROGRESS NOTES
( Pt hasn't been down to her basement for 3 years.  She wants to be able to get down there. Basement steps are almost 9\" deep and 40\" width for a railing  ) .  Also wants to be independent  to perform car transfers.   P: Continue with rehab plan work on side stepping in order for pt to put walker in the back of suv and walker around to the front of car sidestepping  Sanjana Prado PTA    Treatment Charges: Mins Units   Initial Evaluation     Re-Evaluation     Ther Exercise         TE 30 2   Manual Therapy     MT     Ther Activities        TA 15 1   Gait Training          GT     Neuro Re-education NR     Modalities     Non-Billable Service Time     Other     Total Time/Units 45 3

## 2025-05-05 ENCOUNTER — TREATMENT (OUTPATIENT)
Dept: PHYSICAL THERAPY | Age: 75
End: 2025-05-05
Payer: MEDICARE

## 2025-05-05 DIAGNOSIS — R29.898 WEAKNESS OF BOTH LOWER EXTREMITIES: Primary | ICD-10-CM

## 2025-05-05 PROCEDURE — 97110 THERAPEUTIC EXERCISES: CPT

## 2025-05-05 PROCEDURE — 97530 THERAPEUTIC ACTIVITIES: CPT

## 2025-05-05 NOTE — PROGRESS NOTES
Physical Therapy Daily Treatment Note    Date: 2025  Patient Name: Hilaria Castillo  : 1950   MRN: [id number]  DOInjury: -  DOSx: -  Referring Provider:  Abiodun Yuen MD     Medical Diagnosis:   1. Weakness of both lower extremities          X = TO BE PERFORMED NEXT VISIT  > = PROGRESS TO THIS    S: pt reports no changes just trying to do exs.    Always sleep on my back. Still gets frustrated that things are limited.   Increased pain in left elbow and shoulders.   (  Pt goals are to perform steps grab walker and put in back seat and side step to the drivers door.  Than able to drive stop get the walker out to use it ).    O: Discussed anatomy, physiology, body mechanics, principles of loading, and progressive loading/activity.   Time 7271-9495     Visit 24 Repeat outcome measure at mid point and end.    Pain      ROM      Modalities            Exercise      Nustep   L6/ 5 min End of tx. If time TE         Squats She had marked difficulty arising from lowest point TA   Calf Raises 10-15x  airex  TE   Toe Raises   TA   Marches Holding bar and raising only ipsilateral leg   on R an L  2 x  30 with no RB.   TE   Alt. Sidekicks   TA   Leg press 40#  3 x 20   Seat 7 TE   CR Leg press 40#  2 x 20     Sit/Stands See below  Only if time. TA   LAQ 2d HEP TE   Gait training   GT   Sitting rows See below    Marching Gait  NR   Sidestepping,  forward stepping in // bars over 7\" items Holding // bar  step over 7\" kettle bell spread about 12\" 7\" wt. In the // bars 4x.  Then did the same side stepping.   TA   steps 6\" 15x,   SBA/CGA  TE         Car transfer           Curb training   4\" 2x CGA  TA   A:  Tolerated better. Per pt on leg press legs got tired. But also worked on gait training first.  Pt does not move her right foot as far as left. Only takes a half step.  When pt is doing side steps in // bars had pt lift feet and no drag.   Attempted to do exs only using Right hand and unable, (possibly due to fear).  (

## 2025-05-07 ENCOUNTER — TREATMENT (OUTPATIENT)
Dept: PHYSICAL THERAPY | Age: 75
End: 2025-05-07

## 2025-05-07 DIAGNOSIS — R29.898 WEAKNESS OF BOTH LOWER EXTREMITIES: Primary | ICD-10-CM

## 2025-05-07 NOTE — PROGRESS NOTES
Physical Therapy Daily Treatment Note    Date: 2025  Patient Name: Hilaria Castillo  : 1950   MRN: [id number]  DOInjury: -  DOSx: -  Referring Provider:  Abiodun Yuen MD     Medical Diagnosis:   1. Weakness of both lower extremities          X = TO BE PERFORMED NEXT VISIT  > = PROGRESS TO THIS    S: pt reports no changes just trying to do exs.    Always sleep on my back. Still gets frustrated that things are limited.   Increased pain in left elbow and shoulders.   (  Pt goals are to perform steps grab walker and put in back seat and side step to the drivers door.  Than able to drive stop get the walker out to use it ).    O: Discussed anatomy, physiology, body mechanics, principles of loading, and progressive loading/activity.   Time 7125-7434     Visit 25 Repeat outcome measure at mid point and end.    Pain      ROM      Modalities            Exercise      Nustep   L6/ 5 min End of tx. If time TE         Squats She had marked difficulty arising from lowest point TA   Calf Raises 10-15x  airex  TE   Toe Raises   TA   Marches Holding bar and raising only ipsilateral leg   on R an L  2 x  30 with no RB.   TE   Alt. Sidekicks   TA   Leg press 40#  2 x 20   Seat 7 TE   CR Leg press 40#  2 x 20     Sit/Stands See below  Only if time. TA   LAQ 2d HEP TE   Gait training   GT   Sitting rows See below    Marching Gait  NR   Sidestepping,  forward stepping in // bars over 7\" items Holding // bar  step over 7\" kettle bell spread about 12\" 7\" wt. In the // bars 4x.  Then did the same side stepping.   TA   steps 6\" 16x,   SBA/CGA  TE         Car transfer           Curb training   4\" 2x CGA  TA   A:  Tolerated better. Had pt do leg press very slow. But also worked on gait training first.  Pt does not move her right foot as far as left. Only takes a half step.  When pt is doing side steps in // bars had pt lift feet and no drag.   Attempted to do exs only using Right hand and unable, (possibly due to fear).  ( Pt

## 2025-05-14 ENCOUNTER — TREATMENT (OUTPATIENT)
Dept: PHYSICAL THERAPY | Age: 75
End: 2025-05-14
Payer: MEDICARE

## 2025-05-14 DIAGNOSIS — R29.898 WEAKNESS OF BOTH LOWER EXTREMITIES: Primary | ICD-10-CM

## 2025-05-14 PROCEDURE — 97110 THERAPEUTIC EXERCISES: CPT

## 2025-05-14 PROCEDURE — 97530 THERAPEUTIC ACTIVITIES: CPT

## 2025-05-14 NOTE — PROGRESS NOTES
Physical Therapy Daily Treatment Note    Date: 2025  Patient Name: Hilaria Castillo  : 1950   MRN: [id number]  DOInjury: -  DOSx: -  Referring Provider:  Abiodun Yuen MD     Medical Diagnosis:   1. Weakness of both lower extremities          X = TO BE PERFORMED NEXT VISIT  > = PROGRESS TO THIS    S: pt reports both arms are really hurting.    Always sleep on my back. Still gets frustrated that things are limited.   Increased pain in left elbow and shoulders.   (  Pt goals are to perform steps grab walker and put in back seat and side step to the drivers door.  Than able to drive stop get the walker out to use it ).    O: Discussed anatomy, physiology, body mechanics, principles of loading, and progressive loading/activity.   Time 1061-6709     Visit 26 Repeat outcome measure at mid point and end.    Pain      ROM      Modalities            Exercise      Nustep   L6/ 5 min End of tx. If time TE         Squats She had marked difficulty arising from lowest point TA   Calf Raises 10-15x  airex  TE   Toe Raises   TA   Marches Holding bar and raising only ipsilateral leg   on R an L  2 x  30 with no RB.   TE   Alt. Sidekicks   TA   Leg press 60# 10x  TE   SL leg press 0# 10x, SL     CR Leg press 40#  2 x 20     Sit/Stands See below  Only if time. TA   LAQ 2d HEP TE   Gait training   GT   Sitting rows See below    Marching Gait  NR   Sidestepping,  forward stepping in // bars over 7\" items Holding // bar  step over 7\" kettle bell spread about 12\" 7\" wt. In the // bars 4x.  Then did the same side stepping.   TA   steps 6\" 16x,   SBA/CGA  TE         Car transfer           Curb training   4\" 2x CGA  TA   A:  Tolerated better.  But also worked on gait training first.  Pt does not move her right foot as far as left. Only takes a half step.  When pt is doing side steps in // bars had pt lift feet and no drag.   Attempted to do exs only using Right hand and unable, (possibly due to fear).  ( Pt hasn't been down

## 2025-05-19 ENCOUNTER — TREATMENT (OUTPATIENT)
Dept: PHYSICAL THERAPY | Age: 75
End: 2025-05-19
Payer: MEDICARE

## 2025-05-19 DIAGNOSIS — R29.898 WEAKNESS OF BOTH LOWER EXTREMITIES: Primary | ICD-10-CM

## 2025-05-19 PROCEDURE — 97530 THERAPEUTIC ACTIVITIES: CPT

## 2025-05-19 PROCEDURE — 97110 THERAPEUTIC EXERCISES: CPT

## 2025-05-19 NOTE — PROGRESS NOTES
Physical Therapy Daily Treatment Note    Date: 2025  Patient Name: Hilaria Castillo  : 1950   MRN: [id number]  DOInjury: -  DOSx: -  Referring Provider:  Abiodun Yuen MD     Medical Diagnosis:   1. Weakness of both lower extremities          X = TO BE PERFORMED NEXT VISIT  > = PROGRESS TO THIS    S: had a rough weekend. pt reports both arms are really hurting.   Difficult time when having to turn and move feet.  Always sleep on my back. Still gets frustrated that things are limited.   Increased pain in left elbow and shoulders.   (  Pt goals are to perform steps grab walker and put in back seat and side step to the drivers door.  Than able to drive stop get the walker out to use it ).    O: Discussed anatomy, physiology, body mechanics, principles of loading, and progressive loading/activity.   Time 9801-8694     Visit 26 Repeat outcome measure at mid point and end.    Pain      ROM      Modalities            Exercise      Nustep   L6/ 5 min End of tx. If time TE         Squats She had marked difficulty arising from lowest point TA   Calf Raises 10-15x  airex  TE   Toe Raises   TA   Marches Holding bar and raising only ipsilateral leg   on R an L  2 x  30 with no RB.   TE   Alt. Sidekicks   TA   Leg press 60# 12x, 10x. 10x  TE   SL leg press 0# 2 x 10 x, SL     CR Leg press 40#  2 x 20     Sit/Stands See below  Only if time. TA   LAQ 2d HEP TE   Gait training   GT   Sitting rows See below    Marching Gait  NR   Sidestepping,  forward stepping in // bars over 7\" items Holding // bar  step over 7\" kettle bell spread about 12\" 7\" wt. In the // bars 4x.  Then did the same side stepping.   TA   steps 6\" 16x,   SBA/CGA  TE         Car transfer           Curb training   4\" 2x CGA  TA   A:  Tolerated better.  But also worked on gait training first.  Pt does not move her right foot as far as left. Only takes a half step.  When pt is doing side steps in // bars had pt lift feet and no drag.   Attempted to do

## 2025-05-21 ENCOUNTER — TREATMENT (OUTPATIENT)
Dept: PHYSICAL THERAPY | Age: 75
End: 2025-05-21
Payer: MEDICARE

## 2025-05-21 DIAGNOSIS — R29.898 WEAKNESS OF BOTH LOWER EXTREMITIES: Primary | ICD-10-CM

## 2025-05-21 PROCEDURE — 97530 THERAPEUTIC ACTIVITIES: CPT

## 2025-05-21 PROCEDURE — 97110 THERAPEUTIC EXERCISES: CPT

## 2025-05-21 NOTE — PROGRESS NOTES
Physical Therapy Daily Treatment Note    Date: 2025  Patient Name: Hilaria Castillo  : 1950   MRN: [id number]  DOInjury: -  DOSx: -  Referring Provider:  Abiodun Yuen MD     Medical Diagnosis:   1. Weakness of both lower extremities          X = TO BE PERFORMED NEXT VISIT  > = PROGRESS TO THIS    S: pt reports all week has been rough.  I feel that I had set back, but no reason.   Difficult time when having to turn and move feet.  Always sleep on my back. Still gets frustrated that things are limited.   Increased pain in left elbow and shoulders.   (  Pt goals are to perform steps grab walker and put in back seat and side step to the drivers door.  Than able to drive stop get the walker out to use it ).    O: Discussed anatomy, physiology, body mechanics, principles of loading, and progressive loading/activity.   Time 3511-4575     Visit 27 Repeat outcome measure at mid point and end.    Pain      ROM      Modalities            Exercise      Nustep   L6/ 5 min End of tx. If time TE         Squats She had marked difficulty arising from lowest point TA   Calf Raises 10-15x  airex  TE   Toe Raises   TA   Marches Holding bar and raising only ipsilateral leg   on R an L  2 x  30 with no RB.   TE   Alt. Sidekicks   TA   Leg press 60# 2 x 29  TE   SL leg press 0# 2 x 10 x, SL     CR Leg press 40#  2 x 20     Sit/Stands See below  Only if time. TA   LAQ 2d HEP TE   Gait training   GT   Sitting rows See below    Marching Gait  NR   Sidestepping,  forward stepping in // bars over 7\" items Holding // bar  step over 7\" kettle bell spread about 12\" 7\" wt. In the // bars 4x.  Then did the same side stepping.   TA   steps 6\" 16x,   SBA/CGA  TE         Car transfer           Curb training   4\" 2x CGA  TA   A:  Tolerated better.  But also worked on gait training first.  Pt does not move her right foot as far as left. Only takes a half step.  When pt is doing side steps in // bars had pt lift feet and no drag.

## 2025-05-27 ENCOUNTER — TELEPHONE (OUTPATIENT)
Dept: PHYSICAL THERAPY | Age: 75
End: 2025-05-27

## 2025-05-29 ENCOUNTER — TREATMENT (OUTPATIENT)
Dept: PHYSICAL THERAPY | Age: 75
End: 2025-05-29
Payer: MEDICARE

## 2025-05-29 DIAGNOSIS — R29.898 WEAKNESS OF BOTH LOWER EXTREMITIES: Primary | ICD-10-CM

## 2025-05-29 PROCEDURE — 97530 THERAPEUTIC ACTIVITIES: CPT

## 2025-05-29 PROCEDURE — 97110 THERAPEUTIC EXERCISES: CPT

## 2025-05-29 NOTE — PROGRESS NOTES
Physical Therapy Daily Treatment Note    Date: 2025  Patient Name: Hilaria Castillo  : 1950   MRN: [id number]  DOInjury: -  DOSx: -  Referring Provider:  Abiodun Yuen MD     Medical Diagnosis:   1. Weakness of both lower extremities          X = TO BE PERFORMED NEXT VISIT  > = PROGRESS TO THIS    S: pt reports all week has been rough.  I feel that I had set back, but no reason.   Difficult time when having to turn and move feet.  Always sleep on my back. Still gets frustrated that things are limited.   Increased pain in left elbow and shoulders.   (  Pt goals are to perform steps grab walker and put in back seat and side step to the drivers door.  Than able to drive stop get the walker out to use it ).    O: Discussed anatomy, physiology, body mechanics, principles of loading, and progressive loading/activity.   Time 5144-4407     Visit 28 Repeat outcome measure at mid point and end.    Pain      ROM      Modalities            Exercise      Nustep   L6/ 5 min End of tx. If time TE         Squats She had marked difficulty arising from lowest point TA   Calf Raises 10-15x  airex  TE   Toe Raises   TA   Marches Holding bar and raising only ipsilateral leg   on R an L  2 x  30 with no RB.   TE   Alt. Sidekicks   TA   Leg press 60# 2 x 29  TE   SL leg press 0# 2 x 10 x, SL     CR Leg press 40#  2 x 20     Sit/Stands See below  Only if time. TA   LAQ 2d HEP TE   Gait training   GT   Sitting rows See below    Marching Gait  NR   Sidestepping,  forward stepping in // bars over 7\" items Holding // bar  step over 7\" kettle bell spread about 12\" 7\" wt. In the // bars 4x.  Then did the same side stepping.   TA   steps 6\" 16x,   SBA/CGA  TE         Car transfer           Curb training   4\" 2x CGA  TA   A:  Tolerated better.  But also worked on gait training first.  Pt does not move her right foot as far as left. Only takes a half step.  When pt is doing side steps in // bars had pt lift feet and no drag.

## 2025-06-02 ENCOUNTER — TREATMENT (OUTPATIENT)
Dept: PHYSICAL THERAPY | Age: 75
End: 2025-06-02
Payer: MEDICARE

## 2025-06-02 DIAGNOSIS — R29.898 WEAKNESS OF BOTH LOWER EXTREMITIES: Primary | ICD-10-CM

## 2025-06-02 DIAGNOSIS — R26.9 ABNORMALITY OF GAIT: ICD-10-CM

## 2025-06-02 DIAGNOSIS — R26.2 DIFFICULTY WALKING: ICD-10-CM

## 2025-06-02 DIAGNOSIS — M19.90 OSTEOARTHRITIS, UNSPECIFIED OSTEOARTHRITIS TYPE, UNSPECIFIED SITE: ICD-10-CM

## 2025-06-02 PROCEDURE — 97530 THERAPEUTIC ACTIVITIES: CPT

## 2025-06-02 PROCEDURE — 97110 THERAPEUTIC EXERCISES: CPT

## 2025-06-02 NOTE — PROGRESS NOTES
Physical Therapy Daily Treatment Note    Date: 2025  Patient Name: Hilaria Castillo  : 1950   MRN: [id number]  DOInjury: -  DOSx: -  Referring Provider:  Abiodun Yuen MD     Medical Diagnosis:   1. Weakness of both lower extremities          X = TO BE PERFORMED NEXT VISIT  > = PROGRESS TO THIS    S: pt reports still having difficult time when having to turn and move feet.  Always sleep on my back. Still gets frustrated that things are limited.   Increased pain in left elbow and shoulders.   (  Pt goals are to perform steps grab walker and put in back seat and side step to the drivers door.  Than able to drive stop get the walker out to use it ).    O: Discussed anatomy, physiology, body mechanics, principles of loading, and progressive loading/activity.   Time 5704-5958     Visit 29 Repeat outcome measure at mid point and end.    Pain      ROM      Modalities            Exercise      Nustep   L6/ 5 min End of tx. If time TE         Squats She had marked difficulty arising from lowest point TA   Calf Raises 10-15x  airex  TE   Toe Raises   TA   Marches Holding bar and raising only ipsilateral leg   on R an L  2 x  30 with no RB.   TE   Alt. Sidekicks   TA   Leg press 60# 2 x 29  TE   SL leg press 0# 2 x 10 x, SL     CR Leg press 40#  2 x 20     Sit/Stands See below  Only if time. TA   LAQ 2d HEP TE   Gait training   GT   Sitting rows See below    Marching Gait  NR   Sidestepping,  forward stepping in // bars over 7\" items Holding // bar  step over 7\" kettle bell spread about 12\" 7\" wt. In the // bars 4x.  Then did the same side stepping.   TA   steps 6\" 16x,   SBA/CGA  TE         Car transfer           Curb training   4\" 2x CGA  TA   A:  Tolerated better.  But also worked on gait training first.  Pt does not move her right foot as far as left. Only takes a half step.  When pt is doing side steps in // bars had pt lift feet and no drag.   Attempted to do exs only using Right hand and unable, (possibly

## 2025-06-05 ENCOUNTER — TREATMENT (OUTPATIENT)
Dept: PHYSICAL THERAPY | Age: 75
End: 2025-06-05

## 2025-06-05 DIAGNOSIS — R29.898 WEAKNESS OF BOTH LOWER EXTREMITIES: Primary | ICD-10-CM

## 2025-06-05 NOTE — PROGRESS NOTES
Physical Therapy Daily Treatment Note    Date: 2025  Patient Name: Hilaria Castillo  : 1950   MRN: [id number]  DOInjury: -  DOSx: -  Referring Provider:  Abiodun Yuen MD     Medical Diagnosis:   1. Weakness of both lower extremities          X = TO BE PERFORMED NEXT VISIT  > = PROGRESS TO THIS    S: pt reports still having difficult time when having to turn and move feet.  Shoulders/elbows continue to hurt and feels weak. Always sleep on my back. Still gets frustrated that things are limited.   Increased pain in left elbow and shoulders.   (  Pt goals are to perform steps grab walker and put in back seat and side step to the drivers door.  Than able to drive stop get the walker out to use it ).    O: Discussed anatomy, physiology, body mechanics, principles of loading, and progressive loading/activity.   Time 1100-     Visit 30 Repeat outcome measure at mid point and end.    Pain      ROM      Modalities            Exercise      Nustep   L6/ 5 min End of tx. If time TE         Squats She had marked difficulty arising from lowest point TA   Calf Raises 10-15x  airex  TE   Toe Raises   TA   Marches Holding bar and raising only ipsilateral leg   on R an L  2 x  30 with no RB.   TE   Alt. Sidekicks   TA   Leg press 60# 2 x 29  TE   SL leg press 0# 2 x 10 x, SL     CR Leg press 40#  2 x 20     Sit/Stands See below  Only if time. TA   LAQ 2d HEP TE   Gait training   GT   Sitting rows See below    Marching Gait  NR   Sidestepping,  forward stepping in // bars over 7\" items Holding // bar  step over 7\" kettle bell spread about 12\" 7\" wt. In the // bars 4x.  Then did the same side stepping.   TA   steps 6\" 16x,   SBA/CGA  TE         Car transfer           Curb training   4\" 2x CGA  TA   A:  Tolerated fair.  Pt had increased difficulty when standing up. Gave pt green tubing for mid rows and tube pushing.   Worked in the // bars gait training.  Pt does not move her right foot as far as left. Only takes a half

## 2025-06-17 ENCOUNTER — TREATMENT (OUTPATIENT)
Dept: PHYSICAL THERAPY | Age: 75
End: 2025-06-17
Payer: MEDICARE

## 2025-06-17 DIAGNOSIS — R29.898 WEAKNESS OF BOTH LOWER EXTREMITIES: Primary | ICD-10-CM

## 2025-06-17 DIAGNOSIS — R26.2 DIFFICULTY WALKING: ICD-10-CM

## 2025-06-17 PROCEDURE — 97530 THERAPEUTIC ACTIVITIES: CPT | Performed by: PHYSICAL THERAPIST

## 2025-06-17 PROCEDURE — 97110 THERAPEUTIC EXERCISES: CPT | Performed by: PHYSICAL THERAPIST

## 2025-06-17 NOTE — PROGRESS NOTES
Physical Therapy Daily Treatment Note    Date: 2025  Patient Name: Hilaria Castillo  : 1950   MRN: [id number]  DOInjury: -  DOSx: -  Referring Provider:  Abiodun Yuen MD     Medical Diagnosis:   1. Weakness of both lower extremities          X = TO BE PERFORMED NEXT VISIT  > = PROGRESS TO THIS    S: pt reports having difficulty advancing her R leg and weakness. She is depressed today with a decline she's experiencing the last 10 days  O: Discussed anatomy, physiology, body mechanics, principles of loading, and progressive loading/activity.   Time 4658-7655     Visit 30 Repeat outcome measure at mid point and end.    Pain      ROM      Modalities            Exercise      Nustep   L6/ 5 min End of tx. If time TE         Squats She had marked difficulty arising from lowest point TA   Calf Raises 10-15x  airex  TE   Toe Raises   TA   Marches Holding bar and raising only ipsilateral leg   on R an L  2 x  30 with no RB.   TE   Alt. Sidekicks   TA   Leg press 60# 2 x 29  TE   SL leg press 0# 2 x 10 x, SL     CR Leg press 40#  2 x 20     Sit/Stands See below  Only if time. TA   LAQ 2d HEP TE   Gait training   GT   Sitting rows See below    Marching Gait  NR   Sidestepping,  forward stepping in // bars over 7\" items Holding // bar  step over 7\" kettle bell spread about 12\" 7\" wt. In the // bars 4x.  Then did the same side stepping.   TA   steps  TE   squats      Car transfer           Curb training   4\" 2x CGA  TA   A:  Tolerated fair.  Pt had increased difficulty when standing up. Gave pt green tubing for mid rows and tube pushing.   Worked in the // bars gait training.  Pt does not move her right foot as far as left. Only takes a half step.  When pt is doing side steps in // bars had pt lift feet and no drag.   Attempted to do exs only using Right hand and unable, (possibly due to fear).  ( Pt hasn't been down to her basement for 3 years.  She wants to be able to get down there. Basement steps are almost

## 2025-06-19 ENCOUNTER — TREATMENT (OUTPATIENT)
Dept: PHYSICAL THERAPY | Age: 75
End: 2025-06-19
Payer: MEDICARE

## 2025-06-19 DIAGNOSIS — R29.898 WEAKNESS OF BOTH LOWER EXTREMITIES: Primary | ICD-10-CM

## 2025-06-19 DIAGNOSIS — R26.2 DIFFICULTY WALKING: ICD-10-CM

## 2025-06-19 PROCEDURE — 97530 THERAPEUTIC ACTIVITIES: CPT | Performed by: PHYSICAL THERAPIST

## 2025-06-19 NOTE — PROGRESS NOTES
Physical Therapy Daily Treatment Note    Date: 2025  Patient Name: Hilaria Castillo  : 1950   MRN: [id number]  DOInjury: -  DOSx: -  Referring Provider:  Abiodun Yuen MD     Medical Diagnosis:   1. Weakness of both lower extremities          X = TO BE PERFORMED NEXT VISIT  > = PROGRESS TO THIS    S: pt reports having difficulty advancing her R leg and weakness. She is depressed today with a decline she's experiencing the last 10 days  O: having marked difficulty  Time 6911-0165     Visit 30 Repeat outcome measure at mid point and end.    Pain      ROM      Modalities            Exercise      Nustep   End of tx. If time TE         Squats 2 sets to fatigue attempted to go deeper  20x, 20x She had marked difficulty arising from lowest point TA   Calf Raises 3 x 15  TA   Toe Raises   TA   Marches 3 x 15   TA   Alt. Sidekicks 3 x 15  TA   Leg press  TE   SL leg press     CR Leg press     Sit/Stands Only if time. TA   LAQ 2d HEP TE   Gait training   GT   Sitting rows See below    Marching Gait  NR   Sidestepping,  forward stepping in // bars over 7\" items  TA   steps  TE   squats 3 x 10 at leg press     Car transfer           Curb training   4\" 2x CGA  TA   A:  Tolerated fair.  Hilaria has regressed the past 2 weeks with worsening weakness. She is having difficulty w/ advancing R foot in a somewhat Parkinsonian fashion? She needs the expertise of neurology.  P: Continue with rehab plan work on side stepping in order for pt to put walker in the back of suv and walker around to the front of car sidestepping  William Dean, PT    Treatment Charges: Mins Units   Initial Evaluation     Re-Evaluation     Ther Exercise         TE     Manual Therapy     MT     Ther Activities        TA 40 3   Gait Training          GT     Neuro Re-education NR     Modalities     Non-Billable Service Time     Other     Total Time/Units 40 3

## 2025-06-25 ENCOUNTER — TREATMENT (OUTPATIENT)
Dept: PHYSICAL THERAPY | Age: 75
End: 2025-06-25
Payer: MEDICARE

## 2025-06-25 DIAGNOSIS — R29.898 WEAKNESS OF BOTH LOWER EXTREMITIES: Primary | ICD-10-CM

## 2025-06-25 DIAGNOSIS — R26.2 DIFFICULTY WALKING: ICD-10-CM

## 2025-06-25 PROCEDURE — 97530 THERAPEUTIC ACTIVITIES: CPT | Performed by: PHYSICAL THERAPIST

## 2025-06-25 NOTE — PROGRESS NOTES
Physical Therapy Daily Treatment Note    Date: 2025  Patient Name: Hilaria Castillo  : 1950   MRN: [id number]  DOInjury: -  DOSx: -  Referring Provider:  Abiodun Yuen MD     Medical Diagnosis:   1. Weakness of both lower extremities          X = TO BE PERFORMED NEXT VISIT  > = PROGRESS TO THIS    S: pt reports having difficulty advancing her R leg and weakness. She is depressed today with a decline she's experiencing the last 10 days  O: having marked difficulty    Access Code: [email or web address]  URL: [email or web address]  Date: 2025  Prepared by: William Dean    Exercises  - Standing Marching  - 1 x daily - 7 x weekly - 3 sets - 15-20 reps  - Standing Heel Raise  - 1 x daily - 7 x weekly - 3 sets - 15-20 reps  - Squat with Counter Support  - 1 x daily - 7 x weekly - 2 sets - 10 reps  - Seated Long Arc Quad  - 1 x daily - 7 x weekly - 3 sets - 10 reps - 3 sec hold    Time 2226-0251     Visit 30 Repeat outcome measure at mid point and end.    Pain      ROM      Modalities            Exercise      Nustep   End of tx. If time TE         Squats 2 sets to fatigue attempted to go deeper  20x, 20x She had marked difficulty arising from lowest point TA   Calf Raises 3 x 15  TA   Toe Raises   TA   Marches 3 x 15   TA   Alt. Sidekicks 3 x 15  TA   Leg press  TE   SL leg press     CR Leg press     Sit/Stands Only if time. TA   LAQ 2d HEP TE   Gait training   GT   Sitting rows See below    Marching Gait  NR   Sidestepping,  forward stepping in // bars over 7\" items  TA   steps  TE   squats 3 x 10 at leg press     Car transfer           Curb training   4\" 2x CGA  TA   A:  Tolerated fair.  Hilaria has regressed the past 2 weeks with worsening weakness. She is having difficulty w/ advancing R foot in a somewhat Parkinsonian fashion? She needs the expertise of neurology.  P: Continue with rehab plan work on side stepping in order for pt to put walker in the back of suv and walker around to the front of car

## 2025-06-27 ENCOUNTER — TREATMENT (OUTPATIENT)
Dept: PHYSICAL THERAPY | Age: 75
End: 2025-06-27

## 2025-06-27 DIAGNOSIS — R29.898 WEAKNESS OF BOTH LOWER EXTREMITIES: Primary | ICD-10-CM

## 2025-06-27 NOTE — PROGRESS NOTES
Physical Therapy Daily Treatment Note    Date: 2025  Patient Name: Hilaria Castilol  : 1950   MRN: [id number]  DOInjury: -  DOSx: -  Referring Provider:  Abiodun Yuen MD     Medical Diagnosis:   1. Weakness of both lower extremities          X = TO BE PERFORMED NEXT VISIT  > = PROGRESS TO THIS    S: pt reports no changes .  ( pt reports having difficulty advancing her R leg and weakness. She is depressed  now with a decline she's experiencing  ).   O: having marked difficulty    Access Code: [email or web address]  URL: [email or web address]  Date: 2025  Prepared by: William Dean    Exercises  - Standing Marching  - 1 x daily - 7 x weekly - 3 sets - 15-20 reps  - Standing Heel Raise  - 1 x daily - 7 x weekly - 3 sets - 15-20 reps  - Squat with Counter Support  - 1 x daily - 7 x weekly - 2 sets - 10 reps  - Seated Long Arc Quad  - 1 x daily - 7 x weekly - 3 sets - 10 reps - 3 sec hold    Time 2395-8316     Visit 30 Repeat outcome measure at mid point and end.    Pain      ROM      Modalities            Exercise      Nustep   End of tx. If time TE         Squats 2 sets to fatigue 20x, She had marked difficulty arising from lowest point TA   Calf Raises 3 x 15  TA   Toe Raises   TA   Marches 3 x 15   TA   Alt. Sidekicks  TA   Leg press  TA   SL leg press     CR Leg press     Sit/Stands Only if time. TA   LAQ 3 x 10 bilateral w/ 3 sec hold HEP TE   Gait training   GT   Sitting rows See below    Marching Gait  NR   Sidestepping,  forward stepping in // bars over 7\" items  TA   steps  TE   squats 3 x 10 at leg press     Car transfer           Curb training   4\" 2x CGA  TA   A:  Tolerated fair.  Hilaria has regressed the past 2 weeks with worsening weakness. She is having difficulty w/ advancing R foot in a somewhat Parkinsonian fashion? She needs the expertise of neurology.  P: Continue with rehab plan work on side stepping in order for pt to put walker in the back of suv and walker around to the front of car

## 2025-08-04 ENCOUNTER — OFFICE VISIT (OUTPATIENT)
Age: 75
End: 2025-08-04
Payer: MEDICARE

## 2025-08-04 VITALS
SYSTOLIC BLOOD PRESSURE: 128 MMHG | DIASTOLIC BLOOD PRESSURE: 86 MMHG | TEMPERATURE: 97.4 F | WEIGHT: 182.6 LBS | HEART RATE: 65 BPM | BODY MASS INDEX: 28.66 KG/M2 | HEIGHT: 67 IN

## 2025-08-04 DIAGNOSIS — I10 ESSENTIAL HYPERTENSION: Primary | ICD-10-CM

## 2025-08-04 DIAGNOSIS — E66.09 CLASS 1 OBESITY DUE TO EXCESS CALORIES WITH SERIOUS COMORBIDITY AND BODY MASS INDEX (BMI) OF 30.0 TO 30.9 IN ADULT: ICD-10-CM

## 2025-08-04 DIAGNOSIS — E66.811 CLASS 1 OBESITY DUE TO EXCESS CALORIES WITH SERIOUS COMORBIDITY AND BODY MASS INDEX (BMI) OF 30.0 TO 30.9 IN ADULT: ICD-10-CM

## 2025-08-04 PROCEDURE — 3079F DIAST BP 80-89 MM HG: CPT | Performed by: INTERNAL MEDICINE

## 2025-08-04 PROCEDURE — 1123F ACP DISCUSS/DSCN MKR DOCD: CPT | Performed by: INTERNAL MEDICINE

## 2025-08-04 PROCEDURE — 99214 OFFICE O/P EST MOD 30 MIN: CPT | Performed by: INTERNAL MEDICINE

## 2025-08-04 PROCEDURE — 3074F SYST BP LT 130 MM HG: CPT | Performed by: INTERNAL MEDICINE

## 2025-08-04 RX ORDER — LIDOCAINE 50 MG/G
PATCH TOPICAL
COMMUNITY
Start: 2025-07-19

## (undated) DEVICE — NEEDLE HYPO 25GA L1.5IN BLU POLYPR HUB S STL REG BVL STR

## (undated) DEVICE — Device

## (undated) DEVICE — 3M™ RED DOT™ MONITORING ELECTRODE WITH FOAM TAPE AND STICKY GEL 2560, 50/BAG, 20/CASE, 72/PLT: Brand: RED DOT™

## (undated) DEVICE — TOWEL OR BLUEE 16X26IN ST 8 PACK ORB08 16X26ORTWL

## (undated) DEVICE — TAPE ADH W3INXL10YD WHT COT WVN BK POWERFUL RUB BASE HIGHLY

## (undated) DEVICE — GLASS MEDICINE ST

## (undated) DEVICE — GLOVE ORANGE PI 7 1/2   MSG9075

## (undated) DEVICE — GOWN,SIRUS,FABRNF,XL,20/CS: Brand: MEDLINE

## (undated) DEVICE — BANDAGE ADH W1XL3IN NAT FAB WVN FLX DURABLE N ADH PD SEAL

## (undated) DEVICE — NON-DEHP CATHETER EXTENSION SET, MALE LUER LOCK ADAPTER

## (undated) DEVICE — INSTRUMENT HANDLE KNIFE #3 REUSABLE

## (undated) DEVICE — NEUROSTIMULATOR EXT SM LTWT SGL BTTN H2O RESIST WIRELESS

## (undated) DEVICE — C-ARM: Brand: UNBRANDED

## (undated) DEVICE — Device: Brand: PORTEX

## (undated) DEVICE — 6 ML SYRINGE LUER-LOCK TIP: Brand: MONOJECT

## (undated) DEVICE — GAUZE,SPONGE,4"X4",12PLY,STERILE,LF,2'S: Brand: MEDLINE

## (undated) DEVICE — Z DISCONTINUED APPLICATOR SURG PREP 0.35OZ 2% CHG 70% ISO ALC W/ HI LT

## (undated) DEVICE — APPLICATOR MEDICATED 26 CC SOLUTION HI LT ORNG CHLORAPREP

## (undated) DEVICE — NEEDLE SYR 18GA L1.5IN RED PLAS HUB S STL BLNT FILL W/O

## (undated) DEVICE — ENCORE® LATEX TEXTURED SIZE 6.5, STERILE LATEX POWDER-FREE SURGICAL GLOVE: Brand: ENCORE

## (undated) DEVICE — 3 ML SYRINGE LUER-LOCK TIP: Brand: MONOJECT

## (undated) DEVICE — SYRINGE 20ML LL S/C 50

## (undated) DEVICE — SHEET, T, LAPAROTOMY, STERILE: Brand: MEDLINE

## (undated) DEVICE — NEEDLE HYPO 18GA L1.5IN PNK POLYPR HUB S STL THN WALL FILL

## (undated) DEVICE — DRAPE,REIN 53X77,STERILE: Brand: MEDLINE